# Patient Record
Sex: FEMALE | Race: WHITE | Employment: UNEMPLOYED | URBAN - METROPOLITAN AREA
[De-identification: names, ages, dates, MRNs, and addresses within clinical notes are randomized per-mention and may not be internally consistent; named-entity substitution may affect disease eponyms.]

---

## 2017-04-15 PROBLEM — N30.00 ACUTE CYSTITIS: Status: ACTIVE | Noted: 2017-04-15

## 2017-05-03 PROBLEM — M21.372 BILATERAL FOOT-DROP: Status: ACTIVE | Noted: 2017-05-03

## 2017-05-03 PROBLEM — M21.371 BILATERAL FOOT-DROP: Status: ACTIVE | Noted: 2017-05-03

## 2018-02-28 PROBLEM — A41.9 SEPSIS (HCC): Status: ACTIVE | Noted: 2018-02-28

## 2018-04-04 ENCOUNTER — OFFICE VISIT (OUTPATIENT)
Dept: NEUROLOGY | Age: 63
End: 2018-04-04
Payer: MEDICAID

## 2018-04-04 VITALS
SYSTOLIC BLOOD PRESSURE: 110 MMHG | BODY MASS INDEX: 24.91 KG/M2 | DIASTOLIC BLOOD PRESSURE: 66 MMHG | HEART RATE: 83 BPM | WEIGHT: 155 LBS | HEIGHT: 66 IN | TEMPERATURE: 98.6 F | RESPIRATION RATE: 18 BRPM

## 2018-04-04 DIAGNOSIS — G35 MS (MULTIPLE SCLEROSIS) (HCC): Primary | ICD-10-CM

## 2018-04-04 PROCEDURE — 99215 OFFICE O/P EST HI 40 MIN: CPT | Performed by: PSYCHIATRY & NEUROLOGY

## 2018-04-04 PROCEDURE — 99213 OFFICE O/P EST LOW 20 MIN: CPT | Performed by: PSYCHIATRY & NEUROLOGY

## 2018-04-11 ENCOUNTER — HOSPITAL ENCOUNTER (OUTPATIENT)
Age: 63
Discharge: HOME | End: 2018-04-11
Payer: MEDICAID

## 2018-04-11 DIAGNOSIS — G35 MS (MULTIPLE SCLEROSIS) (HCC): ICD-10-CM

## 2018-04-11 LAB
ALBUMIN SERPL-MCNC: 4 G/DL (ref 3.5–5.2)
ALP BLD-CCNC: 85 U/L (ref 35–104)
ALT SERPL-CCNC: 17 U/L (ref 0–32)
ANION GAP SERPL CALCULATED.3IONS-SCNC: 15 MMOL/L (ref 7–16)
AST SERPL-CCNC: 25 U/L (ref 0–31)
BASOPHILS ABSOLUTE: 0.05 E9/L (ref 0–0.2)
BASOPHILS RELATIVE PERCENT: 0.8 % (ref 0–2)
BILIRUB SERPL-MCNC: 0.3 MG/DL (ref 0–1.2)
BUN BLDV-MCNC: 12 MG/DL (ref 8–23)
CALCIUM SERPL-MCNC: 9.9 MG/DL (ref 8.6–10.2)
CHLORIDE BLD-SCNC: 104 MMOL/L (ref 98–107)
CO2: 21 MMOL/L (ref 22–29)
CREAT SERPL-MCNC: 0.9 MG/DL (ref 0.5–1)
EOSINOPHILS ABSOLUTE: 0.12 E9/L (ref 0.05–0.5)
EOSINOPHILS RELATIVE PERCENT: 2 % (ref 0–6)
GFR AFRICAN AMERICAN: >60
GFR NON-AFRICAN AMERICAN: >60 ML/MIN/1.73
GLUCOSE BLD-MCNC: 82 MG/DL (ref 74–109)
HCT VFR BLD CALC: 41.2 % (ref 34–48)
HEMOGLOBIN: 13 G/DL (ref 11.5–15.5)
IMMATURE GRANULOCYTES #: 0.02 E9/L
IMMATURE GRANULOCYTES %: 0.3 % (ref 0–5)
LYMPHOCYTES ABSOLUTE: 1.2 E9/L (ref 1.5–4)
LYMPHOCYTES RELATIVE PERCENT: 19.9 % (ref 20–42)
MCH RBC QN AUTO: 29.5 PG (ref 26–35)
MCHC RBC AUTO-ENTMCNC: 31.6 % (ref 32–34.5)
MCV RBC AUTO: 93.6 FL (ref 80–99.9)
MONOCYTES ABSOLUTE: 0.53 E9/L (ref 0.1–0.95)
MONOCYTES RELATIVE PERCENT: 8.8 % (ref 2–12)
NEUTROPHILS ABSOLUTE: 4.1 E9/L (ref 1.8–7.3)
NEUTROPHILS RELATIVE PERCENT: 68.2 % (ref 43–80)
PDW BLD-RTO: 13.6 FL (ref 11.5–15)
PLATELET # BLD: 237 E9/L (ref 130–450)
PMV BLD AUTO: 11.1 FL (ref 7–12)
POTASSIUM SERPL-SCNC: 4.2 MMOL/L (ref 3.5–5)
RBC # BLD: 4.4 E12/L (ref 3.5–5.5)
SODIUM BLD-SCNC: 140 MMOL/L (ref 132–146)
TOTAL PROTEIN: 7.6 G/DL (ref 6.4–8.3)
WBC # BLD: 6 E9/L (ref 4.5–11.5)

## 2018-04-11 PROCEDURE — 80053 COMPREHEN METABOLIC PANEL: CPT

## 2018-04-11 PROCEDURE — 80074 ACUTE HEPATITIS PANEL: CPT

## 2018-04-11 PROCEDURE — 36415 COLL VENOUS BLD VENIPUNCTURE: CPT

## 2018-04-11 PROCEDURE — 85025 COMPLETE CBC W/AUTO DIFF WBC: CPT

## 2018-04-12 ENCOUNTER — TELEPHONE (OUTPATIENT)
Dept: PHYSICAL MEDICINE AND REHAB | Age: 63
End: 2018-04-12

## 2018-04-12 DIAGNOSIS — M21.372 BILATERAL FOOT-DROP: ICD-10-CM

## 2018-04-12 DIAGNOSIS — M21.371 BILATERAL FOOT-DROP: ICD-10-CM

## 2018-04-12 DIAGNOSIS — G35 MULTIPLE SCLEROSIS (HCC): Primary | ICD-10-CM

## 2018-04-12 LAB
HAV IGM SER IA-ACNC: NORMAL
HEPATITIS B CORE IGM ANTIBODY: NORMAL
HEPATITIS B SURFACE ANTIGEN INTERPRETATION: NORMAL
HEPATITIS C ANTIBODY INTERPRETATION: NORMAL

## 2018-04-26 RX ORDER — 0.9 % SODIUM CHLORIDE 0.9 %
10 VIAL (ML) INJECTION ONCE
Status: CANCELLED | OUTPATIENT
Start: 2018-04-26 | End: 2018-04-26

## 2018-04-26 RX ORDER — SODIUM CHLORIDE 9 MG/ML
INJECTION, SOLUTION INTRAVENOUS ONCE
Status: CANCELLED | OUTPATIENT
Start: 2018-04-26

## 2018-04-26 RX ORDER — METHYLPREDNISOLONE SODIUM SUCCINATE 125 MG/2ML
125 INJECTION, POWDER, LYOPHILIZED, FOR SOLUTION INTRAMUSCULAR; INTRAVENOUS ONCE
Status: CANCELLED | OUTPATIENT
Start: 2018-04-26 | End: 2018-04-26

## 2018-04-26 RX ORDER — DIPHENHYDRAMINE HYDROCHLORIDE 50 MG/ML
25 INJECTION INTRAMUSCULAR; INTRAVENOUS ONCE
Status: CANCELLED | OUTPATIENT
Start: 2018-04-26

## 2018-04-26 RX ORDER — SODIUM CHLORIDE 0.9 % (FLUSH) 0.9 %
10 SYRINGE (ML) INJECTION PRN
Status: CANCELLED
Start: 2018-04-26

## 2018-04-26 RX ORDER — SODIUM CHLORIDE 9 MG/ML
INJECTION, SOLUTION INTRAVENOUS ONCE
Status: CANCELLED
Start: 2018-04-26 | End: 2018-04-26

## 2018-04-26 RX ORDER — ACETAMINOPHEN 325 MG/1
650 TABLET ORAL ONCE
Status: CANCELLED | OUTPATIENT
Start: 2018-04-26

## 2018-04-26 RX ORDER — DIPHENHYDRAMINE HYDROCHLORIDE 50 MG/ML
50 INJECTION INTRAMUSCULAR; INTRAVENOUS ONCE
Status: CANCELLED | OUTPATIENT
Start: 2018-04-26 | End: 2018-04-26

## 2018-04-26 RX ORDER — METHYLPREDNISOLONE SODIUM SUCCINATE 125 MG/2ML
100 INJECTION, POWDER, LYOPHILIZED, FOR SOLUTION INTRAMUSCULAR; INTRAVENOUS ONCE
Status: CANCELLED | OUTPATIENT
Start: 2018-04-26

## 2018-04-26 RX ORDER — EPINEPHRINE 1 MG/ML
0.3 INJECTION, SOLUTION, CONCENTRATE INTRAVENOUS
Status: CANCELLED | OUTPATIENT
Start: 2018-04-26

## 2018-04-26 RX ORDER — SODIUM CHLORIDE 0.9 % (FLUSH) 0.9 %
10 SYRINGE (ML) INJECTION PRN
Status: CANCELLED | OUTPATIENT
Start: 2018-04-26

## 2018-04-26 RX ORDER — SODIUM CHLORIDE 9 MG/ML
INJECTION, SOLUTION INTRAVENOUS CONTINUOUS
Status: CANCELLED | OUTPATIENT
Start: 2018-04-26

## 2018-04-30 ENCOUNTER — TELEPHONE (OUTPATIENT)
Dept: NEUROLOGY | Age: 63
End: 2018-04-30

## 2018-05-02 ENCOUNTER — HOSPITAL ENCOUNTER (OUTPATIENT)
Dept: INFUSION THERAPY | Age: 63
Setting detail: INFUSION SERIES
End: 2018-05-02
Payer: MEDICAID

## 2018-05-03 ENCOUNTER — HOSPITAL ENCOUNTER (OUTPATIENT)
Dept: INFUSION THERAPY | Age: 63
Setting detail: INFUSION SERIES
Discharge: HOME | End: 2018-05-03
Payer: MEDICAID

## 2018-05-03 VITALS
TEMPERATURE: 97.7 F | RESPIRATION RATE: 16 BRPM | HEIGHT: 66 IN | BODY MASS INDEX: 24.91 KG/M2 | SYSTOLIC BLOOD PRESSURE: 106 MMHG | WEIGHT: 155 LBS | HEART RATE: 66 BPM | DIASTOLIC BLOOD PRESSURE: 57 MMHG

## 2018-05-03 DIAGNOSIS — G35 MULTIPLE SCLEROSIS EXACERBATION (HCC): ICD-10-CM

## 2018-05-03 PROCEDURE — 96366 THER/PROPH/DIAG IV INF ADDON: CPT

## 2018-05-03 PROCEDURE — 6370000000 HC RX 637 (ALT 250 FOR IP): Performed by: PSYCHIATRY & NEUROLOGY

## 2018-05-03 PROCEDURE — 2580000003 HC RX 258: Performed by: PSYCHIATRY & NEUROLOGY

## 2018-05-03 PROCEDURE — 96375 TX/PRO/DX INJ NEW DRUG ADDON: CPT

## 2018-05-03 PROCEDURE — 96374 THER/PROPH/DIAG INJ IV PUSH: CPT

## 2018-05-03 PROCEDURE — 6360000002 HC RX W HCPCS: Performed by: PSYCHIATRY & NEUROLOGY

## 2018-05-03 PROCEDURE — 96365 THER/PROPH/DIAG IV INF INIT: CPT

## 2018-05-03 RX ORDER — METHYLPREDNISOLONE SODIUM SUCCINATE 125 MG/2ML
100 INJECTION, POWDER, LYOPHILIZED, FOR SOLUTION INTRAMUSCULAR; INTRAVENOUS ONCE
Status: CANCELLED | OUTPATIENT
Start: 2018-05-03

## 2018-05-03 RX ORDER — DIPHENHYDRAMINE HYDROCHLORIDE 50 MG/ML
50 INJECTION INTRAMUSCULAR; INTRAVENOUS ONCE
Status: CANCELLED | OUTPATIENT
Start: 2018-05-03 | End: 2018-05-03

## 2018-05-03 RX ORDER — SODIUM CHLORIDE 0.9 % (FLUSH) 0.9 %
10 SYRINGE (ML) INJECTION PRN
Status: DISCONTINUED | OUTPATIENT
Start: 2018-05-03 | End: 2018-05-04 | Stop reason: HOSPADM

## 2018-05-03 RX ORDER — DIPHENHYDRAMINE HYDROCHLORIDE 50 MG/ML
25 INJECTION INTRAMUSCULAR; INTRAVENOUS ONCE
Status: COMPLETED | OUTPATIENT
Start: 2018-05-03 | End: 2018-05-03

## 2018-05-03 RX ORDER — SODIUM CHLORIDE 0.9 % (FLUSH) 0.9 %
10 SYRINGE (ML) INJECTION PRN
Status: CANCELLED | OUTPATIENT
Start: 2018-05-03

## 2018-05-03 RX ORDER — SODIUM CHLORIDE 9 MG/ML
INJECTION, SOLUTION INTRAVENOUS CONTINUOUS
Status: CANCELLED | OUTPATIENT
Start: 2018-05-03

## 2018-05-03 RX ORDER — 0.9 % SODIUM CHLORIDE 0.9 %
10 VIAL (ML) INJECTION ONCE
Status: CANCELLED | OUTPATIENT
Start: 2018-05-03 | End: 2018-05-03

## 2018-05-03 RX ORDER — SODIUM CHLORIDE 0.9 % (FLUSH) 0.9 %
SYRINGE (ML) INJECTION
Status: DISPENSED
Start: 2018-05-03 | End: 2018-05-03

## 2018-05-03 RX ORDER — METHYLPREDNISOLONE SODIUM SUCCINATE 125 MG/2ML
100 INJECTION, POWDER, LYOPHILIZED, FOR SOLUTION INTRAMUSCULAR; INTRAVENOUS ONCE
Status: COMPLETED | OUTPATIENT
Start: 2018-05-03 | End: 2018-05-03

## 2018-05-03 RX ORDER — SODIUM CHLORIDE 9 MG/ML
INJECTION, SOLUTION INTRAVENOUS ONCE
Status: CANCELLED
Start: 2018-05-03 | End: 2018-05-03

## 2018-05-03 RX ORDER — ACETAMINOPHEN 325 MG/1
650 TABLET ORAL ONCE
Status: CANCELLED | OUTPATIENT
Start: 2018-05-03

## 2018-05-03 RX ORDER — DIPHENHYDRAMINE HYDROCHLORIDE 50 MG/ML
25 INJECTION INTRAMUSCULAR; INTRAVENOUS ONCE
Status: CANCELLED | OUTPATIENT
Start: 2018-05-03

## 2018-05-03 RX ORDER — SODIUM CHLORIDE 9 MG/ML
INJECTION, SOLUTION INTRAVENOUS ONCE
Status: CANCELLED | OUTPATIENT
Start: 2018-05-03

## 2018-05-03 RX ORDER — EPINEPHRINE 1 MG/ML
0.3 INJECTION, SOLUTION, CONCENTRATE INTRAVENOUS
Status: CANCELLED | OUTPATIENT
Start: 2018-05-03

## 2018-05-03 RX ORDER — ACETAMINOPHEN 325 MG/1
650 TABLET ORAL ONCE
Status: COMPLETED | OUTPATIENT
Start: 2018-05-03 | End: 2018-05-03

## 2018-05-03 RX ORDER — METHYLPREDNISOLONE SODIUM SUCCINATE 125 MG/2ML
125 INJECTION, POWDER, LYOPHILIZED, FOR SOLUTION INTRAMUSCULAR; INTRAVENOUS ONCE
Status: CANCELLED | OUTPATIENT
Start: 2018-05-03 | End: 2018-05-03

## 2018-05-03 RX ORDER — ACETAMINOPHEN 160 MG/5ML
SOLUTION ORAL
Status: DISPENSED
Start: 2018-05-03 | End: 2018-05-03

## 2018-05-03 RX ORDER — SODIUM CHLORIDE 0.9 % (FLUSH) 0.9 %
10 SYRINGE (ML) INJECTION PRN
Status: CANCELLED
Start: 2018-05-03

## 2018-05-03 RX ADMIN — METHYLPREDNISOLONE SODIUM SUCCINATE 100 MG: 125 INJECTION, POWDER, FOR SOLUTION INTRAMUSCULAR; INTRAVENOUS at 10:00

## 2018-05-03 RX ADMIN — ACETAMINOPHEN 650 MG: 325 TABLET, FILM COATED ORAL at 09:58

## 2018-05-03 RX ADMIN — OCRELIZUMAB 300 MG: 300 INJECTION INTRAVENOUS at 10:38

## 2018-05-03 RX ADMIN — DIPHENHYDRAMINE HYDROCHLORIDE 25 MG: 50 INJECTION INTRAMUSCULAR; INTRAVENOUS at 10:02

## 2018-05-03 ASSESSMENT — PAIN SCALES - GENERAL
PAINLEVEL_OUTOF10: 7
PAINLEVEL_OUTOF10: 7

## 2018-05-03 ASSESSMENT — PAIN DESCRIPTION - PAIN TYPE: TYPE: CHRONIC PAIN

## 2018-05-03 NOTE — PROGRESS NOTES
Discharged to home in stable condition, tolerated infusion well, dc instructions signed and given to pt. iv site dc'd site asymptomatic

## 2018-05-17 ENCOUNTER — HOSPITAL ENCOUNTER (OUTPATIENT)
Dept: INFUSION THERAPY | Age: 63
Setting detail: INFUSION SERIES
Discharge: HOME | End: 2018-05-17
Payer: MEDICAID

## 2018-05-17 VITALS
HEART RATE: 74 BPM | RESPIRATION RATE: 20 BRPM | TEMPERATURE: 98.1 F | OXYGEN SATURATION: 93 % | SYSTOLIC BLOOD PRESSURE: 112 MMHG | DIASTOLIC BLOOD PRESSURE: 64 MMHG

## 2018-05-17 DIAGNOSIS — G35 MULTIPLE SCLEROSIS EXACERBATION (HCC): ICD-10-CM

## 2018-05-17 PROCEDURE — 6360000002 HC RX W HCPCS: Performed by: PSYCHIATRY & NEUROLOGY

## 2018-05-17 PROCEDURE — 96374 THER/PROPH/DIAG INJ IV PUSH: CPT

## 2018-05-17 PROCEDURE — 96375 TX/PRO/DX INJ NEW DRUG ADDON: CPT

## 2018-05-17 PROCEDURE — 96366 THER/PROPH/DIAG IV INF ADDON: CPT

## 2018-05-17 PROCEDURE — 6370000000 HC RX 637 (ALT 250 FOR IP): Performed by: PSYCHIATRY & NEUROLOGY

## 2018-05-17 PROCEDURE — 2580000003 HC RX 258: Performed by: PSYCHIATRY & NEUROLOGY

## 2018-05-17 PROCEDURE — 2580000003 HC RX 258

## 2018-05-17 PROCEDURE — 96365 THER/PROPH/DIAG IV INF INIT: CPT

## 2018-05-17 RX ORDER — SODIUM CHLORIDE 9 MG/ML
INJECTION, SOLUTION INTRAVENOUS CONTINUOUS
Status: CANCELLED | OUTPATIENT
Start: 2018-05-17

## 2018-05-17 RX ORDER — DIPHENHYDRAMINE HYDROCHLORIDE 50 MG/ML
25 INJECTION INTRAMUSCULAR; INTRAVENOUS ONCE
Status: COMPLETED | OUTPATIENT
Start: 2018-05-17 | End: 2018-05-17

## 2018-05-17 RX ORDER — ACETAMINOPHEN 325 MG/1
650 TABLET ORAL ONCE
Status: CANCELLED | OUTPATIENT
Start: 2018-05-17

## 2018-05-17 RX ORDER — SODIUM CHLORIDE 0.9 % (FLUSH) 0.9 %
10 SYRINGE (ML) INJECTION PRN
Status: CANCELLED
Start: 2018-05-17

## 2018-05-17 RX ORDER — SODIUM CHLORIDE 0.9 % (FLUSH) 0.9 %
SYRINGE (ML) INJECTION
Status: COMPLETED
Start: 2018-05-17 | End: 2018-05-17

## 2018-05-17 RX ORDER — SODIUM CHLORIDE 0.9 % (FLUSH) 0.9 %
10 SYRINGE (ML) INJECTION PRN
Status: DISCONTINUED | OUTPATIENT
Start: 2018-05-17 | End: 2018-05-18 | Stop reason: HOSPADM

## 2018-05-17 RX ORDER — METHYLPREDNISOLONE SODIUM SUCCINATE 125 MG/2ML
100 INJECTION, POWDER, LYOPHILIZED, FOR SOLUTION INTRAMUSCULAR; INTRAVENOUS ONCE
Status: CANCELLED | OUTPATIENT
Start: 2018-05-17

## 2018-05-17 RX ORDER — SODIUM CHLORIDE 9 MG/ML
INJECTION, SOLUTION INTRAVENOUS ONCE
Status: DISCONTINUED | OUTPATIENT
Start: 2018-05-17 | End: 2018-05-18 | Stop reason: HOSPADM

## 2018-05-17 RX ORDER — EPINEPHRINE 1 MG/ML
0.3 INJECTION, SOLUTION, CONCENTRATE INTRAVENOUS
Status: CANCELLED | OUTPATIENT
Start: 2018-05-17

## 2018-05-17 RX ORDER — ACETAMINOPHEN 325 MG/1
650 TABLET ORAL ONCE
Status: COMPLETED | OUTPATIENT
Start: 2018-05-17 | End: 2018-05-17

## 2018-05-17 RX ORDER — METHYLPREDNISOLONE SODIUM SUCCINATE 125 MG/2ML
100 INJECTION, POWDER, LYOPHILIZED, FOR SOLUTION INTRAMUSCULAR; INTRAVENOUS ONCE
Status: COMPLETED | OUTPATIENT
Start: 2018-05-17 | End: 2018-05-17

## 2018-05-17 RX ORDER — METHYLPREDNISOLONE SODIUM SUCCINATE 125 MG/2ML
125 INJECTION, POWDER, LYOPHILIZED, FOR SOLUTION INTRAMUSCULAR; INTRAVENOUS ONCE
Status: CANCELLED | OUTPATIENT
Start: 2018-05-17 | End: 2018-05-17

## 2018-05-17 RX ORDER — SODIUM CHLORIDE 0.9 % (FLUSH) 0.9 %
10 SYRINGE (ML) INJECTION PRN
Status: CANCELLED | OUTPATIENT
Start: 2018-05-17

## 2018-05-17 RX ORDER — SODIUM CHLORIDE 9 MG/ML
INJECTION, SOLUTION INTRAVENOUS ONCE
Status: CANCELLED | OUTPATIENT
Start: 2018-05-17

## 2018-05-17 RX ORDER — 0.9 % SODIUM CHLORIDE 0.9 %
10 VIAL (ML) INJECTION ONCE
Status: CANCELLED | OUTPATIENT
Start: 2018-05-17 | End: 2018-05-17

## 2018-05-17 RX ORDER — SODIUM CHLORIDE 9 MG/ML
INJECTION, SOLUTION INTRAVENOUS ONCE
Status: CANCELLED
Start: 2018-05-17 | End: 2018-05-17

## 2018-05-17 RX ORDER — DIPHENHYDRAMINE HYDROCHLORIDE 50 MG/ML
25 INJECTION INTRAMUSCULAR; INTRAVENOUS ONCE
Status: CANCELLED | OUTPATIENT
Start: 2018-05-17

## 2018-05-17 RX ORDER — DIPHENHYDRAMINE HYDROCHLORIDE 50 MG/ML
50 INJECTION INTRAMUSCULAR; INTRAVENOUS ONCE
Status: CANCELLED | OUTPATIENT
Start: 2018-05-17 | End: 2018-05-17

## 2018-05-17 RX ADMIN — METHYLPREDNISOLONE SODIUM SUCCINATE 100 MG: 125 INJECTION, POWDER, FOR SOLUTION INTRAMUSCULAR; INTRAVENOUS at 10:30

## 2018-05-17 RX ADMIN — Medication 10 ML: at 10:16

## 2018-05-17 RX ADMIN — Medication 10 ML: at 10:29

## 2018-05-17 RX ADMIN — ACETAMINOPHEN 650 MG: 325 TABLET, FILM COATED ORAL at 10:13

## 2018-05-17 RX ADMIN — OCRELIZUMAB 300 MG: 300 INJECTION INTRAVENOUS at 11:06

## 2018-05-17 RX ADMIN — DIPHENHYDRAMINE HYDROCHLORIDE 25 MG: 50 INJECTION, SOLUTION INTRAMUSCULAR; INTRAVENOUS at 10:20

## 2018-05-17 ASSESSMENT — PAIN SCALES - GENERAL: PAINLEVEL_OUTOF10: 0

## 2018-06-13 ENCOUNTER — OFFICE VISIT (OUTPATIENT)
Dept: NEUROLOGY | Age: 63
End: 2018-06-13
Payer: MEDICAID

## 2018-06-13 VITALS
HEART RATE: 97 BPM | DIASTOLIC BLOOD PRESSURE: 75 MMHG | SYSTOLIC BLOOD PRESSURE: 119 MMHG | BODY MASS INDEX: 24.91 KG/M2 | TEMPERATURE: 98.1 F | WEIGHT: 155 LBS | RESPIRATION RATE: 18 BRPM | HEIGHT: 66 IN

## 2018-06-13 DIAGNOSIS — G35 MS (MULTIPLE SCLEROSIS) (HCC): Primary | ICD-10-CM

## 2018-06-13 PROCEDURE — 99215 OFFICE O/P EST HI 40 MIN: CPT | Performed by: PSYCHIATRY & NEUROLOGY

## 2018-08-09 ENCOUNTER — HOSPITAL ENCOUNTER (INPATIENT)
Age: 63
LOS: 5 days | Discharge: NURSING, REHAB OR LONG-TERM CARE | DRG: 466 | End: 2018-08-14
Attending: EMERGENCY MEDICINE | Admitting: FAMILY MEDICINE
Payer: MEDICAID

## 2018-08-09 ENCOUNTER — APPOINTMENT (OUTPATIENT)
Dept: GENERAL RADIOLOGY | Age: 63
DRG: 466 | End: 2018-08-09
Payer: MEDICAID

## 2018-08-09 ENCOUNTER — APPOINTMENT (OUTPATIENT)
Dept: CT IMAGING | Age: 63
DRG: 466 | End: 2018-08-09
Payer: MEDICAID

## 2018-08-09 DIAGNOSIS — T83.511A URINARY TRACT INFECTION ASSOCIATED WITH INDWELLING URETHRAL CATHETER, INITIAL ENCOUNTER (HCC): ICD-10-CM

## 2018-08-09 DIAGNOSIS — R41.0 DISORIENTATION: Primary | ICD-10-CM

## 2018-08-09 DIAGNOSIS — N39.0 URINARY TRACT INFECTION ASSOCIATED WITH INDWELLING URETHRAL CATHETER, INITIAL ENCOUNTER (HCC): ICD-10-CM

## 2018-08-09 PROBLEM — R41.82 ALTERED MENTAL STATUS: Status: ACTIVE | Noted: 2018-08-09

## 2018-08-09 LAB
ALBUMIN SERPL-MCNC: 3.8 G/DL (ref 3.5–5.2)
ALP BLD-CCNC: 111 U/L (ref 35–104)
ALT SERPL-CCNC: 13 U/L (ref 0–32)
ANION GAP SERPL CALCULATED.3IONS-SCNC: 16 MMOL/L (ref 7–16)
AST SERPL-CCNC: 35 U/L (ref 0–31)
BACTERIA: ABNORMAL /HPF
BILIRUB SERPL-MCNC: 0.4 MG/DL (ref 0–1.2)
BILIRUBIN URINE: NEGATIVE
BLOOD, URINE: ABNORMAL
BUN BLDV-MCNC: 12 MG/DL (ref 8–23)
CALCIUM SERPL-MCNC: 9.8 MG/DL (ref 8.6–10.2)
CHLORIDE BLD-SCNC: 99 MMOL/L (ref 98–107)
CLARITY: ABNORMAL
CO2: 22 MMOL/L (ref 22–29)
COLOR: YELLOW
CREAT SERPL-MCNC: 0.8 MG/DL (ref 0.5–1)
EPITHELIAL CELLS, UA: ABNORMAL /HPF
GFR AFRICAN AMERICAN: >60
GFR NON-AFRICAN AMERICAN: >60 ML/MIN/1.73
GLUCOSE BLD-MCNC: 136 MG/DL (ref 74–109)
GLUCOSE URINE: NEGATIVE MG/DL
HCT VFR BLD CALC: 42.8 % (ref 34–48)
HEMOGLOBIN: 14.2 G/DL (ref 11.5–15.5)
KETONES, URINE: NEGATIVE MG/DL
LACTIC ACID: 2.9 MMOL/L (ref 0.5–2.2)
LEUKOCYTE ESTERASE, URINE: ABNORMAL
LIPASE: 15 U/L (ref 13–60)
MCH RBC QN AUTO: 28.6 PG (ref 26–35)
MCHC RBC AUTO-ENTMCNC: 33.2 % (ref 32–34.5)
MCV RBC AUTO: 86.3 FL (ref 80–99.9)
NITRITE, URINE: NEGATIVE
PDW BLD-RTO: 13.2 FL (ref 11.5–15)
PH UA: 6 (ref 5–9)
PLATELET # BLD: 506 E9/L (ref 130–450)
PMV BLD AUTO: 10 FL (ref 7–12)
POTASSIUM SERPL-SCNC: 5.4 MMOL/L (ref 3.5–5)
PROTEIN UA: 30 MG/DL
RBC # BLD: 4.96 E12/L (ref 3.5–5.5)
RBC UA: ABNORMAL /HPF (ref 0–2)
SODIUM BLD-SCNC: 137 MMOL/L (ref 132–146)
SPECIFIC GRAVITY UA: 1.02 (ref 1–1.03)
TOTAL PROTEIN: 8.3 G/DL (ref 6.4–8.3)
TSH SERPL DL<=0.05 MIU/L-ACNC: 1.29 UIU/ML (ref 0.27–4.2)
UROBILINOGEN, URINE: 0.2 E.U./DL
WBC # BLD: 15.3 E9/L (ref 4.5–11.5)
WBC UA: ABNORMAL /HPF (ref 0–5)

## 2018-08-09 PROCEDURE — 6370000000 HC RX 637 (ALT 250 FOR IP): Performed by: FAMILY MEDICINE

## 2018-08-09 PROCEDURE — 84443 ASSAY THYROID STIM HORMONE: CPT

## 2018-08-09 PROCEDURE — 71045 X-RAY EXAM CHEST 1 VIEW: CPT

## 2018-08-09 PROCEDURE — 2580000003 HC RX 258: Performed by: EMERGENCY MEDICINE

## 2018-08-09 PROCEDURE — 80053 COMPREHEN METABOLIC PANEL: CPT

## 2018-08-09 PROCEDURE — 6360000002 HC RX W HCPCS: Performed by: EMERGENCY MEDICINE

## 2018-08-09 PROCEDURE — 87088 URINE BACTERIA CULTURE: CPT

## 2018-08-09 PROCEDURE — 36415 COLL VENOUS BLD VENIPUNCTURE: CPT

## 2018-08-09 PROCEDURE — 83690 ASSAY OF LIPASE: CPT

## 2018-08-09 PROCEDURE — 87186 SC STD MICRODIL/AGAR DIL: CPT

## 2018-08-09 PROCEDURE — 2060000000 HC ICU INTERMEDIATE R&B

## 2018-08-09 PROCEDURE — 70450 CT HEAD/BRAIN W/O DYE: CPT

## 2018-08-09 PROCEDURE — 87040 BLOOD CULTURE FOR BACTERIA: CPT

## 2018-08-09 PROCEDURE — 96365 THER/PROPH/DIAG IV INF INIT: CPT

## 2018-08-09 PROCEDURE — 81001 URINALYSIS AUTO W/SCOPE: CPT

## 2018-08-09 PROCEDURE — 83605 ASSAY OF LACTIC ACID: CPT

## 2018-08-09 PROCEDURE — 85027 COMPLETE CBC AUTOMATED: CPT

## 2018-08-09 PROCEDURE — 96366 THER/PROPH/DIAG IV INF ADDON: CPT

## 2018-08-09 PROCEDURE — 99284 EMERGENCY DEPT VISIT MOD MDM: CPT

## 2018-08-09 RX ORDER — SODIUM CHLORIDE 0.9 % (FLUSH) 0.9 %
10 SYRINGE (ML) INJECTION PRN
Status: DISCONTINUED | OUTPATIENT
Start: 2018-08-09 | End: 2018-08-14 | Stop reason: HOSPADM

## 2018-08-09 RX ORDER — LEVOTHYROXINE SODIUM 88 UG/1
88 TABLET ORAL DAILY
Status: DISCONTINUED | OUTPATIENT
Start: 2018-08-10 | End: 2018-08-14 | Stop reason: HOSPADM

## 2018-08-09 RX ORDER — FLUOXETINE HYDROCHLORIDE 20 MG/1
20 CAPSULE ORAL 2 TIMES DAILY
Status: DISCONTINUED | OUTPATIENT
Start: 2018-08-09 | End: 2018-08-14 | Stop reason: HOSPADM

## 2018-08-09 RX ORDER — SODIUM CHLORIDE 0.9 % (FLUSH) 0.9 %
10 SYRINGE (ML) INJECTION EVERY 12 HOURS SCHEDULED
Status: DISCONTINUED | OUTPATIENT
Start: 2018-08-09 | End: 2018-08-14 | Stop reason: HOSPADM

## 2018-08-09 RX ORDER — TRAMADOL HYDROCHLORIDE 50 MG/1
50 TABLET ORAL EVERY 6 HOURS PRN
COMMUNITY
End: 2018-12-12 | Stop reason: ALTCHOICE

## 2018-08-09 RX ORDER — ACETAMINOPHEN 325 MG/1
650 TABLET ORAL EVERY 4 HOURS PRN
Status: DISCONTINUED | OUTPATIENT
Start: 2018-08-09 | End: 2018-08-14 | Stop reason: HOSPADM

## 2018-08-09 RX ORDER — SODIUM CHLORIDE 0.9 % (FLUSH) 0.9 %
10 SYRINGE (ML) INJECTION PRN
Status: DISCONTINUED | OUTPATIENT
Start: 2018-08-09 | End: 2018-08-09 | Stop reason: SDUPTHER

## 2018-08-09 RX ORDER — SODIUM CHLORIDE 0.9 % (FLUSH) 0.9 %
10 SYRINGE (ML) INJECTION EVERY 12 HOURS SCHEDULED
Status: DISCONTINUED | OUTPATIENT
Start: 2018-08-09 | End: 2018-08-09 | Stop reason: SDUPTHER

## 2018-08-09 RX ORDER — ALBUTEROL SULFATE 90 UG/1
2 AEROSOL, METERED RESPIRATORY (INHALATION) EVERY 6 HOURS PRN
Status: DISCONTINUED | OUTPATIENT
Start: 2018-08-09 | End: 2018-08-14 | Stop reason: HOSPADM

## 2018-08-09 RX ORDER — ONDANSETRON 2 MG/ML
4 INJECTION INTRAMUSCULAR; INTRAVENOUS EVERY 6 HOURS PRN
Status: DISCONTINUED | OUTPATIENT
Start: 2018-08-09 | End: 2018-08-14 | Stop reason: HOSPADM

## 2018-08-09 RX ORDER — 0.9 % SODIUM CHLORIDE 0.9 %
1000 INTRAVENOUS SOLUTION INTRAVENOUS ONCE
Status: COMPLETED | OUTPATIENT
Start: 2018-08-09 | End: 2018-08-09

## 2018-08-09 RX ORDER — AMLODIPINE BESYLATE 5 MG/1
5 TABLET ORAL DAILY
Status: DISCONTINUED | OUTPATIENT
Start: 2018-08-10 | End: 2018-08-14 | Stop reason: HOSPADM

## 2018-08-09 RX ORDER — GABAPENTIN 300 MG/1
300 CAPSULE ORAL 3 TIMES DAILY
Status: DISCONTINUED | OUTPATIENT
Start: 2018-08-09 | End: 2018-08-14 | Stop reason: HOSPADM

## 2018-08-09 RX ORDER — CLONIDINE HYDROCHLORIDE 0.2 MG/1
0.2 TABLET ORAL EVERY 6 HOURS PRN
Status: DISCONTINUED | OUTPATIENT
Start: 2018-08-09 | End: 2018-08-14 | Stop reason: HOSPADM

## 2018-08-09 RX ORDER — DIAZEPAM 2 MG/1
2 TABLET ORAL EVERY 12 HOURS PRN
Status: DISCONTINUED | OUTPATIENT
Start: 2018-08-09 | End: 2018-08-14 | Stop reason: HOSPADM

## 2018-08-09 RX ADMIN — GENTAMICIN SULFATE 70 MG: 40 INJECTION, SOLUTION INTRAMUSCULAR; INTRAVENOUS at 17:42

## 2018-08-09 RX ADMIN — FLUOXETINE HYDROCHLORIDE 20 MG: 20 CAPSULE ORAL at 23:06

## 2018-08-09 RX ADMIN — SODIUM CHLORIDE 1000 ML: 9 INJECTION, SOLUTION INTRAVENOUS at 13:00

## 2018-08-09 RX ADMIN — GABAPENTIN 300 MG: 300 CAPSULE ORAL at 23:06

## 2018-08-09 ASSESSMENT — PAIN SCALES - GENERAL
PAINLEVEL_OUTOF10: 0
PAINLEVEL_OUTOF10: 0
PAINLEVEL_OUTOF10: 4

## 2018-08-09 ASSESSMENT — PAIN DESCRIPTION - LOCATION: LOCATION: NECK

## 2018-08-09 ASSESSMENT — PAIN DESCRIPTION - PAIN TYPE: TYPE: ACUTE PAIN

## 2018-08-09 NOTE — ED PROVIDER NOTES
63-year-old female presenting from home with 3 days of confusion, fatigue, questionable altered mental status. She is awake and appears alert but is not oriented at this time. Cannot provide history, cannot answer simple questions. She is not in any distress, moving all extremities. She is in a wheelchair at baseline but does move her legs and is able to pivot according to family. Upon arrival she had 1500 mL of urine that came out with her catheter, she has a chronic indwelling but the initial 750 was in the bag. This is emptied and the bag filled up very quickly with a noted large amount of urine. Patient has no complaints at this time. Review of Systems   Unable to perform ROS: Mental status change       Physical Exam   Constitutional: She appears well-developed and well-nourished. No distress. HENT:   Head: Normocephalic and atraumatic. Eyes: Conjunctivae are normal. Pupils are equal, round, and reactive to light. Neck: Normal range of motion. No thyromegaly present. Cardiovascular: Normal rate and regular rhythm. Murmur heard. Pulmonary/Chest: Effort normal and breath sounds normal. No respiratory distress. Abdominal: Soft. She exhibits no distension. There is no tenderness. There is no rebound and no guarding. Musculoskeletal: She exhibits no edema or tenderness. Chronic problem with her legs, thin, and distal pulses intact, generally weak equally   Neurological: She is alert. No cranial nerve deficit. Coordination normal.   Skin: Skin is warm and dry. No erythema. Psychiatric: She has a normal mood and affect. Procedures    MDM         EKG: Sinus tachycardia, rate 107, normal axis, no ST elevations or depressions, no T-wave abnormalities, rightward axis.         --------------------------------------------- PAST HISTORY ---------------------------------------------  Past Medical History:  has a past medical history of Anxiety; Depression;  Hypothyroidism; [facility] and Unspecified cerebral artery occlusion with cerebral infarction. Past Surgical History:  has a past surgical history that includes Hysterectomy (2001); Dental surgery; and Colon surgery (2001). Social History:  reports that she has never smoked. She has never used smokeless tobacco. She reports that she does not drink alcohol or use drugs. Family History: family history includes Cancer in her mother; Diabetes in her father; Heart Disease in her father. The patients home medications have been reviewed.     Allergies: Pcn [penicillins]    -------------------------------------------------- RESULTS -------------------------------------------------    LABS:  Results for orders placed or performed during the hospital encounter of 08/09/18   CBC   Result Value Ref Range    WBC 15.3 (H) 4.5 - 11.5 E9/L    RBC 4.96 3.50 - 5.50 E12/L    Hemoglobin 14.2 11.5 - 15.5 g/dL    Hematocrit 42.8 34.0 - 48.0 %    MCV 86.3 80.0 - 99.9 fL    MCH 28.6 26.0 - 35.0 pg    MCHC 33.2 32.0 - 34.5 %    RDW 13.2 11.5 - 15.0 fL    Platelets 646 (H) 356 - 450 E9/L    MPV 10.0 7.0 - 12.0 fL   Comprehensive Metabolic Panel   Result Value Ref Range    Sodium 137 132 - 146 mmol/L    Potassium 5.4 (H) 3.5 - 5.0 mmol/L    Chloride 99 98 - 107 mmol/L    CO2 22 22 - 29 mmol/L    Anion Gap 16 7 - 16 mmol/L    Glucose 136 (H) 74 - 109 mg/dL    BUN 12 8 - 23 mg/dL    CREATININE 0.8 0.5 - 1.0 mg/dL    GFR Non-African American >60 >=60 mL/min/1.73    GFR African American >60     Calcium 9.8 8.6 - 10.2 mg/dL    Total Protein 8.3 6.4 - 8.3 g/dL    Alb 3.8 3.5 - 5.2 g/dL    Total Bilirubin 0.4 0.0 - 1.2 mg/dL    Alkaline Phosphatase 111 (H) 35 - 104 U/L    ALT 13 0 - 32 U/L    AST 35 (H) 0 - 31 U/L   Lactic Acid, Plasma   Result Value Ref Range    Lactic Acid 2.9 (H) 0.5 - 2.2 mmol/L   Urinalysis   Result Value Ref Range    Color, UA Yellow Straw/Yellow    Clarity, UA SLCLOUDY Clear    Glucose, Ur Negative Negative mg/dL Bilirubin Urine Negative Negative    Ketones, Urine Negative Negative mg/dL    Specific Gravity, UA 1.020 1.005 - 1.030    Blood, Urine MODERATE (A) Negative    pH, UA 6.0 5.0 - 9.0    Protein, UA 30 (A) Negative mg/dL    Urobilinogen, Urine 0.2 <2.0 E.U./dL    Nitrite, Urine Negative Negative    Leukocyte Esterase, Urine LARGE (A) Negative   Lipase   Result Value Ref Range    Lipase 15 13 - 60 U/L   TSH without Reflex   Result Value Ref Range    TSH 1.290 0.270 - 4.200 uIU/mL   Microscopic Urinalysis   Result Value Ref Range    WBC, UA PACKED 0 - 5 /HPF    RBC, UA 2-5 0 - 2 /HPF    Epi Cells RARE /HPF    Bacteria, UA MANY (A) /HPF       RADIOLOGY:  CT Head WO Contrast   Final Result   1. Age-related atrophic changes without evidence of acute intracranial   pathology. 2. Moderate to severe diffuse periventricular and deep subcortical   low-attenuation similar to previous study, representing underlying   demyelinating process and chronic microvascular ischemic disease. If there is concern for CVA, MRI is more sensitive for the detection   of early stages of acute ischemic infarct. XR CHEST PORTABLE   Final Result      1. No acute pleuroparenchymal disease.                      ------------------------- NURSING NOTES AND VITALS REVIEWED ---------------------------  Date / Time Roomed:  8/9/2018 12:18 PM  ED Bed Assignment:  07/07    The nursing notes within the ED encounter and vital signs as below have been reviewed.      Patient Vitals for the past 24 hrs:   BP Temp Temp src Pulse Resp SpO2 Height Weight   08/09/18 1813 (!) 161/90 - - 109 18 - - -   08/09/18 1533 (!) 149/109 - - 110 16 97 % - -   08/09/18 1331 (!) 159/114 - - 108 16 96 % - -   08/09/18 1226 (!) 157/110 97.7 °F (36.5 °C) Temporal 108 18 96 % 5' 6\" (1.676 m) 155 lb (70.3 kg)       Oxygen Saturation Interpretation: Normal    ------------------------------------------ PROGRESS NOTES

## 2018-08-09 NOTE — ED NOTES
Dr. Slade Rodriguez updated on current vital signs. No new orders. Patient's son at the bedside and awaiting to talk w Dr. Slade Rodriguez.      Donnie Saucedo RN  08/09/18 2542

## 2018-08-10 LAB
ALBUMIN SERPL-MCNC: 3.3 G/DL (ref 3.5–5.2)
ALP BLD-CCNC: 91 U/L (ref 35–104)
ALT SERPL-CCNC: 9 U/L (ref 0–32)
ANION GAP SERPL CALCULATED.3IONS-SCNC: 14 MMOL/L (ref 7–16)
AST SERPL-CCNC: 10 U/L (ref 0–31)
BASOPHILS ABSOLUTE: 0.07 E9/L (ref 0–0.2)
BASOPHILS RELATIVE PERCENT: 0.7 % (ref 0–2)
BILIRUB SERPL-MCNC: 0.5 MG/DL (ref 0–1.2)
BUN BLDV-MCNC: 12 MG/DL (ref 8–23)
CALCIUM SERPL-MCNC: 8.6 MG/DL (ref 8.6–10.2)
CHLORIDE BLD-SCNC: 103 MMOL/L (ref 98–107)
CO2: 24 MMOL/L (ref 22–29)
CREAT SERPL-MCNC: 0.9 MG/DL (ref 0.5–1)
EOSINOPHILS ABSOLUTE: 0.08 E9/L (ref 0.05–0.5)
EOSINOPHILS RELATIVE PERCENT: 0.8 % (ref 0–6)
GFR AFRICAN AMERICAN: >60
GFR NON-AFRICAN AMERICAN: >60 ML/MIN/1.73
GLUCOSE BLD-MCNC: 103 MG/DL (ref 74–109)
HCT VFR BLD CALC: 36.3 % (ref 34–48)
HEMOGLOBIN: 12.3 G/DL (ref 11.5–15.5)
IMMATURE GRANULOCYTES #: 0.02 E9/L
IMMATURE GRANULOCYTES %: 0.2 % (ref 0–5)
LACTIC ACID: 0.8 MMOL/L (ref 0.5–2.2)
LYMPHOCYTES ABSOLUTE: 1.18 E9/L (ref 1.5–4)
LYMPHOCYTES RELATIVE PERCENT: 11.9 % (ref 20–42)
MAGNESIUM: 2.1 MG/DL (ref 1.6–2.6)
MCH RBC QN AUTO: 29.3 PG (ref 26–35)
MCHC RBC AUTO-ENTMCNC: 33.9 % (ref 32–34.5)
MCV RBC AUTO: 86.4 FL (ref 80–99.9)
MONOCYTES ABSOLUTE: 0.84 E9/L (ref 0.1–0.95)
MONOCYTES RELATIVE PERCENT: 8.5 % (ref 2–12)
NEUTROPHILS ABSOLUTE: 7.71 E9/L (ref 1.8–7.3)
NEUTROPHILS RELATIVE PERCENT: 77.9 % (ref 43–80)
PDW BLD-RTO: 13.8 FL (ref 11.5–15)
PLATELET # BLD: 440 E9/L (ref 130–450)
PMV BLD AUTO: 10.1 FL (ref 7–12)
POTASSIUM REFLEX MAGNESIUM: 3 MMOL/L (ref 3.5–5)
RBC # BLD: 4.2 E12/L (ref 3.5–5.5)
SODIUM BLD-SCNC: 141 MMOL/L (ref 132–146)
TOTAL PROTEIN: 6.3 G/DL (ref 6.4–8.3)
WBC # BLD: 9.9 E9/L (ref 4.5–11.5)

## 2018-08-10 PROCEDURE — 2700000000 HC OXYGEN THERAPY PER DAY

## 2018-08-10 PROCEDURE — 36415 COLL VENOUS BLD VENIPUNCTURE: CPT

## 2018-08-10 PROCEDURE — G8987 SELF CARE CURRENT STATUS: HCPCS

## 2018-08-10 PROCEDURE — 97165 OT EVAL LOW COMPLEX 30 MIN: CPT

## 2018-08-10 PROCEDURE — 2060000000 HC ICU INTERMEDIATE R&B

## 2018-08-10 PROCEDURE — 80053 COMPREHEN METABOLIC PANEL: CPT

## 2018-08-10 PROCEDURE — 83605 ASSAY OF LACTIC ACID: CPT

## 2018-08-10 PROCEDURE — 99222 1ST HOSP IP/OBS MODERATE 55: CPT | Performed by: FAMILY MEDICINE

## 2018-08-10 PROCEDURE — 6360000002 HC RX W HCPCS: Performed by: FAMILY MEDICINE

## 2018-08-10 PROCEDURE — 6370000000 HC RX 637 (ALT 250 FOR IP): Performed by: FAMILY MEDICINE

## 2018-08-10 PROCEDURE — 83735 ASSAY OF MAGNESIUM: CPT

## 2018-08-10 PROCEDURE — 2580000003 HC RX 258: Performed by: FAMILY MEDICINE

## 2018-08-10 PROCEDURE — 99253 IP/OBS CNSLTJ NEW/EST LOW 45: CPT | Performed by: PSYCHIATRY & NEUROLOGY

## 2018-08-10 PROCEDURE — G8988 SELF CARE GOAL STATUS: HCPCS

## 2018-08-10 PROCEDURE — 85025 COMPLETE CBC W/AUTO DIFF WBC: CPT

## 2018-08-10 PROCEDURE — 97530 THERAPEUTIC ACTIVITIES: CPT

## 2018-08-10 RX ORDER — POTASSIUM BICARBONATE 25 MEQ/1
25 TABLET, EFFERVESCENT ORAL 2 TIMES DAILY
Status: DISCONTINUED | OUTPATIENT
Start: 2018-08-10 | End: 2018-08-14 | Stop reason: HOSPADM

## 2018-08-10 RX ADMIN — Medication 10 ML: at 20:13

## 2018-08-10 RX ADMIN — ENOXAPARIN SODIUM 40 MG: 100 INJECTION SUBCUTANEOUS at 08:18

## 2018-08-10 RX ADMIN — FLUOXETINE HYDROCHLORIDE 20 MG: 20 CAPSULE ORAL at 20:52

## 2018-08-10 RX ADMIN — AMLODIPINE BESYLATE 5 MG: 5 TABLET ORAL at 08:18

## 2018-08-10 RX ADMIN — Medication 10 ML: at 08:18

## 2018-08-10 RX ADMIN — GABAPENTIN 300 MG: 300 CAPSULE ORAL at 14:24

## 2018-08-10 RX ADMIN — GABAPENTIN 300 MG: 300 CAPSULE ORAL at 20:52

## 2018-08-10 RX ADMIN — POTASSIUM BICARBONATE 25 MEQ: 25 TABLET, EFFERVESCENT ORAL at 11:25

## 2018-08-10 RX ADMIN — POTASSIUM BICARBONATE 25 MEQ: 25 TABLET, EFFERVESCENT ORAL at 20:52

## 2018-08-10 RX ADMIN — CEFTRIAXONE SODIUM 1 G: 1 INJECTION, POWDER, FOR SOLUTION INTRAMUSCULAR; INTRAVENOUS at 20:12

## 2018-08-10 RX ADMIN — LEVOTHYROXINE SODIUM 88 MCG: 88 TABLET ORAL at 06:27

## 2018-08-10 RX ADMIN — Medication 10 ML: at 06:27

## 2018-08-10 RX ADMIN — GABAPENTIN 300 MG: 300 CAPSULE ORAL at 08:18

## 2018-08-10 RX ADMIN — FLUOXETINE HYDROCHLORIDE 20 MG: 20 CAPSULE ORAL at 08:18

## 2018-08-10 ASSESSMENT — PAIN SCALES - GENERAL
PAINLEVEL_OUTOF10: 0

## 2018-08-10 NOTE — H&P
Alejandro Tucker  8/9/2018 12:18 PM     1955      63 y.o. Chief Complaint   Patient presents with    Fatigue     x3 days. BGL per EMS. Speech is clear. Delayed responses when asking questions. History of Present Illness:    Alejandro Tucker is a 63 y.o. with hx of MS presents to ER with decreased mental status. Had similar admission in march with UTI  However that urine did not grow organism. Was given 1 dose of Gent in er. Will ask ID input and neuro input for worsening MS    She is confused does not know why she is here. Denies any complaints. Ct head shows mod to severe diffuse periventricular and deep sucortical low attenuation old. representing underlying   demyelinating process and chronic microvascular ischemic disease     Urine + blood, large LE  Nitrites neg wbc 15.3, glucose 136,  Lactic acid  2.9        Prior to Admission medications    Medication Sig Start Date End Date Taking? Authorizing Provider   traMADol (ULTRAM) 50 MG tablet Take 50 mg by mouth every 6 hours as needed for Pain. .   Yes Historical Provider, MD   levothyroxine (SYNTHROID) 88 MCG tablet Take 88 mcg by mouth Daily   Yes Historical Provider, MD   albuterol sulfate HFA (PROAIR HFA) 108 (90 Base) MCG/ACT inhaler Inhale 2 puffs into the lungs every 6 hours as needed for Wheezing 9/18/17 8/9/18 Yes VERENA Barillas - CNP   amLODIPine (NORVASC) 5 MG tablet Take 1 tablet by mouth daily 5/17/17  Yes Raul Munoz MD   diazepam (VALIUM) 2 MG tablet Take 2 mg by mouth every 12 hours as needed for Anxiety   Yes Historical Provider, MD   gabapentin (NEURONTIN) 300 MG capsule Take 1 capsule by mouth 3 times daily 10/30/15  Yes Christo Velasquez MD   FLUoxetine (PROZAC) 20 MG capsule Take 1 capsule by mouth 2 times daily 10/30/15  Yes Christo Velasquez MD   cloNIDine (CATAPRES) 0.2 MG tablet Take 1 tablet by mouth every 6 hours as needed (EABJYTPR>529 or HJDEXQPBJ>389) 9/64/02   Raul Munoz MD       Current Facility-Administered Medications   Medication Dose Route Frequency Provider Last Rate Last Dose    acetaminophen (TYLENOL) tablet 650 mg  650 mg Oral Q4H PRN Lorena Grimm MD        enoxaparin (LOVENOX) injection 40 mg  40 mg Subcutaneous Daily Lorena Grimm MD        albuterol sulfate  (90 Base) MCG/ACT inhaler 2 puff  2 puff Inhalation Q6H PRN Lorena Grimm MD        amLODIPine (NORVASC) tablet 5 mg  5 mg Oral Daily Lorena Grimm MD        cloNIDine (CATAPRES) tablet 0.2 mg  0.2 mg Oral Q6H PRN Lorena Grimm MD        diazepam (VALIUM) tablet 2 mg  2 mg Oral Q12H PRN Lorena Grimm MD        FLUoxetine (PROZAC) capsule 20 mg  20 mg Oral BID Lorena Grimm MD   20 mg at 08/09/18 2306    gabapentin (NEURONTIN) capsule 300 mg  300 mg Oral TID Lorena Grimm MD   300 mg at 08/09/18 2306    levothyroxine (SYNTHROID) tablet 88 mcg  88 mcg Oral Daily Lorena Grimm MD   88 mcg at 08/10/18 7845    sodium chloride flush 0.9 % injection 10 mL  10 mL Intravenous 2 times per day Lorena Grimm MD   10 mL at 08/10/18 0780    sodium chloride flush 0.9 % injection 10 mL  10 mL Intravenous PRN Lorena Grimm MD        ondansetron [facility]) injection 4 mg  4 mg Intravenous Q6H PRN Lorena Grimm MD         Continuous Infusions: Allergies   Allergen Reactions    Pcn [Penicillins] Anaphylaxis         Past Medical History:   Diagnosis Date    Anxiety     Depression     History of blood transfusion     Hypothyroidism     [facility]     Unspecified cerebral artery occlusion with cerebral infarction 2011    left her incontinent         Past Surgical History:   Procedure Laterality Date    COLON SURGERY  2001    exp lap, lysis of adhesions, release of SBO. SE. Dr. Mayra Londono      all removed    HYST[facility]CTOMY  2001    [facility].  Dr. Dash Maria         Social History:   Social History     Social History    Marital status:      Spouse name: N/A    Number of children: N/A    Years of education: N/A     Social History Main Topics    Smoking status: Never Smoker    Smokeless tobacco: Never Used    Alcohol use No    Drug use: No    Sexual activity: Not Asked     Other Topics Concern    None     Social History Narrative    None       Family History:   Family History   Problem Relation Age of Onset    Cancer Mother     Diabetes Father     Heart Disease Father             REVIEW OF SYSTEMS:                            GENERAL No weight change, no fever or chills,++ change in appetite, no night sweats    SKIN             No itching,no rashes no lesions,.                                  ENT  No hearing loss, no  Earache ,  No tinnitus, no vertigo,  No facial pressure. No rhinorrhea , no sore throat. No hoarseness, no swollen glands  RESP         No cough, no sputum, no wheezing, no hemoptysis , no dyspnea. CARDIO No CP, no palpitations, no syncope,no  murmur,no orthopnea,no  edema, or claudication. GI  No dysphagia,no nausea,no vomiting no diarrhea. No abdominal pain no constipation. No melena.   No dysuria, no frequency, no urgency no hematuria. ENDO   No heat or cold intolerance. No diabetes, no thyroid problems. HEME  No bruising no bleeding problems. [address] SKEL. No joint pain, no joint swelling, no weakness, no tenderness, no cramps, no  back pain, no neck pain   PSHYCH. No depression no anxiety. Not suicidal  NEURO. See hpi ++MS, confusion , follows commands. Weakness.   DATA:      Recent Labs      08/09/18   1301   WBC  15.3*   HGB  14.2   HCT  42.8   MCV  86.3   PLT  506*     Recent Labs      08/09/18   1301   NA  137   K  5.4*   CL  99   CO2  22   BUN  12   CREATININE  0.8   LABGLOM  >60   CALCIUM  9.8     Recent Labs      08/09/18   1301   ALT  13     INR: No results for input(s): abnormal findings:weakness bilat legs, ; follows simple commands speech normal,   Extremities - peripheral pulses normal, no pedal edema, no clubbing or cyanosis  Skin - normal coloration and turgor, no rashes, no suspicious skin lesions noted                         Imaging:      Assessment:  Patient Active Problem List   Diagnosis    [facility] sclerosis ([facility]    Acute cystitis    Bilateral foot-drop    Sepsis ([facility]    Infection due to urethral catheter ([facility]    Altered mental status     1. Change in mental status    2. Possible uti   Rocephin    Consult ID    3. MUltiple sclerosis    4.   Chronic urine catheter    Plan:     Consult ID and Neuro        Ressie Misha  7:24 AM  8/10/2018

## 2018-08-10 NOTE — PROGRESS NOTES
OCCUPATIONAL THERAPY INITIAL EVALUATION      Date:8/10/2018  Patient Name: Anthony Robbins  MRN: [id number]  : 1955  [facility]     Evaluating OT: Stacey Ledesma OTR/L #[id number]      Recommended Adaptive Equipment: TBD     AM PAC ADL RAW SCORE -  1424, G Code CK     Diagnosis: Acute Encephalopathy     Past Medical History: Anxiety, Depression, MS, CVA 2011 since incontinent     Precautions: Fall, Bed alarm, 2 L O2     Home Living: Pt lives son in a 1 story home with ramp to enter; bed/bath on main level  Bathroom setup: shower/tub with extended tub bench   Equipment owned: ww, manual w/c, BSC, hospital bed, TTB    Prior Level of Function: assistance with ADLs assistance with IADLs; using w/c for ambulation, STP to commode, w/c, tub bench   Driving: no  Occupation: retired  HHA each day for 3 hours    Pain Level: pt c/o 0/10 pain this session    Cognition: oriented x 2, self and place; follows 2 step directions. fair Problem solving skills  fair Memory   good Sequencing  Additional Comments: Pt oriented to self and place and situation, demonstrates confusion with year and month    Sensory:   Hearing: WFL  Vision: WFL    Glasses: yes [] no [x] reading []      UE Assessment:  Hand Dominance: Right [x]  Left []     Strength ROM Additional Info:    RUE   3+/5 WFL fair  and fair FMC/dexterity noted during ADL tasks     LUE 3+/5 WFL fair  and fair FMC/dexterity noted during ADL tasks   Sensation: Pt denies n/t this date  Tone: generalized deconitioning  Edema:none noted    Functional Assessment:   Initial Eval Status 8/10/17 Comments   Feeding  Setup    Grooming  Setup seated    Upper Body Dressing Min A     Lower Body Dressing Max A    Bathing Mod A    Toileting  DEP,  Incontinent of bowel     Bed Mobility  Supine to Sit: Mod A  Sit to Supine: Mod A  Rolling: Min A    Functional Transfers Sit to Stand:  Mod A  Stand to sit: Mod A  Quickly fatigues (under 1 minute)   Low bottom scoot to HOB, Min A Increase safety and problem solving to good during ADL's  STG #7   Increase functional activity tolerance to fair for 5 minutes of standing  in ADL tasks      Patient and/or family understands diagnosis, prognosis and plan of care: Yes    [] Malnutrition indicators have been identified and nursing has been notified to ensure a dietitian consult is ordered.      Time in: 3:00  Evaluation + 10 Treatment Minutes     Ofelia Bojorquez OTR/L #[id number]

## 2018-08-10 NOTE — CARE COORDINATION
Spoke with patient at the bedside re: admission diagnosis and treatment plans. Patient is confused with delayed responses. Patient states she lives with her son Leilani Blackwood, [phone]. Explained that therapy will be completing evaluations for transition of care planning. Reviewed chart. Patient has a history of HHC with Cj and SHARON at [facility]. Await therapy evals and input from neurology. Will follow for transition of care planning.      Angelita Kraus.

## 2018-08-10 NOTE — CONSULTS
Department of Neurological Sciences  Section of General Neurology - Adult  Consult Note          Mehnaz Durant is 63 y.o. female, right handed. Neurology was consulted for confusion, fatigue    The patient is a poor historian. Past Medical History:     Past Medical History:   Diagnosis Date    Anxiety     Depression     History of blood transfusion     Hypothyroidism     [facility]     Unspecified cerebral artery occlusion with cerebral infarction 2011    left her incontinent       Past Surgical History:     Past Surgical History:   Procedure Laterality Date    COLON SURGERY  2001    exp lap, lysis of adhesions, release of SBO. SE. Dr. Mayra Londono      all removed    HYSTERECTOMY  2001    [facility]. Dr. Dash Maria       Allergies:     Pcn [penicillins]    Medications:     Prior to Admission medications    Medication Sig Start Date End Date Taking? Authorizing Provider   traMADol (ULTRAM) 50 MG tablet Take 50 mg by mouth every 6 hours as needed for Pain. .   Yes Historical Provider, MD   levothyroxine (SYNTHROID) 88 MCG tablet Take 88 mcg by mouth Daily   Yes Historical Provider, MD   albuterol sulfate HFA (PROAIR HFA) 108 (90 Base) MCG/ACT inhaler Inhale 2 puffs into the lungs every 6 hours as needed for Wheezing 9/18/17 8/9/18 Yes Isa Ramírez APRN - CNP   amLODIPine (NORVASC) 5 MG tablet Take 1 tablet by mouth daily 5/17/17  Yes Juliana Garcia MD   diazepam (VALIUM) 2 MG tablet Take 2 mg by mouth every 12 hours as needed for Anxiety   Yes Historical Provider, MD   gabapentin (NEURONTIN) 300 MG capsule Take 1 capsule by mouth 3 times daily 10/30/15  Yes Diana Saba MD   FLUoxetine (PROZAC) 20 MG capsule Take 1 capsule by mouth 2 times daily 10/30/15  Yes Diana Saba MD   cloNIDine (CATAPRES) 0.2 MG tablet Take 1 tablet by mouth every 6 hours as needed (FXTCPHHR>587 or WKYUKVYIC>004) 0/72/91   Juliana Garcia MD       Social History:     Denies ETOH, tobacco, or illicit reflexes    Laboratory/Radiology:     CBC with Differential:    Lab Results   Component Value Date    WBC 9.9 08/10/2018    RBC 4.20 08/10/2018    HGB 12.3 08/10/2018    HCT 36.3 08/10/2018     08/10/2018    MCV 86.4 08/10/2018    MCH 29.3 08/10/2018    MCHC 33.9 08/10/2018    RDW 13.8 08/10/2018    LYMPHOPCT 11.9 08/10/2018    MONOPCT 8.5 08/10/2018    BASOPCT 0.7 08/10/2018    MONOSABS 0.84 08/10/2018    LYMPHSABS 1.18 08/10/2018    EOSABS 0.08 08/10/2018    BASOSABS 0.07 08/10/2018     CMP:    Lab Results   Component Value Date     08/10/2018    K 3.0 08/10/2018     08/10/2018    CO2 24 08/10/2018    BUN 12 08/10/2018    CREATININE 0.9 08/10/2018    GFRAA >60 08/10/2018    LABGLOM >60 08/10/2018    GLUCOSE 103 08/10/2018    PROT 6.3 08/10/2018    LABALBU 3.3 08/10/2018    CALCIUM 8.6 08/10/2018    BILITOT 0.5 08/10/2018    ALKPHOS 91 08/10/2018    AST 10 08/10/2018    ALT 9 08/10/2018       CT head:  1. Age-related atrophic changes without evidence of acute intracranial   pathology. 2. Moderate to severe diffuse periventricular and deep subcortical   low-attenuation similar to previous study, representing underlying   demyelinating process and chronic microvascular ischemic disease. MRI brain: n/a    CTAs head/neck: n/a    Echo w bubble: n/a    I labs and imaging studies were reviewed with attending. Assessment and Plan   1. Acute Encephalopathy-- 73-year-old female with known history of chronic progressive multiple sclerosis who presented with confusion in the setting of UTI with indwelling catheter. Urine culture was positive E. Coli- sensitivities pending. ID consulted. Confusion likely secondary to infectious process, less likely acute MS flare- chronic progressive course. Patient on Ocrevus-- which has no increased risk for PML.   No further imaging at this time  Continue antibiotics per primary team.       Amilcar Dolan, PGY - 2  Emergency Medicine  9:43 AM  8/10/2018      Attending Supervising Physician's Attestation Statement  I performed a history and physical examination on the patient and discussed the management with the resident physician. I reviewed and agree with the findings and plan as documented in his note except for below. Acute encephalopathy has happened in the setting of UTI which is being treated right now. No sign of acute progression of multiple sclerosis. She should have regular follow-up with her neurologist.    Neurology will sign off. Please call us with questions/additional, persistent or new concerns.     Electronically signed by Tracy Rojas MD on 8/10/18 at 8:01 PM

## 2018-08-11 LAB
ALBUMIN SERPL-MCNC: 3 G/DL (ref 3.5–5.2)
ALP BLD-CCNC: 80 U/L (ref 35–104)
ALT SERPL-CCNC: 8 U/L (ref 0–32)
ANION GAP SERPL CALCULATED.3IONS-SCNC: 16 MMOL/L (ref 7–16)
AST SERPL-CCNC: 12 U/L (ref 0–31)
BASOPHILS ABSOLUTE: 0.04 E9/L (ref 0–0.2)
BASOPHILS RELATIVE PERCENT: 0.6 % (ref 0–2)
BILIRUB SERPL-MCNC: 0.2 MG/DL (ref 0–1.2)
BUN BLDV-MCNC: 14 MG/DL (ref 8–23)
CALCIUM SERPL-MCNC: 8.5 MG/DL (ref 8.6–10.2)
CHLORIDE BLD-SCNC: 99 MMOL/L (ref 98–107)
CO2: 26 MMOL/L (ref 22–29)
CREAT SERPL-MCNC: 0.9 MG/DL (ref 0.5–1)
EOSINOPHILS ABSOLUTE: 0.14 E9/L (ref 0.05–0.5)
EOSINOPHILS RELATIVE PERCENT: 2 % (ref 0–6)
GFR AFRICAN AMERICAN: >60
GFR NON-AFRICAN AMERICAN: >60 ML/MIN/1.73
GLUCOSE BLD-MCNC: 94 MG/DL (ref 74–109)
HCT VFR BLD CALC: 33.3 % (ref 34–48)
HEMOGLOBIN: 11.3 G/DL (ref 11.5–15.5)
IMMATURE GRANULOCYTES #: 0.03 E9/L
IMMATURE GRANULOCYTES %: 0.4 % (ref 0–5)
LYMPHOCYTES ABSOLUTE: 1.01 E9/L (ref 1.5–4)
LYMPHOCYTES RELATIVE PERCENT: 14.6 % (ref 20–42)
MCH RBC QN AUTO: 30 PG (ref 26–35)
MCHC RBC AUTO-ENTMCNC: 33.9 % (ref 32–34.5)
MCV RBC AUTO: 88.3 FL (ref 80–99.9)
MONOCYTES ABSOLUTE: 0.62 E9/L (ref 0.1–0.95)
MONOCYTES RELATIVE PERCENT: 9 % (ref 2–12)
NEUTROPHILS ABSOLUTE: 5.06 E9/L (ref 1.8–7.3)
NEUTROPHILS RELATIVE PERCENT: 73.4 % (ref 43–80)
PDW BLD-RTO: 13.4 FL (ref 11.5–15)
PLATELET # BLD: 363 E9/L (ref 130–450)
PMV BLD AUTO: 10.1 FL (ref 7–12)
POTASSIUM SERPL-SCNC: 3.2 MMOL/L (ref 3.5–5)
RBC # BLD: 3.77 E12/L (ref 3.5–5.5)
SODIUM BLD-SCNC: 141 MMOL/L (ref 132–146)
TOTAL PROTEIN: 6 G/DL (ref 6.4–8.3)
WBC # BLD: 6.9 E9/L (ref 4.5–11.5)

## 2018-08-11 PROCEDURE — 2580000003 HC RX 258: Performed by: FAMILY MEDICINE

## 2018-08-11 PROCEDURE — 80053 COMPREHEN METABOLIC PANEL: CPT

## 2018-08-11 PROCEDURE — 6370000000 HC RX 637 (ALT 250 FOR IP): Performed by: FAMILY MEDICINE

## 2018-08-11 PROCEDURE — 85025 COMPLETE CBC W/AUTO DIFF WBC: CPT

## 2018-08-11 PROCEDURE — 6360000002 HC RX W HCPCS: Performed by: INTERNAL MEDICINE

## 2018-08-11 PROCEDURE — 36415 COLL VENOUS BLD VENIPUNCTURE: CPT

## 2018-08-11 PROCEDURE — 2580000003 HC RX 258: Performed by: INTERNAL MEDICINE

## 2018-08-11 PROCEDURE — 99232 SBSQ HOSP IP/OBS MODERATE 35: CPT | Performed by: FAMILY MEDICINE

## 2018-08-11 PROCEDURE — 6360000002 HC RX W HCPCS: Performed by: FAMILY MEDICINE

## 2018-08-11 PROCEDURE — 2060000000 HC ICU INTERMEDIATE R&B

## 2018-08-11 PROCEDURE — 2700000000 HC OXYGEN THERAPY PER DAY

## 2018-08-11 RX ADMIN — Medication 10 ML: at 21:47

## 2018-08-11 RX ADMIN — ENOXAPARIN SODIUM 40 MG: 100 INJECTION SUBCUTANEOUS at 09:46

## 2018-08-11 RX ADMIN — Medication 10 ML: at 09:46

## 2018-08-11 RX ADMIN — GABAPENTIN 300 MG: 300 CAPSULE ORAL at 14:00

## 2018-08-11 RX ADMIN — POTASSIUM BICARBONATE 25 MEQ: 25 TABLET, EFFERVESCENT ORAL at 21:47

## 2018-08-11 RX ADMIN — GABAPENTIN 300 MG: 300 CAPSULE ORAL at 21:47

## 2018-08-11 RX ADMIN — DIAZEPAM 2 MG: 2 TABLET ORAL at 21:49

## 2018-08-11 RX ADMIN — AMLODIPINE BESYLATE 5 MG: 5 TABLET ORAL at 09:45

## 2018-08-11 RX ADMIN — SODIUM CHLORIDE 1000 MG: 900 INJECTION INTRAVENOUS at 16:28

## 2018-08-11 RX ADMIN — FLUOXETINE HYDROCHLORIDE 20 MG: 20 CAPSULE ORAL at 09:46

## 2018-08-11 RX ADMIN — POTASSIUM BICARBONATE 25 MEQ: 25 TABLET, EFFERVESCENT ORAL at 09:45

## 2018-08-11 RX ADMIN — LEVOTHYROXINE SODIUM 88 MCG: 88 TABLET ORAL at 06:58

## 2018-08-11 RX ADMIN — FLUOXETINE HYDROCHLORIDE 20 MG: 20 CAPSULE ORAL at 21:47

## 2018-08-11 RX ADMIN — GABAPENTIN 300 MG: 300 CAPSULE ORAL at 09:45

## 2018-08-11 ASSESSMENT — PAIN SCALES - GENERAL: PAINLEVEL_OUTOF10: 0

## 2018-08-11 NOTE — PROGRESS NOTES
Admit Date: 8/9/2018       Subjective:    K+ low yesterday 3.0  Supplemented  . Labs pending. MS patient on Ocrevus infusions. Patient previously on Tecfidera    Urine + e coli on rocephin emperic + ESBL will ask ID input.        Objective:    Scheduled Meds:  Current Facility-Administered Medications   Medication Dose Route Frequency Provider Last Rate Last Dose    potassium bicarbonate (K-LYTE) disintegrating tablet 25 mEq  25 mEq Oral BID Rosenda Pritchard MD   25 mEq at 08/10/18 2052    cefTRIAXone (ROCEPHIN) 1 g in sterile water 10 mL IV syringe  1 g Intravenous Q24H Rosenda Pritchard MD   1 g at 08/10/18 2012    acetaminophen (TYLENOL) tablet 650 mg  650 mg Oral Q4H PRN Rosenda Pritchard MD        enoxaparin (LOVENOX) injection 40 mg  40 mg Subcutaneous Daily Rosenda Pritchard MD   40 mg at 08/10/18 0818    albuterol sulfate  (90 Base) MCG/ACT inhaler 2 puff  2 puff Inhalation Q6H PRN Rosneda Pritchard MD        amLODIPine (NORVASC) tablet 5 mg  5 mg Oral Daily Rosenda Pritchard MD   5 mg at 08/10/18 0818    cloNIDine (CATAPRES) tablet 0.2 mg  0.2 mg Oral Q6H PRN Rosenda Pritchard MD        diazepam (VALIUM) tablet 2 mg  2 mg Oral Q12H PRN Rosenda Pritchard MD        FLUoxetine (PROZAC) capsule 20 mg  20 mg Oral BID Rosenda Pritchard MD   20 mg at 08/10/18 2052    gabapentin (NEURONTIN) capsule 300 mg  300 mg Oral TID Rosenda Pritchard MD   300 mg at 08/10/18 2052    levothyroxine (SYNTHROID) tablet 88 mcg  88 mcg Oral Daily Rosenda Pritchard MD   88 mcg at 08/11/18 0658    sodium chloride flush 0.9 % injection 10 mL  10 mL Intravenous 2 times per day Rosenda Pritchard MD   10 mL at 08/10/18 2013    sodium chloride flush 0.9 % injection 10 mL  10 mL Intravenous PRN Rosenda Pritchard MD        ondansetron [facility]) injection 4 mg  4 mg Intravenous Q6H PRN Rosenda Pritchard MD           Continuous Infusions  :    PRN Meds:  acetaminophen, albuterol sulfate HFA, cloNIDine, diazepam, sodium 10/14/2015     Lab Results   Component Value Date    TROPONINI <0.01 02/28/2018    TROPONINI 0.02 04/15/2017    TROPONINI <0.01 10/14/2015        TSH:    Lab Results   Component Value Date    TSH 1.290 08/09/2018             U/A:   Lab Results   Component Value Date    COLORU Yellow 08/09/2018    PHUR 6.0 08/09/2018    WBCUA PACKED 08/09/2018    RBCUA 2-5 08/09/2018    BACTERIA MANY 08/09/2018    CLARITYU SLCLOUDY 08/09/2018    SPECGRAV 1.020 08/09/2018    LEUKOCYTESUR LARGE 08/09/2018    UROBILINOGEN 0.2 08/09/2018    BILIRUBINUR Negative 08/09/2018    BLOODU MODERATE 08/09/2018    GLUCOSEU Negative 08/09/2018          Iron studies:No results found for: FERRITIN  Bone disease:  Lab Results   Component Value Date    MG 2.1 08/10/2018     Nutrition:No results found for: ALB           Assessment:  Patient Active Problem List   Diagnosis Code    Multiple sclerosis ([facility] G35    Acute cystitis N30.00    Bilateral foot-drop M21.371, M21.372    Sepsis ([address] A41.9    Infection due to urethral catheter ([facility] T83.511A, N39.0    Altered mental status R41.82     1. Change in mental status   Acute encephalopathy     2. Possible uti  EBSL              Rocephin                       Consult ID     3.  MUltiple sclerosis - chronic progressive     4. Chronic urine catheter    5.  uti?              Plan:    Consult ID  Rachel daily    Vinicius Chi M.D.    8/11/2018

## 2018-08-12 PROBLEM — E87.6 HYPOKALEMIA: Status: ACTIVE | Noted: 2018-08-12

## 2018-08-12 PROBLEM — G93.40 ACUTE ENCEPHALOPATHY: Status: ACTIVE | Noted: 2018-08-12

## 2018-08-12 PROBLEM — E87.20 LACTIC ACIDOSIS: Status: ACTIVE | Noted: 2018-08-12

## 2018-08-12 PROBLEM — B96.29 URINARY TRACT INFECTION DUE TO EXTENDED-SPECTRUM BETA LACTAMASE (ESBL) PRODUCING ESCHERICHIA COLI: Status: ACTIVE | Noted: 2018-08-12

## 2018-08-12 PROBLEM — T83.511A URINARY TRACT INFECTION ASSOCIATED WITH INDWELLING URETHRAL CATHETER (HCC): Status: ACTIVE | Noted: 2018-08-12

## 2018-08-12 PROBLEM — N39.0 URINARY TRACT INFECTION ASSOCIATED WITH INDWELLING URETHRAL CATHETER (HCC): Status: ACTIVE | Noted: 2018-08-12

## 2018-08-12 PROBLEM — N39.0 URINARY TRACT INFECTION DUE TO EXTENDED-SPECTRUM BETA LACTAMASE (ESBL) PRODUCING ESCHERICHIA COLI: Status: ACTIVE | Noted: 2018-08-12

## 2018-08-12 PROBLEM — Z16.12 URINARY TRACT INFECTION DUE TO EXTENDED-SPECTRUM BETA LACTAMASE (ESBL) PRODUCING ESCHERICHIA COLI: Status: ACTIVE | Noted: 2018-08-12

## 2018-08-12 LAB
ANION GAP SERPL CALCULATED.3IONS-SCNC: 9 MMOL/L (ref 7–16)
BUN BLDV-MCNC: 10 MG/DL (ref 8–23)
CALCIUM SERPL-MCNC: 8.9 MG/DL (ref 8.6–10.2)
CHLORIDE BLD-SCNC: 103 MMOL/L (ref 98–107)
CO2: 28 MMOL/L (ref 22–29)
CREAT SERPL-MCNC: 0.8 MG/DL (ref 0.5–1)
GFR AFRICAN AMERICAN: >60
GFR NON-AFRICAN AMERICAN: >60 ML/MIN/1.73
GLUCOSE BLD-MCNC: 120 MG/DL (ref 74–109)
ORGANISM: ABNORMAL
POTASSIUM SERPL-SCNC: 4 MMOL/L (ref 3.5–5)
SODIUM BLD-SCNC: 140 MMOL/L (ref 132–146)
URINE CULTURE, ROUTINE: ABNORMAL
URINE CULTURE, ROUTINE: ABNORMAL

## 2018-08-12 PROCEDURE — 6360000002 HC RX W HCPCS: Performed by: INTERNAL MEDICINE

## 2018-08-12 PROCEDURE — 6360000002 HC RX W HCPCS: Performed by: FAMILY MEDICINE

## 2018-08-12 PROCEDURE — 99231 SBSQ HOSP IP/OBS SF/LOW 25: CPT | Performed by: FAMILY MEDICINE

## 2018-08-12 PROCEDURE — 36415 COLL VENOUS BLD VENIPUNCTURE: CPT

## 2018-08-12 PROCEDURE — 2580000003 HC RX 258: Performed by: INTERNAL MEDICINE

## 2018-08-12 PROCEDURE — 6370000000 HC RX 637 (ALT 250 FOR IP): Performed by: FAMILY MEDICINE

## 2018-08-12 PROCEDURE — 2060000000 HC ICU INTERMEDIATE R&B

## 2018-08-12 PROCEDURE — 2580000003 HC RX 258: Performed by: FAMILY MEDICINE

## 2018-08-12 PROCEDURE — 80048 BASIC METABOLIC PNL TOTAL CA: CPT

## 2018-08-12 RX ORDER — SENNA PLUS 8.6 MG/1
1 TABLET ORAL 2 TIMES DAILY
Status: DISCONTINUED | OUTPATIENT
Start: 2018-08-12 | End: 2018-08-12

## 2018-08-12 RX ADMIN — GABAPENTIN 300 MG: 300 CAPSULE ORAL at 13:21

## 2018-08-12 RX ADMIN — SODIUM CHLORIDE 1000 MG: 900 INJECTION INTRAVENOUS at 15:38

## 2018-08-12 RX ADMIN — POTASSIUM BICARBONATE 25 MEQ: 25 TABLET, EFFERVESCENT ORAL at 08:53

## 2018-08-12 RX ADMIN — FLUOXETINE HYDROCHLORIDE 20 MG: 20 CAPSULE ORAL at 08:53

## 2018-08-12 RX ADMIN — ENOXAPARIN SODIUM 40 MG: 100 INJECTION SUBCUTANEOUS at 08:53

## 2018-08-12 RX ADMIN — FLUOXETINE HYDROCHLORIDE 20 MG: 20 CAPSULE ORAL at 20:48

## 2018-08-12 RX ADMIN — GABAPENTIN 300 MG: 300 CAPSULE ORAL at 08:53

## 2018-08-12 RX ADMIN — Medication 10 ML: at 20:48

## 2018-08-12 RX ADMIN — POTASSIUM BICARBONATE 25 MEQ: 25 TABLET, EFFERVESCENT ORAL at 20:48

## 2018-08-12 RX ADMIN — LEVOTHYROXINE SODIUM 88 MCG: 88 TABLET ORAL at 06:30

## 2018-08-12 RX ADMIN — Medication 10 ML: at 08:53

## 2018-08-12 RX ADMIN — GABAPENTIN 300 MG: 300 CAPSULE ORAL at 20:48

## 2018-08-12 RX ADMIN — AMLODIPINE BESYLATE 5 MG: 5 TABLET ORAL at 08:53

## 2018-08-12 RX ADMIN — ACETAMINOPHEN 650 MG: 325 TABLET, FILM COATED ORAL at 00:54

## 2018-08-12 ASSESSMENT — PAIN DESCRIPTION - DESCRIPTORS
DESCRIPTORS: ACHING;HEADACHE
DESCRIPTORS: ACHING;DISCOMFORT

## 2018-08-12 ASSESSMENT — PAIN SCALES - GENERAL
PAINLEVEL_OUTOF10: 0
PAINLEVEL_OUTOF10: 8

## 2018-08-12 ASSESSMENT — PAIN DESCRIPTION - ORIENTATION
ORIENTATION: ANTERIOR
ORIENTATION: ANTERIOR

## 2018-08-12 ASSESSMENT — PAIN DESCRIPTION - PROGRESSION
CLINICAL_PROGRESSION: GRADUALLY WORSENING
CLINICAL_PROGRESSION: RAPIDLY IMPROVING

## 2018-08-12 ASSESSMENT — PAIN DESCRIPTION - FREQUENCY
FREQUENCY: INTERMITTENT
FREQUENCY: INTERMITTENT

## 2018-08-12 ASSESSMENT — PAIN DESCRIPTION - PAIN TYPE
TYPE: ACUTE PAIN
TYPE: ACUTE PAIN

## 2018-08-12 ASSESSMENT — PAIN DESCRIPTION - ONSET: ONSET: GRADUAL

## 2018-08-12 ASSESSMENT — PAIN DESCRIPTION - LOCATION
LOCATION: HEAD
LOCATION: HEAD

## 2018-08-12 NOTE — PLAN OF CARE
Problem: Sensory:  Goal: General experience of comfort will improve  General experience of comfort will improve  Outcome: Met This Shift      Problem: Urinary Elimination:  Goal: Signs and symptoms of infection will decrease  Signs and symptoms of infection will decrease  Outcome: Met This Shift

## 2018-08-13 PROCEDURE — 99231 SBSQ HOSP IP/OBS SF/LOW 25: CPT | Performed by: FAMILY MEDICINE

## 2018-08-13 PROCEDURE — 6360000002 HC RX W HCPCS: Performed by: INTERNAL MEDICINE

## 2018-08-13 PROCEDURE — 2060000000 HC ICU INTERMEDIATE R&B

## 2018-08-13 PROCEDURE — 2580000003 HC RX 258: Performed by: FAMILY MEDICINE

## 2018-08-13 PROCEDURE — 2580000003 HC RX 258: Performed by: INTERNAL MEDICINE

## 2018-08-13 PROCEDURE — 97530 THERAPEUTIC ACTIVITIES: CPT

## 2018-08-13 PROCEDURE — 6360000002 HC RX W HCPCS: Performed by: FAMILY MEDICINE

## 2018-08-13 PROCEDURE — 2700000000 HC OXYGEN THERAPY PER DAY

## 2018-08-13 PROCEDURE — 6370000000 HC RX 637 (ALT 250 FOR IP): Performed by: FAMILY MEDICINE

## 2018-08-13 PROCEDURE — 97535 SELF CARE MNGMENT TRAINING: CPT

## 2018-08-13 RX ORDER — GRANULES FOR ORAL 3 G/1
3 POWDER ORAL ONCE
Qty: 1 EACH | Refills: 0 | Status: SHIPPED | OUTPATIENT
Start: 2018-08-13 | End: 2018-08-14

## 2018-08-13 RX ADMIN — FLUOXETINE HYDROCHLORIDE 20 MG: 20 CAPSULE ORAL at 21:23

## 2018-08-13 RX ADMIN — GABAPENTIN 300 MG: 300 CAPSULE ORAL at 21:23

## 2018-08-13 RX ADMIN — GABAPENTIN 300 MG: 300 CAPSULE ORAL at 13:32

## 2018-08-13 RX ADMIN — LEVOTHYROXINE SODIUM 88 MCG: 88 TABLET ORAL at 06:28

## 2018-08-13 RX ADMIN — GABAPENTIN 300 MG: 300 CAPSULE ORAL at 09:22

## 2018-08-13 RX ADMIN — SODIUM CHLORIDE 1000 MG: 900 INJECTION INTRAVENOUS at 15:41

## 2018-08-13 RX ADMIN — FLUOXETINE HYDROCHLORIDE 20 MG: 20 CAPSULE ORAL at 09:22

## 2018-08-13 RX ADMIN — POTASSIUM BICARBONATE 25 MEQ: 25 TABLET, EFFERVESCENT ORAL at 09:22

## 2018-08-13 RX ADMIN — Medication 10 ML: at 09:21

## 2018-08-13 RX ADMIN — ACETAMINOPHEN 650 MG: 325 TABLET, FILM COATED ORAL at 21:28

## 2018-08-13 RX ADMIN — ENOXAPARIN SODIUM 40 MG: 100 INJECTION SUBCUTANEOUS at 09:22

## 2018-08-13 RX ADMIN — Medication 10 ML: at 21:22

## 2018-08-13 RX ADMIN — POTASSIUM BICARBONATE 25 MEQ: 25 TABLET, EFFERVESCENT ORAL at 21:23

## 2018-08-13 RX ADMIN — AMLODIPINE BESYLATE 5 MG: 5 TABLET ORAL at 09:22

## 2018-08-13 ASSESSMENT — PAIN SCALES - GENERAL
PAINLEVEL_OUTOF10: 0
PAINLEVEL_OUTOF10: 7
PAINLEVEL_OUTOF10: 0

## 2018-08-13 ASSESSMENT — PAIN DESCRIPTION - PAIN TYPE: TYPE: ACUTE PAIN

## 2018-08-13 ASSESSMENT — PAIN DESCRIPTION - DESCRIPTORS: DESCRIPTORS: HEADACHE;ACHING;DULL

## 2018-08-13 ASSESSMENT — PAIN DESCRIPTION - LOCATION: LOCATION: HEAD

## 2018-08-13 NOTE — PLAN OF CARE
Problem: Falls - Risk of:  Goal: Absence of physical injury  Absence of physical injury   Outcome: Met This Shift      Problem: Risk for Impaired Skin Integrity  Goal: Tissue integrity - skin and mucous membranes  Structural intactness and normal physiological function of skin and  mucous membranes.    Outcome: Met This Shift      Problem: Mental Status - Impaired:  Goal: Mental status will improve  Mental status will improve   Outcome: Met This Shift

## 2018-08-13 NOTE — PROGRESS NOTES
CC- AMS     Subjective: The patient is awake and alert, confused about time   Tolerating medications. Reports no side effects. Afebrile. 10 ROS otherwise negative unless otherwise specified above. Objective:    Vitals:    08/13/18 0745   BP: 132/68   Pulse: 98   Resp: 18   Temp: 98.7 °F (37.1 °C)   SpO2: 95%       General Appearance:    Awake, alert , no acute distress.    HEENT:    Normocephalic,PERRL,neck supple, no JVD, mucosa moist, no thrush   Lungs:     Clear to auscultation bilaterally, no wheeze , crackles   Heart:    Regular rate and rhythm, no murmur   Abdomen:     Soft, non-tender, not distended  bowel sounds present,   Extremities:   No edema,no open wound,no erythema, non  tender   Skin:   no rashes or lesions   CBC+dif:  Reviewed and trend followed     Radiology:  Noted     Microbiology:  Pending     Assessment:  · Possible complicated cAUTI With ESBL E. Coli- neurogenic bladder and indwelling Catheter present on admission  · Lactic acidosis  · Multiple sclerosis  · Multiple antibiotic allergies     Plan:    ·  IV Invanz 1 g every 24 day 2- day 3  ·  can switch to fosfomycin 1 dose for discharge  · Monitor labs  · Will follow with you         Electronically signed by Shun Murrieta MD on 8/13/2018 at 11:54 AM

## 2018-08-13 NOTE — PROGRESS NOTES
required assist to adjust socks and for balance/safety while standing to compete clothing management. Bathing Mod A Per Eval  Will continue to assess    Toileting  DEP,  Incontinent of bowel  Dependent  Noted pt incontinent of bowel upon standing. Pt assisted to complete hygiene and linen change to prevent skin break down. Bed Mobility  Supine to Sit: Mod A  Sit to Supine: Mod A  Rolling: Min A Supine<>Sit: Min A  Min cues for sequencing/safety required with fair follow through. Functional Transfers Sit to Stand: Mod A  Stand to sit: Mod A  Quickly fatigues (under 1 minute)   Low bottom scoot to HOB, Min A  Sit<>Stand: Mod/Min A  Min cues for hand placement/safety provided with fair follow through. Functional Mobility n/t  NT  Pt deferred this date d/t fatigue. Will continue to assess. Sit balance: SBA/S seated at EOB. x10 minutes. Stand balance: Mod/Min A with w/w  Endurance/Activity tolerance: Fair-    Comments: Upon arrival pt in supine with HOB elevated. Pt educated on transfer/mobility safety, increasing activity, B UE AROM exercises, adaptive techniques for ADL tasks and posture. Pt verbalized/demonstrated fair understanding, recommend continued education. Pt completed B UE AROM exercises x10 reps chest press and shoulder flex/ext, pt instructed to continue exercises 2-3x's a day, pt agreeable. Recommended pt sit up in chair during day pt agreeable to idea however deferred sitting up in chair during treatment d/t fatigue. At end of session pt returned to supine with HOB elevated all lines and tubes intact and call light within reach. · Pt has made fair progress towards set goals.    · Continue with current plan of care    Time in: [facility]  Time out:1500  Total Tx Time: [address] HUA/L [id number]

## 2018-08-14 VITALS
RESPIRATION RATE: 18 BRPM | WEIGHT: 171.06 LBS | DIASTOLIC BLOOD PRESSURE: 70 MMHG | BODY MASS INDEX: 33.58 KG/M2 | HEART RATE: 96 BPM | OXYGEN SATURATION: 92 % | TEMPERATURE: 98.1 F | HEIGHT: 60 IN | SYSTOLIC BLOOD PRESSURE: 122 MMHG

## 2018-08-14 LAB
BLOOD CULTURE, ROUTINE: NORMAL
CULTURE, BLOOD 2: NORMAL

## 2018-08-14 PROCEDURE — 6360000002 HC RX W HCPCS: Performed by: FAMILY MEDICINE

## 2018-08-14 PROCEDURE — 6370000000 HC RX 637 (ALT 250 FOR IP): Performed by: INTERNAL MEDICINE

## 2018-08-14 PROCEDURE — 2580000003 HC RX 258: Performed by: FAMILY MEDICINE

## 2018-08-14 PROCEDURE — 99238 HOSP IP/OBS DSCHRG MGMT 30/<: CPT | Performed by: FAMILY MEDICINE

## 2018-08-14 PROCEDURE — 6370000000 HC RX 637 (ALT 250 FOR IP): Performed by: FAMILY MEDICINE

## 2018-08-14 RX ORDER — GRANULES FOR ORAL 3 G/1
3 POWDER ORAL ONCE
Status: COMPLETED | OUTPATIENT
Start: 2018-08-14 | End: 2018-08-14

## 2018-08-14 RX ORDER — POTASSIUM BICARBONATE 25 MEQ/1
25 TABLET, EFFERVESCENT ORAL 2 TIMES DAILY
Qty: 60 TABLET | Refills: 3 | Status: ON HOLD
Start: 2018-08-14 | End: 2019-12-16 | Stop reason: SDUPTHER

## 2018-08-14 RX ORDER — GRANULES FOR ORAL 3 G/1
3 POWDER ORAL ONCE
Qty: 1 EACH | Refills: 0
Start: 2018-08-14 | End: 2018-08-14

## 2018-08-14 RX ADMIN — ENOXAPARIN SODIUM 40 MG: 100 INJECTION SUBCUTANEOUS at 10:15

## 2018-08-14 RX ADMIN — FLUOXETINE HYDROCHLORIDE 20 MG: 20 CAPSULE ORAL at 10:17

## 2018-08-14 RX ADMIN — AMLODIPINE BESYLATE 5 MG: 5 TABLET ORAL at 10:16

## 2018-08-14 RX ADMIN — GABAPENTIN 300 MG: 300 CAPSULE ORAL at 17:00

## 2018-08-14 RX ADMIN — LEVOTHYROXINE SODIUM 88 MCG: 88 TABLET ORAL at 06:24

## 2018-08-14 RX ADMIN — GABAPENTIN 300 MG: 300 CAPSULE ORAL at 10:16

## 2018-08-14 RX ADMIN — POTASSIUM BICARBONATE 25 MEQ: 25 TABLET, EFFERVESCENT ORAL at 10:16

## 2018-08-14 RX ADMIN — DIAZEPAM 2 MG: 2 TABLET ORAL at 10:18

## 2018-08-14 RX ADMIN — FOSFOMYCIN TROMETHAMINE 1 PACKET: 3 POWDER ORAL at 17:00

## 2018-08-14 RX ADMIN — Medication 10 ML: at 10:18

## 2018-08-14 ASSESSMENT — PAIN SCALES - GENERAL
PAINLEVEL_OUTOF10: 0
PAINLEVEL_OUTOF10: 0

## 2018-08-14 NOTE — PROGRESS NOTES
Admit Date: 8/9/2018       Subjective:    Snoozing, awakens easily. Lunch outside door. going to [facility] on discharge. Answers questions but gets stuck on answers and can't complete the thought. No pain    Urine + e coli on rocephin emperic + ESBL will   on invanzt. swiwthched to fospomycin       Objective:    Scheduled Meds:  Current Facility-Administered Medications   Medication Dose Route Frequency Provider Last Rate Last Dose    ertapenem (INVANZ) 1 g IVPB minibag  1 g Intravenous Q24H Shun Murrieta MD   Stopped at 08/13/18 1611    potassium bicarbonate (K-LYTE) disintegrating tablet 25 mEq  25 mEq Oral BID Shari Garcia MD   25 mEq at 08/13/18 2123    acetaminophen (TYLENOL) tablet 650 mg  650 mg Oral Q4H PRN Shari Garcia MD   650 mg at 08/13/18 2128    enoxaparin (LOVENOX) injection 40 mg  40 mg Subcutaneous Daily Shari Garcia MD   40 mg at 08/13/18 0922    albuterol sulfate  (90 Base) MCG/ACT inhaler 2 puff  2 puff Inhalation Q6H PRN Shari Garcia MD        amLODIPine (NORVASC) tablet 5 mg  5 mg Oral Daily Shari Garcia MD   5 mg at 08/13/18 0649    cloNIDine (CATAPRES) tablet 0.2 mg  0.2 mg Oral Q6H PRN Shari Garcia MD        diazepam (VALIUM) tablet 2 mg  2 mg Oral Q12H PRN Shari Garcia MD   2 mg at 08/11/18 2149    FLUoxetine (PROZAC) capsule 20 mg  20 mg Oral BID Shari Garcia MD   20 mg at 08/13/18 2123    gabapentin (NEURONTIN) capsule 300 mg  300 mg Oral TID Shari Garcia MD   300 mg at 08/13/18 2123    levothyroxine (SYNTHROID) tablet 88 mcg  88 mcg Oral Daily Shari Garcia MD   88 mcg at 08/14/18 1912    sodium chloride flush 0.9 % injection 10 mL  10 mL Intravenous 2 times per day Shari Garcai MD   10 mL at 08/13/18 2122    sodium chloride flush 0.9 % injection 10 mL  10 mL Intravenous PRN Shari Garcia MD        ondansetron [facility]) injection 4 mg  4 mg Intravenous Q6H PRN Shari Garcia MD           Continuous Infusions  :    PRN Meds:  acetaminophen, albuterol sulfate HFA, cloNIDine, diazepam, sodium chloride flush, ondansetron      Wt Readings from Last 3 Encounters:   08/09/18 171 lb 1 oz (77.6 kg)   06/13/18 155 lb (70.3 kg)   05/03/18 155 lb (70.3 kg)     I/O last 3 completed shifts: In: 420 [P.O.:420]  Out: 2250 [Urine:2250]    Intake/Output Summary (Last 24 hours) at 08/14/18 0759  Last data filed at 08/14/18 0415   Gross per 24 hour   Intake              420 ml   Output             2250 ml   Net            -1830 ml       Vitals:    08/14/18 0415   BP:    Pulse: 86   Resp:    Temp:    SpO2: 93%       Physical Exam:    Skin:    Warm and dry. No rash or bruises  HEENT:   PERRLA, EOMI  Neck:    No JVD, No thyromegaly, No carotid bruit  Cardiac:   RRR, No gallop or murmur  Lungs:   CTA,  No wheezes or rales,Normal percussion  Abdomen:  Normal bowel sounds, abd soft, non-tender, no rebound or guarding  Extremities:   No clubbing, edema or cyanosis  Neurological:   A & O x 3, Moves all extremities, normal DTR              CBC  Recent Labs      08/11/18   0949   WBC  6.9   HGB  11.3*   HCT  33.3*   MCV  88.3   PLT  363     BMP  Recent Labs      08/11/18   0949  08/12/18   1550   NA  141  140   K  3.2*  4.0   CL  99  103   CO2  26  28   BUN  14  10   CREATININE  0.9  0.8   LABGLOM  >60  >60   CALCIUM  8.5*  8.9     LFT's  Recent Labs      08/11/18   0949   ALT  8     INR: No results for input(s): INR in the last 72 hours. No results found for: CRP  Lab Results   Component Value Date    SEDRATE 49 (H) 05/12/2017     No results for input(s): POCGLU in the last 72 hours. Lipids: No results for input(s): CHOL, HDL in the last 72 hours.     Invalid input(s): LDLCALCU  ABGs: No results found for: PHART, PO2ART, SSE9UAT  SpO2 Readings from Last 1 Encounters:   08/14/18 93%       Last 3 Troponin:    Lab Results   Component Value Date    TROPONINI <0.01 02/28/2018    TROPONINI 0.02 04/15/2017    TROPONINI <0.01 10/14/2015 Lab Results   Component Value Date    TROPONINI <0.01 02/28/2018    TROPONINI 0.02 04/15/2017    TROPONINI <0.01 10/14/2015        TSH:    Lab Results   Component Value Date    TSH 1.290 08/09/2018             U/A:     Lab Results   Component Value Date    COLORU Yellow 08/09/2018    PHUR 6.0 08/09/2018    WBCUA PACKED 08/09/2018    RBCUA 2-5 08/09/2018    BACTERIA MANY 08/09/2018    CLARITYU SLCLOUDY 08/09/2018    SPECGRAV 1.020 08/09/2018    LEUKOCYTESUR LARGE 08/09/2018    UROBILINOGEN 0.2 08/09/2018    BILIRUBINUR Negative 08/09/2018    BLOODU MODERATE 08/09/2018    GLUCOSEU Negative 08/09/2018          Iron studies:No results found for: FERRITIN  Bone disease:  Lab Results   Component Value Date    MG 2.1 08/10/2018     Nutrition:No results found for: ALB           Assessment:  Patient Active Problem List   Diagnosis Code    Multiple sclerosis ([facility] [address] G35    Acute cystitis N30.00    Bilateral foot-drop M21.371, M21.372    Sepsis ([facility] [address] A41.9    Infection due to urethral catheter ([facility] [address] T83.511A, N39.0    Altered mental status R41.82    Urinary tract infection associated with indwelling urethral catheter ([facility] [address] T83.511A, N39.0    Urinary tract infection due to extended-spectrum beta lactamase (ESBL) producing Escherichia coli N39.0, B96.29, Z16.12    Acute encephalopathy G93.40    Lactic acidosis E87.2    Hypokalemia E87.6     1. Change in mental status   Acute encephalopathy   improving     2.   uti  EBSL              Rocephin  d/c   ON invanz                          3.  MUltiple sclerosis - chronic progressive     4. Chronic urine catheter    5.   Lactic acidosis             Plan:      Fosfomycin   Discharge to home vs Sharonda Fraire M.D.    8/14/2018

## 2018-08-14 NOTE — PROGRESS NOTES
Infusions  :    PRN Meds:  acetaminophen, albuterol sulfate HFA, cloNIDine, diazepam, sodium chloride flush, ondansetron      Wt Readings from Last 3 Encounters:   08/09/18 171 lb 1 oz (77.6 kg)   06/13/18 155 lb (70.3 kg)   05/03/18 155 lb (70.3 kg)     I/O last 3 completed shifts: In: 420 [P.O.:420]  Out: 2250 [Urine:2250]    Intake/Output Summary (Last 24 hours) at 08/14/18 0757  Last data filed at 08/14/18 0415   Gross per 24 hour   Intake              420 ml   Output             2250 ml   Net            -1830 ml       Vitals:    08/14/18 0415   BP:    Pulse: 86   Resp:    Temp:    SpO2: 93%       Physical Exam:    Skin:    Warm and dry. No rash or bruises  HEENT:   PERRLA, EOMI  Neck:    No JVD, No thyromegaly, No carotid bruit  Cardiac:   RRR, No gallop or murmur  Lungs:   CTA,  No wheezes or rales,Normal percussion  Abdomen:  Normal bowel sounds, abd soft, non-tender, no rebound or guarding  Extremities:   No clubbing, edema or cyanosis  Neurological:   A & O x 3, Moves all extremities, normal DTR              CBC  Recent Labs      08/11/18   0949   WBC  6.9   HGB  11.3*   HCT  33.3*   MCV  88.3   PLT  363     BMP  Recent Labs      08/11/18   0949  08/12/18   1550   NA  141  140   K  3.2*  4.0   CL  99  103   CO2  26  28   BUN  14  10   CREATININE  0.9  0.8   LABGLOM  >60  >60   CALCIUM  8.5*  8.9     LFT's  Recent Labs      08/11/18   0949   ALT  8     INR: No results for input(s): INR in the last 72 hours. No results found for: CRP  Lab Results   Component Value Date    SEDRATE 49 (H) 05/12/2017     No results for input(s): POCGLU in the last 72 hours. Lipids: No results for input(s): CHOL, HDL in the last 72 hours.     Invalid input(s): LDLCALCU  ABGs: No results found for: PHART, PO2ART, SYB2NPO  SpO2 Readings from Last 1 Encounters:   08/14/18 93%       Last 3 Troponin:    Lab Results   Component Value Date    TROPONINI <0.01 02/28/2018    TROPONINI 0.02 04/15/2017    TROPONINI <0.01 10/14/2015 Lab Results   Component Value Date    TROPONINI <0.01 02/28/2018    TROPONINI 0.02 04/15/2017    TROPONINI <0.01 10/14/2015        TSH:    Lab Results   Component Value Date    TSH 1.290 08/09/2018             U/A:     Lab Results   Component Value Date    COLORU Yellow 08/09/2018    PHUR 6.0 08/09/2018    WBCUA PACKED 08/09/2018    RBCUA 2-5 08/09/2018    BACTERIA MANY 08/09/2018    CLARITYU SLCLOUDY 08/09/2018    SPECGRAV 1.020 08/09/2018    LEUKOCYTESUR LARGE 08/09/2018    UROBILINOGEN 0.2 08/09/2018    BILIRUBINUR Negative 08/09/2018    BLOODU MODERATE 08/09/2018    GLUCOSEU Negative 08/09/2018          Iron studies:No results found for: FERRITIN  Bone disease:  Lab Results   Component Value Date    MG 2.1 08/10/2018     Nutrition:No results found for: ALB           Assessment:  Patient Active Problem List   Diagnosis Code    Multiple sclerosis ([facility] [address] G35    Acute cystitis N30.00    Bilateral foot-drop M21.371, M21.372    Sepsis ([facility] [address] A41.9    Infection due to urethral catheter ([facility] [address] T83.511A, N39.0    Altered mental status R41.82    Urinary tract infection associated with indwelling urethral catheter ([facility] [address] T83.511A, N39.0    Urinary tract infection due to extended-spectrum beta lactamase (ESBL) producing Escherichia coli N39.0, B96.29, Z16.12    Acute encephalopathy G93.40    Lactic acidosis E87.2    Hypokalemia E87.6     1. Change in mental status   Acute encephalopathy   improving     2.   uti  EBSL              Rocephin  d/c   ON invanz                          3.  MUltiple sclerosis - chronic progressive     4. Chronic urine catheter    5.   Lactic acidosis             Plan:      Cont invanz per ID  Discharge to home vs Craig Knapp M.D.    8/14/2018

## 2018-08-14 NOTE — CARE COORDINATION
8/14/2018 SOCIAL WORK TRANSITION OF CARE/DISCHARGE PLANNING  Sw notified that patient is accepted at [facility]. sw faxed over [employer], and paper N17 to [facility] [phone]. Sofia called and spoke with Mitul Weaver to notify of fax sent [phone]. sofia will follow and assist prn.   Electronically signed by DOTTIE Tucker on 8/14/2018 at 2:47 PM

## 2018-08-14 NOTE — CARE COORDINATION
8/14/2018 social work transition of care/discharge planning  Sw set up transport with [facility] via [employer] for [address] and family was notified.   Electronically signed by DOTTIE Hendrix on 8/14/2018 at 3:47 PM

## 2018-08-14 NOTE — CARE COORDINATION
8/14/2018 social work transition of care [address] [email or web address] notified by Nurse that pt's mom called in with concern about pt going home alone and being weakened,would like patience at discharge. Patient reportedly has intermitten confusion. Referral made to [facility]. No pre cert needed if accepted,will need Pasar. Sw will follow and assist prn. Electronically signed by DOTTIE Lara on 8/14/2018 at 11:26 AM   Correction:LOC,paper N17, and HENS-needed (not pasar).   Electronically signed by DOTTIE Lara on 8/14/2018 at 1:24 PM

## 2018-08-17 LAB
EKG ATRIAL RATE: 107 BPM
EKG P AXIS: 82 DEGREES
EKG P-R INTERVAL: 188 MS
EKG Q-T INTERVAL: 350 MS
EKG QRS DURATION: 88 MS
EKG QTC CALCULATION (BAZETT): 467 MS
EKG R AXIS: 96 DEGREES
EKG T AXIS: 74 DEGREES
EKG VENTRICULAR RATE: 107 BPM

## 2018-08-20 ENCOUNTER — TELEPHONE (OUTPATIENT)
Dept: FAMILY MEDICINE CLINIC | Age: 63
End: 2018-08-20

## 2018-08-20 NOTE — TELEPHONE ENCOUNTER
Malka Palacio called from [place] [address] Dept re [facility], Dr Taveras's pt. The d/c summary needs to be completed . [facility]' ph# [phone].

## 2018-10-09 RX ORDER — SODIUM CHLORIDE 9 MG/ML
INJECTION, SOLUTION INTRAVENOUS CONTINUOUS
Status: CANCELLED | OUTPATIENT
Start: 2018-10-09

## 2018-10-09 RX ORDER — EPINEPHRINE 1 MG/ML
0.3 INJECTION, SOLUTION, CONCENTRATE INTRAVENOUS
Status: CANCELLED | OUTPATIENT
Start: 2018-10-09

## 2018-10-09 RX ORDER — ACETAMINOPHEN 325 MG/1
650 TABLET ORAL ONCE
Status: CANCELLED | OUTPATIENT
Start: 2018-10-09

## 2018-10-09 RX ORDER — DIPHENHYDRAMINE HYDROCHLORIDE 50 MG/ML
25 INJECTION INTRAMUSCULAR; INTRAVENOUS ONCE
Status: CANCELLED | OUTPATIENT
Start: 2018-10-09

## 2018-10-09 RX ORDER — METHYLPREDNISOLONE SODIUM SUCCINATE 125 MG/2ML
100 INJECTION, POWDER, LYOPHILIZED, FOR SOLUTION INTRAMUSCULAR; INTRAVENOUS ONCE
Status: CANCELLED | OUTPATIENT
Start: 2018-10-09

## 2018-10-09 RX ORDER — METHYLPREDNISOLONE SODIUM SUCCINATE 125 MG/2ML
125 INJECTION, POWDER, LYOPHILIZED, FOR SOLUTION INTRAMUSCULAR; INTRAVENOUS ONCE
Status: CANCELLED | OUTPATIENT
Start: 2018-10-09 | End: 2018-10-09

## 2018-10-09 RX ORDER — SODIUM CHLORIDE 9 MG/ML
INJECTION, SOLUTION INTRAVENOUS ONCE
Status: CANCELLED
Start: 2018-10-09 | End: 2018-10-09

## 2018-10-09 RX ORDER — 0.9 % SODIUM CHLORIDE 0.9 %
10 VIAL (ML) INJECTION ONCE
Status: CANCELLED | OUTPATIENT
Start: 2018-10-09 | End: 2018-10-09

## 2018-10-09 RX ORDER — SODIUM CHLORIDE 0.9 % (FLUSH) 0.9 %
10 SYRINGE (ML) INJECTION PRN
Status: CANCELLED
Start: 2018-10-09

## 2018-10-09 RX ORDER — DIPHENHYDRAMINE HYDROCHLORIDE 50 MG/ML
50 INJECTION INTRAMUSCULAR; INTRAVENOUS ONCE
Status: CANCELLED | OUTPATIENT
Start: 2018-10-09 | End: 2018-10-09

## 2018-11-01 ENCOUNTER — HOSPITAL ENCOUNTER (OUTPATIENT)
Dept: INFUSION THERAPY | Age: 63
Setting detail: INFUSION SERIES
Discharge: HOME | End: 2018-11-01
Payer: MEDICAID

## 2018-11-01 ENCOUNTER — HOSPITAL ENCOUNTER (OUTPATIENT)
Dept: INFUSION THERAPY | Age: 63
Setting detail: INFUSION SERIES
End: 2018-11-01
Payer: MEDICAID

## 2018-11-01 VITALS
TEMPERATURE: 98.3 F | HEART RATE: 90 BPM | RESPIRATION RATE: 16 BRPM | DIASTOLIC BLOOD PRESSURE: 70 MMHG | SYSTOLIC BLOOD PRESSURE: 114 MMHG

## 2018-11-01 DIAGNOSIS — G35 MULTIPLE SCLEROSIS (HCC): ICD-10-CM

## 2018-11-01 PROCEDURE — 96375 TX/PRO/DX INJ NEW DRUG ADDON: CPT

## 2018-11-01 PROCEDURE — 96367 TX/PROPH/DG ADDL SEQ IV INF: CPT

## 2018-11-01 PROCEDURE — 96374 THER/PROPH/DIAG INJ IV PUSH: CPT

## 2018-11-01 PROCEDURE — 6370000000 HC RX 637 (ALT 250 FOR IP): Performed by: PSYCHIATRY & NEUROLOGY

## 2018-11-01 PROCEDURE — 6360000002 HC RX W HCPCS: Performed by: PSYCHIATRY & NEUROLOGY

## 2018-11-01 PROCEDURE — 96366 THER/PROPH/DIAG IV INF ADDON: CPT

## 2018-11-01 PROCEDURE — 2580000003 HC RX 258: Performed by: PSYCHIATRY & NEUROLOGY

## 2018-11-01 RX ORDER — ACETAMINOPHEN 325 MG/1
650 TABLET ORAL EVERY 4 HOURS PRN
Status: ON HOLD | COMMUNITY
End: 2019-12-16 | Stop reason: SDUPTHER

## 2018-11-01 RX ORDER — ZOLPIDEM TARTRATE 5 MG/1
5 TABLET ORAL NIGHTLY PRN
COMMUNITY
End: 2019-07-05 | Stop reason: ALTCHOICE

## 2018-11-01 RX ORDER — DIPHENHYDRAMINE HYDROCHLORIDE 50 MG/ML
50 INJECTION INTRAMUSCULAR; INTRAVENOUS ONCE
Status: CANCELLED | OUTPATIENT
Start: 2018-11-01 | End: 2018-11-01

## 2018-11-01 RX ORDER — SODIUM CHLORIDE 9 MG/ML
INJECTION, SOLUTION INTRAVENOUS ONCE
Status: CANCELLED | OUTPATIENT
Start: 2018-11-01

## 2018-11-01 RX ORDER — 0.9 % SODIUM CHLORIDE 0.9 %
10 VIAL (ML) INJECTION ONCE
Status: CANCELLED | OUTPATIENT
Start: 2018-11-01 | End: 2018-11-01

## 2018-11-01 RX ORDER — ACETAMINOPHEN 325 MG/1
650 TABLET ORAL ONCE
Status: COMPLETED | OUTPATIENT
Start: 2018-11-01 | End: 2018-11-01

## 2018-11-01 RX ORDER — SODIUM CHLORIDE 9 MG/ML
INJECTION, SOLUTION INTRAVENOUS ONCE
Status: CANCELLED
Start: 2018-11-01 | End: 2018-11-01

## 2018-11-01 RX ORDER — SODIUM CHLORIDE 0.9 % (FLUSH) 0.9 %
SYRINGE (ML) INJECTION
Status: DISPENSED
Start: 2018-11-01 | End: 2018-11-01

## 2018-11-01 RX ORDER — METHYLPREDNISOLONE SODIUM SUCCINATE 125 MG/2ML
100 INJECTION, POWDER, LYOPHILIZED, FOR SOLUTION INTRAMUSCULAR; INTRAVENOUS ONCE
Status: COMPLETED | OUTPATIENT
Start: 2018-11-01 | End: 2018-11-01

## 2018-11-01 RX ORDER — METHYLPREDNISOLONE SODIUM SUCCINATE 125 MG/2ML
100 INJECTION, POWDER, LYOPHILIZED, FOR SOLUTION INTRAMUSCULAR; INTRAVENOUS ONCE
Status: CANCELLED | OUTPATIENT
Start: 2018-11-01

## 2018-11-01 RX ORDER — SODIUM CHLORIDE 9 MG/ML
INJECTION, SOLUTION INTRAVENOUS ONCE
Status: COMPLETED | OUTPATIENT
Start: 2018-11-01 | End: 2018-11-01

## 2018-11-01 RX ORDER — SODIUM CHLORIDE 9 MG/ML
INJECTION, SOLUTION INTRAVENOUS CONTINUOUS
Status: CANCELLED | OUTPATIENT
Start: 2018-11-01

## 2018-11-01 RX ORDER — DIPHENHYDRAMINE HYDROCHLORIDE 50 MG/ML
25 INJECTION INTRAMUSCULAR; INTRAVENOUS ONCE
Status: COMPLETED | OUTPATIENT
Start: 2018-11-01 | End: 2018-11-01

## 2018-11-01 RX ORDER — EPINEPHRINE 1 MG/ML
0.3 INJECTION, SOLUTION, CONCENTRATE INTRAVENOUS
Status: CANCELLED | OUTPATIENT
Start: 2018-11-01

## 2018-11-01 RX ORDER — SODIUM CHLORIDE 0.9 % (FLUSH) 0.9 %
10 SYRINGE (ML) INJECTION PRN
Status: DISCONTINUED | OUTPATIENT
Start: 2018-11-01 | End: 2018-11-02 | Stop reason: HOSPADM

## 2018-11-01 RX ORDER — DIPHENHYDRAMINE HYDROCHLORIDE 50 MG/ML
25 INJECTION INTRAMUSCULAR; INTRAVENOUS ONCE
Status: CANCELLED | OUTPATIENT
Start: 2018-11-01

## 2018-11-01 RX ORDER — SODIUM CHLORIDE 0.9 % (FLUSH) 0.9 %
10 SYRINGE (ML) INJECTION PRN
Status: CANCELLED
Start: 2018-11-01

## 2018-11-01 RX ORDER — METHYLPREDNISOLONE SODIUM SUCCINATE 125 MG/2ML
125 INJECTION, POWDER, LYOPHILIZED, FOR SOLUTION INTRAMUSCULAR; INTRAVENOUS ONCE
Status: CANCELLED | OUTPATIENT
Start: 2018-11-01 | End: 2018-11-01

## 2018-11-01 RX ORDER — ACETAMINOPHEN 325 MG/1
650 TABLET ORAL ONCE
Status: CANCELLED | OUTPATIENT
Start: 2018-11-01

## 2018-11-01 RX ORDER — PYRIDOXINE HCL (VITAMIN B6) 100 MG
500 TABLET ORAL DAILY
Status: ON HOLD | COMMUNITY
End: 2019-12-16 | Stop reason: SDUPTHER

## 2018-11-01 RX ADMIN — METHYLPREDNISOLONE SODIUM SUCCINATE 100 MG: 125 INJECTION, POWDER, FOR SOLUTION INTRAMUSCULAR; INTRAVENOUS at 11:25

## 2018-11-01 RX ADMIN — SODIUM CHLORIDE: 9 INJECTION, SOLUTION INTRAVENOUS at 11:24

## 2018-11-01 RX ADMIN — OCRELIZUMAB 600 MG: 300 INJECTION INTRAVENOUS at 12:27

## 2018-11-01 RX ADMIN — ACETAMINOPHEN 650 MG: 325 TABLET, FILM COATED ORAL at 11:22

## 2018-11-01 RX ADMIN — DIPHENHYDRAMINE HYDROCHLORIDE 25 MG: 50 INJECTION INTRAMUSCULAR; INTRAVENOUS at 11:33

## 2018-11-01 NOTE — PROGRESS NOTES
Patient admitted to infusion services today for Ocrevus infusion for MS. Line placed and pre medications given. Patient has leg bag. Call light in reach. Patient had no adverse effects from her last treatment. Nursing facility called and medications and administration times reviewed.

## 2018-11-01 NOTE — PROGRESS NOTES
Patient tolerating infusion well. IV site asymptomatic. Position changed in bed and food and beverages offered and taken. Leg bag emptied for 200 cc of clear urine. Call light in reach.

## 2018-11-01 NOTE — PROGRESS NOTES
Infusion completed and patient tolerated very well. Vital signs stable. Urinary leg bag again emptied for 200 cc clear urine.  Patient assisted up in chair and waiting for [facility] which was called at [phone]

## 2018-12-12 ENCOUNTER — OFFICE VISIT (OUTPATIENT)
Dept: NEUROLOGY | Age: 63
End: 2018-12-12
Payer: MEDICAID

## 2018-12-12 VITALS
HEART RATE: 96 BPM | BODY MASS INDEX: 27.8 KG/M2 | RESPIRATION RATE: 18 BRPM | SYSTOLIC BLOOD PRESSURE: 142 MMHG | DIASTOLIC BLOOD PRESSURE: 70 MMHG | WEIGHT: 173 LBS | HEIGHT: 66 IN

## 2018-12-12 DIAGNOSIS — G35 MULTIPLE SCLEROSIS (HCC): Chronic | ICD-10-CM

## 2018-12-12 PROBLEM — E87.6 HYPOKALEMIA: Status: RESOLVED | Noted: 2018-08-12 | Resolved: 2018-12-12

## 2018-12-12 PROBLEM — N39.0 URINARY TRACT INFECTION ASSOCIATED WITH INDWELLING URETHRAL CATHETER (HCC): Status: RESOLVED | Noted: 2018-08-12 | Resolved: 2018-12-12

## 2018-12-12 PROBLEM — T83.511A URINARY TRACT INFECTION ASSOCIATED WITH INDWELLING URETHRAL CATHETER (HCC): Status: RESOLVED | Noted: 2018-08-12 | Resolved: 2018-12-12

## 2018-12-12 PROBLEM — A41.9 SEPSIS (HCC): Status: RESOLVED | Noted: 2018-02-28 | Resolved: 2018-12-12

## 2018-12-12 PROBLEM — R41.82 ALTERED MENTAL STATUS: Status: RESOLVED | Noted: 2018-08-09 | Resolved: 2018-12-12

## 2018-12-12 PROBLEM — E87.20 LACTIC ACIDOSIS: Status: RESOLVED | Noted: 2018-08-12 | Resolved: 2018-12-12

## 2018-12-12 PROCEDURE — 99213 OFFICE O/P EST LOW 20 MIN: CPT | Performed by: PSYCHIATRY & NEUROLOGY

## 2018-12-12 PROCEDURE — 99214 OFFICE O/P EST MOD 30 MIN: CPT | Performed by: PSYCHIATRY & NEUROLOGY

## 2018-12-12 NOTE — PROGRESS NOTES
from chronic progressive multiple sclerosis with no significant changes since her last visit. She remains on Ocrevus---tolerating the first 3 infusions well. Her EDSS remains 7.0    She is stable medically as well. Plan:     She will continue with [facility]    She will call at any time if problems arise    I spent 28 minutes with the patient with over 50 % spent in counseling and disease management. All pt issues were addressed and all questions were answered.     Salas Hopping  1:18 PM  12/12/2018

## 2019-04-12 RX ORDER — DIPHENHYDRAMINE HYDROCHLORIDE 50 MG/ML
25 INJECTION INTRAMUSCULAR; INTRAVENOUS ONCE
Status: CANCELLED | OUTPATIENT
Start: 2019-04-12

## 2019-04-12 RX ORDER — ACETAMINOPHEN 325 MG/1
650 TABLET ORAL ONCE
Status: CANCELLED | OUTPATIENT
Start: 2019-04-12

## 2019-04-12 RX ORDER — SODIUM CHLORIDE 9 MG/ML
INJECTION, SOLUTION INTRAVENOUS ONCE
Status: CANCELLED
Start: 2019-04-12

## 2019-04-12 RX ORDER — METHYLPREDNISOLONE SODIUM SUCCINATE 125 MG/2ML
100 INJECTION, POWDER, LYOPHILIZED, FOR SOLUTION INTRAMUSCULAR; INTRAVENOUS ONCE
Status: CANCELLED | OUTPATIENT
Start: 2019-04-12

## 2019-04-12 RX ORDER — SODIUM CHLORIDE 0.9 % (FLUSH) 0.9 %
10 SYRINGE (ML) INJECTION PRN
Status: CANCELLED
Start: 2019-04-12

## 2019-04-16 ENCOUNTER — TELEPHONE (OUTPATIENT)
Dept: NEUROLOGY | Age: 64
End: 2019-04-16

## 2019-04-16 NOTE — TELEPHONE ENCOUNTER
LM for pt to call office to see when her next Alexia Hunt is due. Also spoke with [facility] center to see why patient is not scheduled .

## 2019-04-17 NOTE — TELEPHONE ENCOUNTER
Infusion due in May. Patient is not scheduled.  Will need new orders if patient wants to continue with infusions

## 2019-05-06 ENCOUNTER — APPOINTMENT (OUTPATIENT)
Dept: CT IMAGING | Age: 64
DRG: 190 | End: 2019-05-06
Payer: MEDICAID

## 2019-05-06 ENCOUNTER — HOSPITAL ENCOUNTER (INPATIENT)
Age: 64
LOS: 3 days | Discharge: HOME HEALTH | DRG: 190 | End: 2019-05-09
Attending: EMERGENCY MEDICINE | Admitting: FAMILY MEDICINE
Payer: MEDICAID

## 2019-05-06 ENCOUNTER — APPOINTMENT (OUTPATIENT)
Dept: GENERAL RADIOLOGY | Age: 64
DRG: 190 | End: 2019-05-06
Payer: MEDICAID

## 2019-05-06 DIAGNOSIS — I50.9 NEW ONSET OF CONGESTIVE HEART FAILURE (HCC): ICD-10-CM

## 2019-05-06 DIAGNOSIS — J96.01 ACUTE RESPIRATORY FAILURE WITH HYPOXIA (HCC): Primary | ICD-10-CM

## 2019-05-06 PROBLEM — I50.43 CHF (CONGESTIVE HEART FAILURE), NYHA CLASS I, ACUTE ON CHRONIC, COMBINED (HCC): Status: ACTIVE | Noted: 2019-05-06

## 2019-05-06 LAB
ANION GAP SERPL CALCULATED.3IONS-SCNC: 10 MMOL/L (ref 7–16)
APTT: 32.2 SEC (ref 24.5–35.1)
APTT: 32.4 SEC (ref 24.5–35.1)
BASOPHILS ABSOLUTE: 0.04 E9/L (ref 0–0.2)
BASOPHILS RELATIVE PERCENT: 0.5 % (ref 0–2)
BUN BLDV-MCNC: 11 MG/DL (ref 8–23)
CALCIUM SERPL-MCNC: 9.4 MG/DL (ref 8.6–10.2)
CHLORIDE BLD-SCNC: 101 MMOL/L (ref 98–107)
CK MB: 7.5 NG/ML (ref 0–4.3)
CO2: 29 MMOL/L (ref 22–29)
CREAT SERPL-MCNC: 0.8 MG/DL (ref 0.5–1)
D DIMER: <200 NG/ML DDU
EOSINOPHILS ABSOLUTE: 0.12 E9/L (ref 0.05–0.5)
EOSINOPHILS RELATIVE PERCENT: 1.5 % (ref 0–6)
GFR AFRICAN AMERICAN: >60
GFR AFRICAN AMERICAN: >60
GFR NON-AFRICAN AMERICAN: >60 ML/MIN/1.73
GFR NON-AFRICAN AMERICAN: >60 ML/MIN/1.73
GLUCOSE BLD-MCNC: 108 MG/DL (ref 74–99)
GLUCOSE BLD-MCNC: 110 MG/DL (ref 74–99)
HCT VFR BLD CALC: 40.1 % (ref 34–48)
HCT VFR BLD CALC: 42.3 % (ref 34–48)
HEMOGLOBIN: 12.9 G/DL (ref 11.5–15.5)
HEMOGLOBIN: 13.6 G/DL (ref 11.5–15.5)
IMMATURE GRANULOCYTES #: 0.02 E9/L
IMMATURE GRANULOCYTES %: 0.3 % (ref 0–5)
LV EF: 60 %
LVEF MODALITY: NORMAL
LYMPHOCYTES ABSOLUTE: 0.84 E9/L (ref 1.5–4)
LYMPHOCYTES RELATIVE PERCENT: 10.5 % (ref 20–42)
MCH RBC QN AUTO: 29.2 PG (ref 26–35)
MCH RBC QN AUTO: 29.3 PG (ref 26–35)
MCHC RBC AUTO-ENTMCNC: 32.2 % (ref 32–34.5)
MCHC RBC AUTO-ENTMCNC: 32.2 % (ref 32–34.5)
MCV RBC AUTO: 90.8 FL (ref 80–99.9)
MCV RBC AUTO: 90.9 FL (ref 80–99.9)
MONOCYTES ABSOLUTE: 0.63 E9/L (ref 0.1–0.95)
MONOCYTES RELATIVE PERCENT: 7.9 % (ref 2–12)
NEUTROPHILS ABSOLUTE: 6.33 E9/L (ref 1.8–7.3)
NEUTROPHILS RELATIVE PERCENT: 79.3 % (ref 43–80)
PDW BLD-RTO: 13.2 FL (ref 11.5–15)
PDW BLD-RTO: 13.2 FL (ref 11.5–15)
PERFORMED ON: ABNORMAL
PLATELET # BLD: 412 E9/L (ref 130–450)
PLATELET # BLD: 420 E9/L (ref 130–450)
PMV BLD AUTO: 10.1 FL (ref 7–12)
PMV BLD AUTO: 10.5 FL (ref 7–12)
POC CHLORIDE: 105 MMOL/L (ref 100–108)
POC CREATININE: 0.9 MG/DL (ref 0.5–1)
POC POTASSIUM: 4.2 MMOL/L (ref 3.5–5)
POC SODIUM: 142 MMOL/L (ref 132–146)
POTASSIUM REFLEX MAGNESIUM: 4 MMOL/L (ref 3.5–5)
PRO-BNP: 1504 PG/ML (ref 0–125)
RBC # BLD: 4.41 E12/L (ref 3.5–5.5)
RBC # BLD: 4.66 E12/L (ref 3.5–5.5)
SODIUM BLD-SCNC: 140 MMOL/L (ref 132–146)
TROPONIN: 0.02 NG/ML (ref 0–0.03)
TROPONIN: 0.04 NG/ML (ref 0–0.03)
TROPONIN: 0.05 NG/ML (ref 0–0.03)
WBC # BLD: 8 E9/L (ref 4.5–11.5)
WBC # BLD: 8.1 E9/L (ref 4.5–11.5)

## 2019-05-06 PROCEDURE — 84295 ASSAY OF SERUM SODIUM: CPT

## 2019-05-06 PROCEDURE — 6360000002 HC RX W HCPCS: Performed by: NURSE PRACTITIONER

## 2019-05-06 PROCEDURE — 36415 COLL VENOUS BLD VENIPUNCTURE: CPT

## 2019-05-06 PROCEDURE — 85378 FIBRIN DEGRADE SEMIQUANT: CPT

## 2019-05-06 PROCEDURE — 85027 COMPLETE CBC AUTOMATED: CPT

## 2019-05-06 PROCEDURE — 84132 ASSAY OF SERUM POTASSIUM: CPT

## 2019-05-06 PROCEDURE — 93005 ELECTROCARDIOGRAM TRACING: CPT | Performed by: EMERGENCY MEDICINE

## 2019-05-06 PROCEDURE — 99285 EMERGENCY DEPT VISIT HI MDM: CPT

## 2019-05-06 PROCEDURE — 93005 ELECTROCARDIOGRAM TRACING: CPT | Performed by: NURSE PRACTITIONER

## 2019-05-06 PROCEDURE — 85730 THROMBOPLASTIN TIME PARTIAL: CPT

## 2019-05-06 PROCEDURE — 82553 CREATINE MB FRACTION: CPT

## 2019-05-06 PROCEDURE — 93306 TTE W/DOPPLER COMPLETE: CPT

## 2019-05-06 PROCEDURE — 96374 THER/PROPH/DIAG INJ IV PUSH: CPT

## 2019-05-06 PROCEDURE — 85025 COMPLETE CBC W/AUTO DIFF WBC: CPT

## 2019-05-06 PROCEDURE — 83880 ASSAY OF NATRIURETIC PEPTIDE: CPT

## 2019-05-06 PROCEDURE — 82435 ASSAY OF BLOOD CHLORIDE: CPT

## 2019-05-06 PROCEDURE — 2500000003 HC RX 250 WO HCPCS: Performed by: NURSE PRACTITIONER

## 2019-05-06 PROCEDURE — 99254 IP/OBS CNSLTJ NEW/EST MOD 60: CPT | Performed by: INTERNAL MEDICINE

## 2019-05-06 PROCEDURE — 82565 ASSAY OF CREATININE: CPT

## 2019-05-06 PROCEDURE — 80048 BASIC METABOLIC PNL TOTAL CA: CPT

## 2019-05-06 PROCEDURE — 2700000000 HC OXYGEN THERAPY PER DAY

## 2019-05-06 PROCEDURE — 6370000000 HC RX 637 (ALT 250 FOR IP): Performed by: FAMILY MEDICINE

## 2019-05-06 PROCEDURE — 71275 CT ANGIOGRAPHY CHEST: CPT

## 2019-05-06 PROCEDURE — 6360000004 HC RX CONTRAST MEDICATION: Performed by: INTERNAL MEDICINE

## 2019-05-06 PROCEDURE — 2580000003 HC RX 258: Performed by: RADIOLOGY

## 2019-05-06 PROCEDURE — APPSS45 APP SPLIT SHARED TIME 31-45 MINUTES: Performed by: NURSE PRACTITIONER

## 2019-05-06 PROCEDURE — 84484 ASSAY OF TROPONIN QUANT: CPT

## 2019-05-06 PROCEDURE — 82947 ASSAY GLUCOSE BLOOD QUANT: CPT

## 2019-05-06 PROCEDURE — 71046 X-RAY EXAM CHEST 2 VIEWS: CPT

## 2019-05-06 PROCEDURE — 2580000003 HC RX 258: Performed by: FAMILY MEDICINE

## 2019-05-06 PROCEDURE — 2060000000 HC ICU INTERMEDIATE R&B

## 2019-05-06 PROCEDURE — 6360000004 HC RX CONTRAST MEDICATION: Performed by: RADIOLOGY

## 2019-05-06 PROCEDURE — 6360000002 HC RX W HCPCS: Performed by: EMERGENCY MEDICINE

## 2019-05-06 RX ORDER — FLUTICASONE PROPIONATE 50 MCG
1 SPRAY, SUSPENSION (ML) NASAL DAILY
Status: DISCONTINUED | OUTPATIENT
Start: 2019-05-06 | End: 2019-05-09 | Stop reason: HOSPADM

## 2019-05-06 RX ORDER — NITROGLYCERIN 20 MG/100ML
5 INJECTION INTRAVENOUS CONTINUOUS
Status: DISCONTINUED | OUTPATIENT
Start: 2019-05-06 | End: 2019-05-09 | Stop reason: HOSPADM

## 2019-05-06 RX ORDER — HEPARIN SODIUM 1000 [USP'U]/ML
60 INJECTION, SOLUTION INTRAVENOUS; SUBCUTANEOUS ONCE
Status: DISCONTINUED | OUTPATIENT
Start: 2019-05-06 | End: 2019-05-06

## 2019-05-06 RX ORDER — LEVOTHYROXINE SODIUM 88 UG/1
88 TABLET ORAL DAILY
Status: DISCONTINUED | OUTPATIENT
Start: 2019-05-06 | End: 2019-05-09 | Stop reason: HOSPADM

## 2019-05-06 RX ORDER — FLUTICASONE PROPIONATE 50 MCG
1 SPRAY, SUSPENSION (ML) NASAL DAILY
COMMUNITY
End: 2020-12-14

## 2019-05-06 RX ORDER — FLUOXETINE HYDROCHLORIDE 20 MG/1
20 CAPSULE ORAL 2 TIMES DAILY
Status: DISCONTINUED | OUTPATIENT
Start: 2019-05-06 | End: 2019-05-09 | Stop reason: HOSPADM

## 2019-05-06 RX ORDER — CLONIDINE HYDROCHLORIDE 0.2 MG/1
0.2 TABLET ORAL EVERY 6 HOURS PRN
Status: DISCONTINUED | OUTPATIENT
Start: 2019-05-06 | End: 2019-05-09 | Stop reason: HOSPADM

## 2019-05-06 RX ORDER — DIAZEPAM 2 MG/1
2 TABLET ORAL EVERY 12 HOURS PRN
Status: DISCONTINUED | OUTPATIENT
Start: 2019-05-06 | End: 2019-05-09 | Stop reason: HOSPADM

## 2019-05-06 RX ORDER — ACETAMINOPHEN 325 MG/1
650 TABLET ORAL EVERY 4 HOURS PRN
Status: DISCONTINUED | OUTPATIENT
Start: 2019-05-06 | End: 2019-05-09 | Stop reason: HOSPADM

## 2019-05-06 RX ORDER — SODIUM CHLORIDE 0.9 % (FLUSH) 0.9 %
10 SYRINGE (ML) INJECTION PRN
Status: DISCONTINUED | OUTPATIENT
Start: 2019-05-06 | End: 2019-05-07 | Stop reason: SDUPTHER

## 2019-05-06 RX ORDER — ASPIRIN 81 MG/1
81 TABLET, CHEWABLE ORAL DAILY
Status: DISCONTINUED | OUTPATIENT
Start: 2019-05-06 | End: 2019-05-09 | Stop reason: HOSPADM

## 2019-05-06 RX ORDER — SODIUM CHLORIDE 0.9 % (FLUSH) 0.9 %
10 SYRINGE (ML) INJECTION EVERY 12 HOURS SCHEDULED
Status: DISCONTINUED | OUTPATIENT
Start: 2019-05-06 | End: 2019-05-09 | Stop reason: HOSPADM

## 2019-05-06 RX ORDER — GABAPENTIN 300 MG/1
300 CAPSULE ORAL 3 TIMES DAILY
Status: DISCONTINUED | OUTPATIENT
Start: 2019-05-06 | End: 2019-05-09 | Stop reason: HOSPADM

## 2019-05-06 RX ORDER — PYRIDOXINE HCL (VITAMIN B6) 100 MG
500 TABLET ORAL DAILY
Status: DISCONTINUED | OUTPATIENT
Start: 2019-05-06 | End: 2019-05-06 | Stop reason: CLARIF

## 2019-05-06 RX ORDER — ATORVASTATIN CALCIUM 40 MG/1
40 TABLET, FILM COATED ORAL NIGHTLY
Status: DISCONTINUED | OUTPATIENT
Start: 2019-05-06 | End: 2019-05-09

## 2019-05-06 RX ORDER — HEPARIN SODIUM 1000 [USP'U]/ML
60 INJECTION, SOLUTION INTRAVENOUS; SUBCUTANEOUS PRN
Status: DISCONTINUED | OUTPATIENT
Start: 2019-05-06 | End: 2019-05-08

## 2019-05-06 RX ORDER — FUROSEMIDE 10 MG/ML
40 INJECTION INTRAMUSCULAR; INTRAVENOUS ONCE
Status: COMPLETED | OUTPATIENT
Start: 2019-05-06 | End: 2019-05-06

## 2019-05-06 RX ORDER — HEPARIN SODIUM 1000 [USP'U]/ML
30 INJECTION, SOLUTION INTRAVENOUS; SUBCUTANEOUS PRN
Status: DISCONTINUED | OUTPATIENT
Start: 2019-05-06 | End: 2019-05-08

## 2019-05-06 RX ORDER — SODIUM CHLORIDE 0.9 % (FLUSH) 0.9 %
10 SYRINGE (ML) INJECTION PRN
Status: DISCONTINUED | OUTPATIENT
Start: 2019-05-06 | End: 2019-05-06 | Stop reason: SDUPTHER

## 2019-05-06 RX ORDER — ACETAMINOPHEN 325 MG/1
650 TABLET ORAL EVERY 6 HOURS PRN
Status: DISCONTINUED | OUTPATIENT
Start: 2019-05-06 | End: 2019-05-06 | Stop reason: SDUPTHER

## 2019-05-06 RX ORDER — CHOLECALCIFEROL (VITAMIN D3) 50 MCG
5000 TABLET ORAL DAILY
Status: DISCONTINUED | OUTPATIENT
Start: 2019-05-06 | End: 2019-05-09 | Stop reason: HOSPADM

## 2019-05-06 RX ORDER — AMLODIPINE BESYLATE 5 MG/1
5 TABLET ORAL DAILY
Status: DISCONTINUED | OUTPATIENT
Start: 2019-05-06 | End: 2019-05-08

## 2019-05-06 RX ORDER — ZOLPIDEM TARTRATE 5 MG/1
5 TABLET ORAL NIGHTLY PRN
Status: DISCONTINUED | OUTPATIENT
Start: 2019-05-06 | End: 2019-05-09 | Stop reason: HOSPADM

## 2019-05-06 RX ORDER — CETIRIZINE HYDROCHLORIDE 10 MG/1
10 TABLET ORAL DAILY
Status: DISCONTINUED | OUTPATIENT
Start: 2019-05-06 | End: 2019-05-09 | Stop reason: HOSPADM

## 2019-05-06 RX ORDER — LORATADINE 10 MG/1
10 TABLET ORAL DAILY
Status: ON HOLD | COMMUNITY
End: 2019-12-16 | Stop reason: HOSPADM

## 2019-05-06 RX ORDER — HEPARIN SODIUM 10000 [USP'U]/100ML
12 INJECTION, SOLUTION INTRAVENOUS CONTINUOUS
Status: DISCONTINUED | OUTPATIENT
Start: 2019-05-06 | End: 2019-05-08

## 2019-05-06 RX ORDER — ONDANSETRON 2 MG/ML
4 INJECTION INTRAMUSCULAR; INTRAVENOUS EVERY 6 HOURS PRN
Status: DISCONTINUED | OUTPATIENT
Start: 2019-05-06 | End: 2019-05-09 | Stop reason: HOSPADM

## 2019-05-06 RX ADMIN — ASPIRIN 81 MG 81 MG: 81 TABLET ORAL at 10:42

## 2019-05-06 RX ADMIN — NITROGLYCERIN 5 MCG/MIN: 20 INJECTION INTRAVENOUS at 16:00

## 2019-05-06 RX ADMIN — Medication 10 ML: at 06:05

## 2019-05-06 RX ADMIN — AMLODIPINE BESYLATE 5 MG: 5 TABLET ORAL at 10:42

## 2019-05-06 RX ADMIN — CETIRIZINE HYDROCHLORIDE 10 MG: 10 TABLET, FILM COATED ORAL at 10:42

## 2019-05-06 RX ADMIN — FLUTICASONE PROPIONATE 1 SPRAY: 50 SPRAY, METERED NASAL at 12:18

## 2019-05-06 RX ADMIN — PERFLUTREN 1.65 MG: 6.52 INJECTION, SUSPENSION INTRAVENOUS at 16:32

## 2019-05-06 RX ADMIN — Medication 10 ML: at 21:51

## 2019-05-06 RX ADMIN — FLUOXETINE HYDROCHLORIDE 20 MG: 20 CAPSULE ORAL at 10:41

## 2019-05-06 RX ADMIN — IOPAMIDOL 75 ML: 755 INJECTION, SOLUTION INTRAVENOUS at 06:05

## 2019-05-06 RX ADMIN — APIXABAN 5 MG: 5 TABLET, FILM COATED ORAL at 10:40

## 2019-05-06 RX ADMIN — HEPARIN SODIUM AND DEXTROSE 12 UNITS/KG/HR: 10000; 5 INJECTION INTRAVENOUS at 22:58

## 2019-05-06 RX ADMIN — LEVOTHYROXINE SODIUM 88 MCG: 175 TABLET ORAL at 10:42

## 2019-05-06 RX ADMIN — Medication 10 ML: at 10:42

## 2019-05-06 RX ADMIN — FUROSEMIDE 40 MG: 10 INJECTION, SOLUTION INTRAMUSCULAR; INTRAVENOUS at 06:46

## 2019-05-06 RX ADMIN — POTASSIUM BICARBONATE 20 MEQ: 782 TABLET, EFFERVESCENT ORAL at 10:39

## 2019-05-06 RX ADMIN — GABAPENTIN 300 MG: 300 CAPSULE ORAL at 14:58

## 2019-05-06 RX ADMIN — GABAPENTIN 300 MG: 300 CAPSULE ORAL at 10:40

## 2019-05-06 RX ADMIN — POTASSIUM BICARBONATE 20 MEQ: 782 TABLET, EFFERVESCENT ORAL at 21:50

## 2019-05-06 RX ADMIN — Medication 10 ML: at 10:43

## 2019-05-06 RX ADMIN — ACETAMINOPHEN 650 MG: 325 TABLET, FILM COATED ORAL at 15:24

## 2019-05-06 RX ADMIN — ATORVASTATIN CALCIUM 40 MG: 40 TABLET, FILM COATED ORAL at 21:51

## 2019-05-06 RX ADMIN — Medication 5000 UNITS: at 10:40

## 2019-05-06 RX ADMIN — FLUOXETINE HYDROCHLORIDE 20 MG: 20 CAPSULE ORAL at 21:51

## 2019-05-06 RX ADMIN — GABAPENTIN 300 MG: 300 CAPSULE ORAL at 21:50

## 2019-05-06 ASSESSMENT — PAIN DESCRIPTION - DESCRIPTORS
DESCRIPTORS_2: ACHING
DESCRIPTORS: THROBBING

## 2019-05-06 ASSESSMENT — PAIN DESCRIPTION - LOCATION
LOCATION_2: ARM
LOCATION: BACK;SHOULDER;ARM
LOCATION: CHEST
LOCATION: CHEST

## 2019-05-06 ASSESSMENT — PAIN DESCRIPTION - PAIN TYPE
TYPE: ACUTE PAIN
TYPE_2: ACUTE PAIN
TYPE: ACUTE PAIN

## 2019-05-06 ASSESSMENT — PAIN SCALES - GENERAL
PAINLEVEL_OUTOF10: 6
PAINLEVEL_OUTOF10: 8
PAINLEVEL_OUTOF10: 7
PAINLEVEL_OUTOF10: 5
PAINLEVEL_OUTOF10: 8
PAINLEVEL_OUTOF10: 3

## 2019-05-06 ASSESSMENT — PAIN DESCRIPTION - ORIENTATION
ORIENTATION: LEFT
ORIENTATION_2: RIGHT
ORIENTATION: LEFT;RIGHT

## 2019-05-06 ASSESSMENT — PAIN DESCRIPTION - ONSET: ONSET: ON-GOING

## 2019-05-06 ASSESSMENT — PAIN DESCRIPTION - INTENSITY: RATING_2: 7

## 2019-05-06 ASSESSMENT — PAIN DESCRIPTION - FREQUENCY: FREQUENCY: CONTINUOUS

## 2019-05-06 ASSESSMENT — PAIN DESCRIPTION - DURATION: DURATION_2: CONTINUOUS

## 2019-05-06 NOTE — CONSULTS
Inpatient Cardiology Consultation      Reason for Consult:  Chest pain and shortness of breath    Consulting Physician: Dr. Suzette Estrada    Requesting Physician:  Dr. Josefina Finney    Date of Consultation: 2019    HISTORY OF PRESENT ILLNESS:     This 82-year-old female denies any cardiac history. She does have multiple sclerosis and is in and out of facilities due to inability to care for herself. She currently resides at [facility]. She was treated for a DVT in the left lower extremity in February of this year and has been on Eiquis. Around 11:30 PM last night she felt a tightness in her chest she was getting ready to go to bed. It was a #9 on a scale of 1-10 and felt like a pulling sensation that radiated around her sternum. She was panting but denies being short of breath. She had no diaphoresis. She did not call the nurses tried to get comfortable and go to sleep. Finally the pain persisted until 3:30 AM and nurses were summoned who called 911. In the emergency room and EKG showed sinus rhythm with left axis deviation and old inferior and anterolateral infarct . These changes were new as compared to an EKG done in 2018. 1st troponin was negative but the 2nd troponin is 0.08. She continues to have chest discomfort. Chest x-ray showed pulmonary edema and a pro-BnP was 1504. PAST MEDICAL HISTORY  1. Remote tobacco abuse  2. CVA in   3. Father with premature coronary artery disease in his 61s  2. DVT left lower extremity 2019 and on anticoagulation. DVT was unprovoked  5. Multiple sclerosis  6. Neurogenic bladder and history of sepsis and chronic indwelling Elizondo catheter  7. Hypothyroidism  8. Anemia and history of blood transfusion  9. Depression  10. Anxiety hysterectomy  11.  Exploratory laparotomy for lysis of adhesions and release of small bowel reduction in    12.  complete dental extraction      FAMILY HISTORY-mother  in her [de-identified] of coronary artery disease which did not start until 6 months prior to death, father  2 months ago and had heart failure and had a CABG in his 62s    SOCIAL HISTORY-smoking many years ago denies alcohol or street drugs and lives with her son or in a facility    Medications Prior to admit:  Prior to Admission medications    Medication Sig Start Date End Date Taking?  Authorizing Provider   Cholecalciferol (VITAMIN D3) 5000 units TABS Take 1 tablet by mouth daily   Yes Historical Provider, MD   loratadine (CLARITIN) 10 MG tablet Take 10 mg by mouth daily   Yes Historical Provider, MD   apixaban (ELIQUIS) 5 MG TABS tablet Take 5 mg by mouth 2 times daily   Yes Historical Provider, MD   fluticasone (FLONASE) 50 MCG/ACT nasal spray 1 spray by Each Nare route daily   Yes Historical Provider, MD   aspirin 81 MG tablet Take 81 mg by mouth daily   Yes Historical Provider, MD   Psyllium (METAMUCIL) 28.3 % POWD Take 1 each by mouth daily   Yes Historical Provider, MD   Cranberry 500 MG CAPS Take 500 mg by mouth daily   Yes Historical Provider, MD   acetaminophen (TYLENOL) 325 MG tablet Take 650 mg by mouth every 4 hours as needed for Pain   Yes Historical Provider, MD   magnesium hydroxide (MILK OF MAGNESIA) 400 MG/5ML suspension Take 30 mLs by mouth daily as needed for Constipation 18  Yes Rupa Guardado MD   potassium bicarbonate (K-LYTE) 25 MEQ disintegrating tablet Take 1 tablet by mouth 2 times daily 18  Yes Rupa Guardado MD   levothyroxine (SYNTHROID) 88 MCG tablet Take 88 mcg by mouth Daily   Yes Historical Provider, MD   albuterol sulfate HFA (PROAIR HFA) 108 (90 Base) MCG/ACT inhaler Inhale 2 puffs into the lungs every 6 hours as needed for Wheezing 17 Yes Cloviswilfred Jamison, APRN - CNP   amLODIPine (NORVASC) 5 MG tablet Take 1 tablet by mouth daily 17  Yes Umer Ritchie MD   diazepam (VALIUM) 2 MG tablet Take 2 mg by mouth every 12 hours as needed for Anxiety   Yes Historical Provider, MD   gabapentin (NEURONTIN) 300 MG capsule Take 1 capsule by mouth 3 times daily 10/30/15  Yes Delio Lilly MD   FLUoxetine (PROZAC) 20 MG capsule Take 1 capsule by mouth 2 times daily 10/30/15  Yes Delio Lilly MD   zolpidem (AMBIEN) 5 MG tablet Take 5 mg by mouth nightly as needed for Sleep. Mariel Schmidt     Historical Provider, MD   ocrelizumab (OCREVUS) 300 MG/10ML SOLN injection Infuse 20 ml ( total 600mg) every 6 months 10/8/18   Magnolia Parker MD   cloNIDine (CATAPRES) 0.2 MG tablet Take 1 tablet by mouth every 6 hours as needed ([id number] or [id number]>501) 6/05/35   Denisse Fowler MD       Current Medications:    Current Facility-Administered Medications: sodium chloride flush 0.9 % injection 10 mL, 10 mL, Intravenous, PRN  sodium chloride flush 0.9 % injection 10 mL, 10 mL, Intravenous, 2 times per day  acetaminophen (TYLENOL) tablet 650 mg, 650 mg, Oral, Q4H PRN  cloNIDine (CATAPRES) tablet 0.2 mg, 0.2 mg, Oral, Q6H PRN  zolpidem (AMBIEN) tablet 5 mg, 5 mg, Oral, Nightly PRN  amLODIPine (NORVASC) tablet 5 mg, 5 mg, Oral, Daily  apixaban (ELIQUIS) tablet 5 mg, 5 mg, Oral, BID  aspirin chewable tablet 81 mg, 81 mg, Oral, Daily  vitamin D tablet 5,000 Units, 5,000 Units, Oral, Daily  diazepam (VALIUM) tablet 2 mg, 2 mg, Oral, Q12H PRN  FLUoxetine (PROZAC) capsule 20 mg, 20 mg, Oral, BID  fluticasone (FLONASE) 50 MCG/ACT nasal spray 1 spray, 1 spray, Each Nare, Daily  gabapentin (NEURONTIN) capsule 300 mg, 300 mg, Oral, TID  levothyroxine (SYNTHROID) tablet 88 mcg, 88 mcg, Oral, Daily  cetirizine (ZYRTEC) tablet 10 mg, 10 mg, Oral, Daily  magnesium hydroxide (MILK OF MAGNESIA) 400 MG/5ML suspension 30 mL, 30 mL, Oral, Daily PRN  potassium bicarb-citric acid (EFFER-K) effervescent tablet 20 mEq, 20 mEq, Oral, BID  psyllium (KONSYL) 28.3 % packet 1 packet, 1 packet, Oral, Daily  sodium chloride flush 0.9 % injection 10 mL, 10 mL, Intravenous, 2 times per day  sodium chloride flush 0.9 % injection 10 mL, 10 mL, Intravenous, PRN  ondansetron (ZOFRAN) injection 4 mg, 4 mg, Intravenous, Q6H PRN  atorvastatin (LIPITOR) tablet 40 mg, 40 mg, Oral, Nightly    Allergies:  Pcn [penicillins]      REVIEW OF SYSTEMS:     · Constitutional: Denies fatigue, fevers, chills or night sweats  · Eyes: Denies visual changes or drainage  · ENT: Denies headaches or hearing loss. No mouth sores or sore throat. No epistaxis   · Cardiovascular: Denies chest pain, pressure or palpitations. No lower extremity swelling. · Respiratory: Denies BRIGGS, cough, orthopnea or PND. No hemoptysis   · Gastrointestinal: Denies hematemesis or anorexia. No hematochezia or melena    · Genitourinary: chronic indwelling Elizondo catheter   · Musculoskeletal: can ambulate with a walker and admits to leg weakness   · Integumentary: Denies rash, hives or pruritis   · Neurological: Denies dizziness, headaches or seizures. No numbness or tingling  · Psychiatric: an history xiety or depression. · Endocrine: Denies temperature intolerance. No recent weight change. .  · Hematologic/Lymphatic: Denies abnormal bruising or bleeding. No swollen lymph nodes    PHYSICAL EXAM:   /84   Pulse 89   Temp 97.3 °F (36.3 °C) (Temporal)   Resp 18   Ht 5' 6\" (1.676 m)   Wt 165 lb (74.8 kg)   SpO2 95%   BMI 26.63 kg/m²   CONST:  Well developed, well nourished who appears of stated age. Awake, alert and cooperative. No apparent distress. HEENT:   Head- Normocephalic, atraumatic   Eyes- Conjunctivae pink, anicteric  Throat- Oral mucosa pink and moist  Neck-  No stridor, trachea midline, no jugular venous distention. No carotid bruit. CHEST: Chest symmetrical and non-tender to palpation. No accessory muscle use or intercostal retractions  RESPIRATORY: Lung sounds -clear  CARDIOVASCULAR:     Heart Inspection- shows no noted pulsations  Heart Palpation- no heaves or thrills; PMI is non-displaced   Heart Ausculation- Regular rate and rhythm, no murmur. No s3, s4 or rub   PV: No lower extremity edema. No varicosities. Pedal pulses palpable, no clubbing or cyanosis   ABDOMEN: Soft, non-tender to light palpation. Bowel sounds present. No palpable masses no organomegaly; no abdominal bruit  MS: Good muscle strength and tone. No atrophy or abnormal movements. :    Elizondo catheter with clear yellow urine IN: Warm and dry no statis dermatitis or ulcers   NEURO / PSYCH: Oriented to person, place and time. Speech clear and appropriate. Follows all commands. Pleasant affect     DATA:    ECG / Tele strips: NSR  Diagnostic:      Intake/Output Summary (Last 24 hours) at 5/6/2019 1438  Last data filed at 5/6/2019 1356  Gross per 24 hour   Intake 180 ml   Output 1775 ml   Net -1595 ml       Labs:   CBC:   Recent Labs     05/06/19  0450   WBC 8.0   HGB 13.6   HCT 42.3        BMP:   Recent Labs     05/06/19  0450 05/06/19  0459     --    K 4.0  --    CO2 29  --    BUN 11  --    CREATININE 0.8 0.9   LABGLOM >60 >60   CALCIUM 9.4  --      Mag: No results for input(s): MG in the last 72 hours. Phos: No results for input(s): PHOS in the last 72 hours. TSH: No results for input(s): TSH in the last 72 hours. HgA1c: No results found for: LABA1C  No results found for: EAG  proBNP:   Recent Labs     05/06/19  0450   PROBNP 1,504*     PT/INR: No results for input(s): PROTIME, INR in the last 72 hours. APTT:No results for input(s): APTT in the last 72 hours. CARDIAC ENZYMES:  Recent Labs     05/06/19  0450 05/06/19  1030 05/06/19  1258   TROPONINI 0.02 0.05* 0.04*     FASTING LIPID PANEL:No results found for: CHOL, HDL, LDLDIRECT, LDLCALC, TRIG  LIVER PROFILE:No results for input(s): AST, ALT, LABALBU in the last 72 hours.     Electronically signed by VERENA Cummings CNP on 5/6/2019 at 2:38 PM     ASSESSMENT AND PLAN BY DR. Jerrica Tom

## 2019-05-06 NOTE — ED NOTES
Bed: 16  Expected date:   Expected time:   Means of arrival:   Comments:  Hold EMS     Areli Norton RN  05/06/19 2042

## 2019-05-06 NOTE — PROGRESS NOTES
[address]- Herbal Suspension    To avoid potential drug interactions with herbal and nutritional supplements, it is the policy of [address] to suspend all orders for these products at the time of admission.     Per hospital policy the following herbal/nutritional supplements were suspended during the hospital stay:    Cranberry Caps 500 mg      Thank you,    Cira Jackson, PharmD 5/6/2019 10:04 AM

## 2019-05-06 NOTE — ED PROVIDER NOTES
Department of Emergency Medicine   ED  Provider Note  Admit Date/RoomTime: 5/6/2019  4:22 AM  ED Room: 7402/7402-A          History of Present Illness:  5/6/19, Time: 4:26 AM         Edenilson Pena is a 66 y.o. female presenting to the ED for chest pain, beginning last night at 11:30 PM.  The complaint has been persistent, moderate in severity, and worsened by nothing. She states the pain radiated down both arms. She denies shortness of breath but EMS states that her O2 saturation was 86% on room air on arrival.    Review of Systems:   Pertinent positives and negatives are stated within HPI, all other systems reviewed and are negative.        --------------------------------------------- PAST HISTORY ---------------------------------------------  Past Medical History:  has a past medical history of Anxiety, Cystitis, Depression, History of blood transfusion, Hypertension, Hypothyroidism, MS (multiple sclerosis) ([facility] [address] Neurogenic bladder, Sepsis ([facility] [address] and Unspecified cerebral artery occlusion with cerebral infarction. Past Surgical History:  has a past surgical history that includes Hysterectomy (2001); Dental surgery; and Colon surgery (2001). Social History:  reports that she has never smoked. She has never used smokeless tobacco. She reports that she does not drink alcohol or use drugs. Family History: family history includes Cancer in her mother; Diabetes in her father; Heart Disease in her father. The patients home medications have been reviewed.     Allergies: Pcn [penicillins]        ---------------------------------------------------PHYSICAL EXAM--------------------------------------    Constitutional/General: Alert and oriented x3  Head: Normocephalic and atraumatic  Eyes: PERRL, EOMI, conjunctiva normal, sclera non icteric  Mouth: Oropharynx clear, handling secretions, no trismus, no asymmetry of the posterior oropharynx or uvular edema  Neck: Supple, full ROM, no stridor, no meningeal signs  Respiratory: Lungs clear to auscultation bilaterally, no wheezes, rales, or rhonchi. Not in respiratory distress  Cardiovascular:  Regular rate. Regular rhythm. No murmurs, no aortic murmurs, no gallops, or rubs. 2+ distal pulses. Equal extremity pulses. Chest: No chest wall tenderness  GI:  Abdomen Soft, Non tender, Non distended. +BS. No rebound, guarding, or rigidity. No pulsatile masses. Musculoskeletal: Moves all extremities x 4. Warm and well perfused, no clubbing, cyanosis, or edema. Capillary refill <3 seconds  Integument: skin warm and dry. No rashes. Neurologic: GCS 15, no focal deficits, symmetric strength 5/5 in the upper and lower extremities bilaterally  Psychiatric: Normal Affect          -------------------------------------------------- RESULTS -------------------------------------------------  I have personally reviewed all laboratory and imaging results for this patient. Results are listed below.      LABS:  Results for orders placed or performed during the hospital encounter of 05/06/19   CBC auto differential   Result Value Ref Range    WBC 8.0 4.5 - 11.5 E9/L    RBC 4.66 3.50 - 5.50 E12/L    Hemoglobin 13.6 11.5 - 15.5 g/dL    Hematocrit 42.3 34.0 - 48.0 %    MCV 90.8 80.0 - 99.9 fL    MCH 29.2 26.0 - 35.0 pg    MCHC 32.2 32.0 - 34.5 %    RDW 13.2 11.5 - 15.0 fL    Platelets 722 085 - 377 E9/L    MPV 10.5 7.0 - 12.0 fL    Neutrophils % 79.3 43.0 - 80.0 %    Immature Granulocytes % 0.3 0.0 - 5.0 %    Lymphocytes % 10.5 (L) 20.0 - 42.0 %    Monocytes % 7.9 2.0 - 12.0 %    Eosinophils % 1.5 0.0 - 6.0 %    Basophils % 0.5 0.0 - 2.0 %    Neutrophils # 6.33 1.80 - 7.30 E9/L    Immature Granulocytes # 0.02 E9/L    Lymphocytes # 0.84 (L) 1.50 - 4.00 E9/L    Monocytes # 0.63 0.10 - 0.95 E9/L    Eosinophils # 0.12 0.05 - 0.50 E9/L    Basophils # 0.04 0.00 - 0.20 W6/U   Basic Metabolic Panel w/ Reflex to MG   Result Value Ref Range    Sodium 140 132 - 146 mmol/L    Potassium reflex Magnesium 4.0 3.5 - 5.0 mmol/L    Chloride 101 98 - 107 mmol/L    CO2 29 22 - 29 mmol/L    Anion Gap 10 7 - 16 mmol/L    Glucose 110 (H) 74 - 99 mg/dL    BUN 11 8 - 23 mg/dL    CREATININE 0.8 0.5 - 1.0 mg/dL    GFR Non-African American >60 >=60 mL/min/1.73    GFR African American >60     Calcium 9.4 8.6 - 10.2 mg/dL   Troponin   Result Value Ref Range    Troponin 0.02 0.00 - 0.03 ng/mL   D-Dimer, Quantitative   Result Value Ref Range    D-Dimer, Quant <200 ng/mL DDU   Brain Natriuretic Peptide   Result Value Ref Range    Pro-BNP 1,504 (H) 0 - 125 pg/mL   Troponin   Result Value Ref Range    Troponin 0.05 (H) 0.00 - 0.03 ng/mL   Troponin   Result Value Ref Range    Troponin 0.04 (H) 0.00 - 0.03 ng/mL   CK MB   Result Value Ref Range    CK-MB 7.5 (H) 0.0 - 4.3 ng/mL   CBC   Result Value Ref Range    WBC 8.1 4.5 - 11.5 E9/L    RBC 4.41 3.50 - 5.50 E12/L    Hemoglobin 12.9 11.5 - 15.5 g/dL    Hematocrit 40.1 34.0 - 48.0 %    MCV 90.9 80.0 - 99.9 fL    MCH 29.3 26.0 - 35.0 pg    MCHC 32.2 32.0 - 34.5 %    RDW 13.2 11.5 - 15.0 fL    Platelets 412 561 - 154 E9/L    MPV 10.1 7.0 - 12.0 fL   APTT   Result Value Ref Range    aPTT 32.2 24.5 - 35.1 sec   APTT   Result Value Ref Range    aPTT 32.4 24.5 - 35.1 sec   APTT   Result Value Ref Range    aPTT 56.0 (H) 24.5 - 35.1 sec   Troponin   Result Value Ref Range    Troponin 0.03 0.00 - 0.03 ng/mL   Lipid Panel   Result Value Ref Range    Cholesterol, Total 226 (H) 0 - 199 mg/dL    Triglycerides 124 0 - 149 mg/dL    HDL 47 >40 mg/dL    LDL Calculated 154 (H) 0 - 99 mg/dL    VLDL Cholesterol Calculated 25 mg/dL   Platelet count   Result Value Ref Range    Platelets 513 596 - 010 E9/L   APTT   Result Value Ref Range    aPTT 53.3 (H) 24.5 - 35.1 sec   POCT Venous   Result Value Ref Range    POC Sodium 142 132 - 146 mmol/L    POC Potassium 4.2 3.5 - 5.0 mmol/L    POC Chloride 105 100 - 108 mmol/L    POC Glucose 108 (H) 74 - 99 mg/dl    POC Creatinine 0.9 0.5 - 1.0 mg/dL    GFR Non- American >60 >=60 mL/min/1.73    GFR  >60     Performed on SEE BELOW    EKG 12 Lead   Result Value Ref Range    Ventricular Rate 88 BPM    Atrial Rate 88 BPM    P-R Interval 188 ms    QRS Duration 78 ms    Q-T Interval 400 ms    QTc Calculation (Bazett) 484 ms    P Axis 30 degrees    R Axis -40 degrees    T Axis 93 degrees   EKG 12 Lead   Result Value Ref Range    Ventricular Rate 90 BPM    Atrial Rate 90 BPM    P-R Interval 186 ms    QRS Duration 78 ms    Q-T Interval 414 ms    QTc Calculation (Bazett) 506 ms    P Axis 19 degrees    R Axis -43 degrees    T Axis 90 degrees   EKG 12 Lead   Result Value Ref Range    Ventricular Rate 84 BPM    Atrial Rate 84 BPM    P-R Interval 222 ms    QRS Duration 86 ms    Q-T Interval 434 ms    QTc Calculation (Bazett) 512 ms    P Axis 47 degrees    R Axis -28 degrees    T Axis 87 degrees       RADIOLOGY:  Interpreted by Radiologist unless otherwise specified  NM MYOCARDIAL SPECT REST EXERCISE OR RX   Final Result   1. No reversible perfusion defect. Findings compatible affixed   anteroseptal perfusion defect   2. Ejection fraction is 40 %. 3. Wall motion appears to be hypokinetic         CTA PULMONARY W CONTRAST   Final Result   1. There no findings to suggest pulmonary embolic disease. 2. Bilateral patchy groundglass opacities with pulmonary vascular, findings    suggesting pulmonary edema. 3. Benign hepatic hemangioma measuring 2.6 cm. This report has been electronically signed by Adina Brownlee MD.      XR CHEST STANDARD (2 VW)   Final Result   No interval change               MRI CARDIAC W [address]    (Results Pending)         ------------------------- NURSING NOTES AND VITALS REVIEWED ---------------------------   The nursing notes within the ED encounter and vital signs as below have been reviewed by myself.   /65   Pulse 71   Temp 98.4 °F (36.9 °C) (Temporal)   Resp 16   Ht 5' 6\" (1.676 m)   Wt 184 lb 6.4 oz (83.6 kg)   SpO2 93% BMI 29.76 kg/m²   Oxygen Saturation Interpretation: Abnormal and Improved after treatment    The patients available past medical records and past encounters were reviewed.         ------------------------------ ED COURSE/MEDICAL DECISION MAKING----------------------  Medications   sodium chloride flush 0.9 % injection 10 mL (10 mLs Intravenous Not Given 5/8/19 0817)   acetaminophen (TYLENOL) tablet 650 mg (650 mg Oral Given 5/8/19 1745)   cloNIDine (CATAPRES) tablet 0.2 mg (has no administration in time range)   zolpidem (AMBIEN) tablet 5 mg (has no administration in time range)   aspirin chewable tablet 81 mg (81 mg Oral Given 5/8/19 0814)   vitamin D tablet 5,000 Units (5,000 Units Oral Given 5/8/19 0814)   diazepam (VALIUM) tablet 2 mg (has no administration in time range)   FLUoxetine (PROZAC) capsule 20 mg (20 mg Oral Given 5/8/19 0814)   fluticasone (FLONASE) 50 MCG/ACT nasal spray 1 spray (1 spray Each Nare Given 5/8/19 0815)   gabapentin (NEURONTIN) capsule 300 mg (300 mg Oral Given 5/8/19 1318)   levothyroxine (SYNTHROID) tablet 88 mcg (88 mcg Oral Given 5/8/19 0646)   cetirizine (ZYRTEC) tablet 10 mg (10 mg Oral Given 5/8/19 0814)   magnesium hydroxide (MILK OF MAGNESIA) 400 MG/5ML suspension 30 mL (has no administration in time range)   potassium bicarb-citric acid (EFFER-K) effervescent tablet 20 mEq (20 mEq Oral Given 5/8/19 0814)   psyllium (KONSYL) 28.3 % packet 1 packet (1 packet Oral Not Given 5/8/19 0814)   sodium chloride flush 0.9 % injection 10 mL (10 mLs Intravenous Not Given 5/8/19 0817)   ondansetron (ZOFRAN) injection 4 mg (has no administration in time range)   atorvastatin (LIPITOR) tablet 40 mg (40 mg Oral Given 5/7/19 2017)   nitroGLYCERIN 50 mg in dextrose 5% 250 mL infusion (0 mcg/min Intravenous Stopped 5/6/19 2023)   sodium chloride flush 0.9 % injection 10 mL (has no administration in time range)   apixaban (ELIQUIS) tablet 5 mg (5 mg Oral Given 5/8/19 1013)   metoprolol succinate (TOPROL XL) extended release tablet 25 mg (25 mg Oral Given 5/8/19 1013)   lisinopril (PRINIVIL;ZESTRIL) tablet 2.5 mg (2.5 mg Oral Given 5/8/19 1013)   furosemide (LASIX) injection 40 mg (40 mg Intravenous Given 5/6/19 0646)   iopamidol (ISOVUE-370) 76 % injection 75 mL (75 mLs Intravenous Given 5/6/19 0605)   perflutren lipid microspheres (DEFINITY) injection 1.65 mg (1.65 mg Intravenous Given 5/6/19 1632)   regadenoson (LEXISCAN) injection 0.4 mg (0.4 mg Intravenous Given 5/7/19 1238)   technetium sestamibi (CARDIOLITE) injection 10 millicurie (10 millicuries Intravenous Given 5/7/19 1036)   technetium sestamibi (CARDIOLITE) injection 30 millicurie (30 millicuries Intravenous Given 5/7/19 1346)   gadobenate dimeglumine (MULTIHANCE) injection 34 mL (20 mLs Intravenous Given 5/8/19 1712)              Medical Decision Making:    Patient presents to the ED for chest pain/shortness of breath. Differential diagnoses included but not limited to MI/PTX/PE. Workup in the ED revealed new-onset CHF with hypoxia. Patient was given lasix, oxygen for their symptoms with moderate improvement. Patient requires continued workup and management of their symptoms and will be admitted to the hospital for further evaluation and treatment. Re-Evaluations:             ED Course as of May 08 1917   Mon May 06, 2019   0428 EKG Interpretation    Interpreted by emergency department physician    Rhythm: normal sinus   Rate: normal  Axis: left  Ectopy: none  Conduction: normal  ST Segments: no acute change  T Waves: no acute change  Q Waves: none    Clinical Impression: no acute changes    Lamount Chi      [AD]      ED Course User Index  [AD] Odilia Chi DO           Counseling: The emergency provider has spoken with the patient and discussed todays results, in addition to providing specific details for the plan of care and counseling regarding the diagnosis and prognosis.   Questions are answered at this time and they are agreeable with the plan.       --------------------------------- IMPRESSION AND DISPOSITION ---------------------------------    IMPRESSION  1. Acute respiratory failure with hypoxia ([facility] [address]    2. New onset of congestive heart failure ([facility] [address]        DISPOSITION  Disposition: Admit to telemetry  Patient condition is fair        NOTE: This report was transcribed using voice recognition software. Every effort was made to ensure accuracy; however, inadvertent computerized transcription errors may be present       Odilia Chi DO  Resident  05/06/19 3281  ATTENDING PROVIDER ATTESTATION:     I have personally performed and/or participated in the history, exam, medical decision making, and procedures and agree with all pertinent clinical information. I have also reviewed and agree with the past medical, family and social history unless otherwise noted. I have discussed this patient in detail with the resident, and provided the instruction and education regarding chest pain. My findings/Plan: patient presents because of chest pain and reports  Pain radiates down both arms. Patient noted to have hypoxia at facility. Patient reporting no abdominal pain or vomiting or  Fever or chills. Patient awake alert oriented heart and lung exam is normal abdomen soft and nontender patient moves upper extremities. patient does have history of MS. Labs and EKG noted. Patient resting comfortably. We spoke to on-call  Physician patient will be admitted.        Mike Mora MD  05/08/19 2668       Mike Mora MD  05/08/19 0398

## 2019-05-07 ENCOUNTER — APPOINTMENT (OUTPATIENT)
Dept: NUCLEAR MEDICINE | Age: 64
DRG: 190 | End: 2019-05-07
Payer: MEDICAID

## 2019-05-07 LAB
APTT: 56 SEC (ref 24.5–35.1)
CHOLESTEROL, TOTAL: 226 MG/DL (ref 0–199)
EKG ATRIAL RATE: 84 BPM
EKG ATRIAL RATE: 88 BPM
EKG ATRIAL RATE: 90 BPM
EKG P AXIS: 19 DEGREES
EKG P AXIS: 30 DEGREES
EKG P AXIS: 47 DEGREES
EKG P-R INTERVAL: 186 MS
EKG P-R INTERVAL: 188 MS
EKG P-R INTERVAL: 222 MS
EKG Q-T INTERVAL: 400 MS
EKG Q-T INTERVAL: 414 MS
EKG Q-T INTERVAL: 434 MS
EKG QRS DURATION: 78 MS
EKG QRS DURATION: 78 MS
EKG QRS DURATION: 86 MS
EKG QTC CALCULATION (BAZETT): 484 MS
EKG QTC CALCULATION (BAZETT): 506 MS
EKG QTC CALCULATION (BAZETT): 512 MS
EKG R AXIS: -28 DEGREES
EKG R AXIS: -40 DEGREES
EKG R AXIS: -43 DEGREES
EKG T AXIS: 87 DEGREES
EKG T AXIS: 90 DEGREES
EKG T AXIS: 93 DEGREES
EKG VENTRICULAR RATE: 84 BPM
EKG VENTRICULAR RATE: 88 BPM
EKG VENTRICULAR RATE: 90 BPM
HDLC SERPL-MCNC: 47 MG/DL
LDL CHOLESTEROL CALCULATED: 154 MG/DL (ref 0–99)
LV EF: 40 %
LVEF MODALITY: NORMAL
TRIGL SERPL-MCNC: 124 MG/DL (ref 0–149)
TROPONIN: 0.03 NG/ML (ref 0–0.03)
VLDLC SERPL CALC-MCNC: 25 MG/DL

## 2019-05-07 PROCEDURE — 78452 HT MUSCLE IMAGE SPECT MULT: CPT

## 2019-05-07 PROCEDURE — 2700000000 HC OXYGEN THERAPY PER DAY

## 2019-05-07 PROCEDURE — 6360000002 HC RX W HCPCS: Performed by: INTERNAL MEDICINE

## 2019-05-07 PROCEDURE — 93010 ELECTROCARDIOGRAM REPORT: CPT | Performed by: INTERNAL MEDICINE

## 2019-05-07 PROCEDURE — 93005 ELECTROCARDIOGRAM TRACING: CPT | Performed by: INTERNAL MEDICINE

## 2019-05-07 PROCEDURE — 3430000000 HC RX DIAGNOSTIC RADIOPHARMACEUTICAL: Performed by: RADIOLOGY

## 2019-05-07 PROCEDURE — A9500 TC99M SESTAMIBI: HCPCS | Performed by: RADIOLOGY

## 2019-05-07 PROCEDURE — 84484 ASSAY OF TROPONIN QUANT: CPT

## 2019-05-07 PROCEDURE — 99232 SBSQ HOSP IP/OBS MODERATE 35: CPT | Performed by: INTERNAL MEDICINE

## 2019-05-07 PROCEDURE — 36415 COLL VENOUS BLD VENIPUNCTURE: CPT

## 2019-05-07 PROCEDURE — 2060000000 HC ICU INTERMEDIATE R&B

## 2019-05-07 PROCEDURE — 6360000002 HC RX W HCPCS: Performed by: NURSE PRACTITIONER

## 2019-05-07 PROCEDURE — 80061 LIPID PANEL: CPT

## 2019-05-07 PROCEDURE — 6370000000 HC RX 637 (ALT 250 FOR IP): Performed by: FAMILY MEDICINE

## 2019-05-07 PROCEDURE — 85730 THROMBOPLASTIN TIME PARTIAL: CPT

## 2019-05-07 PROCEDURE — 93017 CV STRESS TEST TRACING ONLY: CPT

## 2019-05-07 RX ORDER — SODIUM CHLORIDE 0.9 % (FLUSH) 0.9 %
10 SYRINGE (ML) INJECTION PRN
Status: DISCONTINUED | OUTPATIENT
Start: 2019-05-07 | End: 2019-05-09 | Stop reason: HOSPADM

## 2019-05-07 RX ADMIN — Medication 10 MILLICURIE: at 10:36

## 2019-05-07 RX ADMIN — FLUOXETINE HYDROCHLORIDE 20 MG: 20 CAPSULE ORAL at 20:17

## 2019-05-07 RX ADMIN — FLUTICASONE PROPIONATE 1 SPRAY: 50 SPRAY, METERED NASAL at 08:28

## 2019-05-07 RX ADMIN — Medication 5000 UNITS: at 08:27

## 2019-05-07 RX ADMIN — ATORVASTATIN CALCIUM 40 MG: 40 TABLET, FILM COATED ORAL at 20:17

## 2019-05-07 RX ADMIN — ASPIRIN 81 MG 81 MG: 81 TABLET ORAL at 08:28

## 2019-05-07 RX ADMIN — LEVOTHYROXINE SODIUM 88 MCG: 175 TABLET ORAL at 06:38

## 2019-05-07 RX ADMIN — CETIRIZINE HYDROCHLORIDE 10 MG: 10 TABLET, FILM COATED ORAL at 08:27

## 2019-05-07 RX ADMIN — FLUOXETINE HYDROCHLORIDE 20 MG: 20 CAPSULE ORAL at 08:28

## 2019-05-07 RX ADMIN — GABAPENTIN 300 MG: 300 CAPSULE ORAL at 20:16

## 2019-05-07 RX ADMIN — GABAPENTIN 300 MG: 300 CAPSULE ORAL at 08:28

## 2019-05-07 RX ADMIN — REGADENOSON 0.4 MG: 0.08 INJECTION, SOLUTION INTRAVENOUS at 12:38

## 2019-05-07 RX ADMIN — HEPARIN SODIUM AND DEXTROSE 12 UNITS/KG/HR: 10000; 5 INJECTION INTRAVENOUS at 23:52

## 2019-05-07 RX ADMIN — Medication 30 MILLICURIE: at 13:46

## 2019-05-07 RX ADMIN — POTASSIUM BICARBONATE 20 MEQ: 782 TABLET, EFFERVESCENT ORAL at 20:16

## 2019-05-07 ASSESSMENT — PAIN DESCRIPTION - LOCATION: LOCATION: BACK

## 2019-05-07 ASSESSMENT — PAIN SCALES - GENERAL
PAINLEVEL_OUTOF10: 0
PAINLEVEL_OUTOF10: 3

## 2019-05-07 ASSESSMENT — PAIN DESCRIPTION - PAIN TYPE: TYPE: CHRONIC PAIN

## 2019-05-07 NOTE — H&P
[address]                  [address] [facility]  [facility]                              HISTORY AND PHYSICAL    PATIENT NAME: Vivienne Pittman                    :        1955  MED REC NO:   [id number]                            ROOM:       [id number]  ACCOUNT NO:   [de-identified]                           ADMIT DATE: 2019  PROVIDER:     Gloria Reyna MD    CHIEF COMPLAINT:  Chest pain and shortness of breath. HISTORY OF PRESENT ILLNESS:  This is a 82-year-old with a longstanding  history of multiple sclerosis, no previous history of heart disease who  presents to the emergency room with chest pain and shortness of breath. The patient was then recently treated with anticoagulation due to a left  leg DVT in February. The patient's CTA of the chest was negative for  PE.  EKG did show abnormalities which were not there in August.  At  present, the patient is pain free recent. CURRENT MEDICATIONS:  See med rec in the chart. PAST MEDICAL HISTORY:  Once again, primarily for MS. She has had  urosepsis in the past, she has had no previous cardiac issues. SOCIAL HISTORY:  Does not smoke, does not drink. At present, she is  residing in the nursing home. FAMILY MEDICAL HISTORY:  Contributory to the fact that her mother just  recently developed heart disease and has passed away, I believe, at age  2453 [facility]. ALLERGIES:  None known. REVIEW OF SYSTEMS:  SKIN:  Negative. LYMPHATICS:  Negative. CENTRAL NERVOUS SYSTEM:  Positive for MS.  CIRCULATORY:  See HPI. DIGESTIVE:  Denies nausea, vomiting, diarrhea, or melena at this time. GENITOURINARY:  Denies dysuria, frequency, or hematuria. She does have  chronic urinary retention due to her MS.    PHYSICAL EXAMINATION:  GENERAL:  Presently, she is lying in bed in no acute distress. VITAL SIGNS:  Temperature 97.3, pulse 89, respirations 18, pressure  120/84, O2 sats 95% on O2 per nasal cannula.   SKIN:

## 2019-05-07 NOTE — PROGRESS NOTES
INPATIENT CARDIOLOGY FOLLOW-UP    Name: Donnie Quintanilla    Age: 66 y.o. Date of Admission: 5/6/2019  4:22 AM    Date of Service: 5/7/2019    Chief Complaint: Follow-up for chest pain and dyspnea      Interim History:  No new overnight cardiac complaints. Currently with no complaints of CP, SOB, palpitations, dizziness, or lightheadedness. No further episodes of chest pain. SR on telemetry. Review of Systems:   Cardiac: As per HPI  General: No fever, chills  Pulmonary: As per HPI  HEENT: No visual disturbances, difficult swallowing  GI: No nausea, vomiting  Endocrine: No thyroid disease or DM  Musculoskeletal: ABBASI x 4, no focal motor deficits  Skin: Intact, no rashes  Neuro/Psych: No headache or seizures    Problem List:  Patient Active Problem List   Diagnosis    Multiple sclerosis ([facility] [address]    Acute cystitis    Bilateral foot-drop    Infection due to urethral catheter ([facility] [facility]    Urinary tract infection due to extended-spectrum beta lactamase (ESBL) producing Escherichia coli    Acute encephalopathy    Heart failure ([facility] [address]    CHF (congestive heart failure), NYHA class I, acute on chronic, combined ([address]    Acute respiratory failure with hypoxia (HCC)       Allergies:   Allergies   Allergen Reactions    Pcn [Penicillins] Anaphylaxis       Current Medications:  Current Facility-Administered Medications   Medication Dose Route Frequency Provider Last Rate Last Dose    sodium chloride flush 0.9 % injection 10 mL  10 mL Intravenous PRN Nancy Xiong DO        sodium chloride flush 0.9 % injection 10 mL  10 mL Intravenous 2 times per day Tomás Moss MD   10 mL at 05/06/19 1043    acetaminophen (TYLENOL) tablet 650 mg  650 mg Oral Q4H PRN Tomás Moss MD   650 mg at 05/06/19 1524    cloNIDine (CATAPRES) tablet 0.2 mg  0.2 mg Oral Q6H PRN Tomás Moss MD        zolpidem [facility]) tablet 5 mg  5 mg Oral Nightly PRN Tomás Moss MD        amLODIPine (NORVASC) tablet 5 mg  5 mg Oral Daily Tomás Moss MD   5 mg at 05/06/19 1042    aspirin chewable tablet 81 mg  81 mg Oral Daily Emily Quintero MD   81 mg at 05/06/19 1042    vitamin D tablet 5,000 Units  5,000 Units Oral Daily Emily Quintero MD   5,000 Units at 05/06/19 1040    diazepam (VALIUM) tablet 2 mg  2 mg Oral Q12H PRN Emily Quintero MD        FLUoxetine (PROZAC) capsule 20 mg  20 mg Oral BID Emily Quintero MD   20 mg at 05/06/19 2151    fluticasone (FLONASE) 50 MCG/ACT nasal spray 1 spray  1 spray Each Nare Daily Emily Quintero MD   1 spray at 05/06/19 1218    gabapentin (NEURONTIN) capsule 300 mg  300 mg Oral TID Emily Quintero MD   300 mg at 05/06/19 2150    levothyroxine (SYNTHROID) tablet 88 mcg  88 mcg Oral Daily Emily Quintero MD   88 mcg at 05/07/19 2407    cetirizine (ZYRTEC) tablet 10 mg  10 mg Oral Daily Emily Quintero MD   10 mg at 05/06/19 1042    magnesium hydroxide (MILK OF MAGNESIA) 400 MG/5ML suspension 30 mL  30 mL Oral Daily PRN Emily Quintero MD        potassium bicarb-citric acid (EFFER-K) effervescent tablet 20 mEq  20 mEq Oral BID Emily Quintero MD   20 mEq at 05/06/19 2150    psyllium (KONSYL) 28.3 % packet 1 packet  1 packet Oral Daily Emily Quintero MD        sodium chloride flush 0.9 % injection 10 mL  10 mL Intravenous 2 times per day Emily Quintero MD   10 mL at 05/06/19 2151    sodium chloride flush 0.9 % injection 10 mL  10 mL Intravenous PRN Emily Quintero MD        ondansetron [facility]) injection 4 mg  4 mg Intravenous Q6H PRN Emily Quintero MD        atorvastatin (LIPITOR) tablet 40 mg  40 mg Oral Nightly Emily Quintero MD   40 mg at 05/06/19 2151    heparin (porcine) injection 4,490 Units  60 Units/kg Intravenous PRN Dalila Pantelis, APRN - CNP        heparin (porcine) injection 2,240 Units  30 Units/kg Intravenous PRN Dalila Pantelis, APRN - CNP        heparin 25,000 units in dextrose 5% 250 mL infusion  12 Units/kg/hr Intravenous Continuous Dalila Pantelis, APRN - CNP 9 mL/hr at 05/06/19 2258 12 Units/kg/hr at 05/06/19 2258    nitroGLYCERIN 50 mg in dextrose 5% 250 mL infusion  5 mcg/min Intravenous Continuous Dalila Darvins, APRN - CNP   Stopped at 05/06/19 2023      heparin (porcine) 12 Units/kg/hr (05/06/19 2258)    nitroGLYCERIN Stopped (05/06/19 2023)       Physical Exam:  /63   Pulse 90   Temp 97.6 °F (36.4 °C) (Temporal)   Resp 16   Ht 5' 6\" (1.676 m)   Wt 179 lb 3.2 oz (81.3 kg)   SpO2 95%   BMI 28.92 kg/m²   Wt Readings from Last 3 Encounters:   05/07/19 179 lb 3.2 oz (81.3 kg)   12/12/18 173 lb (78.5 kg)   08/09/18 171 lb 1 oz (77.6 kg)     Appearance: Awake, alert, no acute respiratory distress  Skin: Intact, no rash  Head: Normocephalic, atraumatic  Eyes: EOMI, no conjunctival erythema  ENMT: No pharyngeal erythema, MMM, no rhinorrhea  Neck: Supple, no elevated JVP, no carotid bruits  Lungs: Clear to auscultation bilaterally. No wheezes, rales, or rhonchi. Cardiac: Regular rate and rhythm, +S1S2, no murmurs apparent  Abdomen: Soft, nontender, +bowel sounds  Extremities: Moves all extremities x 4, no lower extremity edema  Neurologic: No focal motor deficits apparent, normal mood and affect  Peripheral Pulses: Intact posterior tibial pulses bilaterally    Intake/Output:    Intake/Output Summary (Last 24 hours) at 5/7/2019 0749  Last data filed at 5/7/2019 0639  Gross per 24 hour   Intake 729.15 ml   Output 3250 ml   Net -2520.85 ml     No intake/output data recorded. Laboratory Tests:  Recent Labs     05/06/19  0450 05/06/19  0459     --    K 4.0  --      --    CO2 29  --    BUN 11  --    CREATININE 0.8 0.9   GLUCOSE 110*  --    CALCIUM 9.4  --      Lab Results   Component Value Date    MG 2.1 08/10/2018     No results for input(s): ALKPHOS, ALT, AST, PROT, BILITOT, BILIDIR, LABALBU in the last 72 hours.   Recent Labs     05/06/19  0450 05/06/19  1654   WBC 8.0 8.1   RBC 4.66 4.41   HGB 13.6 12.9   HCT 42.3 40.1   MCV 90.8 90.9   MCH 29.2 29.3   MCHC 32.2 32.2   RDW 13.2 13.2    420   MPV 10.5 10.1     Lab Results Component Value Date    CKMB 7.5 (H) 05/06/2019    TROPONINI 0.04 (H) 05/06/2019    TROPONINI 0.05 (H) 05/06/2019    TROPONINI 0.02 05/06/2019     Lab Results   Component Value Date    INR 1.1 10/14/2015    PROTIME 11.9 10/14/2015     Lab Results   Component Value Date    TSH 1.290 08/09/2018     No results found for: LABA1C  No results found for: EAG  No results found for: CHOL  No results found for: TRIG  No results found for: HDL  No results found for: LDLCALC, LDLCHOLESTEROL  No results found for: LABVLDL, VLDL  No results found for: [place]    Cardiac Tests  Telemetry findings reviewed: SR with heart rate in the 70s      Chest X-ray: NAD      Echocardiogram 5/6/2019:    Technically very very difficult examination. Definity echo contrast was   used to delineate endocardial borders.   Normal left ventricle size and systolic function.   Ejection fraction is visually estimated at 60%.   No regional wall motion abnormalities seen except for hypokinesis of the   apex.   Normal left ventricle wall thickness.   Unable to assess diastolic function.   Valves are not well visualized.   No previous echo for comparison. Stress test:          Cardiac catheterization:     Labs are pending for this morning. Vitals: stable with well controlled BPs    ASSESSMENT:  .    Plan:   Assessment:  · Atypical chest pain with borderline elevated troponin to rule out ischemia, chest pain resolved  · History of for a multiple sclerosis  · History of CVA  · Hypertension - well controlled. · HLD continue Atorvastatin,.     Plan:   · Keep her NPO and will schedule for a stress test with Lexiscan this morning to rule out ishemia  · Continue  heparin for now and will change it back to Eliquis if stress test comes back normal.   · Trend troponin, trop for this morning is pending. · Will obtain lipid panel for this morning. Lynda Tomlinson MD., [facility].   [facility]) Cardiology

## 2019-05-07 NOTE — CARE COORDINATION
5/7/2019 social work:discharge planning   Patient is from [facility] and can go back with no precert. Spoke with patient and verified plan is back to [facility] at [facility].

## 2019-05-07 NOTE — PROGRESS NOTES
Wendy ALBARADO made aware of patient's blood pressure and stopping nitro drip.      Dr Sade Nj made aware of Nitro drip being stopped due to blood pressure

## 2019-05-07 NOTE — PROGRESS NOTES
Escherichia coli    Acute encephalopathy    Heart failure (HCC)    CHF (congestive heart failure), NYHA class I, acute on chronic, combined ([address]    Acute respiratory failure with hypoxia ([facility] [address]       Plan:  Stress test        Josefina Ceja  1:06 PM  5/7/2019

## 2019-05-08 ENCOUNTER — APPOINTMENT (OUTPATIENT)
Dept: MRI IMAGING | Age: 64
DRG: 190 | End: 2019-05-08
Payer: MEDICAID

## 2019-05-08 PROBLEM — I21.09: Status: ACTIVE | Noted: 2019-05-08

## 2019-05-08 LAB
APTT: 53.3 SEC (ref 24.5–35.1)
PLATELET # BLD: 343 E9/L (ref 130–450)

## 2019-05-08 PROCEDURE — 36415 COLL VENOUS BLD VENIPUNCTURE: CPT

## 2019-05-08 PROCEDURE — 2580000003 HC RX 258: Performed by: FAMILY MEDICINE

## 2019-05-08 PROCEDURE — 85730 THROMBOPLASTIN TIME PARTIAL: CPT

## 2019-05-08 PROCEDURE — 6370000000 HC RX 637 (ALT 250 FOR IP): Performed by: INTERNAL MEDICINE

## 2019-05-08 PROCEDURE — 6370000000 HC RX 637 (ALT 250 FOR IP): Performed by: FAMILY MEDICINE

## 2019-05-08 PROCEDURE — 2060000000 HC ICU INTERMEDIATE R&B

## 2019-05-08 PROCEDURE — 6360000004 HC RX CONTRAST MEDICATION: Performed by: RADIOLOGY

## 2019-05-08 PROCEDURE — 99232 SBSQ HOSP IP/OBS MODERATE 35: CPT | Performed by: INTERNAL MEDICINE

## 2019-05-08 PROCEDURE — 75561 CARDIAC MRI FOR MORPH W/DYE: CPT

## 2019-05-08 PROCEDURE — A9577 INJ MULTIHANCE: HCPCS | Performed by: RADIOLOGY

## 2019-05-08 PROCEDURE — 85049 AUTOMATED PLATELET COUNT: CPT

## 2019-05-08 RX ORDER — LISINOPRIL 5 MG/1
2.5 TABLET ORAL DAILY
Status: DISCONTINUED | OUTPATIENT
Start: 2019-05-08 | End: 2019-05-09

## 2019-05-08 RX ORDER — METOPROLOL SUCCINATE 25 MG/1
25 TABLET, EXTENDED RELEASE ORAL DAILY
Status: DISCONTINUED | OUTPATIENT
Start: 2019-05-08 | End: 2019-05-09 | Stop reason: HOSPADM

## 2019-05-08 RX ADMIN — GABAPENTIN 300 MG: 300 CAPSULE ORAL at 13:18

## 2019-05-08 RX ADMIN — Medication 10 ML: at 20:55

## 2019-05-08 RX ADMIN — ASPIRIN 81 MG 81 MG: 81 TABLET ORAL at 08:14

## 2019-05-08 RX ADMIN — FLUTICASONE PROPIONATE 1 SPRAY: 50 SPRAY, METERED NASAL at 08:15

## 2019-05-08 RX ADMIN — METOPROLOL SUCCINATE 25 MG: 25 TABLET, FILM COATED, EXTENDED RELEASE ORAL at 10:13

## 2019-05-08 RX ADMIN — POTASSIUM BICARBONATE 20 MEQ: 782 TABLET, EFFERVESCENT ORAL at 08:14

## 2019-05-08 RX ADMIN — GABAPENTIN 300 MG: 300 CAPSULE ORAL at 08:13

## 2019-05-08 RX ADMIN — GADOBENATE DIMEGLUMINE 20 ML: 529 INJECTION, SOLUTION INTRAVENOUS at 17:12

## 2019-05-08 RX ADMIN — LISINOPRIL 2.5 MG: 5 TABLET ORAL at 10:13

## 2019-05-08 RX ADMIN — Medication 5000 UNITS: at 08:14

## 2019-05-08 RX ADMIN — FLUOXETINE HYDROCHLORIDE 20 MG: 20 CAPSULE ORAL at 08:14

## 2019-05-08 RX ADMIN — APIXABAN 5 MG: 5 TABLET, FILM COATED ORAL at 20:55

## 2019-05-08 RX ADMIN — ATORVASTATIN CALCIUM 40 MG: 40 TABLET, FILM COATED ORAL at 20:55

## 2019-05-08 RX ADMIN — CETIRIZINE HYDROCHLORIDE 10 MG: 10 TABLET, FILM COATED ORAL at 08:14

## 2019-05-08 RX ADMIN — FLUOXETINE HYDROCHLORIDE 20 MG: 20 CAPSULE ORAL at 20:54

## 2019-05-08 RX ADMIN — ACETAMINOPHEN 650 MG: 325 TABLET, FILM COATED ORAL at 13:05

## 2019-05-08 RX ADMIN — AMLODIPINE BESYLATE 5 MG: 5 TABLET ORAL at 08:14

## 2019-05-08 RX ADMIN — GABAPENTIN 300 MG: 300 CAPSULE ORAL at 20:55

## 2019-05-08 RX ADMIN — LEVOTHYROXINE SODIUM 88 MCG: 175 TABLET ORAL at 06:46

## 2019-05-08 RX ADMIN — ACETAMINOPHEN 650 MG: 325 TABLET, FILM COATED ORAL at 17:45

## 2019-05-08 RX ADMIN — POTASSIUM BICARBONATE 20 MEQ: 782 TABLET, EFFERVESCENT ORAL at 20:55

## 2019-05-08 RX ADMIN — ACETAMINOPHEN 650 MG: 325 TABLET, FILM COATED ORAL at 08:13

## 2019-05-08 RX ADMIN — DIAZEPAM 2 MG: 2 TABLET ORAL at 21:07

## 2019-05-08 RX ADMIN — APIXABAN 5 MG: 5 TABLET, FILM COATED ORAL at 10:13

## 2019-05-08 ASSESSMENT — PAIN - FUNCTIONAL ASSESSMENT
PAIN_FUNCTIONAL_ASSESSMENT: ACTIVITIES ARE NOT PREVENTED
PAIN_FUNCTIONAL_ASSESSMENT: PREVENTS OR INTERFERES SOME ACTIVE ACTIVITIES AND ADLS

## 2019-05-08 ASSESSMENT — PAIN DESCRIPTION - PAIN TYPE
TYPE: CHRONIC PAIN
TYPE: CHRONIC PAIN

## 2019-05-08 ASSESSMENT — PAIN DESCRIPTION - ONSET
ONSET: ON-GOING
ONSET: ON-GOING

## 2019-05-08 ASSESSMENT — PAIN DESCRIPTION - PROGRESSION
CLINICAL_PROGRESSION: NOT CHANGED
CLINICAL_PROGRESSION: GRADUALLY WORSENING

## 2019-05-08 ASSESSMENT — PAIN DESCRIPTION - ORIENTATION: ORIENTATION: RIGHT;LEFT

## 2019-05-08 ASSESSMENT — PAIN DESCRIPTION - FREQUENCY
FREQUENCY: CONTINUOUS
FREQUENCY: CONTINUOUS

## 2019-05-08 ASSESSMENT — PAIN SCALES - GENERAL
PAINLEVEL_OUTOF10: 7
PAINLEVEL_OUTOF10: 6
PAINLEVEL_OUTOF10: 6
PAINLEVEL_OUTOF10: 4
PAINLEVEL_OUTOF10: 3

## 2019-05-08 ASSESSMENT — PAIN DESCRIPTION - LOCATION
LOCATION: GENERALIZED
LOCATION: GENERALIZED

## 2019-05-08 ASSESSMENT — PAIN DESCRIPTION - DESCRIPTORS
DESCRIPTORS: ACHING;CONSTANT;DISCOMFORT
DESCRIPTORS: ACHING

## 2019-05-08 NOTE — PROGRESS NOTES
INPATIENT CARDIOLOGY FOLLOW-UP    Name: Marly Iniguez    Age: 66 y.o. Date of Admission: 5/6/2019  4:22 AM    Date of Service: 5/8/2019    Chief Complaint: Follow-up for chest pain and dyspnea      Interim History:  No new overnight cardiac complaints. Currently with no complaints of CP, SOB, palpitations, dizziness, or lightheadedness. No further episodes of chest pain. Had stress test yesterday and showed no reversible ischemia and showed fixed defect. SR on telemetry. Review of Systems:   Cardiac: As per HPI  General: No fever, chills  Pulmonary: As per HPI  HEENT: No visual disturbances, difficult swallowing  GI: No nausea, vomiting  Endocrine: No thyroid disease or DM  Musculoskeletal: ABBASI x 4, no focal motor deficits  Skin: Intact, no rashes  Neuro/Psych: No headache or seizures    Problem List:  Patient Active Problem List   Diagnosis    Multiple sclerosis ([facility] [address]    Acute cystitis    Bilateral foot-drop    Infection due to urethral catheter ([facility] [address] [facility]    Urinary tract infection due to extended-spectrum beta lactamase (ESBL) producing Escherichia coli    Acute encephalopathy    Heart failure ([address]    CHF (congestive heart failure), NYHA class I, acute on chronic, combined ([address]    Acute respiratory failure with hypoxia ([address]    Myocardial infarction, anteroseptal (HCC)       Allergies:   Allergies   Allergen Reactions    Pcn [Penicillins] Anaphylaxis       Current Medications:  Current Facility-Administered Medications   Medication Dose Route Frequency Provider Last Rate Last Dose    sodium chloride flush 0.9 % injection 10 mL  10 mL Intravenous PRN Santos Schmitt DO        sodium chloride flush 0.9 % injection 10 mL  10 mL Intravenous 2 times per day Mahendra Srivastava MD   10 mL at 05/06/19 1043    acetaminophen (TYLENOL) tablet 650 mg  650 mg Oral Q4H PRN Mahendra Srivastava MD   650 mg at 05/08/19 0813    cloNIDine (CATAPRES) tablet 0.2 mg  0.2 mg Oral Q6H PRN Mahendra Srivastava MD        zolpidem (AMBIEN) tablet 5 mg  5 mg Oral Nightly PRN Yolette Tompkins MD        amLODIPine [facility]) tablet 5 mg  5 mg Oral Daily Yolette Tompkins MD   5 mg at 05/08/19 3384    aspirin chewable tablet 81 mg  81 mg Oral Daily Yolette Tompkins MD   81 mg at 05/08/19 3904    vitamin D tablet 5,000 Units  5,000 Units Oral Daily Yolette Tompkins MD   5,000 Units at 05/08/19 5542    diazepam (VALIUM) tablet 2 mg  2 mg Oral Q12H PRN Yolette Tompkins MD        FLUoxetine (PROZAC) capsule 20 mg  20 mg Oral BID Yolette Tompkins MD   20 mg at 05/08/19 0814    fluticasone (FLONASE) 50 MCG/ACT nasal spray 1 spray  1 spray Each Nare Daily Yolette Tompkins MD   1 spray at 05/08/19 0815    gabapentin (NEURONTIN) capsule 300 mg  300 mg Oral TID Yolette Tompkins MD   300 mg at 05/08/19 0813    levothyroxine (SYNTHROID) tablet 88 mcg  88 mcg Oral Daily Yolette Tompkins MD   88 mcg at 05/08/19 3658    cetirizine (ZYRTEC) tablet 10 mg  10 mg Oral Daily Yolette Tompkins MD   10 mg at 05/08/19 0814    magnesium hydroxide (MILK OF MAGNESIA) 400 MG/5ML suspension 30 mL  30 mL Oral Daily PRN Yolette Tompkins MD        potassium bicarb-citric acid (EFFER-K) effervescent tablet 20 mEq  20 mEq Oral BID Yolette Tompkins MD   20 mEq at 05/08/19 0814    psyllium (KONSYL) 28.3 % packet 1 packet  1 packet Oral Daily Yolette Tompkins MD        sodium chloride flush 0.9 % injection 10 mL  10 mL Intravenous 2 times per day Yolette Tompkins MD   10 mL at 05/06/19 2151    ondansetron (ZOFRAN) injection 4 mg  4 mg Intravenous Q6H PRN Yolette Tompkins MD        atorvastatin (LIPITOR) tablet 40 mg  40 mg Oral Nightly Yolette Tompkins MD   40 mg at 05/07/19 2017    heparin (porcine) injection 4,490 Units  60 Units/kg Intravenous PRN Dalila Pantelis, APRN - CNP        heparin (porcine) injection 2,240 Units  30 Units/kg Intravenous PRN Dalila Pantelis, APRN - CNP        nitroGLYCERIN 50 mg in dextrose 5% 250 mL infusion  5 mcg/min Intravenous Continuous Dalila VERENA Connelly CNP   Stopped at 05/06/19 2023      nitroGLYCERIN Stopped (05/06/19 2023)       Physical Exam:  /75   Pulse 83   Temp 97.3 °F (36.3 °C) (Temporal)   Resp 18   Ht 5' 6\" (1.676 m)   Wt 184 lb 6.4 oz (83.6 kg)   SpO2 97%   BMI 29.76 kg/m²   Wt Readings from Last 3 Encounters:   05/08/19 184 lb 6.4 oz (83.6 kg)   12/12/18 173 lb (78.5 kg)   08/09/18 171 lb 1 oz (77.6 kg)     Appearance: Awake, alert, no acute respiratory distress  Skin: Intact, no rash  Head: Normocephalic, atraumatic  Eyes: EOMI, no conjunctival erythema  ENMT: No pharyngeal erythema, MMM, no rhinorrhea  Neck: Supple, no elevated JVP, no carotid bruits  Lungs: Clear to auscultation bilaterally. No wheezes, rales, or rhonchi. Cardiac: Regular rate and rhythm, +S1S2, no murmurs apparent  Abdomen: Soft, nontender, +bowel sounds  Extremities: Moves all extremities x 4, no lower extremity edema  Neurologic: No focal motor deficits apparent, normal mood and affect  Peripheral Pulses: Intact posterior tibial pulses bilaterally    Intake/Output:    Intake/Output Summary (Last 24 hours) at 5/8/2019 0852  Last data filed at 5/8/2019 0818  Gross per 24 hour   Intake 600 ml   Output 2250 ml   Net -1650 ml     I/O this shift:  In: 240 [P.O.:240]  Out: -     Laboratory Tests:  Recent Labs     05/06/19  0450 05/06/19  0459     --    K 4.0  --      --    CO2 29  --    BUN 11  --    CREATININE 0.8 0.9   GLUCOSE 110*  --    CALCIUM 9.4  --      Lab Results   Component Value Date    MG 2.1 08/10/2018     No results for input(s): ALKPHOS, ALT, AST, PROT, BILITOT, BILIDIR, LABALBU in the last 72 hours.   Recent Labs     05/06/19  0450 05/06/19  1654 05/08/19  0740   WBC 8.0 8.1  --    RBC 4.66 4.41  --    HGB 13.6 12.9  --    HCT 42.3 40.1  --    MCV 90.8 90.9  --    MCH 29.2 29.3  --    MCHC 32.2 32.2  --    RDW 13.2 13.2  --     420 343   MPV 10.5 10.1  --      Lab Results   Component Value Date    CKMB 7.5 (H) 05/06/2019    TROPONINI 0.03 05/07/2019    TROPONINI 0.04 (H) 05/06/2019 TROPONINI 0.05 (H) 05/06/2019     Lab Results   Component Value Date    INR 1.1 10/14/2015    PROTIME 11.9 10/14/2015     Lab Results   Component Value Date    TSH 1.290 08/09/2018     No results found for: LABA1C  No results found for: EAG  Lab Results   Component Value Date    CHOL 226 (H) 05/07/2019     Lab Results   Component Value Date    TRIG 124 05/07/2019     Lab Results   Component Value Date    HDL 47 05/07/2019     Lab Results   Component Value Date    LDLCALC 154 (H) 05/07/2019     Lab Results   Component Value Date    LABVLDL 25 05/07/2019     No results found for: [facility]    Cardiac Tests  Telemetry findings reviewed: SR with heart rate in the 70s      Chest X-ray: NAD      Echocardiogram 5/6/2019:    Technically very very difficult examination. Definity echo contrast was   used to delineate endocardial borders.   Normal left ventricle size and systolic function.   Ejection fraction is visually estimated at 60%.   No regional wall motion abnormalities seen except for hypokinesis of the   apex.   Normal left ventricle wall thickness.   Unable to assess diastolic function.   Valves are not well visualized.   No previous echo for comparison. Stress test- :    1. No reversible perfusion defect. Findings compatible affixed   anteroseptal perfusion defect   2. Ejection fraction is 40 %. 3. Wall motion appears to be hypokinetic   Assessment: Anteroseptal infarct  Hypokinesis      Cardiac catheterization:     Labs are pending for this morning. Vitals: stable with well controlled Bps, well controlled     ASSESSMENT:  · Atypical chest pain with borderline elevated troponin to rule out ischemia, chest pain resolved, stress test anteroseptal fixed defect with low LV function  · CAD, s/p MI without ischemia based on stress test.   · History of for a multiple sclerosis  · History of CVA  · Hypertension - well controlled. · HLD continue Atorvastatin,.   · Left lower extremity DVT diagnosed in 2/2019     Plan:   · Will stop heparin and treat with medications and restart ELiquis  5 mg po BID for DVT  · Will schedule for a cardiac MRI at assess her LV function and Echo was sub optimal study and also we need to any viability because of chest pain   · Continue statin, will change amlodipine to ACEI and will add low dose beta blocker  · Lipids are not well controlled and will continue atorvastatin  · D/w Dr. Gera Nichols, I prefer to get cardiac MRI before she leaves other wise she can go today or tomorrow. Pantera Carmona MD., [facility].   [facility]) Cardiology

## 2019-05-08 NOTE — PROGRESS NOTES
Patient lying in bed in no acute distress. Denies chest pain or shortness of breath. Vital signs are stable  Chest is clear  Heart is regular rate and rhythm without murmur gallop or rub  TECHNIQUE:   11.8 mCi of Tc-99m MIBI was injected intravenously at rest and cardiac   SPECT images were performed. In addition 35 mCi of Tc-99m MIBI was   injected intravenously at maximum stress by using Lexiscan stress. Stress SPECT images and gated study were performed.        FINDINGS:   Perfusion images demonstrate no reversible perfusion defect. There is   a moderate-sized fixed defect which is in the anterior and septal wall   involving the apex. Wall motion is hypokinetic in the anterior septum and at the level of   the apex   The end diastolic volume is 872 ml. The end systolic volume is 64 ml. The estimated ejection fraction is 40 %.         Impression   1. No reversible perfusion defect. Findings compatible affixed   anteroseptal perfusion defect   2. Ejection fraction is 40 %.    3. Wall motion appears to be hypokinetic   Assessment: Anteroseptal infarct  Hypokinesis  Plan: Await further recommendations from cardiology

## 2019-05-09 VITALS
DIASTOLIC BLOOD PRESSURE: 70 MMHG | OXYGEN SATURATION: 95 % | HEIGHT: 66 IN | WEIGHT: 183.31 LBS | RESPIRATION RATE: 16 BRPM | BODY MASS INDEX: 29.46 KG/M2 | SYSTOLIC BLOOD PRESSURE: 119 MMHG | TEMPERATURE: 96.9 F | HEART RATE: 70 BPM

## 2019-05-09 PROBLEM — I50.43 CHF (CONGESTIVE HEART FAILURE), NYHA CLASS I, ACUTE ON CHRONIC, COMBINED (HCC): Status: RESOLVED | Noted: 2019-05-06 | Resolved: 2019-05-09

## 2019-05-09 LAB
LV EF: 47 %
LVEF MODALITY: NORMAL

## 2019-05-09 PROCEDURE — 6370000000 HC RX 637 (ALT 250 FOR IP): Performed by: INTERNAL MEDICINE

## 2019-05-09 PROCEDURE — 6370000000 HC RX 637 (ALT 250 FOR IP): Performed by: FAMILY MEDICINE

## 2019-05-09 PROCEDURE — 2580000003 HC RX 258: Performed by: FAMILY MEDICINE

## 2019-05-09 PROCEDURE — 99232 SBSQ HOSP IP/OBS MODERATE 35: CPT | Performed by: INTERNAL MEDICINE

## 2019-05-09 RX ORDER — LISINOPRIL 2.5 MG/1
2.5 TABLET ORAL DAILY
Qty: 30 TABLET | Refills: 3 | DISCHARGE
Start: 2019-05-09 | End: 2019-05-09 | Stop reason: HOSPADM

## 2019-05-09 RX ORDER — FUROSEMIDE 20 MG/1
20 TABLET ORAL DAILY
Qty: 60 TABLET | Refills: 3 | Status: ON HOLD | DISCHARGE
Start: 2019-05-09 | End: 2019-12-16 | Stop reason: SDUPTHER

## 2019-05-09 RX ORDER — METOPROLOL SUCCINATE 25 MG/1
25 TABLET, EXTENDED RELEASE ORAL DAILY
Qty: 30 TABLET | Refills: 3 | Status: ON HOLD | DISCHARGE
Start: 2019-05-09 | End: 2019-06-29 | Stop reason: HOSPADM

## 2019-05-09 RX ORDER — FUROSEMIDE 20 MG/1
20 TABLET ORAL DAILY
Status: DISCONTINUED | OUTPATIENT
Start: 2019-05-09 | End: 2019-05-09 | Stop reason: HOSPADM

## 2019-05-09 RX ORDER — LISINOPRIL 5 MG/1
5 TABLET ORAL DAILY
Status: DISCONTINUED | OUTPATIENT
Start: 2019-05-10 | End: 2019-05-09 | Stop reason: HOSPADM

## 2019-05-09 RX ORDER — LISINOPRIL 5 MG/1
5 TABLET ORAL DAILY
Qty: 30 TABLET | Refills: 3 | DISCHARGE
Start: 2019-05-10 | End: 2019-06-25 | Stop reason: ALTCHOICE

## 2019-05-09 RX ORDER — ATORVASTATIN CALCIUM 80 MG/1
80 TABLET, FILM COATED ORAL NIGHTLY
Qty: 30 TABLET | Refills: 3 | Status: ON HOLD | DISCHARGE
Start: 2019-05-09 | End: 2019-12-16 | Stop reason: SDUPTHER

## 2019-05-09 RX ORDER — POTASSIUM CHLORIDE 750 MG/1
10 TABLET, EXTENDED RELEASE ORAL DAILY
Status: DISCONTINUED | OUTPATIENT
Start: 2019-05-09 | End: 2019-05-09 | Stop reason: HOSPADM

## 2019-05-09 RX ORDER — ATORVASTATIN CALCIUM 40 MG/1
80 TABLET, FILM COATED ORAL NIGHTLY
Status: DISCONTINUED | OUTPATIENT
Start: 2019-05-09 | End: 2019-05-09 | Stop reason: HOSPADM

## 2019-05-09 RX ADMIN — LEVOTHYROXINE SODIUM 88 MCG: 175 TABLET ORAL at 07:08

## 2019-05-09 RX ADMIN — FLUTICASONE PROPIONATE 1 SPRAY: 50 SPRAY, METERED NASAL at 09:18

## 2019-05-09 RX ADMIN — METOPROLOL SUCCINATE 25 MG: 25 TABLET, FILM COATED, EXTENDED RELEASE ORAL at 09:18

## 2019-05-09 RX ADMIN — ACETAMINOPHEN 650 MG: 325 TABLET, FILM COATED ORAL at 07:56

## 2019-05-09 RX ADMIN — FUROSEMIDE 20 MG: 20 TABLET ORAL at 13:30

## 2019-05-09 RX ADMIN — GABAPENTIN 300 MG: 300 CAPSULE ORAL at 09:18

## 2019-05-09 RX ADMIN — CETIRIZINE HYDROCHLORIDE 10 MG: 10 TABLET, FILM COATED ORAL at 09:19

## 2019-05-09 RX ADMIN — GABAPENTIN 300 MG: 300 CAPSULE ORAL at 13:30

## 2019-05-09 RX ADMIN — LISINOPRIL 2.5 MG: 5 TABLET ORAL at 09:19

## 2019-05-09 RX ADMIN — Medication 5000 UNITS: at 09:19

## 2019-05-09 RX ADMIN — APIXABAN 5 MG: 5 TABLET, FILM COATED ORAL at 09:18

## 2019-05-09 RX ADMIN — Medication 10 ML: at 09:19

## 2019-05-09 RX ADMIN — ASPIRIN 81 MG 81 MG: 81 TABLET ORAL at 09:18

## 2019-05-09 RX ADMIN — FLUOXETINE HYDROCHLORIDE 20 MG: 20 CAPSULE ORAL at 09:19

## 2019-05-09 RX ADMIN — POTASSIUM BICARBONATE 20 MEQ: 782 TABLET, EFFERVESCENT ORAL at 09:18

## 2019-05-09 ASSESSMENT — PAIN DESCRIPTION - DIRECTION: RADIATING_TOWARDS: EVERYWHERE

## 2019-05-09 ASSESSMENT — PAIN DESCRIPTION - DESCRIPTORS: DESCRIPTORS: ACHING;SORE

## 2019-05-09 ASSESSMENT — PAIN - FUNCTIONAL ASSESSMENT: PAIN_FUNCTIONAL_ASSESSMENT: PREVENTS OR INTERFERES SOME ACTIVE ACTIVITIES AND ADLS

## 2019-05-09 ASSESSMENT — PAIN DESCRIPTION - PAIN TYPE: TYPE: CHRONIC PAIN

## 2019-05-09 ASSESSMENT — PAIN SCALES - GENERAL
PAINLEVEL_OUTOF10: 0
PAINLEVEL_OUTOF10: 6
PAINLEVEL_OUTOF10: 2

## 2019-05-09 ASSESSMENT — PAIN DESCRIPTION - PROGRESSION: CLINICAL_PROGRESSION: NOT CHANGED

## 2019-05-09 ASSESSMENT — PAIN DESCRIPTION - LOCATION: LOCATION: LEG

## 2019-05-09 ASSESSMENT — PAIN DESCRIPTION - FREQUENCY: FREQUENCY: CONTINUOUS

## 2019-05-09 ASSESSMENT — PAIN DESCRIPTION - ONSET: ONSET: ON-GOING

## 2019-05-09 NOTE — DISCHARGE INSTR - COC
Continuity of Care Form    Patient Name: Mariella Easley   :  1955  MRN:  [id number]    Admit date:  2019  Discharge date:  2019    Code Status Order: Full Code   Advance Directives:   [facility] Documentation     Date/Time Healthcare Directive Type of Healthcare Directive Copy in [facility] [address] Agent's Name Healthcare Agent's Phone Number    [phone]  No, patient does not have an advance directive for healthcare treatment -- -- -- -- --          Admitting Physician:  Phuong Salguero MD  PCP: Phuong Salguero MD    [facility]111[facility]Q  Discharging Unit Phone Number: [phone]    Emergency Contact:   Extended Emergency Contact Information  Primary Emergency Contact: Patricia Padilla  Address: [address] Phone: [phone]  Relation: Child  Secondary Emergency Contact: Magnolia Kelly  Address: [address] Phone: [phone]  Relation: Parent    Past Surgical History:  Past Surgical History:   Procedure Laterality Date    COLON SURGERY      exp lap, lysis of adhesions, release of SBO. [facility]. Dr. Maritza Sutherland      all removed    HYSTERECTOMY      [facility]. Dr. Shirley Speaker       Immunization History: There is no immunization history on file for this patient.     Active Problems:  Patient Active Problem List   Diagnosis Code    Multiple sclerosis ([facility] [address] G35    Acute cystitis N30.00    Bilateral foot-drop M21.371, M21.372    Infection due to urethral catheter ([facility] [address] T83.511A, N39.0    Urinary tract infection due to extended-spectrum beta lactamase (ESBL) producing Escherichia coli N39.0, B96.29, Z16.12    Acute encephalopathy G93.40    Heart failure (HCC) I50.9    Myocardial infarction, anteroseptal (HCC) I21.09       Isolation/Infection: Isolation          No Isolation        Patient Infection Status     Infection Encounter Level? Onset Date Added Added By Resolved Resolved By Review Date    ESBL (Extended Spectrum Beta Lactamase) No  08/13/18 Tori Zamudio, RN       E Coli Urine 8/9/18          Nurse Assessment:  Last Vital Signs: /70   Pulse 70   Temp 96.9 °F (36.1 °C) (Temporal)   Resp 16   Ht 5' 6\" (1.676 m)   Wt 183 lb 5 oz (83.2 kg)   SpO2 95%   BMI 29.59 kg/m²     Last documented pain score (0-10 scale): Pain Level: 6  Last Weight:   Wt Readings from Last 1 Encounters:   05/09/19 183 lb 5 oz (83.2 kg)     Mental Status:  oriented, alert and able to concentrate and follow conversation    IV Access:  - None    Nursing Mobility/ADLs:  Walking   Assisted  Transfer  Assisted  Bathing  Assisted  Dressing  Assisted  Toileting  Assisted  Feeding  [facility] Delivery   whole    Wound Care Documentation and Therapy:        Elimination:  Continence:   · Bowel: Yes  · Bladder: pacheco cath   Urinary Catheter: Insertion Date: unknown    Colostomy/Ileostomy/Ileal Conduit: No       Date of Last BM: ***    Intake/Output Summary (Last 24 hours) at 5/9/2019 0827  Last data filed at 5/9/2019 0645  Gross per 24 hour   Intake 600 ml   Output 2150 ml   Net -1550 ml     I/O last 3 completed shifts: In: 840 [P.O.:840]  Out: 2150 [Urine:2150]    Safety Concerns: At Risk for Falls    Impairments/Disabilities:      weakness in BLE, allen foot drop     Nutrition Therapy:  Current Nutrition Therapy:   - Oral Diet:  General    Routes of Feeding: Oral  Liquids: No Restrictions  Daily Fluid Restriction: no  Last Modified Barium Swallow with Video (Video Swallowing Test): not done    Treatments at the Time of Hospital Discharge:   Respiratory Treatments: none  Oxygen Therapy:  is not on home oxygen therapy.   Ventilator:    - No ventilator support    Rehab Therapies: none  Weight Bearing Status/Restrictions: No weight bearing restirctions  Other Medical Equipment (for information only, NOT a DME order):  bedside commode  Other Treatments: none      Patient's personal belongings (please select all that are sent with patient):  Glasses, Dentures upper and lower    RN SIGNATURE:  Electronically signed by Radha Love RN on 5/9/19 at 11:11 AM    CASE MANAGEMENT/SOCIAL WORK SECTION    Inpatient Status Date: ***    Readmission Risk Assessment Score:  Readmission Risk              Risk of Unplanned Readmission:        12           Discharging to Facility/ Agency   · Name:   · Address:  · Phone:  · Fax:    Dialysis Facility (if applicable)   · Name:  · Address:  · Dialysis Schedule:  · Phone:  · Fax:    / signature: {Esignature:[id number]}    PHYSICIAN SECTION    Prognosis: Good    Condition at Discharge: Stable    Rehab Potential (if transferring to Rehab): Fair    Recommended Labs or Other Treatments After Discharge:     Physician Certification: I certify the above information and transfer of Gilmer Lizarraga  is necessary for the continuing treatment of the diagnosis listed and that she requires [facility] for less 30 days.      Update Admission H&P: Changes in H&P as follows - MI    PHYSICIAN SIGNATURE:  Electronically signed by Josefina Ceja MD on 5/9/19 at 8:28 AM

## 2019-05-09 NOTE — CARE COORDINATION
5/9/2019  Patient to discharge back to [facility] today at 12 pm via [facility] ambulette.  patient aware and in agreement

## 2019-05-09 NOTE — PLAN OF CARE
Problem: Pain:  Goal: Pain level will decrease  Description  Pain level will decrease  5/9/2019 1307 by Micah Rogers RN  Outcome: Met This Shift     Problem: Falls - Risk of:  Goal: Will remain free from falls  Description  Will remain free from falls  5/9/2019 1307 by Micah Rogers RN  Outcome: Met This Shift     Problem: Falls - Risk of:  Goal: Absence of physical injury  Description  Absence of physical injury  5/9/2019 1307 by Micah Rogers RN  Outcome: Met This Shift     Problem: Safety:  Goal: Free from accidental physical injury  Description  Free from accidental physical injury  5/9/2019 1307 by Micah Rogers RN  Outcome: Met This Shift     Problem: Safety:  Goal: Free from intentional harm  Description  Free from intentional harm  5/9/2019 1307 by Micah Rogers RN  Outcome: Met This Shift

## 2019-05-09 NOTE — PLAN OF CARE
Problem: Pain:  Description  Pain management should include both nonpharmacologic and pharmacologic interventions. Goal: Pain level will decrease  Description  Pain level will decrease  Outcome: Met This Shift  Goal: Control of acute pain  Description  Control of acute pain  Outcome: Met This Shift  Goal: Control of chronic pain  Description  Control of chronic pain  Outcome: Met This Shift  Goal: Patient's pain/discomfort is manageable  Description  Patient's pain/discomfort is manageable  Outcome: Met This Shift     Problem: Risk for Impaired Skin Integrity  Goal: Tissue integrity - skin and mucous membranes  Description  Structural intactness and normal physiological function of skin and  mucous membranes.   Outcome: Met This Shift     Problem: Falls - Risk of:  Goal: Will remain free from falls  Description  Will remain free from falls  5/9/2019 0304 by Corry Mclain RN  Outcome: Met This Shift  5/8/2019 1451 by Leighton Young RN  Outcome: Met This Shift  Goal: Absence of physical injury  Description  Absence of physical injury  5/9/2019 0304 by Corry Mclain RN  Outcome: Met This Shift  5/8/2019 1451 by Leighton Young RN  Outcome: Met This Shift     Problem: Safety:  Goal: Free from accidental physical injury  Description  Free from accidental physical injury  5/9/2019 0304 by Corry Mclain RN  Outcome: Met This Shift  5/8/2019 1451 by Leighton Young RN  Outcome: Met This Shift  Goal: Free from intentional harm  Description  Free from intentional harm  5/9/2019 0304 by Corry Mclain RN  Outcome: Met This Shift  5/8/2019 1451 by Leighton Young RN  Outcome: Met This Shift

## 2019-05-09 NOTE — PROGRESS NOTES
INPATIENT CARDIOLOGY FOLLOW-UP    Name: Magnolia Lanier    Age: 66 y.o. Date of Admission: 5/6/2019  4:22 AM    Date of Service: 5/9/2019    Chief Complaint: Follow-up for chest pain and dyspnea      Interim History:  No new overnight cardiac complaints. Currently with no complaints of CP, SOB, palpitations, dizziness, or lightheadedness. No further episodes of chest pain. Had stress test yesterday and showed no reversible ischemia and showed fixed defect. SR on telemetry. Review of Systems:   Cardiac: As per HPI  General: No fever, chills  Pulmonary: As per HPI  HEENT: No visual disturbances, difficult swallowing  GI: No nausea, vomiting  Endocrine: No thyroid disease or DM  Musculoskeletal: ABBASI x 4, no focal motor deficits  Skin: Intact, no rashes  Neuro/Psych: No headache or seizures    Problem List:  Patient Active Problem List   Diagnosis    Multiple sclerosis ([facility] [address]    Acute cystitis    Bilateral foot-drop    Infection due to urethral catheter ([facility] [address]    Urinary tract infection due to extended-spectrum beta lactamase (ESBL) producing Escherichia coli    Acute encephalopathy    Heart failure ([facility] [address]    Myocardial infarction, anteroseptal (HCC)       Allergies:   Allergies   Allergen Reactions    Pcn [Penicillins] Anaphylaxis       Current Medications:  Current Facility-Administered Medications   Medication Dose Route Frequency Provider Last Rate Last Dose    atorvastatin (LIPITOR) tablet 80 mg  80 mg Oral Nightly Rian Roberts MD        [START ON 5/10/2019] lisinopril (PRINIVIL;ZESTRIL) tablet 5 mg  5 mg Oral Daily Rian Roberts MD        apixaban Osuna Alexanders) tablet 5 mg  5 mg Oral BID Rian Roberts MD   5 mg at 05/09/19 0918    metoprolol succinate (TOPROL XL) extended release tablet 25 mg  25 mg Oral Daily Rian Roberts MD   25 mg at 05/09/19 0918    sodium chloride flush 0.9 % injection 10 mL  10 mL Intravenous PRN Santos Schmitt DO        sodium chloride flush 0.9 % injection 10 mL  10 mL Intravenous 2 times per day Freddy Carrillo MD   10 mL at 05/06/19 1043    acetaminophen (TYLENOL) tablet 650 mg  650 mg Oral Q4H PRN Freddy Carrillo MD   650 mg at 05/09/19 0756    cloNIDine (CATAPRES) tablet 0.2 mg  0.2 mg Oral Q6H PRN Freddy Carrillo MD        zolpidem [facility]) tablet 5 mg  5 mg Oral Nightly PRN Freddy Carrillo MD        aspirin chewable tablet 81 mg  81 mg Oral Daily Freddy Carrillo MD   81 mg at 05/09/19 4610    vitamin D tablet 5,000 Units  5,000 Units Oral Daily Freddy Carrillo MD   5,000 Units at 05/09/19 0919    diazepam (VALIUM) tablet 2 mg  2 mg Oral Q12H PRN Freddy Carrillo MD   2 mg at 05/08/19 2107    FLUoxetine (PROZAC) capsule 20 mg  20 mg Oral BID rFeddy Carrillo MD   20 mg at 05/09/19 0919    fluticasone (FLONASE) 50 MCG/ACT nasal spray 1 spray  1 spray Each Nare Daily Freddy Carrillo MD   1 spray at 05/09/19 2170    gabapentin (NEURONTIN) capsule 300 mg  300 mg Oral TID Freddy Carrillo MD   300 mg at 05/09/19 5716    levothyroxine (SYNTHROID) tablet 88 mcg  88 mcg Oral Daily Freddy Carrillo MD   88 mcg at 05/09/19 0708    cetirizine (ZYRTEC) tablet 10 mg  10 mg Oral Daily Freddy Carrillo MD   10 mg at 05/09/19 0919    magnesium hydroxide (MILK OF MAGNESIA) 400 MG/5ML suspension 30 mL  30 mL Oral Daily PRN Freddy Carrillo MD        potassium bicarb-citric acid (EFFER-K) effervescent tablet 20 mEq  20 mEq Oral BID Freddy Carrillo MD   20 mEq at 05/09/19 0918    psyllium (KONSYL) 28.3 % packet 1 packet  1 packet Oral Daily Freddy aCrrillo MD        sodium chloride flush 0.9 % injection 10 mL  10 mL Intravenous 2 times per day Freddy Carrillo MD   10 mL at 05/09/19 0919    ondansetron (ZOFRAN) injection 4 mg  4 mg Intravenous Q6H PRN Freddy Carrillo MD        nitroGLYCERIN 50 mg in dextrose 5% 250 mL infusion  5 mcg/min Intravenous Continuous VERENA Acosta - CNP   Stopped at 05/06/19 2023      nitroGLYCERIN Stopped (05/06/19 2023)       Physical Exam:  /70   Pulse 70   Temp 96.9 °F (36.1 °C) (Temporal) Resp 16   Ht 5' 6\" (1.676 m)   Wt 183 lb 5 oz (83.2 kg)   SpO2 95%   BMI 29.59 kg/m²   Wt Readings from Last 3 Encounters:   05/09/19 183 lb 5 oz (83.2 kg)   12/12/18 173 lb (78.5 kg)   08/09/18 171 lb 1 oz (77.6 kg)     Appearance: Awake, alert, no acute respiratory distress  Skin: Intact, no rash  Head: Normocephalic, atraumatic  Eyes: EOMI, no conjunctival erythema  ENMT: No pharyngeal erythema, MMM, no rhinorrhea  Neck: Supple, no elevated JVP, no carotid bruits  Lungs: Clear to auscultation bilaterally. No wheezes, rales, or rhonchi. Cardiac: Regular rate and rhythm, +S1S2, no murmurs apparent  Abdomen: Soft, nontender, +bowel sounds  Extremities: Moves all extremities x 4, no lower extremity edema  Neurologic: No focal motor deficits apparent, normal mood and affect  Peripheral Pulses: Intact posterior tibial pulses bilaterally    Intake/Output:    Intake/Output Summary (Last 24 hours) at 5/9/2019 1235  Last data filed at 5/9/2019 0949  Gross per 24 hour   Intake 600 ml   Output 2150 ml   Net -1550 ml     I/O this shift:  In: 240 [P.O.:240]  Out: -     Laboratory Tests:  No results for input(s): NA, K, CL, CO2, BUN, CREATININE, GLUCOSE, CALCIUM in the last 72 hours. Lab Results   Component Value Date    MG 2.1 08/10/2018     No results for input(s): ALKPHOS, ALT, AST, PROT, BILITOT, BILIDIR, LABALBU in the last 72 hours.   Recent Labs     05/06/19  1654 05/08/19  0740   WBC 8.1  --    RBC 4.41  --    HGB 12.9  --    HCT 40.1  --    MCV 90.9  --    MCH 29.3  --    MCHC 32.2  --    RDW 13.2  --     343   MPV 10.1  --      Lab Results   Component Value Date    CKMB 7.5 (H) 05/06/2019    TROPONINI 0.03 05/07/2019    TROPONINI 0.04 (H) 05/06/2019    TROPONINI 0.05 (H) 05/06/2019     Lab Results   Component Value Date    INR 1.1 10/14/2015    PROTIME 11.9 10/14/2015     Lab Results   Component Value Date    TSH 1.290 08/09/2018     No results found for: LABA1C  No results found for: EAG  Lab Results Component Value Date    CHOL 226 (H) 05/07/2019     Lab Results   Component Value Date    TRIG 124 05/07/2019     Lab Results   Component Value Date    HDL 47 05/07/2019     Lab Results   Component Value Date    LDLCALC 154 (H) 05/07/2019     Lab Results   Component Value Date    LABVLDL 25 05/07/2019     No results found for: [facility]    Cardiac Tests  Telemetry findings reviewed: SR with heart rate in the 70s      Chest X-ray: NAD      Echocardiogram 5/6/2019:    Technically very very difficult examination. Definity echo contrast was   used to delineate endocardial borders.   Normal left ventricle size and systolic function.   Ejection fraction is visually estimated at 60%.   No regional wall motion abnormalities seen except for hypokinesis of the   apex.   Normal left ventricle wall thickness.   Unable to assess diastolic function.   Valves are not well visualized.   No previous echo for comparison. Stress test- :    1. No reversible perfusion defect. Findings compatible affixed   anteroseptal perfusion defect   2. Ejection fraction is 40 %. 3. Wall motion appears to be hypokinetic   Assessment: Anteroseptal infarct  Hypokinesis    Cardiac MRI: images reviewed: There is evidence for edema and delayed enhancement in the anterior   septum compatible with an infarct these findings correlate with the   findings on the nuclear stress study   There is evidence for small region of hypoenhancement along the   inferior septum towards the apex compatible with ischemia   Wall motion along the septum is hypokinetic   Mild mitral insufficiency             Cardiac catheterization:     Labs are pending for this morning.     Vitals: stable with well controlled Bps, well controlled     ASSESSMENT:  · Atypical chest pain with borderline elevated troponin to rule out ischemia, chest pain resolved, stress test anteroseptal fixed defect with low LV function, Cardiac MRI showed thinned out anteroseptal wall involving the distal segment and evidence of edema on T2 weighted images and positive LGE suggestive of recent MI, currently she does not have any chest and and has been chest pain for the past 48 hours. · CAD, s/p MI without ischemia based on stress test.   · History of for a multiple sclerosis  · History of CVA  · Hypertension - well controlled. · HLD continue Atorvastatin,. · Left lower extremity DVT diagnosed in 2/2019     Plan:   · Continue ELiquis  5 mg po BID for DVT  · No reversible ischemia and will treat her medically. · She appears slightly moist and will add lasix 20 mg po daily  · Increase lisinopril 5 mg po daily and continue metoprolol and will increase atorvastatin to 80 mg po daily. ·  Continue rest of the medications  · She stable for discharge and ok for discharge     Luz Allen MD., [facility].   [facility]) Cardiology

## 2019-05-15 ENCOUNTER — TELEPHONE (OUTPATIENT)
Dept: CARDIOLOGY CLINIC | Age: 64
End: 2019-05-15

## 2019-06-12 ENCOUNTER — OFFICE VISIT (OUTPATIENT)
Dept: NEUROLOGY | Age: 64
End: 2019-06-12
Payer: MEDICAID

## 2019-06-12 VITALS
HEIGHT: 67 IN | HEART RATE: 61 BPM | SYSTOLIC BLOOD PRESSURE: 92 MMHG | RESPIRATION RATE: 17 BRPM | OXYGEN SATURATION: 95 % | BODY MASS INDEX: 26.68 KG/M2 | WEIGHT: 170 LBS | DIASTOLIC BLOOD PRESSURE: 60 MMHG

## 2019-06-12 DIAGNOSIS — G35 MULTIPLE SCLEROSIS (HCC): Chronic | ICD-10-CM

## 2019-06-12 PROCEDURE — 99212 OFFICE O/P EST SF 10 MIN: CPT | Performed by: PSYCHIATRY & NEUROLOGY

## 2019-06-12 PROCEDURE — 99215 OFFICE O/P EST HI 40 MIN: CPT | Performed by: PSYCHIATRY & NEUROLOGY

## 2019-06-12 NOTE — PROGRESS NOTES
Donnie Quintanilla is a 66 y.o. right handed woman who has been followed for multiple sclerosis    She was diagnosed with multiple sclerosis 22 years ago-- she was previously maintained on Tecfidera, but that drug was discontinued due to her constant diarrhea. She had received her third dose of Ocrevus over 6 months ago. She tolerated the infusions well. Both her parents  over the past year,; therefore, she did not receive her last dose of Ocrevus. Fortunately, she again denied any increasing weakness, loss of vision, speech abnormalities or cognitive issues. On occasion, she still noted difficulty swallowing. She was now at home living with her son -she still used her Rollator at all times. She has not fallen. She noted pains in her neck and lower back but no radiating discomforts. She was eating and sleeping well. She again denied any medical issues. Review of systems was otherwise unremarkable.      Objective:     BP 92/60 (Site: Right Upper Arm, Position: Sitting, Cuff Size: Medium Adult)   Pulse 61   Resp 17   Ht 5' 7\" (1.702 m)   Wt 170 lb (77.1 kg)   SpO2 95%   BMI 26.63 kg/m²      Afebrile     General appearance: alert, appears stated age and cooperative--in no distress; she remained in wheelchair--she was in a fair spirits  Neck: no adenopathy, no carotid bruit, no JVD, supple, symmetrical, trachea midline and thyroid not enlarged, symmetric, no tenderness/mass/nodules  Lungs: clear to auscultation bilaterally  Heart: regular rate and rhythm, S1, S2 normal, no murmur, click, rub or gallop  Extremities: normal, atraumatic, no cyanosis or edema; bilateral pes planus deformities  Pulses: 1+ and symmetric  Skin: color, texture, turgor normal; no rashes or lesions     Mental Status: alert, oriented, thought content appropriate--- intact memories in all spheres  Speech: no dysarthria  Language: laconic, without aphasias    Cranial Nerves:  I: smell NA   II: visual acuity  NA   II: well    Hepatitis screening was negative    Assessment: This individual suffers from chronic progressive multiple sclerosis with no significant changes since her last visit. She has missed at least one dose of Ocrevus--- hopefully, she will return soon to   that medication. Her EDSS is 7.5. She remains stable medically. Plan:     [facility], [facility] as possible    Routine blood work and MRIs were scheduled    She will call at any time if problems arise    I spent 40 minutes with the patient with over 50 % spent in counseling and disease management. All pt issues were addressed and all questions were answered.     Anai Marsh  2:19 PM  6/12/2019

## 2019-06-13 ENCOUNTER — TELEPHONE (OUTPATIENT)
Dept: NEUROLOGY | Age: 64
End: 2019-06-13

## 2019-06-13 NOTE — TELEPHONE ENCOUNTER
Patient is scheduled for MRI of cervical spine & MRI of brain both without contrast with radiology on 7/1/19 @ 12:00pm & 1:00pm @ [address]. Patient needs to arrive at 11:30am. Patient's transportation needs to park in [place] [address] in [facility] and enter hospital under canopy A then report to radiology registration. Patient also needs lab work done. Ordered by Dr Za Kay on 6/12/19.  at [facility] did not answer for MA to inform them of the testing appointments so MA left message with brief information asking someone to please call MA back at [phone] for all details.     Electronically signed by Martín Davis on 6/13/19 at 10:06 AM

## 2019-06-14 RX ORDER — DIPHENHYDRAMINE HYDROCHLORIDE 50 MG/ML
25 INJECTION INTRAMUSCULAR; INTRAVENOUS ONCE
Status: CANCELLED
Start: 2019-06-14

## 2019-06-14 RX ORDER — ACETAMINOPHEN 325 MG/1
650 TABLET ORAL ONCE
Status: CANCELLED
Start: 2019-06-14

## 2019-06-14 RX ORDER — EPINEPHRINE 1 MG/ML
0.3 INJECTION, SOLUTION, CONCENTRATE INTRAVENOUS
Status: CANCELLED | OUTPATIENT
Start: 2019-06-14

## 2019-06-14 RX ORDER — DIPHENHYDRAMINE HYDROCHLORIDE 50 MG/ML
50 INJECTION INTRAMUSCULAR; INTRAVENOUS ONCE
Status: CANCELLED | OUTPATIENT
Start: 2019-06-14

## 2019-06-14 RX ORDER — METHYLPREDNISOLONE SODIUM SUCCINATE 125 MG/2ML
100 INJECTION, POWDER, LYOPHILIZED, FOR SOLUTION INTRAMUSCULAR; INTRAVENOUS ONCE
Status: CANCELLED
Start: 2019-06-14

## 2019-06-14 RX ORDER — SODIUM CHLORIDE 0.9 % (FLUSH) 0.9 %
10 SYRINGE (ML) INJECTION PRN
Status: CANCELLED
Start: 2019-06-14

## 2019-06-14 RX ORDER — SODIUM CHLORIDE 9 MG/ML
INJECTION, SOLUTION INTRAVENOUS CONTINUOUS
Status: CANCELLED
Start: 2019-06-14

## 2019-06-25 ENCOUNTER — APPOINTMENT (OUTPATIENT)
Dept: GENERAL RADIOLOGY | Age: 64
DRG: 174 | End: 2019-06-25
Payer: MEDICAID

## 2019-06-25 ENCOUNTER — HOSPITAL ENCOUNTER (OUTPATIENT)
Dept: INFUSION THERAPY | Age: 64
Setting detail: INFUSION SERIES
Discharge: HOME | DRG: 174 | End: 2019-06-25
Payer: MEDICAID

## 2019-06-25 ENCOUNTER — HOSPITAL ENCOUNTER (INPATIENT)
Age: 64
LOS: 2 days | Discharge: NURSING, REHAB OR LONG-TERM CARE | DRG: 174 | End: 2019-06-29
Attending: EMERGENCY MEDICINE | Admitting: FAMILY MEDICINE
Payer: MEDICAID

## 2019-06-25 VITALS
HEART RATE: 83 BPM | OXYGEN SATURATION: 93 % | SYSTOLIC BLOOD PRESSURE: 104 MMHG | DIASTOLIC BLOOD PRESSURE: 70 MMHG | TEMPERATURE: 96.4 F | RESPIRATION RATE: 18 BRPM

## 2019-06-25 DIAGNOSIS — G35 MULTIPLE SCLEROSIS (HCC): Primary | ICD-10-CM

## 2019-06-25 DIAGNOSIS — R07.9 CHEST PAIN, UNSPECIFIED TYPE: Primary | ICD-10-CM

## 2019-06-25 LAB
ALBUMIN SERPL-MCNC: 3.8 G/DL (ref 3.5–5.2)
ALP BLD-CCNC: 121 U/L (ref 35–104)
ALT SERPL-CCNC: 22 U/L (ref 0–32)
ANION GAP SERPL CALCULATED.3IONS-SCNC: 15 MMOL/L (ref 7–16)
APTT: 31.1 SEC (ref 24.5–35.1)
AST SERPL-CCNC: 20 U/L (ref 0–31)
BASOPHILS ABSOLUTE: 0.06 E9/L (ref 0–0.2)
BASOPHILS RELATIVE PERCENT: 0.9 % (ref 0–2)
BILIRUB SERPL-MCNC: 0.3 MG/DL (ref 0–1.2)
BUN BLDV-MCNC: 14 MG/DL (ref 8–23)
CALCIUM SERPL-MCNC: 9.2 MG/DL (ref 8.6–10.2)
CHLORIDE BLD-SCNC: 103 MMOL/L (ref 98–107)
CO2: 23 MMOL/L (ref 22–29)
CREAT SERPL-MCNC: 0.8 MG/DL (ref 0.5–1)
D DIMER: <200 NG/ML DDU
EOSINOPHILS ABSOLUTE: 0 E9/L (ref 0.05–0.5)
EOSINOPHILS RELATIVE PERCENT: 0.1 % (ref 0–6)
GFR AFRICAN AMERICAN: >60
GFR NON-AFRICAN AMERICAN: >60 ML/MIN/1.73
GLUCOSE BLD-MCNC: 222 MG/DL (ref 74–99)
HCT VFR BLD CALC: 39.9 % (ref 34–48)
HEMOGLOBIN: 12.9 G/DL (ref 11.5–15.5)
INR BLD: 1.3
LIPASE: 15 U/L (ref 13–60)
LYMPHOCYTES ABSOLUTE: 0.62 E9/L (ref 1.5–4)
LYMPHOCYTES RELATIVE PERCENT: 8.7 % (ref 20–42)
MCH RBC QN AUTO: 29.7 PG (ref 26–35)
MCHC RBC AUTO-ENTMCNC: 32.3 % (ref 32–34.5)
MCV RBC AUTO: 91.9 FL (ref 80–99.9)
MONOCYTES ABSOLUTE: 0 E9/L (ref 0.1–0.95)
MONOCYTES RELATIVE PERCENT: 0.4 % (ref 2–12)
NEUTROPHILS ABSOLUTE: 6.21 E9/L (ref 1.8–7.3)
NEUTROPHILS RELATIVE PERCENT: 90.4 % (ref 43–80)
OVALOCYTES: ABNORMAL
PDW BLD-RTO: 13.4 FL (ref 11.5–15)
PLATELET # BLD: 342 E9/L (ref 130–450)
PMV BLD AUTO: 10 FL (ref 7–12)
POIKILOCYTES: ABNORMAL
POLYCHROMASIA: ABNORMAL
POTASSIUM REFLEX MAGNESIUM: 3.8 MMOL/L (ref 3.5–5)
PRO-BNP: 611 PG/ML (ref 0–125)
PROTHROMBIN TIME: 15.1 SEC (ref 9.3–12.4)
RBC # BLD: 4.34 E12/L (ref 3.5–5.5)
SODIUM BLD-SCNC: 141 MMOL/L (ref 132–146)
TOTAL PROTEIN: 7 G/DL (ref 6.4–8.3)
TROPONIN: <0.01 NG/ML (ref 0–0.03)
WBC # BLD: 6.9 E9/L (ref 4.5–11.5)

## 2019-06-25 PROCEDURE — 2580000003 HC RX 258

## 2019-06-25 PROCEDURE — 96365 THER/PROPH/DIAG IV INF INIT: CPT

## 2019-06-25 PROCEDURE — 71045 X-RAY EXAM CHEST 1 VIEW: CPT

## 2019-06-25 PROCEDURE — 83690 ASSAY OF LIPASE: CPT

## 2019-06-25 PROCEDURE — 85730 THROMBOPLASTIN TIME PARTIAL: CPT

## 2019-06-25 PROCEDURE — 6370000000 HC RX 637 (ALT 250 FOR IP): Performed by: FAMILY MEDICINE

## 2019-06-25 PROCEDURE — G0378 HOSPITAL OBSERVATION PER HR: HCPCS

## 2019-06-25 PROCEDURE — 85378 FIBRIN DEGRADE SEMIQUANT: CPT

## 2019-06-25 PROCEDURE — 84484 ASSAY OF TROPONIN QUANT: CPT

## 2019-06-25 PROCEDURE — 83880 ASSAY OF NATRIURETIC PEPTIDE: CPT

## 2019-06-25 PROCEDURE — 99285 EMERGENCY DEPT VISIT HI MDM: CPT

## 2019-06-25 PROCEDURE — 2580000003 HC RX 258: Performed by: FAMILY MEDICINE

## 2019-06-25 PROCEDURE — 36415 COLL VENOUS BLD VENIPUNCTURE: CPT

## 2019-06-25 PROCEDURE — 96374 THER/PROPH/DIAG INJ IV PUSH: CPT

## 2019-06-25 PROCEDURE — 6370000000 HC RX 637 (ALT 250 FOR IP): Performed by: PSYCHIATRY & NEUROLOGY

## 2019-06-25 PROCEDURE — 85025 COMPLETE CBC W/AUTO DIFF WBC: CPT

## 2019-06-25 PROCEDURE — 2580000003 HC RX 258: Performed by: PSYCHIATRY & NEUROLOGY

## 2019-06-25 PROCEDURE — 93005 ELECTROCARDIOGRAM TRACING: CPT | Performed by: EMERGENCY MEDICINE

## 2019-06-25 PROCEDURE — 80053 COMPREHEN METABOLIC PANEL: CPT

## 2019-06-25 PROCEDURE — 96375 TX/PRO/DX INJ NEW DRUG ADDON: CPT

## 2019-06-25 PROCEDURE — 85610 PROTHROMBIN TIME: CPT

## 2019-06-25 PROCEDURE — 96366 THER/PROPH/DIAG IV INF ADDON: CPT

## 2019-06-25 PROCEDURE — 6360000002 HC RX W HCPCS: Performed by: PSYCHIATRY & NEUROLOGY

## 2019-06-25 RX ORDER — DIPHENHYDRAMINE HYDROCHLORIDE 50 MG/ML
25 INJECTION INTRAMUSCULAR; INTRAVENOUS ONCE
Status: COMPLETED | OUTPATIENT
Start: 2019-06-25 | End: 2019-06-25

## 2019-06-25 RX ORDER — ACETAMINOPHEN 325 MG/1
650 TABLET ORAL ONCE
Status: COMPLETED | OUTPATIENT
Start: 2019-06-25 | End: 2019-06-25

## 2019-06-25 RX ORDER — DIPHENHYDRAMINE HYDROCHLORIDE 50 MG/ML
25 INJECTION INTRAMUSCULAR; INTRAVENOUS ONCE
Status: CANCELLED | OUTPATIENT
Start: 2019-12-10

## 2019-06-25 RX ORDER — DIPHENHYDRAMINE HYDROCHLORIDE 50 MG/ML
50 INJECTION INTRAMUSCULAR; INTRAVENOUS ONCE
Status: CANCELLED | OUTPATIENT
Start: 2019-06-25

## 2019-06-25 RX ORDER — DIPHENHYDRAMINE HYDROCHLORIDE 50 MG/ML
50 INJECTION INTRAMUSCULAR; INTRAVENOUS ONCE
Status: CANCELLED | OUTPATIENT
Start: 2019-12-10

## 2019-06-25 RX ORDER — ATORVASTATIN CALCIUM 40 MG/1
80 TABLET, FILM COATED ORAL NIGHTLY
Status: DISCONTINUED | OUTPATIENT
Start: 2019-06-25 | End: 2019-06-29 | Stop reason: HOSPADM

## 2019-06-25 RX ORDER — SODIUM CHLORIDE 0.9 % (FLUSH) 0.9 %
5 SYRINGE (ML) INJECTION PRN
Status: DISCONTINUED | OUTPATIENT
Start: 2019-06-25 | End: 2019-06-26 | Stop reason: HOSPADM

## 2019-06-25 RX ORDER — SODIUM CHLORIDE 0.9 % (FLUSH) 0.9 %
10 SYRINGE (ML) INJECTION PRN
Status: CANCELLED | OUTPATIENT
Start: 2019-12-10

## 2019-06-25 RX ORDER — HEPARIN SODIUM (PORCINE) LOCK FLUSH IV SOLN 100 UNIT/ML 100 UNIT/ML
500 SOLUTION INTRAVENOUS PRN
Status: CANCELLED | OUTPATIENT
Start: 2019-12-10

## 2019-06-25 RX ORDER — EPINEPHRINE 1 MG/ML
0.3 INJECTION, SOLUTION, CONCENTRATE INTRAVENOUS PRN
Status: CANCELLED | OUTPATIENT
Start: 2019-06-25

## 2019-06-25 RX ORDER — METOPROLOL SUCCINATE 25 MG/1
25 TABLET, EXTENDED RELEASE ORAL DAILY
Status: DISCONTINUED | OUTPATIENT
Start: 2019-06-25 | End: 2019-06-29

## 2019-06-25 RX ORDER — DIPHENHYDRAMINE HYDROCHLORIDE 50 MG/ML
50 INJECTION INTRAMUSCULAR; INTRAVENOUS ONCE
Status: COMPLETED | OUTPATIENT
Start: 2019-06-25 | End: 2019-06-25

## 2019-06-25 RX ORDER — GABAPENTIN 300 MG/1
300 CAPSULE ORAL 3 TIMES DAILY
Status: DISCONTINUED | OUTPATIENT
Start: 2019-06-25 | End: 2019-06-29 | Stop reason: HOSPADM

## 2019-06-25 RX ORDER — SODIUM CHLORIDE 9 MG/ML
INJECTION, SOLUTION INTRAVENOUS CONTINUOUS
Status: CANCELLED
Start: 2019-12-10

## 2019-06-25 RX ORDER — SODIUM CHLORIDE 0.9 % (FLUSH) 0.9 %
5 SYRINGE (ML) INJECTION PRN
Status: CANCELLED | OUTPATIENT
Start: 2019-12-10

## 2019-06-25 RX ORDER — METHYLPREDNISOLONE SODIUM SUCCINATE 125 MG/2ML
100 INJECTION, POWDER, LYOPHILIZED, FOR SOLUTION INTRAMUSCULAR; INTRAVENOUS ONCE
Status: COMPLETED | OUTPATIENT
Start: 2019-06-25 | End: 2019-06-25

## 2019-06-25 RX ORDER — AMLODIPINE BESYLATE 5 MG/1
5 TABLET ORAL DAILY
Status: DISCONTINUED | OUTPATIENT
Start: 2019-06-25 | End: 2019-06-29

## 2019-06-25 RX ORDER — FLUTICASONE PROPIONATE 50 MCG
1 SPRAY, SUSPENSION (ML) NASAL DAILY
Status: DISCONTINUED | OUTPATIENT
Start: 2019-06-25 | End: 2019-06-29 | Stop reason: HOSPADM

## 2019-06-25 RX ORDER — CETIRIZINE HYDROCHLORIDE 10 MG/1
10 TABLET ORAL DAILY
Status: DISCONTINUED | OUTPATIENT
Start: 2019-06-25 | End: 2019-06-29 | Stop reason: HOSPADM

## 2019-06-25 RX ORDER — EPINEPHRINE 1 MG/ML
0.3 INJECTION, SOLUTION, CONCENTRATE INTRAVENOUS PRN
Status: CANCELLED | OUTPATIENT
Start: 2019-12-10

## 2019-06-25 RX ORDER — METHYLPREDNISOLONE SODIUM SUCCINATE 125 MG/2ML
100 INJECTION, POWDER, LYOPHILIZED, FOR SOLUTION INTRAMUSCULAR; INTRAVENOUS ONCE
Status: CANCELLED | OUTPATIENT
Start: 2019-12-10

## 2019-06-25 RX ORDER — SODIUM CHLORIDE 0.9 % (FLUSH) 0.9 %
10 SYRINGE (ML) INJECTION PRN
Status: DISCONTINUED | OUTPATIENT
Start: 2019-06-25 | End: 2019-06-26 | Stop reason: HOSPADM

## 2019-06-25 RX ORDER — ZOLPIDEM TARTRATE 5 MG/1
5 TABLET ORAL NIGHTLY PRN
Status: DISCONTINUED | OUTPATIENT
Start: 2019-06-25 | End: 2019-06-29 | Stop reason: HOSPADM

## 2019-06-25 RX ORDER — HEPARIN SODIUM (PORCINE) LOCK FLUSH IV SOLN 100 UNIT/ML 100 UNIT/ML
500 SOLUTION INTRAVENOUS PRN
Status: DISCONTINUED | OUTPATIENT
Start: 2019-06-25 | End: 2019-06-26 | Stop reason: HOSPADM

## 2019-06-25 RX ORDER — ONDANSETRON 2 MG/ML
4 INJECTION INTRAMUSCULAR; INTRAVENOUS EVERY 6 HOURS PRN
Status: DISCONTINUED | OUTPATIENT
Start: 2019-06-25 | End: 2019-06-29 | Stop reason: HOSPADM

## 2019-06-25 RX ORDER — ACETAMINOPHEN 325 MG/1
650 TABLET ORAL ONCE
Status: CANCELLED | OUTPATIENT
Start: 2019-12-10

## 2019-06-25 RX ORDER — ACETAMINOPHEN 325 MG/1
650 TABLET ORAL ONCE
Status: CANCELLED
Start: 2019-12-10

## 2019-06-25 RX ORDER — SODIUM CHLORIDE 0.9 % (FLUSH) 0.9 %
10 SYRINGE (ML) INJECTION EVERY 12 HOURS SCHEDULED
Status: DISCONTINUED | OUTPATIENT
Start: 2019-06-25 | End: 2019-06-29 | Stop reason: HOSPADM

## 2019-06-25 RX ORDER — ALBUTEROL SULFATE 90 UG/1
2 AEROSOL, METERED RESPIRATORY (INHALATION) EVERY 6 HOURS PRN
Status: DISCONTINUED | OUTPATIENT
Start: 2019-06-25 | End: 2019-06-29 | Stop reason: HOSPADM

## 2019-06-25 RX ORDER — ACETAMINOPHEN 325 MG/1
650 TABLET ORAL ONCE
Status: CANCELLED
Start: 2019-06-25

## 2019-06-25 RX ORDER — LISINOPRIL 2.5 MG/1
2.5 TABLET ORAL DAILY
Status: ON HOLD | COMMUNITY
End: 2019-06-29 | Stop reason: HOSPADM

## 2019-06-25 RX ORDER — SODIUM CHLORIDE 0.9 % (FLUSH) 0.9 %
10 SYRINGE (ML) INJECTION PRN
Status: DISCONTINUED | OUTPATIENT
Start: 2019-06-25 | End: 2019-06-29 | Stop reason: HOSPADM

## 2019-06-25 RX ORDER — SODIUM CHLORIDE 9 MG/ML
INJECTION, SOLUTION INTRAVENOUS CONTINUOUS
Status: DISCONTINUED | OUTPATIENT
Start: 2019-06-25 | End: 2019-06-26 | Stop reason: HOSPADM

## 2019-06-25 RX ORDER — FLUOXETINE HYDROCHLORIDE 20 MG/1
20 CAPSULE ORAL 2 TIMES DAILY
Status: DISCONTINUED | OUTPATIENT
Start: 2019-06-25 | End: 2019-06-29 | Stop reason: HOSPADM

## 2019-06-25 RX ORDER — ASPIRIN 81 MG/1
81 TABLET ORAL DAILY
Status: DISCONTINUED | OUTPATIENT
Start: 2019-06-25 | End: 2019-06-29 | Stop reason: HOSPADM

## 2019-06-25 RX ORDER — ACETAMINOPHEN 325 MG/1
650 TABLET ORAL ONCE
Status: DISCONTINUED | OUTPATIENT
Start: 2019-06-25 | End: 2019-06-26 | Stop reason: HOSPADM

## 2019-06-25 RX ORDER — PYRIDOXINE HCL (VITAMIN B6) 100 MG
500 TABLET ORAL DAILY
Status: DISCONTINUED | OUTPATIENT
Start: 2019-06-25 | End: 2019-06-25 | Stop reason: CLARIF

## 2019-06-25 RX ORDER — LISINOPRIL 5 MG/1
2.5 TABLET ORAL DAILY
Status: DISCONTINUED | OUTPATIENT
Start: 2019-06-25 | End: 2019-06-29

## 2019-06-25 RX ORDER — ACETAMINOPHEN 325 MG/1
650 TABLET ORAL EVERY 4 HOURS PRN
Status: DISCONTINUED | OUTPATIENT
Start: 2019-06-25 | End: 2019-06-29 | Stop reason: HOSPADM

## 2019-06-25 RX ORDER — SODIUM CHLORIDE 0.9 % (FLUSH) 0.9 %
SYRINGE (ML) INJECTION
Status: COMPLETED
Start: 2019-06-25 | End: 2019-06-25

## 2019-06-25 RX ORDER — CLONIDINE HYDROCHLORIDE 0.2 MG/1
0.2 TABLET ORAL EVERY 6 HOURS PRN
Status: DISCONTINUED | OUTPATIENT
Start: 2019-06-25 | End: 2019-06-29 | Stop reason: HOSPADM

## 2019-06-25 RX ORDER — FUROSEMIDE 20 MG/1
20 TABLET ORAL DAILY
Status: DISCONTINUED | OUTPATIENT
Start: 2019-06-25 | End: 2019-06-27

## 2019-06-25 RX ORDER — LEVOTHYROXINE SODIUM 88 UG/1
88 TABLET ORAL DAILY
Status: DISCONTINUED | OUTPATIENT
Start: 2019-06-26 | End: 2019-06-29 | Stop reason: HOSPADM

## 2019-06-25 RX ORDER — DIAZEPAM 2 MG/1
2 TABLET ORAL EVERY 12 HOURS PRN
Status: DISCONTINUED | OUTPATIENT
Start: 2019-06-25 | End: 2019-06-29 | Stop reason: HOSPADM

## 2019-06-25 RX ORDER — EPINEPHRINE 1 MG/ML
0.3 INJECTION, SOLUTION, CONCENTRATE INTRAVENOUS PRN
Status: DISCONTINUED | OUTPATIENT
Start: 2019-06-25 | End: 2019-06-26 | Stop reason: HOSPADM

## 2019-06-25 RX ADMIN — ATORVASTATIN CALCIUM 80 MG: 40 TABLET, FILM COATED ORAL at 22:00

## 2019-06-25 RX ADMIN — DIPHENHYDRAMINE HYDROCHLORIDE 50 MG: 50 INJECTION, SOLUTION INTRAMUSCULAR; INTRAVENOUS at 14:14

## 2019-06-25 RX ADMIN — DIPHENHYDRAMINE HYDROCHLORIDE 25 MG: 50 INJECTION, SOLUTION INTRAMUSCULAR; INTRAVENOUS at 09:15

## 2019-06-25 RX ADMIN — POTASSIUM BICARBONATE 20 MEQ: 782 TABLET, EFFERVESCENT ORAL at 22:00

## 2019-06-25 RX ADMIN — HYDROCORTISONE SODIUM SUCCINATE 100 MG: 100 INJECTION, POWDER, FOR SOLUTION INTRAMUSCULAR; INTRAVENOUS at 14:18

## 2019-06-25 RX ADMIN — Medication 10 ML: at 22:00

## 2019-06-25 RX ADMIN — FLUOXETINE HYDROCHLORIDE 20 MG: 20 CAPSULE ORAL at 22:00

## 2019-06-25 RX ADMIN — ACETAMINOPHEN 650 MG: 325 TABLET, FILM COATED ORAL at 09:02

## 2019-06-25 RX ADMIN — METHYLPREDNISOLONE SODIUM SUCCINATE 100 MG: 125 INJECTION, POWDER, FOR SOLUTION INTRAMUSCULAR; INTRAVENOUS at 09:12

## 2019-06-25 RX ADMIN — GABAPENTIN 300 MG: 300 CAPSULE ORAL at 22:00

## 2019-06-25 RX ADMIN — Medication 10 ML: at 09:16

## 2019-06-25 RX ADMIN — Medication 10 ML: at 09:23

## 2019-06-25 RX ADMIN — APIXABAN 5 MG: 5 TABLET, FILM COATED ORAL at 22:00

## 2019-06-25 RX ADMIN — OCRELIZUMAB 600 MG: 300 INJECTION INTRAVENOUS at 09:49

## 2019-06-25 RX ADMIN — Medication 10 ML: at 14:18

## 2019-06-25 RX ADMIN — SODIUM CHLORIDE: 9 INJECTION, SOLUTION INTRAVENOUS at 09:12

## 2019-06-25 RX ADMIN — ZOLPIDEM TARTRATE 5 MG: 5 TABLET ORAL at 22:11

## 2019-06-25 ASSESSMENT — PAIN SCALES - GENERAL
PAINLEVEL_OUTOF10: 8
PAINLEVEL_OUTOF10: 8
PAINLEVEL_OUTOF10: 7

## 2019-06-25 ASSESSMENT — PAIN DESCRIPTION - PAIN TYPE
TYPE: CHRONIC PAIN
TYPE: CHRONIC PAIN

## 2019-06-25 ASSESSMENT — PAIN DESCRIPTION - LOCATION: LOCATION: GENERALIZED

## 2019-06-25 ASSESSMENT — ENCOUNTER SYMPTOMS
NAUSEA: 0
BACK PAIN: 0
TROUBLE SWALLOWING: 0
SHORTNESS OF BREATH: 1
ABDOMINAL PAIN: 0
VOMITING: 0

## 2019-06-25 NOTE — ED NOTES
Bed: 11  Expected date:   Expected time:   Means of arrival:   Comments:     Christina Sosa RN  06/25/19 7624

## 2019-06-25 NOTE — PROGRESS NOTES
Pt states she she feels much better, cp 6 or 7 on a scale of 1-10, not as much chest  Pressure , VS better pulse 93%    bp 104 70 hr 83 , dr chong want pt to go to ER will  Get pt ready to take. Loyda Mendosa

## 2019-06-25 NOTE — PROGRESS NOTES
Pt c/o cp and heaviness, ocrevus was completed and ns flush was infusing, vs 140 97 hr91 , pulse ox 88%, oc 2l  applied, dr. Roberto Talavera office called, spoke to [facility] and relayed information, rescue meds were  given.

## 2019-06-26 LAB
EKG ATRIAL RATE: 84 BPM
EKG ATRIAL RATE: 88 BPM
EKG P AXIS: 43 DEGREES
EKG P AXIS: 52 DEGREES
EKG P-R INTERVAL: 204 MS
EKG P-R INTERVAL: 220 MS
EKG Q-T INTERVAL: 404 MS
EKG Q-T INTERVAL: 436 MS
EKG QRS DURATION: 88 MS
EKG QRS DURATION: 90 MS
EKG QTC CALCULATION (BAZETT): 488 MS
EKG QTC CALCULATION (BAZETT): 515 MS
EKG R AXIS: -20 DEGREES
EKG R AXIS: -45 DEGREES
EKG T AXIS: 69 DEGREES
EKG T AXIS: 86 DEGREES
EKG VENTRICULAR RATE: 84 BPM
EKG VENTRICULAR RATE: 88 BPM
TROPONIN: 0.3 NG/ML (ref 0–0.03)
TROPONIN: 0.41 NG/ML (ref 0–0.03)

## 2019-06-26 PROCEDURE — 93010 ELECTROCARDIOGRAM REPORT: CPT | Performed by: INTERNAL MEDICINE

## 2019-06-26 PROCEDURE — 36415 COLL VENOUS BLD VENIPUNCTURE: CPT

## 2019-06-26 PROCEDURE — 2580000003 HC RX 258: Performed by: FAMILY MEDICINE

## 2019-06-26 PROCEDURE — 84484 ASSAY OF TROPONIN QUANT: CPT

## 2019-06-26 PROCEDURE — 6370000000 HC RX 637 (ALT 250 FOR IP): Performed by: INTERNAL MEDICINE

## 2019-06-26 PROCEDURE — G0378 HOSPITAL OBSERVATION PER HR: HCPCS

## 2019-06-26 PROCEDURE — 6370000000 HC RX 637 (ALT 250 FOR IP): Performed by: FAMILY MEDICINE

## 2019-06-26 PROCEDURE — 93005 ELECTROCARDIOGRAM TRACING: CPT | Performed by: FAMILY MEDICINE

## 2019-06-26 PROCEDURE — 99244 OFF/OP CNSLTJ NEW/EST MOD 40: CPT | Performed by: INTERNAL MEDICINE

## 2019-06-26 RX ADMIN — ATORVASTATIN CALCIUM 80 MG: 40 TABLET, FILM COATED ORAL at 22:39

## 2019-06-26 RX ADMIN — FLUTICASONE PROPIONATE 1 SPRAY: 50 SPRAY, METERED NASAL at 08:59

## 2019-06-26 RX ADMIN — CETIRIZINE HYDROCHLORIDE 10 MG: 10 TABLET, FILM COATED ORAL at 08:54

## 2019-06-26 RX ADMIN — APIXABAN 5 MG: 5 TABLET, FILM COATED ORAL at 08:53

## 2019-06-26 RX ADMIN — FLUOXETINE HYDROCHLORIDE 20 MG: 20 CAPSULE ORAL at 08:53

## 2019-06-26 RX ADMIN — POTASSIUM BICARBONATE 20 MEQ: 782 TABLET, EFFERVESCENT ORAL at 22:39

## 2019-06-26 RX ADMIN — ASPIRIN 81 MG: 81 TABLET ORAL at 08:55

## 2019-06-26 RX ADMIN — POTASSIUM BICARBONATE 20 MEQ: 782 TABLET, EFFERVESCENT ORAL at 08:53

## 2019-06-26 RX ADMIN — FLUOXETINE HYDROCHLORIDE 20 MG: 20 CAPSULE ORAL at 22:39

## 2019-06-26 RX ADMIN — GABAPENTIN 300 MG: 300 CAPSULE ORAL at 08:53

## 2019-06-26 RX ADMIN — AMLODIPINE BESYLATE 5 MG: 5 TABLET ORAL at 08:55

## 2019-06-26 RX ADMIN — LEVOTHYROXINE SODIUM 88 MCG: 88 TABLET ORAL at 06:51

## 2019-06-26 RX ADMIN — Medication 10 ML: at 08:57

## 2019-06-26 RX ADMIN — GABAPENTIN 300 MG: 300 CAPSULE ORAL at 22:40

## 2019-06-26 RX ADMIN — METOPROLOL SUCCINATE 25 MG: 25 TABLET, FILM COATED, EXTENDED RELEASE ORAL at 08:54

## 2019-06-26 RX ADMIN — GABAPENTIN 300 MG: 300 CAPSULE ORAL at 14:34

## 2019-06-26 RX ADMIN — LISINOPRIL 2.5 MG: 5 TABLET ORAL at 08:56

## 2019-06-26 RX ADMIN — NITROGLYCERIN 1 INCH: 20 OINTMENT TOPICAL at 18:26

## 2019-06-26 RX ADMIN — NITROGLYCERIN 1 INCH: 20 OINTMENT TOPICAL at 12:14

## 2019-06-26 RX ADMIN — Medication 10 ML: at 22:40

## 2019-06-26 RX ADMIN — FUROSEMIDE 20 MG: 20 TABLET ORAL at 08:55

## 2019-06-26 ASSESSMENT — PAIN SCALES - GENERAL
PAINLEVEL_OUTOF10: 0

## 2019-06-26 NOTE — CONSULTS
Consults   dictatted  Deondre Kwong for dischage if second trope neg
14, temp 98, and room air saturation 92%. HEENT: no JVD, no carotid bruits, normocephqlic  LUNGS: clear of rales or wheeze. No ACC muscle usage  HEART: regular rate and rhythm, normal S1,S2. No murmur  ABDOMEN: soft, non tender, no masses or organomegaly  MUSCULOSKELETAL: no deformities, no edema, normal strenght  NEURO/PSYCH: alert and oreiented, appropriate  SKIN: no rashes or edema    LABORATORY DATA:  Troponin negative x1. Potassium 3.8. Normal GFR. Hemoglobin 12.9. Normal LFTs. D-dimer less than 200. Chest x-ray, no  overt failure to my review. EKG, normal sinus rhythm, septal MI, no  change from prior tracing. IMPRESSION AND PLAN:  The patient presents with rest chest discomfort  after an infusion of medicine for multiple sclerosis. She is symptom  free at this time. Her ECG shows no acute changes and her initial  cardiac enzymes negative. If the second troponin is negative, she can  be discharged without further testing, given her recent benign  noninvasive evaluation just a month ago.         Jaime Doan MD    D: 06/26/2019 8:02:44       T: 06/26/2019 9:14:23     [email or web address]  Job#: [id number]     Doc#: [id number]    CC:

## 2019-06-26 NOTE — DISCHARGE INSTR - COC
***    Patient's personal belongings (please select all that are sent with patient):  {[facility] DME Belongings:[id number]}    RN SIGNATURE:  SRomain RN    CASE MANAGEMENT/SOCIAL WORK SECTION    Inpatient Status Date: ***    Readmission Risk Assessment Score:  Readmission Risk              Risk of Unplanned Readmission:        14           Discharging to Facility/ Agency   · Name: Mjao Suárez  · Address:  · Phone:  · Fax:    Dialysis Facility (if applicable)   · Name:  · Address:  · Dialysis Schedule:  · Phone:  · Fax:    / signature: Electronically signed by DOTTIE Resendiz on 6/26/19 at 10:37 AM    PHYSICIAN SECTION    Prognosis: Good    Condition at Discharge: Stable    Rehab Potential (if transferring to Rehab): Fair    Recommended Labs or Other Treatments After Discharge: cbc cmp    Physician Certification: I certify the above information and transfer of Josefina Velazquez  is necessary for the continuing treatment of the diagnosis listed and that she requires Intermediate Nursing Care for greater 30 days.      Update Admission H&P: Changes in H&P as follows - NSTEMI    PHYSICIAN SIGNATURE:  Electronically signed by Darra Duverney, MD on 6/29/19 at 12:33 PM

## 2019-06-26 NOTE — PROGRESS NOTES
Second troponin came back at 0.41. Dr. Cecilia Salinas on call for cardiology and notified via perfect serve.

## 2019-06-27 PROBLEM — I21.4 NSTEMI (NON-ST ELEVATED MYOCARDIAL INFARCTION) (HCC): Status: ACTIVE | Noted: 2019-06-27

## 2019-06-27 PROCEDURE — 2580000003 HC RX 258: Performed by: FAMILY MEDICINE

## 2019-06-27 PROCEDURE — 2060000000 HC ICU INTERMEDIATE R&B

## 2019-06-27 PROCEDURE — 6370000000 HC RX 637 (ALT 250 FOR IP): Performed by: INTERNAL MEDICINE

## 2019-06-27 PROCEDURE — 6370000000 HC RX 637 (ALT 250 FOR IP): Performed by: FAMILY MEDICINE

## 2019-06-27 PROCEDURE — 99233 SBSQ HOSP IP/OBS HIGH 50: CPT | Performed by: INTERNAL MEDICINE

## 2019-06-27 RX ORDER — SODIUM CHLORIDE 9 MG/ML
INJECTION, SOLUTION INTRAVENOUS CONTINUOUS
Status: DISCONTINUED | OUTPATIENT
Start: 2019-06-28 | End: 2019-06-28

## 2019-06-27 RX ADMIN — GABAPENTIN 300 MG: 300 CAPSULE ORAL at 10:35

## 2019-06-27 RX ADMIN — GABAPENTIN 300 MG: 300 CAPSULE ORAL at 20:22

## 2019-06-27 RX ADMIN — NITROGLYCERIN 1 INCH: 20 OINTMENT TOPICAL at 06:59

## 2019-06-27 RX ADMIN — POTASSIUM BICARBONATE 20 MEQ: 782 TABLET, EFFERVESCENT ORAL at 20:22

## 2019-06-27 RX ADMIN — NITROGLYCERIN 1 INCH: 20 OINTMENT TOPICAL at 18:18

## 2019-06-27 RX ADMIN — Medication 10 ML: at 20:22

## 2019-06-27 RX ADMIN — Medication 10 ML: at 10:29

## 2019-06-27 RX ADMIN — AMLODIPINE BESYLATE 5 MG: 5 TABLET ORAL at 10:28

## 2019-06-27 RX ADMIN — FLUTICASONE PROPIONATE 1 SPRAY: 50 SPRAY, METERED NASAL at 10:27

## 2019-06-27 RX ADMIN — ZOLPIDEM TARTRATE 5 MG: 5 TABLET ORAL at 20:38

## 2019-06-27 RX ADMIN — NITROGLYCERIN 1 INCH: 20 OINTMENT TOPICAL at 01:30

## 2019-06-27 RX ADMIN — NITROGLYCERIN 1 INCH: 20 OINTMENT TOPICAL at 14:29

## 2019-06-27 RX ADMIN — MAGNESIUM HYDROXIDE 30 ML: 400 SUSPENSION ORAL at 10:28

## 2019-06-27 RX ADMIN — ATORVASTATIN CALCIUM 80 MG: 40 TABLET, FILM COATED ORAL at 20:22

## 2019-06-27 RX ADMIN — ZOLPIDEM TARTRATE 5 MG: 5 TABLET ORAL at 02:49

## 2019-06-27 RX ADMIN — ASPIRIN 81 MG: 81 TABLET ORAL at 10:28

## 2019-06-27 RX ADMIN — PSYLLIUM HUSK 1 PACKET: 3.4 GRANULE ORAL at 10:27

## 2019-06-27 RX ADMIN — ACETAMINOPHEN 650 MG: 325 TABLET, FILM COATED ORAL at 19:33

## 2019-06-27 RX ADMIN — FLUOXETINE HYDROCHLORIDE 20 MG: 20 CAPSULE ORAL at 10:28

## 2019-06-27 RX ADMIN — LISINOPRIL 2.5 MG: 5 TABLET ORAL at 10:28

## 2019-06-27 RX ADMIN — METOPROLOL SUCCINATE 25 MG: 25 TABLET, FILM COATED, EXTENDED RELEASE ORAL at 10:27

## 2019-06-27 RX ADMIN — GABAPENTIN 300 MG: 300 CAPSULE ORAL at 14:29

## 2019-06-27 RX ADMIN — POTASSIUM BICARBONATE 20 MEQ: 782 TABLET, EFFERVESCENT ORAL at 10:27

## 2019-06-27 RX ADMIN — CETIRIZINE HYDROCHLORIDE 10 MG: 10 TABLET, FILM COATED ORAL at 10:28

## 2019-06-27 RX ADMIN — FLUOXETINE HYDROCHLORIDE 20 MG: 20 CAPSULE ORAL at 20:22

## 2019-06-27 RX ADMIN — NITROGLYCERIN 1 INCH: 20 OINTMENT TOPICAL at 23:55

## 2019-06-27 RX ADMIN — LEVOTHYROXINE SODIUM 88 MCG: 88 TABLET ORAL at 06:59

## 2019-06-27 ASSESSMENT — PAIN SCALES - GENERAL
PAINLEVEL_OUTOF10: 8
PAINLEVEL_OUTOF10: 0
PAINLEVEL_OUTOF10: 8
PAINLEVEL_OUTOF10: 0

## 2019-06-27 ASSESSMENT — PAIN DESCRIPTION - LOCATION: LOCATION: BACK

## 2019-06-27 ASSESSMENT — PAIN DESCRIPTION - PAIN TYPE: TYPE: CHRONIC PAIN

## 2019-06-27 NOTE — PROGRESS NOTES
PROGRESS NOTE     CARDIOLOGY    Chief complaint: Seen today for follow up, management & recommendations for nstemi  She denies chest pain or shortness of breath today.     Wt Readings from Last 3 Encounters:   06/25/19 170 lb (77.1 kg)   06/12/19 170 lb (77.1 kg)   05/09/19 183 lb 5 oz (83.2 kg)     Temp Readings from Last 3 Encounters:   06/26/19 98 °F (36.7 °C) (Temporal)   06/25/19 96.4 °F (35.8 °C)   05/09/19 96.9 °F (36.1 °C) (Temporal)     BP Readings from Last 3 Encounters:   06/26/19 104/63   06/25/19 104/70   06/12/19 92/60     Pulse Readings from Last 3 Encounters:   06/26/19 85   06/25/19 83   06/12/19 61         Intake/Output Summary (Last 24 hours) at 6/27/2019 0616  Last data filed at 6/27/2019 0245  Gross per 24 hour   Intake 250 ml   Output 3475 ml   Net -3225 ml       Recent Labs     06/25/19  1515   WBC 6.9   HGB 12.9   HCT 39.9   MCV 91.9        Recent Labs     06/25/19  1515      K 3.8      CO2 23   BUN 14   CREATININE 0.8     Recent Labs     06/25/19  1515   PROTIME 15.1*   INR 1.3     Recent Labs     06/25/19  2153 06/26/19  1020 06/26/19  1610   TROPONINI <0.01 0.30* 0.41*           nitroglycerin (NITRO-BID) 2 % ointment 1 inch 4 times per day   acetaminophen (TYLENOL) tablet 650 mg Q4H PRN   albuterol sulfate  (90 Base) MCG/ACT inhaler 2 puff Q6H PRN   amLODIPine (NORVASC) tablet 5 mg Daily   aspirin EC tablet 81 mg Daily   atorvastatin (LIPITOR) tablet 80 mg Nightly   cloNIDine (CATAPRES) tablet 0.2 mg Q6H PRN   diazepam (VALIUM) tablet 2 mg Q12H PRN   FLUoxetine (PROZAC) capsule 20 mg BID   fluticasone (FLONASE) 50 MCG/ACT nasal spray 1 spray Daily   furosemide (LASIX) tablet 20 mg Daily   gabapentin (NEURONTIN) capsule 300 mg TID   levothyroxine (SYNTHROID) tablet 88 mcg Daily   lisinopril (PRINIVIL;ZESTRIL) tablet 2.5 mg Daily   cetirizine (ZYRTEC) tablet 10 mg Daily   magnesium hydroxide (MILK OF MAGNESIA) 400 MG/5ML suspension 30 mL Daily PRN   metoprolol

## 2019-06-28 LAB
ABO/RH: NORMAL
ANTIBODY SCREEN: NORMAL
EKG ATRIAL RATE: 68 BPM
EKG P AXIS: 32 DEGREES
EKG P-R INTERVAL: 222 MS
EKG Q-T INTERVAL: 480 MS
EKG QRS DURATION: 64 MS
EKG QTC CALCULATION (BAZETT): 510 MS
EKG R AXIS: -20 DEGREES
EKG T AXIS: 105 DEGREES
EKG VENTRICULAR RATE: 68 BPM
POC ACT LR: 272 SECONDS

## 2019-06-28 PROCEDURE — C1725 CATH, TRANSLUMIN NON-LASER: HCPCS

## 2019-06-28 PROCEDURE — C1874 STENT, COATED/COV W/DEL SYS: HCPCS

## 2019-06-28 PROCEDURE — 36415 COLL VENOUS BLD VENIPUNCTURE: CPT

## 2019-06-28 PROCEDURE — 2140000000 HC CCU INTERMEDIATE R&B

## 2019-06-28 PROCEDURE — 85347 COAGULATION TIME ACTIVATED: CPT

## 2019-06-28 PROCEDURE — 86900 BLOOD TYPING SEROLOGIC ABO: CPT

## 2019-06-28 PROCEDURE — 92928 PRQ TCAT PLMT NTRAC ST 1 LES: CPT | Performed by: INTERNAL MEDICINE

## 2019-06-28 PROCEDURE — 4A023N7 MEASUREMENT OF CARDIAC SAMPLING AND PRESSURE, LEFT HEART, PERCUTANEOUS APPROACH: ICD-10-PCS | Performed by: INTERNAL MEDICINE

## 2019-06-28 PROCEDURE — 6360000002 HC RX W HCPCS

## 2019-06-28 PROCEDURE — 02723FZ DILATION OF CORONARY ARTERY, THREE ARTERIES WITH THREE INTRALUMINAL DEVICES, PERCUTANEOUS APPROACH: ICD-10-PCS | Performed by: INTERNAL MEDICINE

## 2019-06-28 PROCEDURE — 6370000000 HC RX 637 (ALT 250 FOR IP): Performed by: INTERNAL MEDICINE

## 2019-06-28 PROCEDURE — 2580000003 HC RX 258: Performed by: INTERNAL MEDICINE

## 2019-06-28 PROCEDURE — B2151ZZ FLUOROSCOPY OF LEFT HEART USING LOW OSMOLAR CONTRAST: ICD-10-PCS | Performed by: INTERNAL MEDICINE

## 2019-06-28 PROCEDURE — 6370000000 HC RX 637 (ALT 250 FOR IP)

## 2019-06-28 PROCEDURE — 2700000000 HC OXYGEN THERAPY PER DAY

## 2019-06-28 PROCEDURE — 93005 ELECTROCARDIOGRAM TRACING: CPT | Performed by: INTERNAL MEDICINE

## 2019-06-28 PROCEDURE — C1769 GUIDE WIRE: HCPCS

## 2019-06-28 PROCEDURE — C1894 INTRO/SHEATH, NON-LASER: HCPCS

## 2019-06-28 PROCEDURE — 2580000003 HC RX 258: Performed by: FAMILY MEDICINE

## 2019-06-28 PROCEDURE — 99233 SBSQ HOSP IP/OBS HIGH 50: CPT | Performed by: INTERNAL MEDICINE

## 2019-06-28 PROCEDURE — 86850 RBC ANTIBODY SCREEN: CPT

## 2019-06-28 PROCEDURE — C1887 CATHETER, GUIDING: HCPCS

## 2019-06-28 PROCEDURE — 86901 BLOOD TYPING SEROLOGIC RH(D): CPT

## 2019-06-28 PROCEDURE — 93458 L HRT ARTERY/VENTRICLE ANGIO: CPT | Performed by: INTERNAL MEDICINE

## 2019-06-28 PROCEDURE — 93010 ELECTROCARDIOGRAM REPORT: CPT | Performed by: INTERNAL MEDICINE

## 2019-06-28 PROCEDURE — 2500000003 HC RX 250 WO HCPCS

## 2019-06-28 PROCEDURE — 6370000000 HC RX 637 (ALT 250 FOR IP): Performed by: FAMILY MEDICINE

## 2019-06-28 PROCEDURE — 2709999900 HC NON-CHARGEABLE SUPPLY

## 2019-06-28 PROCEDURE — B2111ZZ FLUOROSCOPY OF MULTIPLE CORONARY ARTERIES USING LOW OSMOLAR CONTRAST: ICD-10-PCS | Performed by: INTERNAL MEDICINE

## 2019-06-28 RX ADMIN — POTASSIUM BICARBONATE 20 MEQ: 782 TABLET, EFFERVESCENT ORAL at 13:31

## 2019-06-28 RX ADMIN — FLUTICASONE PROPIONATE 1 SPRAY: 50 SPRAY, METERED NASAL at 13:29

## 2019-06-28 RX ADMIN — GABAPENTIN 300 MG: 300 CAPSULE ORAL at 21:15

## 2019-06-28 RX ADMIN — FLUOXETINE HYDROCHLORIDE 20 MG: 20 CAPSULE ORAL at 21:15

## 2019-06-28 RX ADMIN — ASPIRIN 81 MG: 81 TABLET ORAL at 07:08

## 2019-06-28 RX ADMIN — POTASSIUM BICARBONATE 20 MEQ: 782 TABLET, EFFERVESCENT ORAL at 21:15

## 2019-06-28 RX ADMIN — LISINOPRIL 2.5 MG: 5 TABLET ORAL at 13:30

## 2019-06-28 RX ADMIN — NITROGLYCERIN 1 INCH: 20 OINTMENT TOPICAL at 06:17

## 2019-06-28 RX ADMIN — ATORVASTATIN CALCIUM 80 MG: 40 TABLET, FILM COATED ORAL at 21:15

## 2019-06-28 RX ADMIN — METOPROLOL SUCCINATE 25 MG: 25 TABLET, FILM COATED, EXTENDED RELEASE ORAL at 13:30

## 2019-06-28 RX ADMIN — Medication 10 ML: at 01:21

## 2019-06-28 RX ADMIN — DIAZEPAM 2 MG: 2 TABLET ORAL at 21:15

## 2019-06-28 RX ADMIN — TICAGRELOR 90 MG: 90 TABLET ORAL at 21:15

## 2019-06-28 RX ADMIN — CETIRIZINE HYDROCHLORIDE 10 MG: 10 TABLET, FILM COATED ORAL at 15:30

## 2019-06-28 RX ADMIN — GABAPENTIN 300 MG: 300 CAPSULE ORAL at 13:30

## 2019-06-28 RX ADMIN — SODIUM CHLORIDE: 9 INJECTION, SOLUTION INTRAVENOUS at 01:21

## 2019-06-28 RX ADMIN — Medication 10 ML: at 13:31

## 2019-06-28 RX ADMIN — AMLODIPINE BESYLATE 5 MG: 5 TABLET ORAL at 15:30

## 2019-06-28 RX ADMIN — Medication 10 ML: at 21:15

## 2019-06-28 ASSESSMENT — PAIN SCALES - GENERAL
PAINLEVEL_OUTOF10: 0

## 2019-06-28 NOTE — PROCEDURES
type: A. CHINEDU III flow pre PCI. SEE PROCEDURE PORTION ABOVE  Result: 0% residual stenosis and CHINEDU III distal flow.      2. PCI vessel:  CIRCUMFLEX Lesion type B  CHINEDU I flow pre PCI  SEE PROCEDURE PORTION ABOVE  Result: 0% residual stenosis with CHINEDU III distal flow.      3. PCI vessel:  LAD, Lesion type C  CHINEDU III distal flow  SEE PROCEDURE PORTION ABOVE  Result: no change     Drugs: . Anticoagulation was maintained with heparin . brilinta  load given  in cath lab.      Right radial sheath: pulled and TR band applied. There was good distal pulses in the RUE at the end of the procedure.     Complication: None   Blood loss: 10 cc  Contrast used: 128cc        Post op diagnosis:  NSTEMI  PCI TO RCA AND CIRCUMFLEX  ATTEMPTED PCI TO LAD     Plan:  DAPT  Risk profile modification.    See orders        Fanta Islas MD    D: 06/28/2019 8:50:29       T: 06/28/2019 11:09:22     [email or web address]  Job#: [id number]     Doc#: [id number]    CC:

## 2019-06-29 VITALS
DIASTOLIC BLOOD PRESSURE: 56 MMHG | OXYGEN SATURATION: 94 % | BODY MASS INDEX: 29.15 KG/M2 | TEMPERATURE: 98.3 F | SYSTOLIC BLOOD PRESSURE: 99 MMHG | HEART RATE: 76 BPM | WEIGHT: 185.7 LBS | RESPIRATION RATE: 16 BRPM | HEIGHT: 67 IN

## 2019-06-29 PROBLEM — N30.00 ACUTE CYSTITIS: Status: RESOLVED | Noted: 2017-04-15 | Resolved: 2019-06-29

## 2019-06-29 PROBLEM — Z16.12 URINARY TRACT INFECTION DUE TO EXTENDED-SPECTRUM BETA LACTAMASE (ESBL) PRODUCING ESCHERICHIA COLI: Status: RESOLVED | Noted: 2018-08-12 | Resolved: 2019-06-29

## 2019-06-29 PROBLEM — N39.0 URINARY TRACT INFECTION DUE TO EXTENDED-SPECTRUM BETA LACTAMASE (ESBL) PRODUCING ESCHERICHIA COLI: Status: RESOLVED | Noted: 2018-08-12 | Resolved: 2019-06-29

## 2019-06-29 PROBLEM — G93.40 ACUTE ENCEPHALOPATHY: Status: RESOLVED | Noted: 2018-08-12 | Resolved: 2019-06-29

## 2019-06-29 PROBLEM — B96.29 URINARY TRACT INFECTION DUE TO EXTENDED-SPECTRUM BETA LACTAMASE (ESBL) PRODUCING ESCHERICHIA COLI: Status: RESOLVED | Noted: 2018-08-12 | Resolved: 2019-06-29

## 2019-06-29 LAB
ANION GAP SERPL CALCULATED.3IONS-SCNC: 14 MMOL/L (ref 7–16)
BUN BLDV-MCNC: 16 MG/DL (ref 8–23)
CALCIUM SERPL-MCNC: 8.9 MG/DL (ref 8.6–10.2)
CHLORIDE BLD-SCNC: 100 MMOL/L (ref 98–107)
CO2: 24 MMOL/L (ref 22–29)
CREAT SERPL-MCNC: 0.9 MG/DL (ref 0.5–1)
GFR AFRICAN AMERICAN: >60
GFR NON-AFRICAN AMERICAN: >60 ML/MIN/1.73
GLUCOSE BLD-MCNC: 100 MG/DL (ref 74–99)
POTASSIUM SERPL-SCNC: 4.2 MMOL/L (ref 3.5–5)
SODIUM BLD-SCNC: 138 MMOL/L (ref 132–146)

## 2019-06-29 PROCEDURE — 99233 SBSQ HOSP IP/OBS HIGH 50: CPT | Performed by: INTERNAL MEDICINE

## 2019-06-29 PROCEDURE — 2580000003 HC RX 258: Performed by: INTERNAL MEDICINE

## 2019-06-29 PROCEDURE — 6370000000 HC RX 637 (ALT 250 FOR IP): Performed by: INTERNAL MEDICINE

## 2019-06-29 PROCEDURE — 2700000000 HC OXYGEN THERAPY PER DAY

## 2019-06-29 PROCEDURE — 80048 BASIC METABOLIC PNL TOTAL CA: CPT

## 2019-06-29 PROCEDURE — 36415 COLL VENOUS BLD VENIPUNCTURE: CPT

## 2019-06-29 PROCEDURE — 93005 ELECTROCARDIOGRAM TRACING: CPT | Performed by: INTERNAL MEDICINE

## 2019-06-29 RX ORDER — LISINOPRIL 5 MG/1
5 TABLET ORAL DAILY
Qty: 30 TABLET | Refills: 3 | Status: ON HOLD | DISCHARGE
Start: 2019-06-30 | End: 2019-12-16 | Stop reason: SDUPTHER

## 2019-06-29 RX ORDER — METOPROLOL SUCCINATE 50 MG/1
50 TABLET, EXTENDED RELEASE ORAL DAILY
Status: DISCONTINUED | OUTPATIENT
Start: 2019-06-30 | End: 2019-06-29 | Stop reason: HOSPADM

## 2019-06-29 RX ORDER — NITROGLYCERIN 0.4 MG/1
0.4 TABLET SUBLINGUAL EVERY 5 MIN PRN
Status: DISCONTINUED | OUTPATIENT
Start: 2019-06-29 | End: 2019-06-29 | Stop reason: HOSPADM

## 2019-06-29 RX ORDER — METOPROLOL SUCCINATE 50 MG/1
50 TABLET, EXTENDED RELEASE ORAL DAILY
Qty: 30 TABLET | Refills: 3 | Status: ON HOLD | DISCHARGE
Start: 2019-06-30 | End: 2019-12-16 | Stop reason: HOSPADM

## 2019-06-29 RX ORDER — NITROGLYCERIN 0.4 MG/1
TABLET SUBLINGUAL
Qty: 25 TABLET | Refills: 3 | Status: ON HOLD | DISCHARGE
Start: 2019-06-29 | End: 2019-12-16 | Stop reason: SDUPTHER

## 2019-06-29 RX ORDER — LISINOPRIL 5 MG/1
5 TABLET ORAL DAILY
Status: DISCONTINUED | OUTPATIENT
Start: 2019-06-30 | End: 2019-06-29 | Stop reason: HOSPADM

## 2019-06-29 RX ADMIN — GABAPENTIN 300 MG: 300 CAPSULE ORAL at 15:35

## 2019-06-29 RX ADMIN — APIXABAN 5 MG: 5 TABLET, FILM COATED ORAL at 09:38

## 2019-06-29 RX ADMIN — Medication 10 ML: at 09:37

## 2019-06-29 RX ADMIN — ASPIRIN 81 MG: 81 TABLET ORAL at 09:38

## 2019-06-29 RX ADMIN — METOPROLOL SUCCINATE 25 MG: 25 TABLET, FILM COATED, EXTENDED RELEASE ORAL at 09:42

## 2019-06-29 RX ADMIN — FLUOXETINE HYDROCHLORIDE 20 MG: 20 CAPSULE ORAL at 09:38

## 2019-06-29 RX ADMIN — POTASSIUM BICARBONATE 20 MEQ: 782 TABLET, EFFERVESCENT ORAL at 09:31

## 2019-06-29 RX ADMIN — GABAPENTIN 300 MG: 300 CAPSULE ORAL at 09:37

## 2019-06-29 RX ADMIN — FLUTICASONE PROPIONATE 1 SPRAY: 50 SPRAY, METERED NASAL at 09:31

## 2019-06-29 RX ADMIN — LEVOTHYROXINE SODIUM 88 MCG: 88 TABLET ORAL at 06:37

## 2019-06-29 RX ADMIN — CETIRIZINE HYDROCHLORIDE 10 MG: 10 TABLET, FILM COATED ORAL at 09:38

## 2019-06-29 RX ADMIN — TICAGRELOR 90 MG: 90 TABLET ORAL at 09:37

## 2019-06-29 ASSESSMENT — PAIN SCALES - GENERAL: PAINLEVEL_OUTOF10: 0

## 2019-06-29 NOTE — DISCHARGE SUMMARY
Admit Date: 5/6/2019  4:22 AM   Discharge Date: 5/9/2019    Patient Active Problem List   Diagnosis    [facility]    Bilateral foot-drop    Heart failure ([facility]    Myocardial infarction, anteroseptal ([facility]    Chest pain    NSTEMI (non-ST elevated myocardial infarction) ([facility]        Present on Admission:   Heart failure ([facility]   (Resolved) CHF (congestive heart failure), NYHA class I, acute on chronic, combined ([facility]   (Resolved) Acute respiratory failure with hypoxia ([address]   Myocardial infarction, anteroseptal ([facility]        Sweta, [address] Medication Instructions ALVARO:[id number]    Printed on:06/29/19 6822   Medication Information                      acetaminophen (TYLENOL) 325 MG tablet  Take 650 mg by mouth every 4 hours as needed for Pain             albuterol sulfate HFA (PROAIR HFA) 108 (90 Base) MCG/ACT inhaler  Inhale 2 puffs into the lungs every 6 hours as needed for Wheezing             apixaban (ELIQUIS) 5 MG TABS tablet  Take 5 mg by mouth 2 times daily             aspirin 81 MG tablet  Take 81 mg by mouth daily             atorvastatin (LIPITOR) 80 MG tablet  Take 1 tablet by mouth nightly             Cholecalciferol (VITAMIN D3) 5000 units TABS  Take 1 tablet by mouth daily             cloNIDine (CATAPRES) 0.2 MG tablet  Take 1 tablet by mouth every 6 hours as needed ([id number]>870 or [id number]>788)             Cranberry 500 MG CAPS  Take 500 mg by mouth daily             diazepam (VALIUM) 2 MG tablet  Take 2 mg by mouth every 12 hours as needed for Anxiety             FLUoxetine (PROZAC) 20 MG capsule  Take 1 capsule by mouth 2 times daily             fluticasone (FLONASE) 50 MCG/ACT nasal spray  1 spray by Each Nare route daily             furosemide (LASIX) 20 MG tablet  Take 1 tablet by mouth daily             gabapentin (NEURONTIN) 300 MG capsule  Take 1 capsule by mouth 3 times daily             levothyroxine (SYNTHROID) 88 MCG tablet  Take 88 mcg by mouth Daily

## 2019-06-30 LAB
EKG ATRIAL RATE: 70 BPM
EKG P AXIS: 9 DEGREES
EKG P-R INTERVAL: 206 MS
EKG Q-T INTERVAL: 450 MS
EKG QRS DURATION: 66 MS
EKG QTC CALCULATION (BAZETT): 486 MS
EKG R AXIS: -31 DEGREES
EKG T AXIS: 88 DEGREES
EKG VENTRICULAR RATE: 70 BPM

## 2019-06-30 PROCEDURE — 93010 ELECTROCARDIOGRAM REPORT: CPT | Performed by: INTERNAL MEDICINE

## 2019-07-01 ENCOUNTER — HOSPITAL ENCOUNTER (OUTPATIENT)
Dept: MRI IMAGING | Age: 64
Discharge: HOME | End: 2019-07-03
Payer: MEDICAID

## 2019-07-01 ENCOUNTER — TELEPHONE (OUTPATIENT)
Dept: CARDIOLOGY CLINIC | Age: 64
End: 2019-07-01

## 2019-07-01 DIAGNOSIS — G35 MULTIPLE SCLEROSIS (HCC): Chronic | ICD-10-CM

## 2019-07-01 PROCEDURE — 72156 MRI NECK SPINE W/O & W/DYE: CPT

## 2019-07-01 PROCEDURE — 6360000004 HC RX CONTRAST MEDICATION: Performed by: RADIOLOGY

## 2019-07-01 PROCEDURE — A9579 GAD-BASE MR CONTRAST NOS,1ML: HCPCS | Performed by: RADIOLOGY

## 2019-07-01 PROCEDURE — 70553 MRI BRAIN STEM W/O & W/DYE: CPT

## 2019-07-01 RX ADMIN — GADOTERIDOL 17 ML: 279.3 INJECTION, SOLUTION INTRAVENOUS at 13:54

## 2019-07-01 NOTE — TELEPHONE ENCOUNTER
F/U scheduled for 7/2/19 at 10:20 a.m. Appointment information left on voicemail with  at [facility] as they were in a meeting at the time of the call. Advised to call our office if they are unable to keep appointment tomorrow.

## 2019-07-05 ENCOUNTER — OFFICE VISIT (OUTPATIENT)
Dept: CARDIOLOGY CLINIC | Age: 64
End: 2019-07-05
Payer: MEDICAID

## 2019-07-05 VITALS
DIASTOLIC BLOOD PRESSURE: 80 MMHG | RESPIRATION RATE: 16 BRPM | HEIGHT: 66 IN | HEART RATE: 63 BPM | WEIGHT: 180 LBS | BODY MASS INDEX: 28.93 KG/M2 | SYSTOLIC BLOOD PRESSURE: 118 MMHG

## 2019-07-05 DIAGNOSIS — I51.9 HEART PROBLEM: Primary | ICD-10-CM

## 2019-07-05 DIAGNOSIS — I21.4 NON-ST ELEVATION MYOCARDIAL INFARCTION (NSTEMI) (HCC): ICD-10-CM

## 2019-07-05 PROCEDURE — 99214 OFFICE O/P EST MOD 30 MIN: CPT | Performed by: INTERNAL MEDICINE

## 2019-07-05 PROCEDURE — 93000 ELECTROCARDIOGRAM COMPLETE: CPT | Performed by: INTERNAL MEDICINE

## 2019-07-05 NOTE — PROGRESS NOTES
OUTPATIENT CARDIOLOGY FOLLOW-UP    Name: Jose R Slade    Age: 66 y.o. Primary Care Physician: Janny Bansal MD    Date of Service: 7/5/2019    Chief Complaint:   Chief Complaint   Patient presents with    Heart Problem       Interim History:   Mrs. Roselyn Laughlin is a 44-year-old female with history of multiple sclerosis and no prior history of cardiac history, history of DVT involving the left lower extremity diagnosed in February 2019 on Eliquis for anticoagulation, ambulates with a walker, history of CVA diagnosed in February 2011 and history of hypertension who presented to the hospital on 5/6/2019 with midsternal chest pressure and blood work and EKG changes suggestive for non-ST elevation MI. After initial chest pain patient remain asymptomatic. Her stress test and cardiac MRI showed evidence of myocardial with no reversible ischemia. Patient was discharged home in medical management. She was readmitted to the hospital on 6/26/2019 with a chest pain secondary to unstable angina. She did undergo cardiac catheterization which showed multivessel disease and underwent PCI/ELHAM to mid RCA,and  Circumflex. PCI of the  proximal LAD was attempted and was not successful and was unable to pass balloon beyond the obstruction. She does have left to right collateral.  She did not go for bypass due to multiple sclerosis. She was discharged home on 6/26/2019. Since she was discharged from the hospital, she has not had any further hospitalizations or ER visits. She is here for follow-up visit after she was discharged from the hospital.  He is compliant with medications, as well as salt and fluid intake. He does not take any over-the-counter arthritis medications. No new cardiac complaints since last cardiology evaluation. She denies recent chest pain, SOB, palpitations, lightheadedness, dizziness, syncope, PND, or orthopnea. SR on EKG.     Review of Systems:   Cardiac: As per HPI  General: No fever, chills  Pulmonary: As per HPI  HEENT: No visual disturbances, difficult swallowing  GI: No nausea, vomiting  Endocrine: No thyroid disease or DM  Musculoskeletal: ABBASI x 4, no focal motor deficits  Skin: Intact, no rashes  Neuro/Psych: No headache or seizures    Past Medical History:  Past Medical History:   Diagnosis Date    Anxiety     Cystitis     Depression     History of blood transfusion     Hypertension 08/27/2018    Hypothyroidism     [facility]     Neurogenic bladder 08/09/2018    [facility]     Unspecified cerebral artery occlusion with cerebral infarction 2011    left her incontinent       Past Surgical History:  Past Surgical History:   Procedure Laterality Date    COLON SURGERY  2001    exp lap, lysis of adhesions, release of SBO. [facility]. Dr. Lee March 06/28/2019    Dr. Gianluca Emmanuel- Rt Wrist- 3.0/300mm Resolute-RCA, 2.75/22mm-Circumflex,     DENTAL SURGERY      all removed    HYSTERECTOMY  2001    [facility].  Dr. Marcus Hall       Family History:  Family History   Problem Relation Age of Onset    Cancer Mother     Diabetes Father     Heart Disease Father        Social History:  Social History     Socioeconomic History    Marital status:      Spouse name: Not on file    Number of children: Not on file    Years of education: Not on file    Highest education level: Not on file   Occupational History    Not on file   Social Needs    Financial resource strain: Not on file    Food insecurity:     Worry: Not on file     Inability: Not on file    Transportation needs:     Medical: Not on file     Non-medical: Not on file   Tobacco Use    Smoking status: Never Smoker    Smokeless tobacco: Never Used   Substance and Sexual Activity    Alcohol use: No    Drug use: No    Sexual activity: Not on file   Lifestyle    Physical activity:     Days per week: Not on file     Minutes per session: Not on file    Stress: Not on file

## 2019-10-03 ENCOUNTER — TELEPHONE (OUTPATIENT)
Dept: CARDIOLOGY | Age: 64
End: 2019-10-03

## 2019-10-07 ENCOUNTER — HOSPITAL ENCOUNTER (OUTPATIENT)
Dept: CARDIOLOGY | Age: 64
Discharge: HOME | End: 2019-10-07
Payer: MEDICAID

## 2019-10-07 DIAGNOSIS — I21.4 NON-ST ELEVATION MYOCARDIAL INFARCTION (NSTEMI) (HCC): ICD-10-CM

## 2019-10-07 LAB
LV EF: 63 %
LVEF MODALITY: NORMAL

## 2019-10-07 PROCEDURE — 2580000003 HC RX 258: Performed by: INTERNAL MEDICINE

## 2019-10-07 PROCEDURE — 6360000004 HC RX CONTRAST MEDICATION: Performed by: INTERNAL MEDICINE

## 2019-10-07 PROCEDURE — 93306 TTE W/DOPPLER COMPLETE: CPT | Performed by: PSYCHIATRY & NEUROLOGY

## 2019-10-07 RX ORDER — SODIUM CHLORIDE 0.9 % (FLUSH) 0.9 %
10 SYRINGE (ML) INJECTION PRN
Status: DISCONTINUED | OUTPATIENT
Start: 2019-10-07 | End: 2019-10-08 | Stop reason: HOSPADM

## 2019-10-07 RX ADMIN — Medication 10 ML: at 14:12

## 2019-10-07 RX ADMIN — Medication 10 ML: at 14:19

## 2019-10-07 RX ADMIN — PERFLUTREN 1.1 MG: 6.52 INJECTION, SUSPENSION INTRAVENOUS at 14:12

## 2019-10-09 ENCOUNTER — TELEPHONE (OUTPATIENT)
Dept: CARDIOLOGY CLINIC | Age: 64
End: 2019-10-09

## 2019-11-28 ENCOUNTER — HOSPITAL ENCOUNTER (INPATIENT)
Age: 64
LOS: 18 days | Discharge: NURSING, REHAB OR LONG-TERM CARE | DRG: 720 | End: 2019-12-16
Attending: EMERGENCY MEDICINE | Admitting: FAMILY MEDICINE
Payer: MEDICAID

## 2019-11-28 ENCOUNTER — APPOINTMENT (OUTPATIENT)
Dept: GENERAL RADIOLOGY | Age: 64
DRG: 720 | End: 2019-11-28
Payer: MEDICAID

## 2019-11-28 DIAGNOSIS — N39.0 URINARY TRACT INFECTION WITHOUT HEMATURIA, SITE UNSPECIFIED: Primary | ICD-10-CM

## 2019-11-28 DIAGNOSIS — G35 MULTIPLE SCLEROSIS (HCC): Chronic | ICD-10-CM

## 2019-11-28 PROBLEM — A41.9 SEPSIS (HCC): Status: ACTIVE | Noted: 2019-11-28

## 2019-11-28 LAB
ALBUMIN SERPL-MCNC: 3.4 G/DL (ref 3.5–5.2)
ALP BLD-CCNC: 146 U/L (ref 35–104)
ALT SERPL-CCNC: 15 U/L (ref 0–32)
ANION GAP SERPL CALCULATED.3IONS-SCNC: 17 MMOL/L (ref 7–16)
APTT: 32.5 SEC (ref 24.5–35.1)
AST SERPL-CCNC: 14 U/L (ref 0–31)
BACTERIA: ABNORMAL /HPF
BASOPHILS ABSOLUTE: 0.05 E9/L (ref 0–0.2)
BASOPHILS RELATIVE PERCENT: 0.3 % (ref 0–2)
BILIRUB SERPL-MCNC: 0.4 MG/DL (ref 0–1.2)
BILIRUBIN URINE: NEGATIVE
BLOOD, URINE: ABNORMAL
BUN BLDV-MCNC: 11 MG/DL (ref 8–23)
CALCIUM SERPL-MCNC: 8.6 MG/DL (ref 8.6–10.2)
CHLORIDE BLD-SCNC: 100 MMOL/L (ref 98–107)
CHP ED QC CHECK: YES
CLARITY: ABNORMAL
CO2: 19 MMOL/L (ref 22–29)
COLOR: YELLOW
CREAT SERPL-MCNC: 0.9 MG/DL (ref 0.5–1)
EOSINOPHILS ABSOLUTE: 0.22 E9/L (ref 0.05–0.5)
EOSINOPHILS RELATIVE PERCENT: 1.3 % (ref 0–6)
EPITHELIAL CELLS, UA: ABNORMAL /HPF
GFR AFRICAN AMERICAN: >60
GFR NON-AFRICAN AMERICAN: >60 ML/MIN/1.73
GLUCOSE BLD-MCNC: 106 MG/DL (ref 74–99)
GLUCOSE BLD-MCNC: 83 MG/DL
GLUCOSE URINE: NEGATIVE MG/DL
HCT VFR BLD CALC: 38.9 % (ref 34–48)
HEMOGLOBIN: 11.8 G/DL (ref 11.5–15.5)
IMMATURE GRANULOCYTES #: 0.1 E9/L
IMMATURE GRANULOCYTES %: 0.6 % (ref 0–5)
INR BLD: 1.5
KETONES, URINE: NEGATIVE MG/DL
LACTIC ACID: 1.4 MMOL/L (ref 0.5–2.2)
LACTIC ACID: 2.9 MMOL/L (ref 0.5–2.2)
LEUKOCYTE ESTERASE, URINE: ABNORMAL
LYMPHOCYTES ABSOLUTE: 0.61 E9/L (ref 1.5–4)
LYMPHOCYTES RELATIVE PERCENT: 3.7 % (ref 20–42)
MCH RBC QN AUTO: 29.2 PG (ref 26–35)
MCHC RBC AUTO-ENTMCNC: 30.3 % (ref 32–34.5)
MCV RBC AUTO: 96.3 FL (ref 80–99.9)
METER GLUCOSE: 83 MG/DL (ref 74–99)
MONOCYTES ABSOLUTE: 0.77 E9/L (ref 0.1–0.95)
MONOCYTES RELATIVE PERCENT: 4.7 % (ref 2–12)
NEUTROPHILS ABSOLUTE: 14.64 E9/L (ref 1.8–7.3)
NEUTROPHILS RELATIVE PERCENT: 89.4 % (ref 43–80)
NITRITE, URINE: POSITIVE
PDW BLD-RTO: 13.4 FL (ref 11.5–15)
PH UA: 7.5 (ref 5–9)
PLATELET # BLD: 364 E9/L (ref 130–450)
PMV BLD AUTO: 10.1 FL (ref 7–12)
POTASSIUM SERPL-SCNC: 3.8 MMOL/L (ref 3.5–5)
PROTEIN UA: NEGATIVE MG/DL
PROTHROMBIN TIME: 16.6 SEC (ref 9.3–12.4)
RBC # BLD: 4.04 E12/L (ref 3.5–5.5)
RBC UA: ABNORMAL /HPF (ref 0–2)
REASON FOR REJECTION: NORMAL
REJECTED TEST: NORMAL
SODIUM BLD-SCNC: 136 MMOL/L (ref 132–146)
SPECIFIC GRAVITY UA: 1.01 (ref 1–1.03)
TOTAL PROTEIN: 6.5 G/DL (ref 6.4–8.3)
TROPONIN: <0.01 NG/ML (ref 0–0.03)
UROBILINOGEN, URINE: 0.2 E.U./DL
WBC # BLD: 16.4 E9/L (ref 4.5–11.5)
WBC UA: >20 /HPF (ref 0–5)

## 2019-11-28 PROCEDURE — 85730 THROMBOPLASTIN TIME PARTIAL: CPT

## 2019-11-28 PROCEDURE — 85610 PROTHROMBIN TIME: CPT

## 2019-11-28 PROCEDURE — 81001 URINALYSIS AUTO W/SCOPE: CPT

## 2019-11-28 PROCEDURE — 99285 EMERGENCY DEPT VISIT HI MDM: CPT

## 2019-11-28 PROCEDURE — 96374 THER/PROPH/DIAG INJ IV PUSH: CPT

## 2019-11-28 PROCEDURE — 1200000000 HC SEMI PRIVATE

## 2019-11-28 PROCEDURE — 82962 GLUCOSE BLOOD TEST: CPT

## 2019-11-28 PROCEDURE — 80053 COMPREHEN METABOLIC PANEL: CPT

## 2019-11-28 PROCEDURE — 36415 COLL VENOUS BLD VENIPUNCTURE: CPT

## 2019-11-28 PROCEDURE — 94760 N-INVAS EAR/PLS OXIMETRY 1: CPT

## 2019-11-28 PROCEDURE — 6360000002 HC RX W HCPCS: Performed by: EMERGENCY MEDICINE

## 2019-11-28 PROCEDURE — 84484 ASSAY OF TROPONIN QUANT: CPT

## 2019-11-28 PROCEDURE — 87186 SC STD MICRODIL/AGAR DIL: CPT

## 2019-11-28 PROCEDURE — 85025 COMPLETE CBC W/AUTO DIFF WBC: CPT

## 2019-11-28 PROCEDURE — 71045 X-RAY EXAM CHEST 1 VIEW: CPT

## 2019-11-28 PROCEDURE — 93005 ELECTROCARDIOGRAM TRACING: CPT | Performed by: EMERGENCY MEDICINE

## 2019-11-28 PROCEDURE — 2580000003 HC RX 258

## 2019-11-28 PROCEDURE — 2580000003 HC RX 258: Performed by: EMERGENCY MEDICINE

## 2019-11-28 PROCEDURE — 87040 BLOOD CULTURE FOR BACTERIA: CPT

## 2019-11-28 PROCEDURE — 87088 URINE BACTERIA CULTURE: CPT

## 2019-11-28 PROCEDURE — 83605 ASSAY OF LACTIC ACID: CPT

## 2019-11-28 RX ORDER — FUROSEMIDE 20 MG/1
20 TABLET ORAL DAILY
Status: DISCONTINUED | OUTPATIENT
Start: 2019-11-29 | End: 2019-12-01

## 2019-11-28 RX ORDER — METOPROLOL SUCCINATE 50 MG/1
50 TABLET, EXTENDED RELEASE ORAL DAILY
Status: DISCONTINUED | OUTPATIENT
Start: 2019-11-29 | End: 2019-12-01

## 2019-11-28 RX ORDER — CETIRIZINE HYDROCHLORIDE 10 MG/1
10 TABLET ORAL DAILY
Status: DISCONTINUED | OUTPATIENT
Start: 2019-11-29 | End: 2019-12-16 | Stop reason: HOSPADM

## 2019-11-28 RX ORDER — DIAZEPAM 2 MG/1
2 TABLET ORAL EVERY 12 HOURS PRN
Status: DISCONTINUED | OUTPATIENT
Start: 2019-11-28 | End: 2019-11-30

## 2019-11-28 RX ORDER — SODIUM CHLORIDE 0.9 % (FLUSH) 0.9 %
10 SYRINGE (ML) INJECTION PRN
Status: DISCONTINUED | OUTPATIENT
Start: 2019-11-28 | End: 2019-11-30 | Stop reason: SDUPTHER

## 2019-11-28 RX ORDER — CLONIDINE HYDROCHLORIDE 0.2 MG/1
0.2 TABLET ORAL EVERY 6 HOURS PRN
Status: DISCONTINUED | OUTPATIENT
Start: 2019-11-28 | End: 2019-12-06

## 2019-11-28 RX ORDER — LEVOTHYROXINE SODIUM 88 UG/1
88 TABLET ORAL DAILY
Status: DISCONTINUED | OUTPATIENT
Start: 2019-11-29 | End: 2019-12-16 | Stop reason: HOSPADM

## 2019-11-28 RX ORDER — LISINOPRIL 5 MG/1
5 TABLET ORAL DAILY
Status: DISCONTINUED | OUTPATIENT
Start: 2019-11-29 | End: 2019-12-16 | Stop reason: HOSPADM

## 2019-11-28 RX ORDER — FLUOXETINE HYDROCHLORIDE 20 MG/1
20 CAPSULE ORAL 2 TIMES DAILY
Status: DISCONTINUED | OUTPATIENT
Start: 2019-11-28 | End: 2019-12-16 | Stop reason: HOSPADM

## 2019-11-28 RX ORDER — ONDANSETRON 2 MG/ML
4 INJECTION INTRAMUSCULAR; INTRAVENOUS EVERY 6 HOURS PRN
Status: DISCONTINUED | OUTPATIENT
Start: 2019-11-28 | End: 2019-11-30

## 2019-11-28 RX ORDER — GABAPENTIN 300 MG/1
300 CAPSULE ORAL 3 TIMES DAILY
Status: DISCONTINUED | OUTPATIENT
Start: 2019-11-28 | End: 2019-12-12 | Stop reason: SDUPTHER

## 2019-11-28 RX ORDER — SODIUM CHLORIDE 0.9 % (FLUSH) 0.9 %
10 SYRINGE (ML) INJECTION EVERY 12 HOURS SCHEDULED
Status: DISCONTINUED | OUTPATIENT
Start: 2019-11-28 | End: 2019-11-30 | Stop reason: SDUPTHER

## 2019-11-28 RX ORDER — VITAMIN B COMPLEX
5000 TABLET ORAL DAILY
Status: DISCONTINUED | OUTPATIENT
Start: 2019-11-29 | End: 2019-12-16 | Stop reason: HOSPADM

## 2019-11-28 RX ORDER — FLUTICASONE PROPIONATE 50 MCG
1 SPRAY, SUSPENSION (ML) NASAL DAILY
Status: DISCONTINUED | OUTPATIENT
Start: 2019-11-29 | End: 2019-12-16 | Stop reason: HOSPADM

## 2019-11-28 RX ORDER — KETOROLAC TROMETHAMINE 30 MG/ML
15 INJECTION, SOLUTION INTRAMUSCULAR; INTRAVENOUS ONCE
Status: COMPLETED | OUTPATIENT
Start: 2019-11-28 | End: 2019-11-28

## 2019-11-28 RX ORDER — ATORVASTATIN CALCIUM 40 MG/1
80 TABLET, FILM COATED ORAL NIGHTLY
Status: DISCONTINUED | OUTPATIENT
Start: 2019-11-28 | End: 2019-12-16 | Stop reason: HOSPADM

## 2019-11-28 RX ORDER — 0.9 % SODIUM CHLORIDE 0.9 %
1000 INTRAVENOUS SOLUTION INTRAVENOUS ONCE
Status: COMPLETED | OUTPATIENT
Start: 2019-11-28 | End: 2019-11-28

## 2019-11-28 RX ORDER — SODIUM CHLORIDE 9 MG/ML
INJECTION, SOLUTION INTRAVENOUS CONTINUOUS
Status: DISCONTINUED | OUTPATIENT
Start: 2019-11-28 | End: 2019-11-29

## 2019-11-28 RX ORDER — ACETAMINOPHEN 325 MG/1
650 TABLET ORAL EVERY 4 HOURS PRN
Status: DISCONTINUED | OUTPATIENT
Start: 2019-11-28 | End: 2019-11-30

## 2019-11-28 RX ORDER — ASPIRIN 81 MG/1
81 TABLET ORAL DAILY
Status: DISCONTINUED | OUTPATIENT
Start: 2019-11-29 | End: 2019-11-30

## 2019-11-28 RX ADMIN — KETOROLAC TROMETHAMINE 15 MG: 30 INJECTION, SOLUTION INTRAMUSCULAR; INTRAVENOUS at 21:27

## 2019-11-28 RX ADMIN — SODIUM CHLORIDE 1000 ML: 9 INJECTION, SOLUTION INTRAVENOUS at 19:00

## 2019-11-28 RX ADMIN — SODIUM CHLORIDE: 9 INJECTION, SOLUTION INTRAVENOUS at 20:25

## 2019-11-28 ASSESSMENT — PAIN DESCRIPTION - DESCRIPTORS: DESCRIPTORS: ACHING

## 2019-11-28 ASSESSMENT — PAIN SCALES - GENERAL
PAINLEVEL_OUTOF10: 7
PAINLEVEL_OUTOF10: 7
PAINLEVEL_OUTOF10: 8

## 2019-11-28 ASSESSMENT — PAIN DESCRIPTION - LOCATION
LOCATION: ABDOMEN
LOCATION: GENERALIZED

## 2019-11-28 ASSESSMENT — PAIN DESCRIPTION - PAIN TYPE
TYPE: ACUTE PAIN
TYPE: ACUTE PAIN

## 2019-11-28 ASSESSMENT — PAIN DESCRIPTION - ORIENTATION: ORIENTATION: LOWER

## 2019-11-29 ENCOUNTER — ANESTHESIA EVENT (OUTPATIENT)
Dept: INPATIENT UNIT | Age: 64
DRG: 720 | End: 2019-11-29
Payer: MEDICAID

## 2019-11-29 ENCOUNTER — APPOINTMENT (OUTPATIENT)
Dept: GENERAL RADIOLOGY | Age: 64
DRG: 720 | End: 2019-11-29
Payer: MEDICAID

## 2019-11-29 ENCOUNTER — ANESTHESIA (OUTPATIENT)
Dept: INPATIENT UNIT | Age: 64
DRG: 720 | End: 2019-11-29
Payer: MEDICAID

## 2019-11-29 LAB
AADO2: 605 MMHG
ALBUMIN SERPL-MCNC: 2.8 G/DL (ref 3.5–5.2)
ALP BLD-CCNC: 122 U/L (ref 35–104)
ALT SERPL-CCNC: 11 U/L (ref 0–32)
ANION GAP SERPL CALCULATED.3IONS-SCNC: 13 MMOL/L (ref 7–16)
ANION GAP SERPL CALCULATED.3IONS-SCNC: 8 MMOL/L (ref 7–16)
AST SERPL-CCNC: 12 U/L (ref 0–31)
B.E.: -7.6 MMOL/L (ref -3–3)
B.E.: -9.2 MMOL/L (ref -3–3)
BASOPHILS ABSOLUTE: 0.03 E9/L (ref 0–0.2)
BASOPHILS ABSOLUTE: 0.12 E9/L (ref 0–0.2)
BASOPHILS RELATIVE PERCENT: 0.3 % (ref 0–2)
BASOPHILS RELATIVE PERCENT: 0.9 % (ref 0–2)
BILIRUB SERPL-MCNC: 0.5 MG/DL (ref 0–1.2)
BUN BLDV-MCNC: 11 MG/DL (ref 8–23)
BUN BLDV-MCNC: 12 MG/DL (ref 8–23)
BURR CELLS: ABNORMAL
CALCIUM SERPL-MCNC: 7.9 MG/DL (ref 8.6–10.2)
CALCIUM SERPL-MCNC: 8 MG/DL (ref 8.6–10.2)
CHLORIDE BLD-SCNC: 106 MMOL/L (ref 98–107)
CHLORIDE BLD-SCNC: 109 MMOL/L (ref 98–107)
CO2: 22 MMOL/L (ref 22–29)
CO2: 24 MMOL/L (ref 22–29)
COHB: 0.5 % (ref 0–1.5)
COHB: 0.6 % (ref 0–1.5)
COMMENT: ABNORMAL
CREAT SERPL-MCNC: 0.9 MG/DL (ref 0.5–1)
CREAT SERPL-MCNC: 0.9 MG/DL (ref 0.5–1)
CRITICAL: ABNORMAL
CRITICAL: ABNORMAL
D DIMER: 534 NG/ML DDU
DATE ANALYZED: ABNORMAL
DATE ANALYZED: ABNORMAL
DATE OF COLLECTION: ABNORMAL
DATE OF COLLECTION: ABNORMAL
EOSINOPHILS ABSOLUTE: 0.12 E9/L (ref 0.05–0.5)
EOSINOPHILS ABSOLUTE: 0.29 E9/L (ref 0.05–0.5)
EOSINOPHILS RELATIVE PERCENT: 0.9 % (ref 0–6)
EOSINOPHILS RELATIVE PERCENT: 3.1 % (ref 0–6)
FIO2: 100 %
GFR AFRICAN AMERICAN: >60
GFR AFRICAN AMERICAN: >60
GFR NON-AFRICAN AMERICAN: >60 ML/MIN/1.73
GFR NON-AFRICAN AMERICAN: >60 ML/MIN/1.73
GLUCOSE BLD-MCNC: 95 MG/DL (ref 74–99)
GLUCOSE BLD-MCNC: 96 MG/DL (ref 74–99)
HCO3: 16.3 MMOL/L (ref 22–26)
HCO3: 20.5 MMOL/L (ref 22–26)
HCT VFR BLD CALC: 27.2 % (ref 34–48)
HCT VFR BLD CALC: 30.8 % (ref 34–48)
HEMOGLOBIN: 8.2 G/DL (ref 11.5–15.5)
HEMOGLOBIN: 9.6 G/DL (ref 11.5–15.5)
HHB: 14.2 % (ref 0–5)
HHB: 29.1 % (ref 0–5)
HYPOCHROMIA: ABNORMAL
IMMATURE GRANULOCYTES #: 0.03 E9/L
IMMATURE GRANULOCYTES %: 0.3 % (ref 0–5)
LAB: ABNORMAL
LAB: ABNORMAL
LACTIC ACID, SEPSIS: 0.8 MMOL/L (ref 0.5–1.9)
LACTIC ACID, SEPSIS: 0.9 MMOL/L (ref 0.5–1.9)
LACTIC ACID: 0.9 MMOL/L (ref 0.5–2.2)
LYMPHOCYTES ABSOLUTE: 0.27 E9/L (ref 1.5–4)
LYMPHOCYTES ABSOLUTE: 0.59 E9/L (ref 1.5–4)
LYMPHOCYTES RELATIVE PERCENT: 1.7 % (ref 20–42)
LYMPHOCYTES RELATIVE PERCENT: 6.3 % (ref 20–42)
Lab: ABNORMAL
Lab: ABNORMAL
MAGNESIUM: 2 MG/DL (ref 1.6–2.6)
MCH RBC QN AUTO: 29.2 PG (ref 26–35)
MCH RBC QN AUTO: 29.6 PG (ref 26–35)
MCHC RBC AUTO-ENTMCNC: 30.1 % (ref 32–34.5)
MCHC RBC AUTO-ENTMCNC: 31.2 % (ref 32–34.5)
MCV RBC AUTO: 95.1 FL (ref 80–99.9)
MCV RBC AUTO: 96.8 FL (ref 80–99.9)
METER GLUCOSE: 100 MG/DL (ref 74–99)
METER GLUCOSE: 82 MG/DL (ref 74–99)
METHB: 0.4 % (ref 0–1.5)
METHB: 0.4 % (ref 0–1.5)
MODE: ABNORMAL
MODE: AC
MONOCYTES ABSOLUTE: 0.4 E9/L (ref 0.1–0.95)
MONOCYTES ABSOLUTE: 0.51 E9/L (ref 0.1–0.95)
MONOCYTES RELATIVE PERCENT: 2.6 % (ref 2–12)
MONOCYTES RELATIVE PERCENT: 5.4 % (ref 2–12)
NEUTROPHILS ABSOLUTE: 12.69 E9/L (ref 1.8–7.3)
NEUTROPHILS ABSOLUTE: 7.99 E9/L (ref 1.8–7.3)
NEUTROPHILS RELATIVE PERCENT: 84.6 % (ref 43–80)
NEUTROPHILS RELATIVE PERCENT: 93.9 % (ref 43–80)
O2 CONTENT: 14.5 ML/DL
O2 CONTENT: 16.3 ML/DL
O2 SATURATION: 70.6 % (ref 92–98.5)
O2 SATURATION: 85.7 % (ref 92–98.5)
O2HB: 70 % (ref 94–97)
O2HB: 84.8 % (ref 94–97)
OPERATOR ID: 2156
OPERATOR ID: 914
OVALOCYTES: ABNORMAL
PATIENT TEMP: 37 C
PATIENT TEMP: 37 C
PCO2: 28.9 MMHG (ref 35–45)
PCO2: 61 MMHG (ref 35–45)
PDW BLD-RTO: 13.7 FL (ref 11.5–15)
PDW BLD-RTO: 13.9 FL (ref 11.5–15)
PEEP/CPAP: 10 CMH2O
PFO2: 0.54 MMHG/%
PH BLOOD GAS: 7.14 (ref 7.35–7.45)
PH BLOOD GAS: 7.37 (ref 7.35–7.45)
PHOSPHORUS: 2.6 MG/DL (ref 2.5–4.5)
PLATELET # BLD: 301 E9/L (ref 130–450)
PLATELET # BLD: 334 E9/L (ref 130–450)
PMV BLD AUTO: 10.3 FL (ref 7–12)
PMV BLD AUTO: 9.8 FL (ref 7–12)
PO2: 49.6 MMHG (ref 60–100)
PO2: 54.1 MMHG (ref 60–100)
POIKILOCYTES: ABNORMAL
POIKILOCYTES: ABNORMAL
POLYCHROMASIA: ABNORMAL
POLYCHROMASIA: ABNORMAL
POTASSIUM REFLEX MAGNESIUM: 3.9 MMOL/L (ref 3.5–5)
POTASSIUM SERPL-SCNC: 4.1 MMOL/L (ref 3.5–5)
POTASSIUM SERPL-SCNC: 4.2 MMOL/L (ref 3.3–5.1)
POTASSIUM SERPL-SCNC: 4.75 MMOL/L (ref 3.3–5.1)
PRO-BNP: 3877 PG/ML (ref 0–125)
RBC # BLD: 2.81 E12/L (ref 3.5–5.5)
RBC # BLD: 3.24 E12/L (ref 3.5–5.5)
RI(T): 11.18
RR MECHANICAL: 14 B/MIN
SODIUM BLD-SCNC: 141 MMOL/L (ref 132–146)
SODIUM BLD-SCNC: 141 MMOL/L (ref 132–146)
SOURCE, BLOOD GAS: ABNORMAL
SOURCE, BLOOD GAS: ABNORMAL
THB: 13.7 G/DL (ref 11.5–16.5)
THB: 14.7 G/DL (ref 11.5–16.5)
TIME ANALYZED: 1808
TIME ANALYZED: 2219
TOTAL PROTEIN: 5.3 G/DL (ref 6.4–8.3)
TROPONIN: <0.01 NG/ML (ref 0–0.03)
VT MECHANICAL: 400 ML
WBC # BLD: 13.5 E9/L (ref 4.5–11.5)
WBC # BLD: 9.4 E9/L (ref 4.5–11.5)

## 2019-11-29 PROCEDURE — 99223 1ST HOSP IP/OBS HIGH 75: CPT | Performed by: FAMILY MEDICINE

## 2019-11-29 PROCEDURE — 71045 X-RAY EXAM CHEST 1 VIEW: CPT

## 2019-11-29 PROCEDURE — 2000000000 HC ICU R&B

## 2019-11-29 PROCEDURE — 2700000000 HC OXYGEN THERAPY PER DAY

## 2019-11-29 PROCEDURE — 36415 COLL VENOUS BLD VENIPUNCTURE: CPT

## 2019-11-29 PROCEDURE — 2500000003 HC RX 250 WO HCPCS: Performed by: INTERNAL MEDICINE

## 2019-11-29 PROCEDURE — 6360000002 HC RX W HCPCS: Performed by: FAMILY MEDICINE

## 2019-11-29 PROCEDURE — 6360000002 HC RX W HCPCS: Performed by: EMERGENCY MEDICINE

## 2019-11-29 PROCEDURE — 80048 BASIC METABOLIC PNL TOTAL CA: CPT

## 2019-11-29 PROCEDURE — 31500 INSERT EMERGENCY AIRWAY: CPT | Performed by: NURSE ANESTHETIST, CERTIFIED REGISTERED

## 2019-11-29 PROCEDURE — 84484 ASSAY OF TROPONIN QUANT: CPT

## 2019-11-29 PROCEDURE — 36620 INSERTION CATHETER ARTERY: CPT

## 2019-11-29 PROCEDURE — 83735 ASSAY OF MAGNESIUM: CPT

## 2019-11-29 PROCEDURE — 80053 COMPREHEN METABOLIC PANEL: CPT

## 2019-11-29 PROCEDURE — 5A1945Z RESPIRATORY VENTILATION, 24-96 CONSECUTIVE HOURS: ICD-10-PCS | Performed by: ANESTHESIOLOGY

## 2019-11-29 PROCEDURE — 6370000000 HC RX 637 (ALT 250 FOR IP): Performed by: INTERNAL MEDICINE

## 2019-11-29 PROCEDURE — 85378 FIBRIN DEGRADE SEMIQUANT: CPT

## 2019-11-29 PROCEDURE — 31500 INSERT EMERGENCY AIRWAY: CPT

## 2019-11-29 PROCEDURE — 6360000002 HC RX W HCPCS

## 2019-11-29 PROCEDURE — 2580000003 HC RX 258: Performed by: INTERNAL MEDICINE

## 2019-11-29 PROCEDURE — 84100 ASSAY OF PHOSPHORUS: CPT

## 2019-11-29 PROCEDURE — 99233 SBSQ HOSP IP/OBS HIGH 50: CPT | Performed by: INTERNAL MEDICINE

## 2019-11-29 PROCEDURE — 2580000003 HC RX 258: Performed by: EMERGENCY MEDICINE

## 2019-11-29 PROCEDURE — 84132 ASSAY OF SERUM POTASSIUM: CPT

## 2019-11-29 PROCEDURE — 82805 BLOOD GASES W/O2 SATURATION: CPT

## 2019-11-29 PROCEDURE — 83880 ASSAY OF NATRIURETIC PEPTIDE: CPT

## 2019-11-29 PROCEDURE — 82962 GLUCOSE BLOOD TEST: CPT

## 2019-11-29 PROCEDURE — 83605 ASSAY OF LACTIC ACID: CPT

## 2019-11-29 PROCEDURE — 2500000003 HC RX 250 WO HCPCS: Performed by: NURSE ANESTHETIST, CERTIFIED REGISTERED

## 2019-11-29 PROCEDURE — 6370000000 HC RX 637 (ALT 250 FOR IP): Performed by: FAMILY MEDICINE

## 2019-11-29 PROCEDURE — 85025 COMPLETE CBC W/AUTO DIFF WBC: CPT

## 2019-11-29 PROCEDURE — 94002 VENT MGMT INPAT INIT DAY: CPT

## 2019-11-29 PROCEDURE — 2580000003 HC RX 258: Performed by: FAMILY MEDICINE

## 2019-11-29 RX ORDER — 0.9 % SODIUM CHLORIDE 0.9 %
1000 INTRAVENOUS SOLUTION INTRAVENOUS ONCE
Status: COMPLETED | OUTPATIENT
Start: 2019-11-29 | End: 2019-11-29

## 2019-11-29 RX ORDER — IPRATROPIUM BROMIDE AND ALBUTEROL SULFATE 2.5; .5 MG/3ML; MG/3ML
1 SOLUTION RESPIRATORY (INHALATION)
Status: DISCONTINUED | OUTPATIENT
Start: 2019-11-29 | End: 2019-11-30

## 2019-11-29 RX ORDER — ALBUMIN (HUMAN) 12.5 G/50ML
25 SOLUTION INTRAVENOUS ONCE
Status: DISCONTINUED | OUTPATIENT
Start: 2019-11-29 | End: 2019-11-29

## 2019-11-29 RX ORDER — LIDOCAINE HYDROCHLORIDE 20 MG/ML
INJECTION, SOLUTION INFILTRATION; PERINEURAL
Status: DISCONTINUED
Start: 2019-11-29 | End: 2019-11-30

## 2019-11-29 RX ORDER — BUMETANIDE 0.25 MG/ML
2 INJECTION, SOLUTION INTRAMUSCULAR; INTRAVENOUS ONCE
Status: COMPLETED | OUTPATIENT
Start: 2019-11-29 | End: 2019-11-29

## 2019-11-29 RX ORDER — PROPOFOL 10 MG/ML
INJECTION, EMULSION INTRAVENOUS
Status: COMPLETED
Start: 2019-11-29 | End: 2019-11-29

## 2019-11-29 RX ORDER — VECURONIUM BROMIDE 1 MG/ML
8 INJECTION, POWDER, LYOPHILIZED, FOR SOLUTION INTRAVENOUS ONCE
Status: COMPLETED | OUTPATIENT
Start: 2019-11-30 | End: 2019-11-29

## 2019-11-29 RX ORDER — ETOMIDATE 2 MG/ML
INJECTION, SOLUTION INTRAVENOUS PRN
Status: DISCONTINUED | OUTPATIENT
Start: 2019-11-29 | End: 2019-12-03 | Stop reason: HOSPADM

## 2019-11-29 RX ORDER — MIDAZOLAM HYDROCHLORIDE 1 MG/ML
INJECTION INTRAMUSCULAR; INTRAVENOUS
Status: COMPLETED
Start: 2019-11-29 | End: 2019-11-29

## 2019-11-29 RX ORDER — PANTOPRAZOLE SODIUM 40 MG/10ML
40 INJECTION, POWDER, LYOPHILIZED, FOR SOLUTION INTRAVENOUS DAILY
Status: DISCONTINUED | OUTPATIENT
Start: 2019-11-30 | End: 2019-12-06

## 2019-11-29 RX ORDER — VECURONIUM BROMIDE 1 MG/ML
INJECTION, POWDER, LYOPHILIZED, FOR SOLUTION INTRAVENOUS
Status: DISPENSED
Start: 2019-11-29 | End: 2019-11-30

## 2019-11-29 RX ORDER — 0.9 % SODIUM CHLORIDE 0.9 %
500 INTRAVENOUS SOLUTION INTRAVENOUS ONCE
Status: DISCONTINUED | OUTPATIENT
Start: 2019-11-29 | End: 2019-11-29

## 2019-11-29 RX ADMIN — GABAPENTIN 300 MG: 300 CAPSULE ORAL at 14:06

## 2019-11-29 RX ADMIN — IPRATROPIUM BROMIDE AND ALBUTEROL SULFATE 1 AMPULE: .5; 3 SOLUTION RESPIRATORY (INHALATION) at 22:51

## 2019-11-29 RX ADMIN — LEVOTHYROXINE SODIUM 88 MCG: 88 TABLET ORAL at 06:34

## 2019-11-29 RX ADMIN — SODIUM CHLORIDE: 9 INJECTION, SOLUTION INTRAVENOUS at 11:50

## 2019-11-29 RX ADMIN — DEXMEDETOMIDINE 0.2 MCG/KG/HR: 100 INJECTION, SOLUTION, CONCENTRATE INTRAVENOUS at 21:06

## 2019-11-29 RX ADMIN — FLUOXETINE HYDROCHLORIDE 20 MG: 20 CAPSULE ORAL at 00:49

## 2019-11-29 RX ADMIN — APIXABAN 5 MG: 5 TABLET, FILM COATED ORAL at 00:58

## 2019-11-29 RX ADMIN — FLUTICASONE PROPIONATE 1 SPRAY: 50 SPRAY, METERED NASAL at 09:22

## 2019-11-29 RX ADMIN — MIDAZOLAM 2 MG: 1 INJECTION INTRAMUSCULAR; INTRAVENOUS at 22:45

## 2019-11-29 RX ADMIN — GABAPENTIN 300 MG: 300 CAPSULE ORAL at 09:23

## 2019-11-29 RX ADMIN — LISINOPRIL 5 MG: 5 TABLET ORAL at 09:23

## 2019-11-29 RX ADMIN — VITAMIN D, TAB 1000IU (100/BT) 5000 UNITS: 25 TAB at 09:23

## 2019-11-29 RX ADMIN — CEFTRIAXONE SODIUM 1 G: 1 INJECTION, POWDER, FOR SOLUTION INTRAMUSCULAR; INTRAVENOUS at 06:23

## 2019-11-29 RX ADMIN — SODIUM CHLORIDE: 9 INJECTION, SOLUTION INTRAVENOUS at 02:04

## 2019-11-29 RX ADMIN — SODIUM CHLORIDE 1000 ML: 9 INJECTION, SOLUTION INTRAVENOUS at 15:55

## 2019-11-29 RX ADMIN — VECURONIUM BROMIDE: 1 INJECTION, POWDER, LYOPHILIZED, FOR SOLUTION INTRAVENOUS at 23:40

## 2019-11-29 RX ADMIN — ASPIRIN 81 MG: 81 TABLET, COATED ORAL at 09:23

## 2019-11-29 RX ADMIN — Medication 5 MCG/MIN: at 21:28

## 2019-11-29 RX ADMIN — MIDAZOLAM 2 MG: 1 INJECTION INTRAMUSCULAR; INTRAVENOUS at 23:25

## 2019-11-29 RX ADMIN — PROPOFOL 1000 MG: 10 INJECTION, EMULSION INTRAVENOUS at 21:28

## 2019-11-29 RX ADMIN — METOPROLOL SUCCINATE 50 MG: 50 TABLET, EXTENDED RELEASE ORAL at 09:23

## 2019-11-29 RX ADMIN — ACETAMINOPHEN 650 MG: 325 TABLET, FILM COATED ORAL at 09:23

## 2019-11-29 RX ADMIN — ACETAMINOPHEN 650 MG: 325 TABLET, FILM COATED ORAL at 01:03

## 2019-11-29 RX ADMIN — ATORVASTATIN CALCIUM 80 MG: 40 TABLET, FILM COATED ORAL at 00:58

## 2019-11-29 RX ADMIN — BUMETANIDE 2 MG: 0.25 INJECTION INTRAMUSCULAR; INTRAVENOUS at 18:30

## 2019-11-29 RX ADMIN — Medication 25 MCG/HR: at 19:32

## 2019-11-29 RX ADMIN — POTASSIUM BICARBONATE 20 MEQ: 782 TABLET, EFFERVESCENT ORAL at 09:23

## 2019-11-29 RX ADMIN — GABAPENTIN 300 MG: 300 CAPSULE ORAL at 00:58

## 2019-11-29 RX ADMIN — CLONIDINE HYDROCHLORIDE 0.2 MG: 0.2 TABLET ORAL at 09:23

## 2019-11-29 RX ADMIN — SODIUM CHLORIDE 1000 ML: 9 INJECTION, SOLUTION INTRAVENOUS at 16:51

## 2019-11-29 RX ADMIN — FUROSEMIDE 20 MG: 20 TABLET ORAL at 09:23

## 2019-11-29 RX ADMIN — POTASSIUM BICARBONATE 20 MEQ: 782 TABLET, EFFERVESCENT ORAL at 00:57

## 2019-11-29 RX ADMIN — CETIRIZINE HYDROCHLORIDE 10 MG: 10 TABLET, FILM COATED ORAL at 09:23

## 2019-11-29 RX ADMIN — MEROPENEM 1 G: 1 INJECTION, POWDER, FOR SOLUTION INTRAVENOUS at 00:43

## 2019-11-29 RX ADMIN — SODIUM CHLORIDE, PRESERVATIVE FREE 10 ML: 5 INJECTION INTRAVENOUS at 09:24

## 2019-11-29 RX ADMIN — APIXABAN 5 MG: 5 TABLET, FILM COATED ORAL at 09:24

## 2019-11-29 RX ADMIN — ETOMIDATE 14 MG: 2 INJECTION, SOLUTION INTRAVENOUS at 18:14

## 2019-11-29 RX ADMIN — FLUOXETINE HYDROCHLORIDE 20 MG: 20 CAPSULE ORAL at 09:24

## 2019-11-29 ASSESSMENT — PULMONARY FUNCTION TESTS
PIF_VALUE: 37
PIF_VALUE: 30
PIF_VALUE: 39
PIF_VALUE: 30
PIF_VALUE: 29
PIF_VALUE: 30
PIF_VALUE: 33
PIF_VALUE: 28
PIF_VALUE: 26
PIF_VALUE: 35
PIF_VALUE: 27
PIF_VALUE: 35
PIF_VALUE: 29
PIF_VALUE: 30
PIF_VALUE: 26
PIF_VALUE: 30
PIF_VALUE: 35
PIF_VALUE: 27

## 2019-11-29 ASSESSMENT — PAIN DESCRIPTION - ONSET: ONSET: ON-GOING

## 2019-11-29 ASSESSMENT — PAIN SCALES - GENERAL
PAINLEVEL_OUTOF10: 7
PAINLEVEL_OUTOF10: 8
PAINLEVEL_OUTOF10: 0
PAINLEVEL_OUTOF10: 0
PAINLEVEL_OUTOF10: 8

## 2019-11-29 ASSESSMENT — PAIN DESCRIPTION - PAIN TYPE
TYPE: ACUTE PAIN

## 2019-11-29 ASSESSMENT — PAIN DESCRIPTION - PROGRESSION: CLINICAL_PROGRESSION: NOT CHANGED

## 2019-11-29 ASSESSMENT — PAIN DESCRIPTION - DESCRIPTORS
DESCRIPTORS: STABBING
DESCRIPTORS: DISCOMFORT

## 2019-11-29 ASSESSMENT — PAIN DESCRIPTION - LOCATION
LOCATION: KNEE
LOCATION: ABDOMEN
LOCATION: ABDOMEN

## 2019-11-29 ASSESSMENT — PAIN DESCRIPTION - FREQUENCY: FREQUENCY: INTERMITTENT

## 2019-11-29 ASSESSMENT — PAIN DESCRIPTION - ORIENTATION
ORIENTATION: LEFT
ORIENTATION: RIGHT;LEFT

## 2019-11-30 ENCOUNTER — APPOINTMENT (OUTPATIENT)
Dept: GENERAL RADIOLOGY | Age: 64
DRG: 720 | End: 2019-11-30
Payer: MEDICAID

## 2019-11-30 ENCOUNTER — ANESTHESIA EVENT (OUTPATIENT)
Dept: ICU | Age: 64
DRG: 720 | End: 2019-11-30
Payer: MEDICAID

## 2019-11-30 ENCOUNTER — ANESTHESIA (OUTPATIENT)
Dept: ICU | Age: 64
DRG: 720 | End: 2019-11-30
Payer: MEDICAID

## 2019-11-30 ENCOUNTER — APPOINTMENT (OUTPATIENT)
Dept: ULTRASOUND IMAGING | Age: 64
DRG: 720 | End: 2019-11-30
Payer: MEDICAID

## 2019-11-30 LAB
AADO2: 419.8 MMHG
AADO2: 509.4 MMHG
ADENOVIRUS BY PCR: NOT DETECTED
ALBUMIN SERPL-MCNC: 2.7 G/DL (ref 3.5–5.2)
ALP BLD-CCNC: 160 U/L (ref 35–104)
ALT SERPL-CCNC: 28 U/L (ref 0–32)
ANION GAP SERPL CALCULATED.3IONS-SCNC: 15 MMOL/L (ref 7–16)
ANION GAP SERPL CALCULATED.3IONS-SCNC: 8 MMOL/L (ref 7–16)
APTT: 33 SEC (ref 24.5–35.1)
AST SERPL-CCNC: 32 U/L (ref 0–31)
B.E.: -7.2 MMOL/L (ref -3–3)
B.E.: -7.9 MMOL/L (ref -3–3)
BILIRUB SERPL-MCNC: 0.2 MG/DL (ref 0–1.2)
BORDETELLA PARAPERTUSSIS BY PCR: NOT DETECTED
BORDETELLA PERTUSSIS BY PCR: NOT DETECTED
BUN BLDV-MCNC: 12 MG/DL (ref 8–23)
BUN BLDV-MCNC: 15 MG/DL (ref 8–23)
CALCIUM IONIZED: 1.2 MMOL/L (ref 1.15–1.33)
CALCIUM SERPL-MCNC: 7.8 MG/DL (ref 8.6–10.2)
CALCIUM SERPL-MCNC: 7.9 MG/DL (ref 8.6–10.2)
CHLAMYDOPHILIA PNEUMONIAE BY PCR: NOT DETECTED
CHLORIDE BLD-SCNC: 107 MMOL/L (ref 98–107)
CHLORIDE BLD-SCNC: 113 MMOL/L (ref 98–107)
CHLORIDE URINE RANDOM: 75 MMOL/L
CO2: 18 MMOL/L (ref 22–29)
CO2: 24 MMOL/L (ref 22–29)
COHB: 0.4 % (ref 0–1.5)
COHB: 0.6 % (ref 0–1.5)
CORONAVIRUS 229E BY PCR: NOT DETECTED
CORONAVIRUS HKU1 BY PCR: NOT DETECTED
CORONAVIRUS NL63 BY PCR: NOT DETECTED
CORONAVIRUS OC43 BY PCR: NOT DETECTED
CREAT SERPL-MCNC: 1 MG/DL (ref 0.5–1)
CREAT SERPL-MCNC: 1.2 MG/DL (ref 0.5–1)
CREATININE URINE: 92 MG/DL (ref 29–226)
CRITICAL: ABNORMAL
CRITICAL: ABNORMAL
DATE ANALYZED: ABNORMAL
DATE ANALYZED: ABNORMAL
DATE OF COLLECTION: ABNORMAL
DATE OF COLLECTION: ABNORMAL
EKG ATRIAL RATE: 88 BPM
EKG P AXIS: 40 DEGREES
EKG P-R INTERVAL: 200 MS
EKG Q-T INTERVAL: 378 MS
EKG QRS DURATION: 76 MS
EKG QTC CALCULATION (BAZETT): 457 MS
EKG R AXIS: -28 DEGREES
EKG T AXIS: 58 DEGREES
EKG VENTRICULAR RATE: 88 BPM
FIO2: 100 %
FIO2: 90 %
GFR AFRICAN AMERICAN: 55
GFR AFRICAN AMERICAN: >60
GFR NON-AFRICAN AMERICAN: 45 ML/MIN/1.73
GFR NON-AFRICAN AMERICAN: 56 ML/MIN/1.73
GLUCOSE BLD-MCNC: 140 MG/DL (ref 74–99)
GLUCOSE BLD-MCNC: 247 MG/DL (ref 74–99)
HCO3: 19.7 MMOL/L (ref 22–26)
HCO3: 20 MMOL/L (ref 22–26)
HCT VFR BLD CALC: 33 % (ref 34–48)
HCT VFR BLD CALC: 34.1 % (ref 34–48)
HCT VFR BLD CALC: 38 % (ref 34–48)
HEMOGLOBIN: 10.1 G/DL (ref 11.5–15.5)
HEMOGLOBIN: 10.4 G/DL (ref 11.5–15.5)
HEMOGLOBIN: 11.6 G/DL (ref 11.5–15.5)
HHB: 1.3 % (ref 0–5)
HHB: 2.3 % (ref 0–5)
HUMAN METAPNEUMOVIRUS BY PCR: NOT DETECTED
HUMAN RHINOVIRUS/ENTEROVIRUS BY PCR: NOT DETECTED
INFLUENZA A BY PCR: NOT DETECTED
INFLUENZA B BY PCR: NOT DETECTED
INR BLD: 1.8
LAB: ABNORMAL
LAB: ABNORMAL
LACTATE DEHYDROGENASE: 264 U/L (ref 135–214)
Lab: ABNORMAL
Lab: ABNORMAL
MAGNESIUM: 1.7 MG/DL (ref 1.6–2.6)
METER GLUCOSE: 165 MG/DL (ref 74–99)
METER GLUCOSE: 198 MG/DL (ref 74–99)
METER GLUCOSE: 206 MG/DL (ref 74–99)
METHB: 0.5 % (ref 0–1.5)
METHB: 0.6 % (ref 0–1.5)
MODE: AC
MODE: AC
MYCOPLASMA PNEUMONIAE BY PCR: NOT DETECTED
O2 CONTENT: 17.6 ML/DL
O2 CONTENT: 17.7 ML/DL
O2 SATURATION: 97.7 % (ref 92–98.5)
O2 SATURATION: 98.7 % (ref 92–98.5)
O2HB: 96.5 % (ref 94–97)
O2HB: 97.8 % (ref 94–97)
OPERATOR ID: ABNORMAL
OPERATOR ID: ABNORMAL
PARAINFLUENZA VIRUS 1 BY PCR: NOT DETECTED
PARAINFLUENZA VIRUS 2 BY PCR: NOT DETECTED
PARAINFLUENZA VIRUS 3 BY PCR: NOT DETECTED
PARAINFLUENZA VIRUS 4 BY PCR: NOT DETECTED
PATIENT TEMP: 37 C
PATIENT TEMP: 37 C
PCO2: 44.7 MMHG (ref 35–45)
PCO2: 50.5 MMHG (ref 35–45)
PEEP/CPAP: 15 CMH2O
PEEP/CPAP: 15 CMH2O
PFO2: 1.28 MMHG/%
PFO2: 1.71 MMHG/%
PH BLOOD GAS: 7.22 (ref 7.35–7.45)
PH BLOOD GAS: 7.26 (ref 7.35–7.45)
PHOSPHORUS: 2.6 MG/DL (ref 2.5–4.5)
PO2: 128.1 MMHG (ref 60–100)
PO2: 153.6 MMHG (ref 60–100)
POTASSIUM SERPL-SCNC: 4.2 MMOL/L (ref 3.5–5)
POTASSIUM SERPL-SCNC: 4.2 MMOL/L (ref 3.5–5)
POTASSIUM, UR: 66.1 MMOL/L
PROTHROMBIN TIME: 19.8 SEC (ref 9.3–12.4)
RESPIRATORY SYNCYTIAL VIRUS BY PCR: NOT DETECTED
RI(T): 2.73
RI(T): 3.98
RR MECHANICAL: 16 B/MIN
RR MECHANICAL: 18 B/MIN
SODIUM BLD-SCNC: 140 MMOL/L (ref 132–146)
SODIUM BLD-SCNC: 145 MMOL/L (ref 132–146)
SODIUM URINE: 62 MMOL/L
SOURCE, BLOOD GAS: ABNORMAL
SOURCE, BLOOD GAS: ABNORMAL
THB: 12.6 G/DL (ref 11.5–16.5)
THB: 12.9 G/DL (ref 11.5–16.5)
TIME ANALYZED: 152
TIME ANALYZED: 415
TOTAL PROTEIN: 5.5 G/DL (ref 6.4–8.3)
UREA NITROGEN, UR: 503 MG/DL (ref 800–1666)
URINE CULTURE, ROUTINE: NORMAL
VT MECHANICAL: 330 ML
VT MECHANICAL: 330 ML

## 2019-11-30 PROCEDURE — 6370000000 HC RX 637 (ALT 250 FOR IP): Performed by: INTERNAL MEDICINE

## 2019-11-30 PROCEDURE — 83615 LACTATE (LD) (LDH) ENZYME: CPT

## 2019-11-30 PROCEDURE — 6360000002 HC RX W HCPCS: Performed by: INTERNAL MEDICINE

## 2019-11-30 PROCEDURE — 80053 COMPREHEN METABOLIC PANEL: CPT

## 2019-11-30 PROCEDURE — 85730 THROMBOPLASTIN TIME PARTIAL: CPT

## 2019-11-30 PROCEDURE — 84133 ASSAY OF URINE POTASSIUM: CPT

## 2019-11-30 PROCEDURE — 85018 HEMOGLOBIN: CPT

## 2019-11-30 PROCEDURE — 85014 HEMATOCRIT: CPT

## 2019-11-30 PROCEDURE — 82330 ASSAY OF CALCIUM: CPT

## 2019-11-30 PROCEDURE — 84300 ASSAY OF URINE SODIUM: CPT

## 2019-11-30 PROCEDURE — 82805 BLOOD GASES W/O2 SATURATION: CPT

## 2019-11-30 PROCEDURE — 2580000003 HC RX 258: Performed by: INTERNAL MEDICINE

## 2019-11-30 PROCEDURE — 84100 ASSAY OF PHOSPHORUS: CPT

## 2019-11-30 PROCEDURE — 93308 TTE F-UP OR LMTD: CPT

## 2019-11-30 PROCEDURE — 74018 RADEX ABDOMEN 1 VIEW: CPT

## 2019-11-30 PROCEDURE — 71045 X-RAY EXAM CHEST 1 VIEW: CPT

## 2019-11-30 PROCEDURE — 94003 VENT MGMT INPAT SUBQ DAY: CPT

## 2019-11-30 PROCEDURE — 2500000003 HC RX 250 WO HCPCS: Performed by: INTERNAL MEDICINE

## 2019-11-30 PROCEDURE — 94640 AIRWAY INHALATION TREATMENT: CPT

## 2019-11-30 PROCEDURE — 84540 ASSAY OF URINE/UREA-N: CPT

## 2019-11-30 PROCEDURE — 0BH18EZ INSERTION OF ENDOTRACHEAL AIRWAY INTO TRACHEA, VIA NATURAL OR ARTIFICIAL OPENING ENDOSCOPIC: ICD-10-PCS | Performed by: ANESTHESIOLOGY

## 2019-11-30 PROCEDURE — 31500 INSERT EMERGENCY AIRWAY: CPT | Performed by: ANESTHESIOLOGY

## 2019-11-30 PROCEDURE — 2580000003 HC RX 258

## 2019-11-30 PROCEDURE — 6370000000 HC RX 637 (ALT 250 FOR IP): Performed by: FAMILY MEDICINE

## 2019-11-30 PROCEDURE — 99255 IP/OBS CONSLTJ NEW/EST HI 80: CPT | Performed by: INTERNAL MEDICINE

## 2019-11-30 PROCEDURE — 99232 SBSQ HOSP IP/OBS MODERATE 35: CPT | Performed by: FAMILY MEDICINE

## 2019-11-30 PROCEDURE — 87206 SMEAR FLUORESCENT/ACID STAI: CPT

## 2019-11-30 PROCEDURE — C9113 INJ PANTOPRAZOLE SODIUM, VIA: HCPCS | Performed by: INTERNAL MEDICINE

## 2019-11-30 PROCEDURE — 36415 COLL VENOUS BLD VENIPUNCTURE: CPT

## 2019-11-30 PROCEDURE — 87450 HC DIRECT STREP B ANTIGEN: CPT

## 2019-11-30 PROCEDURE — 82570 ASSAY OF URINE CREATININE: CPT

## 2019-11-30 PROCEDURE — 93010 ELECTROCARDIOGRAM REPORT: CPT | Performed by: INTERNAL MEDICINE

## 2019-11-30 PROCEDURE — 85610 PROTHROMBIN TIME: CPT

## 2019-11-30 PROCEDURE — 02HV33Z INSERTION OF INFUSION DEVICE INTO SUPERIOR VENA CAVA, PERCUTANEOUS APPROACH: ICD-10-PCS | Performed by: INTERNAL MEDICINE

## 2019-11-30 PROCEDURE — 80048 BASIC METABOLIC PNL TOTAL CA: CPT

## 2019-11-30 PROCEDURE — 82962 GLUCOSE BLOOD TEST: CPT

## 2019-11-30 PROCEDURE — 76770 US EXAM ABDO BACK WALL COMP: CPT

## 2019-11-30 PROCEDURE — 2000000000 HC ICU R&B

## 2019-11-30 PROCEDURE — 87088 URINE BACTERIA CULTURE: CPT

## 2019-11-30 PROCEDURE — 0HQ4XZZ REPAIR NECK SKIN, EXTERNAL APPROACH: ICD-10-PCS | Performed by: FAMILY MEDICINE

## 2019-11-30 PROCEDURE — 36556 INSERT NON-TUNNEL CV CATH: CPT

## 2019-11-30 PROCEDURE — 6360000004 HC RX CONTRAST MEDICATION: Performed by: INTERNAL MEDICINE

## 2019-11-30 PROCEDURE — 87070 CULTURE OTHR SPECIMN AEROBIC: CPT

## 2019-11-30 PROCEDURE — 82436 ASSAY OF URINE CHLORIDE: CPT

## 2019-11-30 PROCEDURE — 87081 CULTURE SCREEN ONLY: CPT

## 2019-11-30 PROCEDURE — 83735 ASSAY OF MAGNESIUM: CPT

## 2019-11-30 PROCEDURE — 0100U HC RESPIRPTHGN MULT REV TRANS & AMP PRB TECH 21 TRGT: CPT

## 2019-11-30 PROCEDURE — 99233 SBSQ HOSP IP/OBS HIGH 50: CPT | Performed by: INTERNAL MEDICINE

## 2019-11-30 RX ORDER — SODIUM CHLORIDE 0.9 % (FLUSH) 0.9 %
10 SYRINGE (ML) INJECTION EVERY 12 HOURS SCHEDULED
Status: DISCONTINUED | OUTPATIENT
Start: 2019-11-30 | End: 2019-12-16 | Stop reason: HOSPADM

## 2019-11-30 RX ORDER — NICOTINE POLACRILEX 4 MG
15 LOZENGE BUCCAL PRN
Status: DISCONTINUED | OUTPATIENT
Start: 2019-11-30 | End: 2019-12-16 | Stop reason: HOSPADM

## 2019-11-30 RX ORDER — IPRATROPIUM BROMIDE AND ALBUTEROL SULFATE 2.5; .5 MG/3ML; MG/3ML
1 SOLUTION RESPIRATORY (INHALATION) EVERY 4 HOURS PRN
Status: DISCONTINUED | OUTPATIENT
Start: 2019-11-30 | End: 2019-11-30

## 2019-11-30 RX ORDER — MAGNESIUM SULFATE IN WATER 40 MG/ML
2 INJECTION, SOLUTION INTRAVENOUS ONCE
Status: COMPLETED | OUTPATIENT
Start: 2019-11-30 | End: 2019-11-30

## 2019-11-30 RX ORDER — DEXTROSE MONOHYDRATE 50 MG/ML
100 INJECTION, SOLUTION INTRAVENOUS PRN
Status: DISCONTINUED | OUTPATIENT
Start: 2019-11-30 | End: 2019-12-16 | Stop reason: HOSPADM

## 2019-11-30 RX ORDER — PROPOFOL 10 MG/ML
10 INJECTION, EMULSION INTRAVENOUS
Status: DISCONTINUED | OUTPATIENT
Start: 2019-11-30 | End: 2019-12-01

## 2019-11-30 RX ORDER — DEXTROSE MONOHYDRATE 25 G/50ML
12.5 INJECTION, SOLUTION INTRAVENOUS PRN
Status: DISCONTINUED | OUTPATIENT
Start: 2019-11-30 | End: 2019-12-16 | Stop reason: HOSPADM

## 2019-11-30 RX ORDER — ACETAMINOPHEN 325 MG/1
650 TABLET ORAL EVERY 6 HOURS PRN
Status: DISCONTINUED | OUTPATIENT
Start: 2019-11-30 | End: 2019-12-16 | Stop reason: HOSPADM

## 2019-11-30 RX ORDER — MIDAZOLAM HYDROCHLORIDE 1 MG/ML
2 INJECTION INTRAMUSCULAR; INTRAVENOUS
Status: DISCONTINUED | OUTPATIENT
Start: 2019-11-30 | End: 2019-12-03

## 2019-11-30 RX ORDER — MIDAZOLAM HYDROCHLORIDE 1 MG/ML
2 INJECTION INTRAMUSCULAR; INTRAVENOUS
Status: DISCONTINUED | OUTPATIENT
Start: 2019-11-30 | End: 2019-11-30

## 2019-11-30 RX ORDER — ONDANSETRON 2 MG/ML
4 INJECTION INTRAMUSCULAR; INTRAVENOUS EVERY 6 HOURS PRN
Status: DISCONTINUED | OUTPATIENT
Start: 2019-11-30 | End: 2019-12-16 | Stop reason: HOSPADM

## 2019-11-30 RX ORDER — SODIUM CHLORIDE 0.9 % (FLUSH) 0.9 %
10 SYRINGE (ML) INJECTION PRN
Status: DISCONTINUED | OUTPATIENT
Start: 2019-11-30 | End: 2019-12-16 | Stop reason: HOSPADM

## 2019-11-30 RX ORDER — MIDAZOLAM HYDROCHLORIDE 1 MG/ML
2 INJECTION INTRAMUSCULAR; INTRAVENOUS
Status: DISCONTINUED | OUTPATIENT
Start: 2019-11-30 | End: 2019-11-30 | Stop reason: SDUPTHER

## 2019-11-30 RX ORDER — IPRATROPIUM BROMIDE AND ALBUTEROL SULFATE 2.5; .5 MG/3ML; MG/3ML
1 SOLUTION RESPIRATORY (INHALATION) 4 TIMES DAILY
Status: DISCONTINUED | OUTPATIENT
Start: 2019-11-30 | End: 2019-12-09

## 2019-11-30 RX ADMIN — POTASSIUM BICARBONATE 20 MEQ: 782 TABLET, EFFERVESCENT ORAL at 08:25

## 2019-11-30 RX ADMIN — HYDROCORTISONE SODIUM SUCCINATE 50 MG: 100 INJECTION, POWDER, FOR SOLUTION INTRAMUSCULAR; INTRAVENOUS at 06:55

## 2019-11-30 RX ADMIN — IPRATROPIUM BROMIDE AND ALBUTEROL SULFATE 1 AMPULE: 2.5; .5 SOLUTION RESPIRATORY (INHALATION) at 12:20

## 2019-11-30 RX ADMIN — THIAMINE HYDROCHLORIDE 200 MG: 100 INJECTION, SOLUTION INTRAMUSCULAR; INTRAVENOUS at 20:15

## 2019-11-30 RX ADMIN — IPRATROPIUM BROMIDE AND ALBUTEROL SULFATE 1 AMPULE: 2.5; .5 SOLUTION RESPIRATORY (INHALATION) at 08:35

## 2019-11-30 RX ADMIN — TICAGRELOR 90 MG: 90 TABLET ORAL at 22:07

## 2019-11-30 RX ADMIN — IPRATROPIUM BROMIDE AND ALBUTEROL SULFATE 1 AMPULE: 2.5; .5 SOLUTION RESPIRATORY (INHALATION) at 15:59

## 2019-11-30 RX ADMIN — Medication 150 MCG/HR: at 12:45

## 2019-11-30 RX ADMIN — ASCORBIC ACID 1500 MG: 500 INJECTION, SOLUTION INTRAMUSCULAR; INTRAVENOUS; SUBCUTANEOUS at 18:10

## 2019-11-30 RX ADMIN — PROPOFOL 35 MCG/KG/MIN: 10 INJECTION, EMULSION INTRAVENOUS at 08:33

## 2019-11-30 RX ADMIN — APIXABAN 5 MG: 5 TABLET, FILM COATED ORAL at 20:15

## 2019-11-30 RX ADMIN — MEROPENEM 1 G: 1 INJECTION, POWDER, FOR SOLUTION INTRAVENOUS at 17:19

## 2019-11-30 RX ADMIN — POTASSIUM BICARBONATE 20 MEQ: 782 TABLET, EFFERVESCENT ORAL at 20:14

## 2019-11-30 RX ADMIN — HYDROCORTISONE SODIUM SUCCINATE 50 MG: 100 INJECTION, POWDER, FOR SOLUTION INTRAMUSCULAR; INTRAVENOUS at 02:43

## 2019-11-30 RX ADMIN — MAGNESIUM SULFATE HEPTAHYDRATE 2 G: 40 INJECTION, SOLUTION INTRAVENOUS at 10:35

## 2019-11-30 RX ADMIN — HYDROCORTISONE SODIUM SUCCINATE 50 MG: 100 INJECTION, POWDER, FOR SOLUTION INTRAMUSCULAR; INTRAVENOUS at 12:41

## 2019-11-30 RX ADMIN — PANTOPRAZOLE SODIUM 40 MG: 40 INJECTION, POWDER, FOR SOLUTION INTRAVENOUS at 08:23

## 2019-11-30 RX ADMIN — Medication 10 ML: at 08:23

## 2019-11-30 RX ADMIN — IPRATROPIUM BROMIDE AND ALBUTEROL SULFATE 1 AMPULE: 2.5; .5 SOLUTION RESPIRATORY (INHALATION) at 19:59

## 2019-11-30 RX ADMIN — VITAMIN D, TAB 1000IU (100/BT) 5000 UNITS: 25 TAB at 08:23

## 2019-11-30 RX ADMIN — Medication 150 MCG/HR: at 04:05

## 2019-11-30 RX ADMIN — ACETAMINOPHEN 325 MG: 325 TABLET, FILM COATED ORAL at 06:54

## 2019-11-30 RX ADMIN — MEROPENEM 1 G: 1 INJECTION, POWDER, FOR SOLUTION INTRAVENOUS at 02:43

## 2019-11-30 RX ADMIN — Medication 150 MCG/HR: at 22:08

## 2019-11-30 RX ADMIN — INSULIN LISPRO 2 UNITS: 100 INJECTION, SOLUTION INTRAVENOUS; SUBCUTANEOUS at 22:10

## 2019-11-30 RX ADMIN — ATORVASTATIN CALCIUM 80 MG: 40 TABLET, FILM COATED ORAL at 20:14

## 2019-11-30 RX ADMIN — WATER 10 ML: 1 INJECTION INTRAMUSCULAR; INTRAVENOUS; SUBCUTANEOUS at 02:44

## 2019-11-30 RX ADMIN — PROPOFOL 35 MCG/KG/MIN: 10 INJECTION, EMULSION INTRAVENOUS at 01:55

## 2019-11-30 RX ADMIN — HYDROCORTISONE SODIUM SUCCINATE 50 MG: 100 INJECTION, POWDER, FOR SOLUTION INTRAMUSCULAR; INTRAVENOUS at 18:09

## 2019-11-30 RX ADMIN — ASCORBIC ACID 1500 MG: 500 INJECTION, SOLUTION INTRAMUSCULAR; INTRAVENOUS; SUBCUTANEOUS at 12:39

## 2019-11-30 RX ADMIN — ACETAMINOPHEN 650 MG: 325 TABLET, FILM COATED ORAL at 01:54

## 2019-11-30 RX ADMIN — THIAMINE HYDROCHLORIDE 200 MG: 100 INJECTION, SOLUTION INTRAMUSCULAR; INTRAVENOUS at 12:39

## 2019-11-30 RX ADMIN — INSULIN LISPRO 1 UNITS: 100 INJECTION, SOLUTION INTRAVENOUS; SUBCUTANEOUS at 18:19

## 2019-11-30 RX ADMIN — PROPOFOL 20 MCG/KG/MIN: 10 INJECTION, EMULSION INTRAVENOUS at 15:03

## 2019-11-30 RX ADMIN — PERFLUTREN 1.65 MG: 6.52 INJECTION, SUSPENSION INTRAVENOUS at 15:05

## 2019-11-30 RX ADMIN — LEVOTHYROXINE SODIUM 88 MCG: 88 TABLET ORAL at 06:54

## 2019-11-30 RX ADMIN — DEXTROSE MONOHYDRATE 1.5 G: 50 INJECTION, SOLUTION INTRAVENOUS at 11:27

## 2019-11-30 RX ADMIN — MEROPENEM 1 G: 1 INJECTION, POWDER, FOR SOLUTION INTRAVENOUS at 08:24

## 2019-11-30 RX ADMIN — Medication 20 MCG/MIN: at 18:19

## 2019-11-30 RX ADMIN — CETIRIZINE HYDROCHLORIDE 10 MG: 10 TABLET, FILM COATED ORAL at 08:22

## 2019-11-30 ASSESSMENT — PULMONARY FUNCTION TESTS
PIF_VALUE: 19
PIF_VALUE: 31
PIF_VALUE: 19
PIF_VALUE: 25
PIF_VALUE: 30
PIF_VALUE: 22
PIF_VALUE: 25
PIF_VALUE: 25
PIF_VALUE: 21
PIF_VALUE: 26
PIF_VALUE: 26
PIF_VALUE: 24
PIF_VALUE: 20
PIF_VALUE: 19
PIF_VALUE: 19
PIF_VALUE: 25
PIF_VALUE: 24
PIF_VALUE: 24
PIF_VALUE: 25
PIF_VALUE: 22
PIF_VALUE: 23
PIF_VALUE: 19
PIF_VALUE: 24
PIF_VALUE: 21
PIF_VALUE: 22
PIF_VALUE: 24
PIF_VALUE: 22
PIF_VALUE: 19
PIF_VALUE: 23
PIF_VALUE: 24
PIF_VALUE: 24
PIF_VALUE: 20
PIF_VALUE: 19
PIF_VALUE: 22
PIF_VALUE: 21
PIF_VALUE: 24
PIF_VALUE: 26

## 2019-11-30 ASSESSMENT — PAIN SCALES - GENERAL
PAINLEVEL_OUTOF10: 0
PAINLEVEL_OUTOF10: 0

## 2019-12-01 ENCOUNTER — APPOINTMENT (OUTPATIENT)
Dept: GENERAL RADIOLOGY | Age: 64
DRG: 720 | End: 2019-12-01
Payer: MEDICAID

## 2019-12-01 LAB
AADO2: 208.5 MMHG
ANION GAP SERPL CALCULATED.3IONS-SCNC: 14 MMOL/L (ref 7–16)
B.E.: -1.9 MMOL/L (ref -3–3)
BASOPHILS ABSOLUTE: 0 E9/L (ref 0–0.2)
BASOPHILS RELATIVE PERCENT: 0.1 % (ref 0–2)
BUN BLDV-MCNC: 11 MG/DL (ref 8–23)
BURR CELLS: ABNORMAL
CALCIUM SERPL-MCNC: 8.2 MG/DL (ref 8.6–10.2)
CHLORIDE BLD-SCNC: 110 MMOL/L (ref 98–107)
CO2: 20 MMOL/L (ref 22–29)
COHB: 0.3 % (ref 0–1.5)
CREAT SERPL-MCNC: 0.7 MG/DL (ref 0.5–1)
CRITICAL: ABNORMAL
DATE ANALYZED: ABNORMAL
DATE OF COLLECTION: ABNORMAL
EOSINOPHILS ABSOLUTE: 0 E9/L (ref 0.05–0.5)
EOSINOPHILS RELATIVE PERCENT: 0 % (ref 0–6)
FIO2: 60 %
GFR AFRICAN AMERICAN: >60
GFR NON-AFRICAN AMERICAN: >60 ML/MIN/1.73
GLUCOSE BLD-MCNC: 171 MG/DL (ref 74–99)
HCO3: 24.3 MMOL/L (ref 22–26)
HCT VFR BLD CALC: 28.5 % (ref 34–48)
HCT VFR BLD CALC: 32.5 % (ref 34–48)
HEMOGLOBIN: 8.9 G/DL (ref 11.5–15.5)
HEMOGLOBIN: 9.9 G/DL (ref 11.5–15.5)
HHB: 1.3 % (ref 0–5)
L. PNEUMOPHILA SEROGP 1 UR AG: NORMAL
LAB: ABNORMAL
LYMPHOCYTES ABSOLUTE: 0.95 E9/L (ref 1.5–4)
LYMPHOCYTES RELATIVE PERCENT: 4.3 % (ref 20–42)
Lab: ABNORMAL
MAGNESIUM: 2.4 MG/DL (ref 1.6–2.6)
MCH RBC QN AUTO: 29.7 PG (ref 26–35)
MCHC RBC AUTO-ENTMCNC: 30.5 % (ref 32–34.5)
MCV RBC AUTO: 97.6 FL (ref 80–99.9)
METER GLUCOSE: 128 MG/DL (ref 74–99)
METER GLUCOSE: 131 MG/DL (ref 74–99)
METER GLUCOSE: 149 MG/DL (ref 74–99)
METER GLUCOSE: 159 MG/DL (ref 74–99)
METHB: 0.6 % (ref 0–1.5)
MODE: AC
MONOCYTES ABSOLUTE: 0.48 E9/L (ref 0.1–0.95)
MONOCYTES RELATIVE PERCENT: 1.7 % (ref 2–12)
NEUTROPHILS ABSOLUTE: 22.37 E9/L (ref 1.8–7.3)
NEUTROPHILS RELATIVE PERCENT: 94 % (ref 43–80)
O2 CONTENT: 15.7 ML/DL
O2 SATURATION: 98.7 % (ref 92–98.5)
O2HB: 97.8 % (ref 94–97)
OPERATOR ID: 7490
ORGANISM: ABNORMAL
OVALOCYTES: ABNORMAL
PATIENT TEMP: 37 C
PCO2: 48 MMHG (ref 35–45)
PDW BLD-RTO: 13.4 FL (ref 11.5–15)
PEEP/CPAP: 15 CMH2O
PFO2: 2.53 MMHG/%
PH BLOOD GAS: 7.32 (ref 7.35–7.45)
PHOSPHORUS: 2.3 MG/DL (ref 2.5–4.5)
PLATELET # BLD: 446 E9/L (ref 130–450)
PMV BLD AUTO: 10.2 FL (ref 7–12)
PO2: 151.5 MMHG (ref 60–100)
POIKILOCYTES: ABNORMAL
POLYCHROMASIA: ABNORMAL
POTASSIUM SERPL-SCNC: 4.4 MMOL/L (ref 3.5–5)
PRO-BNP: 8324 PG/ML (ref 0–125)
RBC # BLD: 3.33 E12/L (ref 3.5–5.5)
RI(T): 1.38
RR MECHANICAL: 18 B/MIN
SODIUM BLD-SCNC: 144 MMOL/L (ref 132–146)
SOURCE, BLOOD GAS: ABNORMAL
STREP PNEUMONIAE ANTIGEN, URINE: NORMAL
THB: 11.2 G/DL (ref 11.5–16.5)
TIME ANALYZED: 616
TSH SERPL DL<=0.05 MIU/L-ACNC: 1.96 UIU/ML (ref 0.27–4.2)
VT MECHANICAL: 330 ML
WBC # BLD: 23.8 E9/L (ref 4.5–11.5)

## 2019-12-01 PROCEDURE — 6360000002 HC RX W HCPCS: Performed by: INTERNAL MEDICINE

## 2019-12-01 PROCEDURE — 84100 ASSAY OF PHOSPHORUS: CPT

## 2019-12-01 PROCEDURE — 82805 BLOOD GASES W/O2 SATURATION: CPT

## 2019-12-01 PROCEDURE — 94640 AIRWAY INHALATION TREATMENT: CPT

## 2019-12-01 PROCEDURE — 71045 X-RAY EXAM CHEST 1 VIEW: CPT

## 2019-12-01 PROCEDURE — 99233 SBSQ HOSP IP/OBS HIGH 50: CPT | Performed by: INTERNAL MEDICINE

## 2019-12-01 PROCEDURE — 6370000000 HC RX 637 (ALT 250 FOR IP): Performed by: INTERNAL MEDICINE

## 2019-12-01 PROCEDURE — 2580000003 HC RX 258: Performed by: INTERNAL MEDICINE

## 2019-12-01 PROCEDURE — 84443 ASSAY THYROID STIM HORMONE: CPT

## 2019-12-01 PROCEDURE — 2500000003 HC RX 250 WO HCPCS: Performed by: INTERNAL MEDICINE

## 2019-12-01 PROCEDURE — 2000000000 HC ICU R&B

## 2019-12-01 PROCEDURE — 85018 HEMOGLOBIN: CPT

## 2019-12-01 PROCEDURE — C9113 INJ PANTOPRAZOLE SODIUM, VIA: HCPCS | Performed by: INTERNAL MEDICINE

## 2019-12-01 PROCEDURE — 85014 HEMATOCRIT: CPT

## 2019-12-01 PROCEDURE — 36415 COLL VENOUS BLD VENIPUNCTURE: CPT

## 2019-12-01 PROCEDURE — 85025 COMPLETE CBC W/AUTO DIFF WBC: CPT

## 2019-12-01 PROCEDURE — 36592 COLLECT BLOOD FROM PICC: CPT

## 2019-12-01 PROCEDURE — 83880 ASSAY OF NATRIURETIC PEPTIDE: CPT

## 2019-12-01 PROCEDURE — 83735 ASSAY OF MAGNESIUM: CPT

## 2019-12-01 PROCEDURE — 82962 GLUCOSE BLOOD TEST: CPT

## 2019-12-01 PROCEDURE — 99231 SBSQ HOSP IP/OBS SF/LOW 25: CPT | Performed by: FAMILY MEDICINE

## 2019-12-01 PROCEDURE — 6370000000 HC RX 637 (ALT 250 FOR IP): Performed by: FAMILY MEDICINE

## 2019-12-01 PROCEDURE — 94003 VENT MGMT INPAT SUBQ DAY: CPT

## 2019-12-01 PROCEDURE — 80048 BASIC METABOLIC PNL TOTAL CA: CPT

## 2019-12-01 RX ORDER — BUMETANIDE 0.25 MG/ML
2 INJECTION, SOLUTION INTRAMUSCULAR; INTRAVENOUS ONCE
Status: COMPLETED | OUTPATIENT
Start: 2019-12-01 | End: 2019-12-01

## 2019-12-01 RX ADMIN — ASCORBIC ACID 1500 MG: 500 INJECTION, SOLUTION INTRAMUSCULAR; INTRAVENOUS; SUBCUTANEOUS at 01:05

## 2019-12-01 RX ADMIN — MUPIROCIN: 20 OINTMENT TOPICAL at 21:23

## 2019-12-01 RX ADMIN — ASCORBIC ACID 1500 MG: 500 INJECTION, SOLUTION INTRAMUSCULAR; INTRAVENOUS; SUBCUTANEOUS at 13:51

## 2019-12-01 RX ADMIN — PANTOPRAZOLE SODIUM 40 MG: 40 INJECTION, POWDER, FOR SOLUTION INTRAVENOUS at 08:25

## 2019-12-01 RX ADMIN — MEROPENEM 1 G: 1 INJECTION, POWDER, FOR SOLUTION INTRAVENOUS at 01:05

## 2019-12-01 RX ADMIN — IPRATROPIUM BROMIDE AND ALBUTEROL SULFATE 1 AMPULE: 2.5; .5 SOLUTION RESPIRATORY (INHALATION) at 17:29

## 2019-12-01 RX ADMIN — MEROPENEM 1 G: 1 INJECTION, POWDER, FOR SOLUTION INTRAVENOUS at 17:52

## 2019-12-01 RX ADMIN — ASCORBIC ACID 1500 MG: 500 INJECTION, SOLUTION INTRAMUSCULAR; INTRAVENOUS; SUBCUTANEOUS at 17:52

## 2019-12-01 RX ADMIN — HYDROCORTISONE SODIUM SUCCINATE 50 MG: 100 INJECTION, POWDER, FOR SOLUTION INTRAMUSCULAR; INTRAVENOUS at 06:34

## 2019-12-01 RX ADMIN — THIAMINE HYDROCHLORIDE 200 MG: 100 INJECTION, SOLUTION INTRAMUSCULAR; INTRAVENOUS at 13:51

## 2019-12-01 RX ADMIN — LEVOTHYROXINE SODIUM 88 MCG: 88 TABLET ORAL at 06:34

## 2019-12-01 RX ADMIN — THIAMINE HYDROCHLORIDE 200 MG: 100 INJECTION, SOLUTION INTRAMUSCULAR; INTRAVENOUS at 21:33

## 2019-12-01 RX ADMIN — CETIRIZINE HYDROCHLORIDE 10 MG: 10 TABLET, FILM COATED ORAL at 08:26

## 2019-12-01 RX ADMIN — VITAMIN D, TAB 1000IU (100/BT) 5000 UNITS: 25 TAB at 08:25

## 2019-12-01 RX ADMIN — APIXABAN 5 MG: 5 TABLET, FILM COATED ORAL at 08:26

## 2019-12-01 RX ADMIN — BUMETANIDE 2 MG: 0.25 INJECTION INTRAMUSCULAR; INTRAVENOUS at 15:26

## 2019-12-01 RX ADMIN — IPRATROPIUM BROMIDE AND ALBUTEROL SULFATE 1 AMPULE: 2.5; .5 SOLUTION RESPIRATORY (INHALATION) at 12:35

## 2019-12-01 RX ADMIN — IPRATROPIUM BROMIDE AND ALBUTEROL SULFATE 1 AMPULE: 2.5; .5 SOLUTION RESPIRATORY (INHALATION) at 20:07

## 2019-12-01 RX ADMIN — MUPIROCIN: 20 OINTMENT TOPICAL at 17:53

## 2019-12-01 RX ADMIN — POTASSIUM BICARBONATE 20 MEQ: 782 TABLET, EFFERVESCENT ORAL at 08:25

## 2019-12-01 RX ADMIN — TICAGRELOR 90 MG: 90 TABLET ORAL at 21:22

## 2019-12-01 RX ADMIN — HYDROCORTISONE SODIUM SUCCINATE 50 MG: 100 INJECTION, POWDER, FOR SOLUTION INTRAMUSCULAR; INTRAVENOUS at 21:19

## 2019-12-01 RX ADMIN — POTASSIUM BICARBONATE 20 MEQ: 782 TABLET, EFFERVESCENT ORAL at 21:22

## 2019-12-01 RX ADMIN — MEROPENEM 1 G: 1 INJECTION, POWDER, FOR SOLUTION INTRAVENOUS at 13:21

## 2019-12-01 RX ADMIN — IPRATROPIUM BROMIDE AND ALBUTEROL SULFATE 1 AMPULE: 2.5; .5 SOLUTION RESPIRATORY (INHALATION) at 08:02

## 2019-12-01 RX ADMIN — Medication 10 ML: at 08:26

## 2019-12-01 RX ADMIN — Medication 10 ML: at 21:22

## 2019-12-01 RX ADMIN — Medication 13 MCG/MIN: at 10:03

## 2019-12-01 RX ADMIN — APIXABAN 5 MG: 5 TABLET, FILM COATED ORAL at 21:22

## 2019-12-01 RX ADMIN — TICAGRELOR 90 MG: 90 TABLET ORAL at 08:24

## 2019-12-01 RX ADMIN — ATORVASTATIN CALCIUM 80 MG: 40 TABLET, FILM COATED ORAL at 21:22

## 2019-12-01 RX ADMIN — HYDROCORTISONE SODIUM SUCCINATE 50 MG: 100 INJECTION, POWDER, FOR SOLUTION INTRAMUSCULAR; INTRAVENOUS at 01:05

## 2019-12-01 RX ADMIN — METOPROLOL TARTRATE 25 MG: 25 TABLET ORAL at 15:26

## 2019-12-01 RX ADMIN — PROPOFOL 15 MCG/KG/MIN: 10 INJECTION, EMULSION INTRAVENOUS at 01:09

## 2019-12-01 RX ADMIN — ASCORBIC ACID 1500 MG: 500 INJECTION, SOLUTION INTRAMUSCULAR; INTRAVENOUS; SUBCUTANEOUS at 06:34

## 2019-12-01 ASSESSMENT — PULMONARY FUNCTION TESTS
PIF_VALUE: 24
PIF_VALUE: 24
PIF_VALUE: 22
PIF_VALUE: 23
PIF_VALUE: 26
PIF_VALUE: 17
PIF_VALUE: 26
PIF_VALUE: 17
PIF_VALUE: 24
PIF_VALUE: 25
PIF_VALUE: 26
PIF_VALUE: 24
PIF_VALUE: 26
PIF_VALUE: 21
PIF_VALUE: 27
PIF_VALUE: 26
PIF_VALUE: 21
PIF_VALUE: 12
PIF_VALUE: 24
PIF_VALUE: 12
PIF_VALUE: 24
PIF_VALUE: 26
PIF_VALUE: 24
PIF_VALUE: 24

## 2019-12-01 ASSESSMENT — PAIN SCALES - GENERAL
PAINLEVEL_OUTOF10: 0
PAINLEVEL_OUTOF10: 2

## 2019-12-02 ENCOUNTER — APPOINTMENT (OUTPATIENT)
Dept: GENERAL RADIOLOGY | Age: 64
DRG: 720 | End: 2019-12-02
Payer: MEDICAID

## 2019-12-02 LAB
AADO2: 218 MMHG
AADO2: 377.6 MMHG
ALBUMIN SERPL-MCNC: 2.6 G/DL (ref 3.5–5.2)
ALP BLD-CCNC: 103 U/L (ref 35–104)
ALT SERPL-CCNC: 61 U/L (ref 0–32)
ANION GAP SERPL CALCULATED.3IONS-SCNC: 11 MMOL/L (ref 7–16)
AST SERPL-CCNC: 43 U/L (ref 0–31)
B.E.: 2.2 MMOL/L (ref -3–3)
B.E.: 3.9 MMOL/L (ref -3–3)
BASOPHILS ABSOLUTE: 0.01 E9/L (ref 0–0.2)
BASOPHILS RELATIVE PERCENT: 0.1 % (ref 0–2)
BILIRUB SERPL-MCNC: 0.4 MG/DL (ref 0–1.2)
BILIRUBIN DIRECT: <0.2 MG/DL (ref 0–0.3)
BILIRUBIN, INDIRECT: ABNORMAL MG/DL (ref 0–1)
BUN BLDV-MCNC: 16 MG/DL (ref 8–23)
CALCIUM SERPL-MCNC: 8.2 MG/DL (ref 8.6–10.2)
CHLORIDE BLD-SCNC: 111 MMOL/L (ref 98–107)
CO2: 23 MMOL/L (ref 22–29)
COHB: 0.2 % (ref 0–1.5)
COHB: 0.3 % (ref 0–1.5)
CREAT SERPL-MCNC: 0.9 MG/DL (ref 0.5–1)
CRITICAL: ABNORMAL
CRITICAL: ABNORMAL
CULTURE, RESPIRATORY: NORMAL
DATE ANALYZED: ABNORMAL
DATE ANALYZED: ABNORMAL
DATE OF COLLECTION: ABNORMAL
DATE OF COLLECTION: ABNORMAL
EOSINOPHILS ABSOLUTE: 0 E9/L (ref 0.05–0.5)
EOSINOPHILS RELATIVE PERCENT: 0 % (ref 0–6)
FIO2: 50 %
FIO2: 80 %
GFR AFRICAN AMERICAN: >60
GFR NON-AFRICAN AMERICAN: >60 ML/MIN/1.73
GLUCOSE BLD-MCNC: 131 MG/DL (ref 74–99)
HCO3: 23.1 MMOL/L (ref 22–26)
HCO3: 27.1 MMOL/L (ref 22–26)
HCT VFR BLD CALC: 24.6 % (ref 34–48)
HCT VFR BLD CALC: 25.9 % (ref 34–48)
HEMOGLOBIN: 7.9 G/DL (ref 11.5–15.5)
HEMOGLOBIN: 8.3 G/DL (ref 11.5–15.5)
HHB: 1.6 % (ref 0–5)
HHB: 2.2 % (ref 0–5)
IMMATURE GRANULOCYTES #: 0.15 E9/L
IMMATURE GRANULOCYTES %: 1.2 % (ref 0–5)
LAB: ABNORMAL
LAB: ABNORMAL
LYMPHOCYTES ABSOLUTE: 0.65 E9/L (ref 1.5–4)
LYMPHOCYTES RELATIVE PERCENT: 5.3 % (ref 20–42)
Lab: ABNORMAL
Lab: ABNORMAL
MAGNESIUM: 2.1 MG/DL (ref 1.6–2.6)
MCH RBC QN AUTO: 29.8 PG (ref 26–35)
MCHC RBC AUTO-ENTMCNC: 32.1 % (ref 32–34.5)
MCV RBC AUTO: 92.8 FL (ref 80–99.9)
METER GLUCOSE: 106 MG/DL (ref 74–99)
METER GLUCOSE: 116 MG/DL (ref 74–99)
METER GLUCOSE: 135 MG/DL (ref 74–99)
METER GLUCOSE: 138 MG/DL (ref 74–99)
METHB: 0.4 % (ref 0–1.5)
METHB: 0.4 % (ref 0–1.5)
MODE: AC
MODE: AC
MONOCYTES ABSOLUTE: 0.63 E9/L (ref 0.1–0.95)
MONOCYTES RELATIVE PERCENT: 5.1 % (ref 2–12)
NEUTROPHILS ABSOLUTE: 10.9 E9/L (ref 1.8–7.3)
NEUTROPHILS RELATIVE PERCENT: 88.3 % (ref 43–80)
O2 CONTENT: 11.8 ML/DL
O2 CONTENT: 12.5 ML/DL
O2 SATURATION: 97.8 % (ref 92–98.5)
O2 SATURATION: 98.4 % (ref 92–98.5)
O2HB: 97.2 % (ref 94–97)
O2HB: 97.7 % (ref 94–97)
OPERATOR ID: 2658
OPERATOR ID: 7490
PATIENT TEMP: 37 C
PATIENT TEMP: 37 C
PCO2: 23.5 MMHG (ref 35–45)
PCO2: 35.1 MMHG (ref 35–45)
PDW BLD-RTO: 13.9 FL (ref 11.5–15)
PEEP/CPAP: 10 CMH2O
PEEP/CPAP: 8 CMH2O
PFO2: 1.7 MMHG/%
PFO2: 1.99 MMHG/%
PH BLOOD GAS: 7.5 (ref 7.35–7.45)
PH BLOOD GAS: 7.61 (ref 7.35–7.45)
PHOSPHORUS: 1.6 MG/DL (ref 2.5–4.5)
PLATELET # BLD: 329 E9/L (ref 130–450)
PMV BLD AUTO: 10.1 FL (ref 7–12)
PO2: 136 MMHG (ref 60–100)
PO2: 99.6 MMHG (ref 60–100)
POTASSIUM SERPL-SCNC: 3.5 MMOL/L (ref 3.5–5)
RBC # BLD: 2.65 E12/L (ref 3.5–5.5)
RI(T): 2.19
RI(T): 2.78
RR MECHANICAL: 14 B/MIN
RR MECHANICAL: 18 B/MIN
SMEAR, RESPIRATORY: NORMAL
SODIUM BLD-SCNC: 145 MMOL/L (ref 132–146)
SOURCE, BLOOD GAS: ABNORMAL
SOURCE, BLOOD GAS: ABNORMAL
THB: 8.5 G/DL (ref 11.5–16.5)
THB: 8.9 G/DL (ref 11.5–16.5)
TIME ANALYZED: 534
TIME ANALYZED: 901
TOTAL PROTEIN: 4.9 G/DL (ref 6.4–8.3)
VT MECHANICAL: 400 ML
VT MECHANICAL: 400 ML
WBC # BLD: 12.3 E9/L (ref 4.5–11.5)

## 2019-12-02 PROCEDURE — 6360000002 HC RX W HCPCS: Performed by: INTERNAL MEDICINE

## 2019-12-02 PROCEDURE — 6370000000 HC RX 637 (ALT 250 FOR IP): Performed by: INTERNAL MEDICINE

## 2019-12-02 PROCEDURE — 94640 AIRWAY INHALATION TREATMENT: CPT

## 2019-12-02 PROCEDURE — 83735 ASSAY OF MAGNESIUM: CPT

## 2019-12-02 PROCEDURE — 82805 BLOOD GASES W/O2 SATURATION: CPT

## 2019-12-02 PROCEDURE — 85025 COMPLETE CBC W/AUTO DIFF WBC: CPT

## 2019-12-02 PROCEDURE — 2000000000 HC ICU R&B

## 2019-12-02 PROCEDURE — 80076 HEPATIC FUNCTION PANEL: CPT

## 2019-12-02 PROCEDURE — 2580000003 HC RX 258: Performed by: INTERNAL MEDICINE

## 2019-12-02 PROCEDURE — C9113 INJ PANTOPRAZOLE SODIUM, VIA: HCPCS | Performed by: INTERNAL MEDICINE

## 2019-12-02 PROCEDURE — 99233 SBSQ HOSP IP/OBS HIGH 50: CPT | Performed by: INTERNAL MEDICINE

## 2019-12-02 PROCEDURE — 84100 ASSAY OF PHOSPHORUS: CPT

## 2019-12-02 PROCEDURE — 36415 COLL VENOUS BLD VENIPUNCTURE: CPT

## 2019-12-02 PROCEDURE — 2500000003 HC RX 250 WO HCPCS: Performed by: INTERNAL MEDICINE

## 2019-12-02 PROCEDURE — 82962 GLUCOSE BLOOD TEST: CPT

## 2019-12-02 PROCEDURE — 6370000000 HC RX 637 (ALT 250 FOR IP): Performed by: FAMILY MEDICINE

## 2019-12-02 PROCEDURE — 71045 X-RAY EXAM CHEST 1 VIEW: CPT

## 2019-12-02 PROCEDURE — 85014 HEMATOCRIT: CPT

## 2019-12-02 PROCEDURE — 94003 VENT MGMT INPAT SUBQ DAY: CPT

## 2019-12-02 PROCEDURE — 85018 HEMOGLOBIN: CPT

## 2019-12-02 PROCEDURE — 80048 BASIC METABOLIC PNL TOTAL CA: CPT

## 2019-12-02 RX ORDER — CHLORHEXIDINE GLUCONATE 0.12 MG/ML
15 RINSE ORAL 2 TIMES DAILY
Status: DISCONTINUED | OUTPATIENT
Start: 2019-12-02 | End: 2019-12-05

## 2019-12-02 RX ORDER — POTASSIUM CHLORIDE 29.8 MG/ML
20 INJECTION INTRAVENOUS ONCE
Status: COMPLETED | OUTPATIENT
Start: 2019-12-02 | End: 2019-12-02

## 2019-12-02 RX ORDER — BUMETANIDE 0.25 MG/ML
1 INJECTION, SOLUTION INTRAMUSCULAR; INTRAVENOUS ONCE
Status: COMPLETED | OUTPATIENT
Start: 2019-12-02 | End: 2019-12-02

## 2019-12-02 RX ADMIN — TICAGRELOR 90 MG: 90 TABLET ORAL at 20:27

## 2019-12-02 RX ADMIN — POTASSIUM BICARBONATE 20 MEQ: 782 TABLET, EFFERVESCENT ORAL at 20:22

## 2019-12-02 RX ADMIN — IPRATROPIUM BROMIDE AND ALBUTEROL SULFATE 1 AMPULE: 2.5; .5 SOLUTION RESPIRATORY (INHALATION) at 14:02

## 2019-12-02 RX ADMIN — HYDROCORTISONE SODIUM SUCCINATE 50 MG: 100 INJECTION, POWDER, FOR SOLUTION INTRAMUSCULAR; INTRAVENOUS at 06:27

## 2019-12-02 RX ADMIN — APIXABAN 5 MG: 5 TABLET, FILM COATED ORAL at 20:23

## 2019-12-02 RX ADMIN — POTASSIUM BICARBONATE 20 MEQ: 782 TABLET, EFFERVESCENT ORAL at 08:34

## 2019-12-02 RX ADMIN — MUPIROCIN: 20 OINTMENT TOPICAL at 08:32

## 2019-12-02 RX ADMIN — METOPROLOL TARTRATE 25 MG: 25 TABLET ORAL at 20:23

## 2019-12-02 RX ADMIN — SODIUM PHOSPHATE, MONOBASIC, MONOHYDRATE 10 MMOL: 276; 142 INJECTION, SOLUTION INTRAVENOUS at 12:07

## 2019-12-02 RX ADMIN — VITAMIN D, TAB 1000IU (100/BT) 5000 UNITS: 25 TAB at 08:34

## 2019-12-02 RX ADMIN — CHLORHEXIDINE GLUCONATE 0.12% ORAL RINSE 15 ML: 1.2 LIQUID ORAL at 20:23

## 2019-12-02 RX ADMIN — IPRATROPIUM BROMIDE AND ALBUTEROL SULFATE 1 AMPULE: 2.5; .5 SOLUTION RESPIRATORY (INHALATION) at 16:13

## 2019-12-02 RX ADMIN — IPRATROPIUM BROMIDE AND ALBUTEROL SULFATE 1 AMPULE: 2.5; .5 SOLUTION RESPIRATORY (INHALATION) at 20:05

## 2019-12-02 RX ADMIN — MEROPENEM 1 G: 1 INJECTION, POWDER, FOR SOLUTION INTRAVENOUS at 08:32

## 2019-12-02 RX ADMIN — BUMETANIDE 1 MG: 0.25 INJECTION INTRAMUSCULAR; INTRAVENOUS at 19:32

## 2019-12-02 RX ADMIN — ASCORBIC ACID 1500 MG: 500 INJECTION, SOLUTION INTRAMUSCULAR; INTRAVENOUS; SUBCUTANEOUS at 06:41

## 2019-12-02 RX ADMIN — BUMETANIDE 1 MG: 0.25 INJECTION INTRAMUSCULAR; INTRAVENOUS at 10:28

## 2019-12-02 RX ADMIN — LEVOTHYROXINE SODIUM 88 MCG: 88 TABLET ORAL at 06:27

## 2019-12-02 RX ADMIN — IPRATROPIUM BROMIDE AND ALBUTEROL SULFATE 1 AMPULE: 2.5; .5 SOLUTION RESPIRATORY (INHALATION) at 07:29

## 2019-12-02 RX ADMIN — PANTOPRAZOLE SODIUM 40 MG: 40 INJECTION, POWDER, FOR SOLUTION INTRAVENOUS at 08:33

## 2019-12-02 RX ADMIN — Medication 10 ML: at 20:25

## 2019-12-02 RX ADMIN — FLUTICASONE PROPIONATE 1 SPRAY: 50 SPRAY, METERED NASAL at 08:32

## 2019-12-02 RX ADMIN — ATORVASTATIN CALCIUM 80 MG: 40 TABLET, FILM COATED ORAL at 20:22

## 2019-12-02 RX ADMIN — THIAMINE HYDROCHLORIDE 200 MG: 100 INJECTION, SOLUTION INTRAMUSCULAR; INTRAVENOUS at 08:33

## 2019-12-02 RX ADMIN — CETIRIZINE HYDROCHLORIDE 10 MG: 10 TABLET, FILM COATED ORAL at 08:34

## 2019-12-02 RX ADMIN — ACETAMINOPHEN 650 MG: 325 TABLET, FILM COATED ORAL at 00:44

## 2019-12-02 RX ADMIN — MEROPENEM 1 G: 1 INJECTION, POWDER, FOR SOLUTION INTRAVENOUS at 00:44

## 2019-12-02 RX ADMIN — MUPIROCIN: 20 OINTMENT TOPICAL at 20:24

## 2019-12-02 RX ADMIN — ASCORBIC ACID 1500 MG: 500 INJECTION, SOLUTION INTRAMUSCULAR; INTRAVENOUS; SUBCUTANEOUS at 12:07

## 2019-12-02 RX ADMIN — THIAMINE HYDROCHLORIDE 200 MG: 100 INJECTION, SOLUTION INTRAMUSCULAR; INTRAVENOUS at 20:27

## 2019-12-02 RX ADMIN — ASCORBIC ACID 1500 MG: 500 INJECTION, SOLUTION INTRAMUSCULAR; INTRAVENOUS; SUBCUTANEOUS at 23:57

## 2019-12-02 RX ADMIN — APIXABAN 5 MG: 5 TABLET, FILM COATED ORAL at 08:34

## 2019-12-02 RX ADMIN — ASCORBIC ACID 1500 MG: 500 INJECTION, SOLUTION INTRAMUSCULAR; INTRAVENOUS; SUBCUTANEOUS at 00:17

## 2019-12-02 RX ADMIN — TICAGRELOR 90 MG: 90 TABLET ORAL at 08:37

## 2019-12-02 RX ADMIN — ASCORBIC ACID 1500 MG: 500 INJECTION, SOLUTION INTRAMUSCULAR; INTRAVENOUS; SUBCUTANEOUS at 18:00

## 2019-12-02 RX ADMIN — METOPROLOL TARTRATE 25 MG: 25 TABLET ORAL at 08:34

## 2019-12-02 RX ADMIN — Medication 10 ML: at 08:33

## 2019-12-02 RX ADMIN — ERTAPENEM SODIUM 1000 MG: 1 INJECTION, POWDER, LYOPHILIZED, FOR SOLUTION INTRAMUSCULAR; INTRAVENOUS at 14:54

## 2019-12-02 RX ADMIN — CHLORHEXIDINE GLUCONATE 0.12% ORAL RINSE 15 ML: 1.2 LIQUID ORAL at 12:07

## 2019-12-02 RX ADMIN — POTASSIUM CHLORIDE 20 MEQ: 29.8 INJECTION, SOLUTION INTRAVENOUS at 10:29

## 2019-12-02 ASSESSMENT — PULMONARY FUNCTION TESTS
PIF_VALUE: 20
PIF_VALUE: 21
PIF_VALUE: 20
PIF_VALUE: 19
PIF_VALUE: 26
PIF_VALUE: 18
PIF_VALUE: 26
PIF_VALUE: 20
PIF_VALUE: 21
PIF_VALUE: 22
PIF_VALUE: 21
PIF_VALUE: 20
PIF_VALUE: 19
PIF_VALUE: 21
PIF_VALUE: 21
PIF_VALUE: 33
PIF_VALUE: 19
PIF_VALUE: 19
PIF_VALUE: 27
PIF_VALUE: 25
PIF_VALUE: 23
PIF_VALUE: 22
PIF_VALUE: 23

## 2019-12-02 ASSESSMENT — PAIN SCALES - GENERAL
PAINLEVEL_OUTOF10: 0

## 2019-12-03 ENCOUNTER — APPOINTMENT (OUTPATIENT)
Dept: GENERAL RADIOLOGY | Age: 64
DRG: 720 | End: 2019-12-03
Payer: MEDICAID

## 2019-12-03 LAB
AADO2: 162.9 MMHG
AADO2: 241 MMHG
ANION GAP SERPL CALCULATED.3IONS-SCNC: 12 MMOL/L (ref 7–16)
B.E.: 5.3 MMOL/L (ref -3–3)
B.E.: 6 MMOL/L (ref -3–3)
BASOPHILS ABSOLUTE: 0.03 E9/L (ref 0–0.2)
BASOPHILS RELATIVE PERCENT: 0.3 % (ref 0–2)
BLOOD CULTURE, ROUTINE: NORMAL
BUN BLDV-MCNC: 17 MG/DL (ref 8–23)
CALCIUM SERPL-MCNC: 8.3 MG/DL (ref 8.6–10.2)
CHLORIDE BLD-SCNC: 107 MMOL/L (ref 98–107)
CO2: 28 MMOL/L (ref 22–29)
COHB: 0.3 % (ref 0–1.5)
COHB: 0.3 % (ref 0–1.5)
CREAT SERPL-MCNC: 0.8 MG/DL (ref 0.5–1)
CRITICAL: ABNORMAL
CRITICAL: ABNORMAL
CULTURE, BLOOD 2: NORMAL
DATE ANALYZED: ABNORMAL
DATE ANALYZED: ABNORMAL
DATE OF COLLECTION: ABNORMAL
DATE OF COLLECTION: ABNORMAL
EOSINOPHILS ABSOLUTE: 0.07 E9/L (ref 0.05–0.5)
EOSINOPHILS RELATIVE PERCENT: 0.8 % (ref 0–6)
FIO2: 40 %
FIO2: 60 %
GFR AFRICAN AMERICAN: >60
GFR NON-AFRICAN AMERICAN: >60 ML/MIN/1.73
GLUCOSE BLD-MCNC: 105 MG/DL (ref 74–99)
HCO3: 29 MMOL/L (ref 22–26)
HCO3: 29.2 MMOL/L (ref 22–26)
HCT VFR BLD CALC: 24.5 % (ref 34–48)
HCT VFR BLD CALC: 30.6 % (ref 34–48)
HEMOGLOBIN: 7.7 G/DL (ref 11.5–15.5)
HEMOGLOBIN: 9.6 G/DL (ref 11.5–15.5)
HHB: 1.7 % (ref 0–5)
HHB: 7.3 % (ref 0–5)
IMMATURE GRANULOCYTES #: 0.08 E9/L
IMMATURE GRANULOCYTES %: 0.9 % (ref 0–5)
LAB: ABNORMAL
LAB: ABNORMAL
LYMPHOCYTES ABSOLUTE: 0.81 E9/L (ref 1.5–4)
LYMPHOCYTES RELATIVE PERCENT: 8.8 % (ref 20–42)
Lab: ABNORMAL
Lab: ABNORMAL
MAGNESIUM: 2.2 MG/DL (ref 1.6–2.6)
MCH RBC QN AUTO: 29.2 PG (ref 26–35)
MCHC RBC AUTO-ENTMCNC: 31.4 % (ref 32–34.5)
MCV RBC AUTO: 92.8 FL (ref 80–99.9)
METER GLUCOSE: 109 MG/DL (ref 74–99)
METER GLUCOSE: 110 MG/DL (ref 74–99)
METER GLUCOSE: 112 MG/DL (ref 74–99)
METER GLUCOSE: 121 MG/DL (ref 74–99)
METER GLUCOSE: 98 MG/DL (ref 74–99)
METER GLUCOSE: 99 MG/DL (ref 74–99)
METHB: 0.3 % (ref 0–1.5)
METHB: 0.5 % (ref 0–1.5)
MODE: ABNORMAL
MODE: AC
MONOCYTES ABSOLUTE: 0.58 E9/L (ref 0.1–0.95)
MONOCYTES RELATIVE PERCENT: 6.3 % (ref 2–12)
NEUTROPHILS ABSOLUTE: 7.61 E9/L (ref 1.8–7.3)
NEUTROPHILS RELATIVE PERCENT: 82.9 % (ref 43–80)
O2 CONTENT: 12.6 ML/DL
O2 CONTENT: 13.9 ML/DL
O2 SATURATION: 92.7 % (ref 92–98.5)
O2 SATURATION: 98.3 % (ref 92–98.5)
O2HB: 92.1 % (ref 94–97)
O2HB: 97.5 % (ref 94–97)
OPERATOR ID: 1768
OPERATOR ID: 1874
PATIENT TEMP: 37 C
PATIENT TEMP: 37 C
PCO2: 35.2 MMHG (ref 35–45)
PCO2: 39.9 MMHG (ref 35–45)
PDW BLD-RTO: 14.4 FL (ref 11.5–15)
PEEP/CPAP: 5 CMH2O
PEEP/CPAP: 5 CMH2O
PFO2: 1.66 MMHG/%
PFO2: 2.22 MMHG/%
PH BLOOD GAS: 7.48 (ref 7.35–7.45)
PH BLOOD GAS: 7.53 (ref 7.35–7.45)
PHOSPHORUS: 2.3 MG/DL (ref 2.5–4.5)
PHOSPHORUS: 3.9 MG/DL (ref 2.5–4.5)
PLATELET # BLD: 339 E9/L (ref 130–450)
PMV BLD AUTO: 10.2 FL (ref 7–12)
PO2: 133.1 MMHG (ref 60–100)
PO2: 66.4 MMHG (ref 60–100)
POTASSIUM SERPL-SCNC: 2.9 MMOL/L (ref 3.5–5)
POTASSIUM SERPL-SCNC: 4 MMOL/L (ref 3.5–5)
PRO-BNP: 6519 PG/ML (ref 0–125)
PS: 10 CMH20
RBC # BLD: 2.64 E12/L (ref 3.5–5.5)
RI(T): 1.81
RI(T): 2.45
RR MECHANICAL: 14 B/MIN
SODIUM BLD-SCNC: 147 MMOL/L (ref 132–146)
SOURCE, BLOOD GAS: ABNORMAL
SOURCE, BLOOD GAS: ABNORMAL
THB: 10.7 G/DL (ref 11.5–16.5)
THB: 9 G/DL (ref 11.5–16.5)
TIME ANALYZED: 1747
TIME ANALYZED: 425
VT MECHANICAL: 400 ML
WBC # BLD: 9.2 E9/L (ref 4.5–11.5)

## 2019-12-03 PROCEDURE — 36415 COLL VENOUS BLD VENIPUNCTURE: CPT

## 2019-12-03 PROCEDURE — 2580000003 HC RX 258: Performed by: INTERNAL MEDICINE

## 2019-12-03 PROCEDURE — 85014 HEMATOCRIT: CPT

## 2019-12-03 PROCEDURE — 82805 BLOOD GASES W/O2 SATURATION: CPT

## 2019-12-03 PROCEDURE — 2500000003 HC RX 250 WO HCPCS: Performed by: INTERNAL MEDICINE

## 2019-12-03 PROCEDURE — 6370000000 HC RX 637 (ALT 250 FOR IP): Performed by: INTERNAL MEDICINE

## 2019-12-03 PROCEDURE — 6360000002 HC RX W HCPCS: Performed by: INTERNAL MEDICINE

## 2019-12-03 PROCEDURE — 94003 VENT MGMT INPAT SUBQ DAY: CPT

## 2019-12-03 PROCEDURE — 94640 AIRWAY INHALATION TREATMENT: CPT

## 2019-12-03 PROCEDURE — 71045 X-RAY EXAM CHEST 1 VIEW: CPT

## 2019-12-03 PROCEDURE — 82962 GLUCOSE BLOOD TEST: CPT

## 2019-12-03 PROCEDURE — C9113 INJ PANTOPRAZOLE SODIUM, VIA: HCPCS | Performed by: INTERNAL MEDICINE

## 2019-12-03 PROCEDURE — 83735 ASSAY OF MAGNESIUM: CPT

## 2019-12-03 PROCEDURE — 85025 COMPLETE CBC W/AUTO DIFF WBC: CPT

## 2019-12-03 PROCEDURE — 99233 SBSQ HOSP IP/OBS HIGH 50: CPT | Performed by: INTERNAL MEDICINE

## 2019-12-03 PROCEDURE — 80048 BASIC METABOLIC PNL TOTAL CA: CPT

## 2019-12-03 PROCEDURE — 84132 ASSAY OF SERUM POTASSIUM: CPT

## 2019-12-03 PROCEDURE — 83880 ASSAY OF NATRIURETIC PEPTIDE: CPT

## 2019-12-03 PROCEDURE — 84100 ASSAY OF PHOSPHORUS: CPT

## 2019-12-03 PROCEDURE — 2000000000 HC ICU R&B

## 2019-12-03 PROCEDURE — 85018 HEMOGLOBIN: CPT

## 2019-12-03 PROCEDURE — 6370000000 HC RX 637 (ALT 250 FOR IP): Performed by: FAMILY MEDICINE

## 2019-12-03 RX ORDER — POTASSIUM CHLORIDE 29.8 MG/ML
20 INJECTION INTRAVENOUS PRN
Status: DISCONTINUED | OUTPATIENT
Start: 2019-12-03 | End: 2019-12-07

## 2019-12-03 RX ORDER — POTASSIUM CHLORIDE 29.8 MG/ML
20 INJECTION INTRAVENOUS
Status: COMPLETED | OUTPATIENT
Start: 2019-12-03 | End: 2019-12-03

## 2019-12-03 RX ORDER — POTASSIUM CHLORIDE 29.8 MG/ML
20 INJECTION INTRAVENOUS
Status: DISCONTINUED | OUTPATIENT
Start: 2019-12-03 | End: 2019-12-03

## 2019-12-03 RX ORDER — BUMETANIDE 0.25 MG/ML
1 INJECTION, SOLUTION INTRAMUSCULAR; INTRAVENOUS 2 TIMES DAILY
Status: DISCONTINUED | OUTPATIENT
Start: 2019-12-03 | End: 2019-12-06

## 2019-12-03 RX ORDER — MAGNESIUM SULFATE 1 G/100ML
1 INJECTION INTRAVENOUS ONCE
Status: COMPLETED | OUTPATIENT
Start: 2019-12-03 | End: 2019-12-03

## 2019-12-03 RX ORDER — SODIUM CHLORIDE FOR INHALATION 3 %
4 VIAL, NEBULIZER (ML) INHALATION EVERY 4 HOURS
Status: DISCONTINUED | OUTPATIENT
Start: 2019-12-03 | End: 2019-12-08

## 2019-12-03 RX ORDER — POTASSIUM CHLORIDE 29.8 MG/ML
20 INJECTION INTRAVENOUS ONCE
Status: COMPLETED | OUTPATIENT
Start: 2019-12-03 | End: 2019-12-03

## 2019-12-03 RX ADMIN — APIXABAN 5 MG: 5 TABLET, FILM COATED ORAL at 20:53

## 2019-12-03 RX ADMIN — TICAGRELOR 90 MG: 90 TABLET ORAL at 20:55

## 2019-12-03 RX ADMIN — SODIUM CHLORIDE SOLN NEBU 3% 4 ML: 3 NEBU SOLN at 15:59

## 2019-12-03 RX ADMIN — CETIRIZINE HYDROCHLORIDE 10 MG: 10 TABLET, FILM COATED ORAL at 08:46

## 2019-12-03 RX ADMIN — MAGNESIUM SULFATE 1 G: 1 INJECTION INTRAVENOUS at 11:58

## 2019-12-03 RX ADMIN — METOPROLOL TARTRATE 25 MG: 25 TABLET ORAL at 08:46

## 2019-12-03 RX ADMIN — CHLORHEXIDINE GLUCONATE 0.12% ORAL RINSE 15 ML: 1.2 LIQUID ORAL at 08:48

## 2019-12-03 RX ADMIN — MUPIROCIN: 20 OINTMENT TOPICAL at 20:55

## 2019-12-03 RX ADMIN — POTASSIUM CHLORIDE 20 MEQ: 29.8 INJECTION, SOLUTION INTRAVENOUS at 10:37

## 2019-12-03 RX ADMIN — ASCORBIC ACID 1500 MG: 500 INJECTION, SOLUTION INTRAMUSCULAR; INTRAVENOUS; SUBCUTANEOUS at 05:55

## 2019-12-03 RX ADMIN — IPRATROPIUM BROMIDE AND ALBUTEROL SULFATE 1 AMPULE: 2.5; .5 SOLUTION RESPIRATORY (INHALATION) at 10:28

## 2019-12-03 RX ADMIN — IPRATROPIUM BROMIDE AND ALBUTEROL SULFATE 1 AMPULE: 2.5; .5 SOLUTION RESPIRATORY (INHALATION) at 12:35

## 2019-12-03 RX ADMIN — METOPROLOL TARTRATE 25 MG: 25 TABLET ORAL at 20:53

## 2019-12-03 RX ADMIN — VITAMIN D, TAB 1000IU (100/BT) 5000 UNITS: 25 TAB at 08:46

## 2019-12-03 RX ADMIN — FLUTICASONE PROPIONATE 1 SPRAY: 50 SPRAY, METERED NASAL at 08:48

## 2019-12-03 RX ADMIN — APIXABAN 5 MG: 5 TABLET, FILM COATED ORAL at 08:46

## 2019-12-03 RX ADMIN — BUMETANIDE 1 MG: 0.25 INJECTION INTRAMUSCULAR; INTRAVENOUS at 15:50

## 2019-12-03 RX ADMIN — THIAMINE HYDROCHLORIDE 200 MG: 100 INJECTION, SOLUTION INTRAMUSCULAR; INTRAVENOUS at 08:47

## 2019-12-03 RX ADMIN — PANTOPRAZOLE SODIUM 40 MG: 40 INJECTION, POWDER, FOR SOLUTION INTRAVENOUS at 08:47

## 2019-12-03 RX ADMIN — POTASSIUM BICARBONATE 20 MEQ: 782 TABLET, EFFERVESCENT ORAL at 08:46

## 2019-12-03 RX ADMIN — SODIUM CHLORIDE SOLN NEBU 3% 4 ML: 3 NEBU SOLN at 19:54

## 2019-12-03 RX ADMIN — Medication 10 ML: at 20:54

## 2019-12-03 RX ADMIN — ERTAPENEM SODIUM 1000 MG: 1 INJECTION, POWDER, LYOPHILIZED, FOR SOLUTION INTRAMUSCULAR; INTRAVENOUS at 11:56

## 2019-12-03 RX ADMIN — HYDROCORTISONE SODIUM SUCCINATE 50 MG: 100 INJECTION, POWDER, FOR SOLUTION INTRAMUSCULAR; INTRAVENOUS at 08:47

## 2019-12-03 RX ADMIN — IPRATROPIUM BROMIDE AND ALBUTEROL SULFATE 1 AMPULE: 2.5; .5 SOLUTION RESPIRATORY (INHALATION) at 19:54

## 2019-12-03 RX ADMIN — POTASSIUM CHLORIDE 20 MEQ: 29.8 INJECTION, SOLUTION INTRAVENOUS at 07:54

## 2019-12-03 RX ADMIN — IPRATROPIUM BROMIDE AND ALBUTEROL SULFATE 1 AMPULE: 2.5; .5 SOLUTION RESPIRATORY (INHALATION) at 15:59

## 2019-12-03 RX ADMIN — LEVOTHYROXINE SODIUM 88 MCG: 88 TABLET ORAL at 08:46

## 2019-12-03 RX ADMIN — POTASSIUM CHLORIDE 20 MEQ: 29.8 INJECTION, SOLUTION INTRAVENOUS at 09:31

## 2019-12-03 RX ADMIN — ATORVASTATIN CALCIUM 80 MG: 40 TABLET, FILM COATED ORAL at 20:52

## 2019-12-03 RX ADMIN — Medication 10 ML: at 08:48

## 2019-12-03 RX ADMIN — CHLORHEXIDINE GLUCONATE 0.12% ORAL RINSE 15 ML: 1.2 LIQUID ORAL at 20:54

## 2019-12-03 RX ADMIN — TICAGRELOR 90 MG: 90 TABLET ORAL at 08:46

## 2019-12-03 RX ADMIN — POTASSIUM PHOSPHATE, MONOBASIC AND POTASSIUM PHOSPHATE, DIBASIC 15 MMOL: 224; 236 INJECTION, SOLUTION, CONCENTRATE INTRAVENOUS at 09:31

## 2019-12-03 RX ADMIN — POTASSIUM BICARBONATE 20 MEQ: 782 TABLET, EFFERVESCENT ORAL at 20:52

## 2019-12-03 RX ADMIN — MUPIROCIN: 20 OINTMENT TOPICAL at 08:49

## 2019-12-03 ASSESSMENT — PAIN SCALES - GENERAL
PAINLEVEL_OUTOF10: 0

## 2019-12-03 ASSESSMENT — PULMONARY FUNCTION TESTS
PIF_VALUE: 16
PIF_VALUE: 19
PIF_VALUE: 21
PIF_VALUE: 16
PIF_VALUE: 22
PIF_VALUE: 21
PIF_VALUE: 19
PIF_VALUE: 16
PIF_VALUE: 21
PIF_VALUE: 22
PIF_VALUE: 21
PIF_VALUE: 19
PIF_VALUE: 16
PIF_VALUE: 19
PIF_VALUE: 16
PIF_VALUE: 16
PIF_VALUE: 22
PIF_VALUE: 21
PIF_VALUE: 19
PIF_VALUE: 16
PIF_VALUE: 16
PIF_VALUE: 15
PIF_VALUE: 16
PIF_VALUE: 16
PIF_VALUE: 22

## 2019-12-04 ENCOUNTER — APPOINTMENT (OUTPATIENT)
Dept: CT IMAGING | Age: 64
DRG: 720 | End: 2019-12-04
Payer: MEDICAID

## 2019-12-04 ENCOUNTER — APPOINTMENT (OUTPATIENT)
Dept: GENERAL RADIOLOGY | Age: 64
DRG: 720 | End: 2019-12-04
Payer: MEDICAID

## 2019-12-04 LAB
AADO2: 279.7 MMHG
ANION GAP SERPL CALCULATED.3IONS-SCNC: 13 MMOL/L (ref 7–16)
B.E.: 5.9 MMOL/L (ref -3–3)
BASOPHILS ABSOLUTE: 0.02 E9/L (ref 0–0.2)
BASOPHILS RELATIVE PERCENT: 0.2 % (ref 0–2)
BUN BLDV-MCNC: 13 MG/DL (ref 8–23)
CALCIUM SERPL-MCNC: 8.4 MG/DL (ref 8.6–10.2)
CHLORIDE BLD-SCNC: 107 MMOL/L (ref 98–107)
CO2: 26 MMOL/L (ref 22–29)
COHB: 0.3 % (ref 0–1.5)
CREAT SERPL-MCNC: 0.7 MG/DL (ref 0.5–1)
CRITICAL: ABNORMAL
DATE ANALYZED: ABNORMAL
DATE OF COLLECTION: ABNORMAL
EOSINOPHILS ABSOLUTE: 0.11 E9/L (ref 0.05–0.5)
EOSINOPHILS RELATIVE PERCENT: 1.2 % (ref 0–6)
FIO2: 60 %
GFR AFRICAN AMERICAN: >60
GFR NON-AFRICAN AMERICAN: >60 ML/MIN/1.73
GLUCOSE BLD-MCNC: 130 MG/DL (ref 74–99)
HCO3: 29.7 MMOL/L (ref 22–26)
HCT VFR BLD CALC: 27.2 % (ref 34–48)
HEMOGLOBIN: 8.4 G/DL (ref 11.5–15.5)
HHB: 3.7 % (ref 0–5)
IMMATURE GRANULOCYTES #: 0.08 E9/L
IMMATURE GRANULOCYTES %: 0.9 % (ref 0–5)
LAB: ABNORMAL
LYMPHOCYTES ABSOLUTE: 0.86 E9/L (ref 1.5–4)
LYMPHOCYTES RELATIVE PERCENT: 9.2 % (ref 20–42)
Lab: ABNORMAL
MAGNESIUM: 2.4 MG/DL (ref 1.6–2.6)
MCH RBC QN AUTO: 29.1 PG (ref 26–35)
MCHC RBC AUTO-ENTMCNC: 30.9 % (ref 32–34.5)
MCV RBC AUTO: 94.1 FL (ref 80–99.9)
METER GLUCOSE: 104 MG/DL (ref 74–99)
METER GLUCOSE: 109 MG/DL (ref 74–99)
METHB: 0.4 % (ref 0–1.5)
MODE: AC
MONOCYTES ABSOLUTE: 0.62 E9/L (ref 0.1–0.95)
MONOCYTES RELATIVE PERCENT: 6.6 % (ref 2–12)
NEUTROPHILS ABSOLUTE: 7.65 E9/L (ref 1.8–7.3)
NEUTROPHILS RELATIVE PERCENT: 81.9 % (ref 43–80)
O2 CONTENT: 13 ML/DL
O2 SATURATION: 96.3 % (ref 92–98.5)
O2HB: 95.6 % (ref 94–97)
OPERATOR ID: 1893
PATIENT TEMP: 37 C
PCO2: 39.9 MMHG (ref 35–45)
PDW BLD-RTO: 14.4 FL (ref 11.5–15)
PEEP/CPAP: 5 CMH2O
PFO2: 1.49 MMHG/%
PH BLOOD GAS: 7.49 (ref 7.35–7.45)
PHOSPHORUS: 3.3 MG/DL (ref 2.5–4.5)
PLATELET # BLD: 382 E9/L (ref 130–450)
PMV BLD AUTO: 9.7 FL (ref 7–12)
PO2: 89.2 MMHG (ref 60–100)
POTASSIUM SERPL-SCNC: 3.9 MMOL/L (ref 3.5–5)
RBC # BLD: 2.89 E12/L (ref 3.5–5.5)
RI(T): 3.14
RR MECHANICAL: 14 B/MIN
SODIUM BLD-SCNC: 146 MMOL/L (ref 132–146)
SOURCE, BLOOD GAS: ABNORMAL
THB: 9.6 G/DL (ref 11.5–16.5)
TIME ANALYZED: 506
VT MECHANICAL: 400 ML
WBC # BLD: 9.3 E9/L (ref 4.5–11.5)

## 2019-12-04 PROCEDURE — 2580000003 HC RX 258: Performed by: INTERNAL MEDICINE

## 2019-12-04 PROCEDURE — 82805 BLOOD GASES W/O2 SATURATION: CPT

## 2019-12-04 PROCEDURE — 85025 COMPLETE CBC W/AUTO DIFF WBC: CPT

## 2019-12-04 PROCEDURE — 71045 X-RAY EXAM CHEST 1 VIEW: CPT

## 2019-12-04 PROCEDURE — 71250 CT THORAX DX C-: CPT

## 2019-12-04 PROCEDURE — 99233 SBSQ HOSP IP/OBS HIGH 50: CPT | Performed by: INTERNAL MEDICINE

## 2019-12-04 PROCEDURE — 6370000000 HC RX 637 (ALT 250 FOR IP): Performed by: INTERNAL MEDICINE

## 2019-12-04 PROCEDURE — 84100 ASSAY OF PHOSPHORUS: CPT

## 2019-12-04 PROCEDURE — 2000000000 HC ICU R&B

## 2019-12-04 PROCEDURE — 6360000002 HC RX W HCPCS: Performed by: INTERNAL MEDICINE

## 2019-12-04 PROCEDURE — 94003 VENT MGMT INPAT SUBQ DAY: CPT

## 2019-12-04 PROCEDURE — 82962 GLUCOSE BLOOD TEST: CPT

## 2019-12-04 PROCEDURE — 80048 BASIC METABOLIC PNL TOTAL CA: CPT

## 2019-12-04 PROCEDURE — 2500000003 HC RX 250 WO HCPCS: Performed by: INTERNAL MEDICINE

## 2019-12-04 PROCEDURE — 37799 UNLISTED PX VASCULAR SURGERY: CPT

## 2019-12-04 PROCEDURE — 36415 COLL VENOUS BLD VENIPUNCTURE: CPT

## 2019-12-04 PROCEDURE — 94667 MNPJ CHEST WALL 1ST: CPT

## 2019-12-04 PROCEDURE — 6370000000 HC RX 637 (ALT 250 FOR IP): Performed by: FAMILY MEDICINE

## 2019-12-04 PROCEDURE — 94640 AIRWAY INHALATION TREATMENT: CPT

## 2019-12-04 PROCEDURE — 83735 ASSAY OF MAGNESIUM: CPT

## 2019-12-04 PROCEDURE — C9113 INJ PANTOPRAZOLE SODIUM, VIA: HCPCS | Performed by: INTERNAL MEDICINE

## 2019-12-04 RX ORDER — ALBUMIN, HUMAN INJ 5% 5 %
50 SOLUTION INTRAVENOUS ONCE
Status: DISCONTINUED | OUTPATIENT
Start: 2019-12-04 | End: 2019-12-04

## 2019-12-04 RX ADMIN — IPRATROPIUM BROMIDE AND ALBUTEROL SULFATE 1 AMPULE: 2.5; .5 SOLUTION RESPIRATORY (INHALATION) at 13:34

## 2019-12-04 RX ADMIN — APIXABAN 5 MG: 5 TABLET, FILM COATED ORAL at 09:40

## 2019-12-04 RX ADMIN — SODIUM CHLORIDE, PRESERVATIVE FREE 10 ML: 5 INJECTION INTRAVENOUS at 17:04

## 2019-12-04 RX ADMIN — MUPIROCIN: 20 OINTMENT TOPICAL at 20:51

## 2019-12-04 RX ADMIN — TICAGRELOR 90 MG: 90 TABLET ORAL at 20:51

## 2019-12-04 RX ADMIN — SODIUM CHLORIDE SOLN NEBU 3% 4 ML: 3 NEBU SOLN at 13:35

## 2019-12-04 RX ADMIN — ERTAPENEM SODIUM 1000 MG: 1 INJECTION, POWDER, LYOPHILIZED, FOR SOLUTION INTRAMUSCULAR; INTRAVENOUS at 12:14

## 2019-12-04 RX ADMIN — Medication 10 ML: at 09:44

## 2019-12-04 RX ADMIN — SODIUM CHLORIDE SOLN NEBU 3% 4 ML: 3 NEBU SOLN at 20:18

## 2019-12-04 RX ADMIN — SODIUM CHLORIDE SOLN NEBU 3% 4 ML: 3 NEBU SOLN at 16:13

## 2019-12-04 RX ADMIN — Medication 10 ML: at 20:51

## 2019-12-04 RX ADMIN — CHLORHEXIDINE GLUCONATE 0.12% ORAL RINSE 15 ML: 1.2 LIQUID ORAL at 09:38

## 2019-12-04 RX ADMIN — SODIUM CHLORIDE SOLN NEBU 3% 4 ML: 3 NEBU SOLN at 03:19

## 2019-12-04 RX ADMIN — IPRATROPIUM BROMIDE AND ALBUTEROL SULFATE 1 AMPULE: 2.5; .5 SOLUTION RESPIRATORY (INHALATION) at 16:13

## 2019-12-04 RX ADMIN — MUPIROCIN: 20 OINTMENT TOPICAL at 09:43

## 2019-12-04 RX ADMIN — TICAGRELOR 90 MG: 90 TABLET ORAL at 09:44

## 2019-12-04 RX ADMIN — SODIUM CHLORIDE SOLN NEBU 3% 4 ML: 3 NEBU SOLN at 23:37

## 2019-12-04 RX ADMIN — IPRATROPIUM BROMIDE AND ALBUTEROL SULFATE 1 AMPULE: 2.5; .5 SOLUTION RESPIRATORY (INHALATION) at 20:18

## 2019-12-04 RX ADMIN — VITAMIN D, TAB 1000IU (100/BT) 5000 UNITS: 25 TAB at 09:39

## 2019-12-04 RX ADMIN — LEVOTHYROXINE SODIUM 88 MCG: 88 TABLET ORAL at 05:26

## 2019-12-04 RX ADMIN — PANTOPRAZOLE SODIUM 40 MG: 40 INJECTION, POWDER, FOR SOLUTION INTRAVENOUS at 09:39

## 2019-12-04 RX ADMIN — BUMETANIDE 1 MG: 0.25 INJECTION INTRAMUSCULAR; INTRAVENOUS at 09:39

## 2019-12-04 RX ADMIN — POTASSIUM BICARBONATE 20 MEQ: 782 TABLET, EFFERVESCENT ORAL at 20:50

## 2019-12-04 RX ADMIN — BUMETANIDE 1 MG: 0.25 INJECTION INTRAMUSCULAR; INTRAVENOUS at 17:03

## 2019-12-04 RX ADMIN — METOPROLOL TARTRATE 25 MG: 25 TABLET ORAL at 09:40

## 2019-12-04 RX ADMIN — CHLORHEXIDINE GLUCONATE 0.12% ORAL RINSE 15 ML: 1.2 LIQUID ORAL at 20:51

## 2019-12-04 RX ADMIN — SODIUM CHLORIDE SOLN NEBU 3% 4 ML: 3 NEBU SOLN at 08:49

## 2019-12-04 RX ADMIN — IPRATROPIUM BROMIDE AND ALBUTEROL SULFATE 1 AMPULE: 2.5; .5 SOLUTION RESPIRATORY (INHALATION) at 08:49

## 2019-12-04 RX ADMIN — FLUTICASONE PROPIONATE 1 SPRAY: 50 SPRAY, METERED NASAL at 09:43

## 2019-12-04 RX ADMIN — METOPROLOL TARTRATE 25 MG: 25 TABLET ORAL at 20:50

## 2019-12-04 RX ADMIN — POTASSIUM BICARBONATE 20 MEQ: 782 TABLET, EFFERVESCENT ORAL at 09:39

## 2019-12-04 RX ADMIN — CETIRIZINE HYDROCHLORIDE 10 MG: 10 TABLET, FILM COATED ORAL at 09:40

## 2019-12-04 ASSESSMENT — PULMONARY FUNCTION TESTS
PIF_VALUE: 20
PIF_VALUE: 20
PIF_VALUE: 21
PIF_VALUE: 20
PIF_VALUE: 22
PIF_VALUE: 18
PIF_VALUE: 24
PIF_VALUE: 20
PIF_VALUE: 16
PIF_VALUE: 19
PIF_VALUE: 20
PIF_VALUE: 21
PIF_VALUE: 20
PIF_VALUE: 21
PIF_VALUE: 22
PIF_VALUE: 20
PIF_VALUE: 23
PIF_VALUE: 21
PIF_VALUE: 20
PIF_VALUE: 20
PIF_VALUE: 22
PIF_VALUE: 21
PIF_VALUE: 22
PIF_VALUE: 21
PIF_VALUE: 21

## 2019-12-04 ASSESSMENT — PAIN SCALES - GENERAL
PAINLEVEL_OUTOF10: 0
PAINLEVEL_OUTOF10: 0

## 2019-12-05 ENCOUNTER — APPOINTMENT (OUTPATIENT)
Dept: GENERAL RADIOLOGY | Age: 64
DRG: 720 | End: 2019-12-05
Payer: MEDICAID

## 2019-12-05 LAB
AADO2: 230.5 MMHG
ANION GAP SERPL CALCULATED.3IONS-SCNC: 14 MMOL/L (ref 7–16)
B.E.: 4.2 MMOL/L (ref -3–3)
BASOPHILS ABSOLUTE: 0.04 E9/L (ref 0–0.2)
BASOPHILS RELATIVE PERCENT: 0.4 % (ref 0–2)
BUN BLDV-MCNC: 15 MG/DL (ref 8–23)
CALCIUM SERPL-MCNC: 8.3 MG/DL (ref 8.6–10.2)
CHLORIDE BLD-SCNC: 105 MMOL/L (ref 98–107)
CO2: 25 MMOL/L (ref 22–29)
COHB: 0.2 % (ref 0–1.5)
CREAT SERPL-MCNC: 0.8 MG/DL (ref 0.5–1)
CRITICAL: ABNORMAL
DATE ANALYZED: ABNORMAL
DATE OF COLLECTION: ABNORMAL
EOSINOPHILS ABSOLUTE: 0.23 E9/L (ref 0.05–0.5)
EOSINOPHILS RELATIVE PERCENT: 2.5 % (ref 0–6)
FIO2: 50 %
GFR AFRICAN AMERICAN: >60
GFR NON-AFRICAN AMERICAN: >60 ML/MIN/1.73
GLUCOSE BLD-MCNC: 91 MG/DL (ref 74–99)
HCO3: 27.7 MMOL/L (ref 22–26)
HCT VFR BLD CALC: 28.5 % (ref 34–48)
HEMOGLOBIN: 8.8 G/DL (ref 11.5–15.5)
HHB: 6.4 % (ref 0–5)
IMMATURE GRANULOCYTES #: 0.1 E9/L
IMMATURE GRANULOCYTES %: 1.1 % (ref 0–5)
LAB: ABNORMAL
LYMPHOCYTES ABSOLUTE: 0.78 E9/L (ref 1.5–4)
LYMPHOCYTES RELATIVE PERCENT: 8.5 % (ref 20–42)
Lab: ABNORMAL
MAGNESIUM: 2.3 MG/DL (ref 1.6–2.6)
MCH RBC QN AUTO: 29.1 PG (ref 26–35)
MCHC RBC AUTO-ENTMCNC: 30.9 % (ref 32–34.5)
MCV RBC AUTO: 94.4 FL (ref 80–99.9)
METER GLUCOSE: 83 MG/DL (ref 74–99)
METHB: 0.5 % (ref 0–1.5)
MODE: ABNORMAL
MONOCYTES ABSOLUTE: 0.61 E9/L (ref 0.1–0.95)
MONOCYTES RELATIVE PERCENT: 6.7 % (ref 2–12)
NEUTROPHILS ABSOLUTE: 7.39 E9/L (ref 1.8–7.3)
NEUTROPHILS RELATIVE PERCENT: 80.8 % (ref 43–80)
O2 CONTENT: 13 ML/DL
O2 SATURATION: 93.6 % (ref 92–98.5)
O2HB: 92.9 % (ref 94–97)
OPERATOR ID: 7490
PATIENT TEMP: 37 C
PCO2: 36.9 MMHG (ref 35–45)
PDW BLD-RTO: 14.6 FL (ref 11.5–15)
PEEP/CPAP: 5 CMH2O
PFO2: 1.44 MMHG/%
PH BLOOD GAS: 7.49 (ref 7.35–7.45)
PHOSPHORUS: 3.7 MG/DL (ref 2.5–4.5)
PLATELET # BLD: 422 E9/L (ref 130–450)
PMV BLD AUTO: 10.1 FL (ref 7–12)
PO2: 72 MMHG (ref 60–100)
POTASSIUM SERPL-SCNC: 3.7 MMOL/L (ref 3.5–5)
PRO-BNP: 2180 PG/ML (ref 0–125)
PS: 14 CMH20
RBC # BLD: 3.02 E12/L (ref 3.5–5.5)
RI(T): 3.2
SODIUM BLD-SCNC: 144 MMOL/L (ref 132–146)
SOURCE, BLOOD GAS: ABNORMAL
THB: 9.9 G/DL (ref 11.5–16.5)
TIME ANALYZED: 540
URINE CULTURE, ROUTINE: NORMAL
WBC # BLD: 9.2 E9/L (ref 4.5–11.5)

## 2019-12-05 PROCEDURE — 80048 BASIC METABOLIC PNL TOTAL CA: CPT

## 2019-12-05 PROCEDURE — 6360000002 HC RX W HCPCS: Performed by: INTERNAL MEDICINE

## 2019-12-05 PROCEDURE — 94669 MECHANICAL CHEST WALL OSCILL: CPT

## 2019-12-05 PROCEDURE — 94640 AIRWAY INHALATION TREATMENT: CPT

## 2019-12-05 PROCEDURE — 83880 ASSAY OF NATRIURETIC PEPTIDE: CPT

## 2019-12-05 PROCEDURE — 37799 UNLISTED PX VASCULAR SURGERY: CPT

## 2019-12-05 PROCEDURE — 2500000003 HC RX 250 WO HCPCS: Performed by: INTERNAL MEDICINE

## 2019-12-05 PROCEDURE — 83735 ASSAY OF MAGNESIUM: CPT

## 2019-12-05 PROCEDURE — 6370000000 HC RX 637 (ALT 250 FOR IP): Performed by: INTERNAL MEDICINE

## 2019-12-05 PROCEDURE — 94003 VENT MGMT INPAT SUBQ DAY: CPT

## 2019-12-05 PROCEDURE — 99233 SBSQ HOSP IP/OBS HIGH 50: CPT | Performed by: INTERNAL MEDICINE

## 2019-12-05 PROCEDURE — 2580000003 HC RX 258: Performed by: INTERNAL MEDICINE

## 2019-12-05 PROCEDURE — 97163 PT EVAL HIGH COMPLEX 45 MIN: CPT

## 2019-12-05 PROCEDURE — 2000000000 HC ICU R&B

## 2019-12-05 PROCEDURE — 82805 BLOOD GASES W/O2 SATURATION: CPT

## 2019-12-05 PROCEDURE — 97530 THERAPEUTIC ACTIVITIES: CPT

## 2019-12-05 PROCEDURE — 85025 COMPLETE CBC W/AUTO DIFF WBC: CPT

## 2019-12-05 PROCEDURE — C9113 INJ PANTOPRAZOLE SODIUM, VIA: HCPCS | Performed by: INTERNAL MEDICINE

## 2019-12-05 PROCEDURE — 2700000000 HC OXYGEN THERAPY PER DAY

## 2019-12-05 PROCEDURE — 6370000000 HC RX 637 (ALT 250 FOR IP): Performed by: FAMILY MEDICINE

## 2019-12-05 PROCEDURE — 82962 GLUCOSE BLOOD TEST: CPT

## 2019-12-05 PROCEDURE — 84100 ASSAY OF PHOSPHORUS: CPT

## 2019-12-05 PROCEDURE — 71045 X-RAY EXAM CHEST 1 VIEW: CPT

## 2019-12-05 RX ADMIN — LEVOTHYROXINE SODIUM 88 MCG: 88 TABLET ORAL at 07:53

## 2019-12-05 RX ADMIN — SODIUM CHLORIDE SOLN NEBU 3% 4 ML: 3 NEBU SOLN at 08:58

## 2019-12-05 RX ADMIN — DORNASE ALFA 2.5 MG: 1 SOLUTION RESPIRATORY (INHALATION) at 18:13

## 2019-12-05 RX ADMIN — BUMETANIDE 1 MG: 0.25 INJECTION INTRAMUSCULAR; INTRAVENOUS at 17:00

## 2019-12-05 RX ADMIN — TICAGRELOR 90 MG: 90 TABLET ORAL at 07:50

## 2019-12-05 RX ADMIN — SODIUM CHLORIDE SOLN NEBU 3% 4 ML: 3 NEBU SOLN at 04:16

## 2019-12-05 RX ADMIN — CHLORHEXIDINE GLUCONATE 0.12% ORAL RINSE 15 ML: 1.2 LIQUID ORAL at 07:53

## 2019-12-05 RX ADMIN — IPRATROPIUM BROMIDE AND ALBUTEROL SULFATE 1 AMPULE: 2.5; .5 SOLUTION RESPIRATORY (INHALATION) at 21:04

## 2019-12-05 RX ADMIN — METOPROLOL TARTRATE 25 MG: 25 TABLET ORAL at 07:50

## 2019-12-05 RX ADMIN — MUPIROCIN: 20 OINTMENT TOPICAL at 07:51

## 2019-12-05 RX ADMIN — IPRATROPIUM BROMIDE AND ALBUTEROL SULFATE 1 AMPULE: 2.5; .5 SOLUTION RESPIRATORY (INHALATION) at 08:58

## 2019-12-05 RX ADMIN — POTASSIUM BICARBONATE 20 MEQ: 782 TABLET, EFFERVESCENT ORAL at 07:51

## 2019-12-05 RX ADMIN — Medication 10 ML: at 20:45

## 2019-12-05 RX ADMIN — Medication 10 ML: at 07:53

## 2019-12-05 RX ADMIN — IPRATROPIUM BROMIDE AND ALBUTEROL SULFATE 1 AMPULE: 2.5; .5 SOLUTION RESPIRATORY (INHALATION) at 18:14

## 2019-12-05 RX ADMIN — ERTAPENEM SODIUM 1000 MG: 1 INJECTION, POWDER, LYOPHILIZED, FOR SOLUTION INTRAMUSCULAR; INTRAVENOUS at 14:01

## 2019-12-05 RX ADMIN — SODIUM CHLORIDE SOLN NEBU 3% 4 ML: 3 NEBU SOLN at 18:14

## 2019-12-05 RX ADMIN — VITAMIN D, TAB 1000IU (100/BT) 5000 UNITS: 25 TAB at 07:50

## 2019-12-05 RX ADMIN — CETIRIZINE HYDROCHLORIDE 10 MG: 10 TABLET, FILM COATED ORAL at 07:50

## 2019-12-05 RX ADMIN — SODIUM CHLORIDE SOLN NEBU 3% 4 ML: 3 NEBU SOLN at 21:04

## 2019-12-05 RX ADMIN — PANTOPRAZOLE SODIUM 40 MG: 40 INJECTION, POWDER, FOR SOLUTION INTRAVENOUS at 07:53

## 2019-12-05 RX ADMIN — FLUTICASONE PROPIONATE 1 SPRAY: 50 SPRAY, METERED NASAL at 07:51

## 2019-12-05 RX ADMIN — BUMETANIDE 1 MG: 0.25 INJECTION INTRAMUSCULAR; INTRAVENOUS at 07:53

## 2019-12-05 RX ADMIN — MUPIROCIN: 20 OINTMENT TOPICAL at 21:23

## 2019-12-05 ASSESSMENT — PAIN SCALES - GENERAL
PAINLEVEL_OUTOF10: 0

## 2019-12-05 ASSESSMENT — PULMONARY FUNCTION TESTS
PIF_VALUE: 20
PIF_VALUE: 16
PIF_VALUE: 0
PIF_VALUE: 20
PIF_VALUE: 16
PIF_VALUE: 20

## 2019-12-06 ENCOUNTER — APPOINTMENT (OUTPATIENT)
Dept: GENERAL RADIOLOGY | Age: 64
DRG: 720 | End: 2019-12-06
Payer: MEDICAID

## 2019-12-06 ENCOUNTER — APPOINTMENT (OUTPATIENT)
Dept: INTERVENTIONAL RADIOLOGY/VASCULAR | Age: 64
DRG: 720 | End: 2019-12-06
Payer: MEDICAID

## 2019-12-06 LAB
ANION GAP SERPL CALCULATED.3IONS-SCNC: 17 MMOL/L (ref 7–16)
B.E.: 3.3 MMOL/L (ref -3–3)
BASOPHILS ABSOLUTE: 0.04 E9/L (ref 0–0.2)
BASOPHILS RELATIVE PERCENT: 0.3 % (ref 0–2)
BUN BLDV-MCNC: 19 MG/DL (ref 8–23)
CALCIUM SERPL-MCNC: 8.8 MG/DL (ref 8.6–10.2)
CHLORIDE BLD-SCNC: 100 MMOL/L (ref 98–107)
CO2: 24 MMOL/L (ref 22–29)
COHB: 0.7 % (ref 0–1.5)
CREAT SERPL-MCNC: 0.8 MG/DL (ref 0.5–1)
CRITICAL: ABNORMAL
DATE ANALYZED: ABNORMAL
DATE OF COLLECTION: ABNORMAL
EOSINOPHILS ABSOLUTE: 0.23 E9/L (ref 0.05–0.5)
EOSINOPHILS RELATIVE PERCENT: 1.9 % (ref 0–6)
GFR AFRICAN AMERICAN: >60
GFR NON-AFRICAN AMERICAN: >60 ML/MIN/1.73
GLUCOSE BLD-MCNC: 90 MG/DL (ref 74–99)
HCO3: 27.1 MMOL/L (ref 22–26)
HCT VFR BLD CALC: 30.8 % (ref 34–48)
HEMOGLOBIN: 9.5 G/DL (ref 11.5–15.5)
HHB: 6.2 % (ref 0–5)
IMMATURE GRANULOCYTES #: 0.13 E9/L
IMMATURE GRANULOCYTES %: 1.1 % (ref 0–5)
LAB: ABNORMAL
LYMPHOCYTES ABSOLUTE: 0.6 E9/L (ref 1.5–4)
LYMPHOCYTES RELATIVE PERCENT: 5.1 % (ref 20–42)
Lab: ABNORMAL
MAGNESIUM: 2.3 MG/DL (ref 1.6–2.6)
MCH RBC QN AUTO: 29 PG (ref 26–35)
MCHC RBC AUTO-ENTMCNC: 30.8 % (ref 32–34.5)
MCV RBC AUTO: 93.9 FL (ref 80–99.9)
METER GLUCOSE: 81 MG/DL (ref 74–99)
METER GLUCOSE: 84 MG/DL (ref 74–99)
METER GLUCOSE: 89 MG/DL (ref 74–99)
METER GLUCOSE: 93 MG/DL (ref 74–99)
METHB: 0.5 % (ref 0–1.5)
MODE: ABNORMAL
MONOCYTES ABSOLUTE: 0.66 E9/L (ref 0.1–0.95)
MONOCYTES RELATIVE PERCENT: 5.6 % (ref 2–12)
NEUTROPHILS ABSOLUTE: 10.15 E9/L (ref 1.8–7.3)
NEUTROPHILS RELATIVE PERCENT: 86 % (ref 43–80)
O2 CONTENT: 14.4 ML/DL
O2 SATURATION: 93.7 % (ref 92–98.5)
O2HB: 92.6 % (ref 94–97)
OPERATOR ID: ABNORMAL
PATIENT TEMP: 37 C
PCO2: 38.3 MMHG (ref 35–45)
PDW BLD-RTO: 14.3 FL (ref 11.5–15)
PH BLOOD GAS: 7.47 (ref 7.35–7.45)
PHOSPHORUS: 3.5 MG/DL (ref 2.5–4.5)
PLATELET # BLD: 540 E9/L (ref 130–450)
PMV BLD AUTO: 10.1 FL (ref 7–12)
PO2: 72.9 MMHG (ref 60–100)
POTASSIUM SERPL-SCNC: 3.8 MMOL/L (ref 3.5–5)
RBC # BLD: 3.28 E12/L (ref 3.5–5.5)
SODIUM BLD-SCNC: 141 MMOL/L (ref 132–146)
SOURCE, BLOOD GAS: ABNORMAL
THB: 11 G/DL (ref 11.5–16.5)
TIME ANALYZED: 422
WBC # BLD: 11.8 E9/L (ref 4.5–11.5)

## 2019-12-06 PROCEDURE — 92610 EVALUATE SWALLOWING FUNCTION: CPT | Performed by: SPEECH-LANGUAGE PATHOLOGIST

## 2019-12-06 PROCEDURE — 2580000003 HC RX 258: Performed by: INTERNAL MEDICINE

## 2019-12-06 PROCEDURE — C9113 INJ PANTOPRAZOLE SODIUM, VIA: HCPCS | Performed by: INTERNAL MEDICINE

## 2019-12-06 PROCEDURE — 82805 BLOOD GASES W/O2 SATURATION: CPT

## 2019-12-06 PROCEDURE — 2700000000 HC OXYGEN THERAPY PER DAY

## 2019-12-06 PROCEDURE — 76604 US EXAM CHEST: CPT

## 2019-12-06 PROCEDURE — 99232 SBSQ HOSP IP/OBS MODERATE 35: CPT | Performed by: INTERNAL MEDICINE

## 2019-12-06 PROCEDURE — 92526 ORAL FUNCTION THERAPY: CPT | Performed by: SPEECH-LANGUAGE PATHOLOGIST

## 2019-12-06 PROCEDURE — 71045 X-RAY EXAM CHEST 1 VIEW: CPT

## 2019-12-06 PROCEDURE — 36415 COLL VENOUS BLD VENIPUNCTURE: CPT

## 2019-12-06 PROCEDURE — 2500000003 HC RX 250 WO HCPCS: Performed by: INTERNAL MEDICINE

## 2019-12-06 PROCEDURE — 80048 BASIC METABOLIC PNL TOTAL CA: CPT

## 2019-12-06 PROCEDURE — 74018 RADEX ABDOMEN 1 VIEW: CPT

## 2019-12-06 PROCEDURE — 6360000002 HC RX W HCPCS: Performed by: INTERNAL MEDICINE

## 2019-12-06 PROCEDURE — 6370000000 HC RX 637 (ALT 250 FOR IP): Performed by: INTERNAL MEDICINE

## 2019-12-06 PROCEDURE — 94640 AIRWAY INHALATION TREATMENT: CPT

## 2019-12-06 PROCEDURE — 2720000010 HC SURG SUPPLY STERILE

## 2019-12-06 PROCEDURE — 84100 ASSAY OF PHOSPHORUS: CPT

## 2019-12-06 PROCEDURE — 82962 GLUCOSE BLOOD TEST: CPT

## 2019-12-06 PROCEDURE — 83735 ASSAY OF MAGNESIUM: CPT

## 2019-12-06 PROCEDURE — 94668 MNPJ CHEST WALL SBSQ: CPT

## 2019-12-06 PROCEDURE — 85025 COMPLETE CBC W/AUTO DIFF WBC: CPT

## 2019-12-06 PROCEDURE — 2060000000 HC ICU INTERMEDIATE R&B

## 2019-12-06 RX ORDER — SODIUM CHLORIDE 9 MG/ML
10 INJECTION INTRAVENOUS DAILY
Status: DISCONTINUED | OUTPATIENT
Start: 2019-12-06 | End: 2019-12-16 | Stop reason: HOSPADM

## 2019-12-06 RX ORDER — PANTOPRAZOLE SODIUM 40 MG/10ML
40 INJECTION, POWDER, LYOPHILIZED, FOR SOLUTION INTRAVENOUS DAILY
Status: DISCONTINUED | OUTPATIENT
Start: 2019-12-06 | End: 2019-12-16 | Stop reason: HOSPADM

## 2019-12-06 RX ORDER — BUMETANIDE 0.25 MG/ML
1 INJECTION, SOLUTION INTRAMUSCULAR; INTRAVENOUS DAILY
Status: DISCONTINUED | OUTPATIENT
Start: 2019-12-06 | End: 2019-12-10

## 2019-12-06 RX ORDER — PANTOPRAZOLE SODIUM 40 MG/1
40 TABLET, DELAYED RELEASE ORAL
Status: DISCONTINUED | OUTPATIENT
Start: 2019-12-06 | End: 2019-12-06

## 2019-12-06 RX ORDER — BUMETANIDE 1 MG/1
1 TABLET ORAL DAILY
Status: DISCONTINUED | OUTPATIENT
Start: 2019-12-06 | End: 2019-12-06

## 2019-12-06 RX ADMIN — BUMETANIDE 1 MG: 0.25 INJECTION INTRAMUSCULAR; INTRAVENOUS at 08:38

## 2019-12-06 RX ADMIN — SODIUM CHLORIDE SOLN NEBU 3% 4 ML: 3 NEBU SOLN at 03:45

## 2019-12-06 RX ADMIN — SODIUM CHLORIDE SOLN NEBU 3% 4 ML: 3 NEBU SOLN at 08:56

## 2019-12-06 RX ADMIN — DORNASE ALFA 2.5 MG: 1 SOLUTION RESPIRATORY (INHALATION) at 08:56

## 2019-12-06 RX ADMIN — SODIUM CHLORIDE SOLN NEBU 3% 4 ML: 3 NEBU SOLN at 00:58

## 2019-12-06 RX ADMIN — MUPIROCIN: 20 OINTMENT TOPICAL at 08:45

## 2019-12-06 RX ADMIN — IPRATROPIUM BROMIDE AND ALBUTEROL SULFATE 1 AMPULE: 2.5; .5 SOLUTION RESPIRATORY (INHALATION) at 08:56

## 2019-12-06 RX ADMIN — FLUTICASONE PROPIONATE 1 SPRAY: 50 SPRAY, METERED NASAL at 08:44

## 2019-12-06 RX ADMIN — Medication 10 ML: at 08:38

## 2019-12-06 RX ADMIN — SODIUM CHLORIDE SOLN NEBU 3% 4 ML: 3 NEBU SOLN at 11:54

## 2019-12-06 RX ADMIN — PANTOPRAZOLE SODIUM 40 MG: 40 INJECTION, POWDER, FOR SOLUTION INTRAVENOUS at 08:38

## 2019-12-06 RX ADMIN — IPRATROPIUM BROMIDE AND ALBUTEROL SULFATE 1 AMPULE: 2.5; .5 SOLUTION RESPIRATORY (INHALATION) at 11:54

## 2019-12-06 ASSESSMENT — PAIN SCALES - GENERAL
PAINLEVEL_OUTOF10: 0

## 2019-12-07 LAB
ANION GAP SERPL CALCULATED.3IONS-SCNC: 18 MMOL/L (ref 7–16)
BASOPHILS ABSOLUTE: 0.03 E9/L (ref 0–0.2)
BASOPHILS RELATIVE PERCENT: 0.3 % (ref 0–2)
BUN BLDV-MCNC: 18 MG/DL (ref 8–23)
CALCIUM SERPL-MCNC: 9.1 MG/DL (ref 8.6–10.2)
CHLORIDE BLD-SCNC: 106 MMOL/L (ref 98–107)
CO2: 23 MMOL/L (ref 22–29)
CREAT SERPL-MCNC: 0.7 MG/DL (ref 0.5–1)
EOSINOPHILS ABSOLUTE: 0.15 E9/L (ref 0.05–0.5)
EOSINOPHILS RELATIVE PERCENT: 1.4 % (ref 0–6)
GFR AFRICAN AMERICAN: >60
GFR NON-AFRICAN AMERICAN: >60 ML/MIN/1.73
GLUCOSE BLD-MCNC: 115 MG/DL (ref 74–99)
HCT VFR BLD CALC: 32.2 % (ref 34–48)
HEMOGLOBIN: 9.9 G/DL (ref 11.5–15.5)
IMMATURE GRANULOCYTES #: 0.12 E9/L
IMMATURE GRANULOCYTES %: 1.1 % (ref 0–5)
LYMPHOCYTES ABSOLUTE: 0.68 E9/L (ref 1.5–4)
LYMPHOCYTES RELATIVE PERCENT: 6.5 % (ref 20–42)
MAGNESIUM: 2.3 MG/DL (ref 1.6–2.6)
MCH RBC QN AUTO: 28.9 PG (ref 26–35)
MCHC RBC AUTO-ENTMCNC: 30.7 % (ref 32–34.5)
MCV RBC AUTO: 94.2 FL (ref 80–99.9)
METER GLUCOSE: 134 MG/DL (ref 74–99)
METER GLUCOSE: 159 MG/DL (ref 74–99)
METER GLUCOSE: 185 MG/DL (ref 74–99)
METER GLUCOSE: 92 MG/DL (ref 74–99)
MONOCYTES ABSOLUTE: 0.77 E9/L (ref 0.1–0.95)
MONOCYTES RELATIVE PERCENT: 7.3 % (ref 2–12)
NEUTROPHILS ABSOLUTE: 8.79 E9/L (ref 1.8–7.3)
NEUTROPHILS RELATIVE PERCENT: 83.4 % (ref 43–80)
PDW BLD-RTO: 14.3 FL (ref 11.5–15)
PHOSPHORUS: 2.5 MG/DL (ref 2.5–4.5)
PLATELET # BLD: 656 E9/L (ref 130–450)
PMV BLD AUTO: 10.4 FL (ref 7–12)
POTASSIUM SERPL-SCNC: 3.8 MMOL/L (ref 3.5–5)
PRO-BNP: 1430 PG/ML (ref 0–125)
RBC # BLD: 3.42 E12/L (ref 3.5–5.5)
SODIUM BLD-SCNC: 147 MMOL/L (ref 132–146)
WBC # BLD: 10.5 E9/L (ref 4.5–11.5)

## 2019-12-07 PROCEDURE — 2500000003 HC RX 250 WO HCPCS: Performed by: INTERNAL MEDICINE

## 2019-12-07 PROCEDURE — 94640 AIRWAY INHALATION TREATMENT: CPT

## 2019-12-07 PROCEDURE — 6370000000 HC RX 637 (ALT 250 FOR IP): Performed by: INTERNAL MEDICINE

## 2019-12-07 PROCEDURE — 83880 ASSAY OF NATRIURETIC PEPTIDE: CPT

## 2019-12-07 PROCEDURE — 82962 GLUCOSE BLOOD TEST: CPT

## 2019-12-07 PROCEDURE — 6360000002 HC RX W HCPCS: Performed by: INTERNAL MEDICINE

## 2019-12-07 PROCEDURE — 80048 BASIC METABOLIC PNL TOTAL CA: CPT

## 2019-12-07 PROCEDURE — 2580000003 HC RX 258: Performed by: INTERNAL MEDICINE

## 2019-12-07 PROCEDURE — 36415 COLL VENOUS BLD VENIPUNCTURE: CPT

## 2019-12-07 PROCEDURE — 6370000000 HC RX 637 (ALT 250 FOR IP): Performed by: FAMILY MEDICINE

## 2019-12-07 PROCEDURE — 2700000000 HC OXYGEN THERAPY PER DAY

## 2019-12-07 PROCEDURE — 84100 ASSAY OF PHOSPHORUS: CPT

## 2019-12-07 PROCEDURE — 94668 MNPJ CHEST WALL SBSQ: CPT

## 2019-12-07 PROCEDURE — 2060000000 HC ICU INTERMEDIATE R&B

## 2019-12-07 PROCEDURE — C9113 INJ PANTOPRAZOLE SODIUM, VIA: HCPCS | Performed by: INTERNAL MEDICINE

## 2019-12-07 PROCEDURE — 85025 COMPLETE CBC W/AUTO DIFF WBC: CPT

## 2019-12-07 PROCEDURE — 83735 ASSAY OF MAGNESIUM: CPT

## 2019-12-07 RX ADMIN — TICAGRELOR 90 MG: 90 TABLET ORAL at 10:07

## 2019-12-07 RX ADMIN — SODIUM CHLORIDE SOLN NEBU 3% 4 ML: 3 NEBU SOLN at 20:05

## 2019-12-07 RX ADMIN — POTASSIUM BICARBONATE 20 MEQ: 782 TABLET, EFFERVESCENT ORAL at 10:07

## 2019-12-07 RX ADMIN — VITAMIN D, TAB 1000IU (100/BT) 5000 UNITS: 25 TAB at 10:06

## 2019-12-07 RX ADMIN — LEVOTHYROXINE SODIUM 88 MCG: 88 TABLET ORAL at 06:51

## 2019-12-07 RX ADMIN — IPRATROPIUM BROMIDE AND ALBUTEROL SULFATE 1 AMPULE: 2.5; .5 SOLUTION RESPIRATORY (INHALATION) at 20:05

## 2019-12-07 RX ADMIN — IPRATROPIUM BROMIDE AND ALBUTEROL SULFATE 1 AMPULE: 2.5; .5 SOLUTION RESPIRATORY (INHALATION) at 16:36

## 2019-12-07 RX ADMIN — SODIUM CHLORIDE SOLN NEBU 3% 4 ML: 3 NEBU SOLN at 12:20

## 2019-12-07 RX ADMIN — LISINOPRIL 5 MG: 5 TABLET ORAL at 10:06

## 2019-12-07 RX ADMIN — SODIUM CHLORIDE SOLN NEBU 3% 4 ML: 3 NEBU SOLN at 00:16

## 2019-12-07 RX ADMIN — MUPIROCIN: 20 OINTMENT TOPICAL at 10:37

## 2019-12-07 RX ADMIN — DORNASE ALFA 2.5 MG: 1 SOLUTION RESPIRATORY (INHALATION) at 16:35

## 2019-12-07 RX ADMIN — SODIUM CHLORIDE, PRESERVATIVE FREE 10 ML: 5 INJECTION INTRAVENOUS at 10:08

## 2019-12-07 RX ADMIN — PANTOPRAZOLE SODIUM 40 MG: 40 INJECTION, POWDER, FOR SOLUTION INTRAVENOUS at 10:07

## 2019-12-07 RX ADMIN — IPRATROPIUM BROMIDE AND ALBUTEROL SULFATE 1 AMPULE: 2.5; .5 SOLUTION RESPIRATORY (INHALATION) at 12:19

## 2019-12-07 RX ADMIN — POTASSIUM BICARBONATE 20 MEQ: 782 TABLET, EFFERVESCENT ORAL at 00:31

## 2019-12-07 RX ADMIN — Medication 10 ML: at 10:07

## 2019-12-07 RX ADMIN — POTASSIUM BICARBONATE 20 MEQ: 782 TABLET, EFFERVESCENT ORAL at 21:26

## 2019-12-07 RX ADMIN — METOPROLOL TARTRATE 25 MG: 25 TABLET ORAL at 00:32

## 2019-12-07 RX ADMIN — MUPIROCIN: 20 OINTMENT TOPICAL at 21:40

## 2019-12-07 RX ADMIN — IPRATROPIUM BROMIDE AND ALBUTEROL SULFATE 1 AMPULE: 2.5; .5 SOLUTION RESPIRATORY (INHALATION) at 09:23

## 2019-12-07 RX ADMIN — SODIUM CHLORIDE SOLN NEBU 3% 4 ML: 3 NEBU SOLN at 09:23

## 2019-12-07 RX ADMIN — TICAGRELOR 90 MG: 90 TABLET ORAL at 00:31

## 2019-12-07 RX ADMIN — FLUTICASONE PROPIONATE 1 SPRAY: 50 SPRAY, METERED NASAL at 10:38

## 2019-12-07 RX ADMIN — TICAGRELOR 90 MG: 90 TABLET ORAL at 21:26

## 2019-12-07 RX ADMIN — METOPROLOL TARTRATE 25 MG: 25 TABLET ORAL at 10:07

## 2019-12-07 RX ADMIN — CETIRIZINE HYDROCHLORIDE 10 MG: 10 TABLET, FILM COATED ORAL at 10:06

## 2019-12-07 RX ADMIN — SODIUM CHLORIDE SOLN NEBU 3% 4 ML: 3 NEBU SOLN at 03:28

## 2019-12-07 RX ADMIN — BUMETANIDE 1 MG: 0.25 INJECTION INTRAMUSCULAR; INTRAVENOUS at 10:07

## 2019-12-07 RX ADMIN — Medication 10 ML: at 21:28

## 2019-12-07 RX ADMIN — Medication 10 ML: at 00:32

## 2019-12-07 RX ADMIN — SODIUM CHLORIDE SOLN NEBU 3% 4 ML: 3 NEBU SOLN at 16:36

## 2019-12-07 RX ADMIN — METOPROLOL TARTRATE 25 MG: 25 TABLET ORAL at 21:26

## 2019-12-07 ASSESSMENT — PAIN SCALES - GENERAL
PAINLEVEL_OUTOF10: 0

## 2019-12-08 LAB
ANION GAP SERPL CALCULATED.3IONS-SCNC: 15 MMOL/L (ref 7–16)
BASOPHILS ABSOLUTE: 0.05 E9/L (ref 0–0.2)
BASOPHILS RELATIVE PERCENT: 0.5 % (ref 0–2)
BUN BLDV-MCNC: 23 MG/DL (ref 8–23)
CALCIUM SERPL-MCNC: 9.2 MG/DL (ref 8.6–10.2)
CHLORIDE BLD-SCNC: 104 MMOL/L (ref 98–107)
CO2: 23 MMOL/L (ref 22–29)
CREAT SERPL-MCNC: 0.8 MG/DL (ref 0.5–1)
EOSINOPHILS ABSOLUTE: 0.1 E9/L (ref 0.05–0.5)
EOSINOPHILS RELATIVE PERCENT: 1 % (ref 0–6)
GFR AFRICAN AMERICAN: >60
GFR NON-AFRICAN AMERICAN: >60 ML/MIN/1.73
GLUCOSE BLD-MCNC: 143 MG/DL (ref 74–99)
HCT VFR BLD CALC: 34.9 % (ref 34–48)
HEMOGLOBIN: 10.3 G/DL (ref 11.5–15.5)
IMMATURE GRANULOCYTES #: 0.07 E9/L
IMMATURE GRANULOCYTES %: 0.7 % (ref 0–5)
LYMPHOCYTES ABSOLUTE: 1.07 E9/L (ref 1.5–4)
LYMPHOCYTES RELATIVE PERCENT: 11.1 % (ref 20–42)
MAGNESIUM: 2.6 MG/DL (ref 1.6–2.6)
MCH RBC QN AUTO: 29 PG (ref 26–35)
MCHC RBC AUTO-ENTMCNC: 29.5 % (ref 32–34.5)
MCV RBC AUTO: 98.3 FL (ref 80–99.9)
METER GLUCOSE: 145 MG/DL (ref 74–99)
METER GLUCOSE: 146 MG/DL (ref 74–99)
METER GLUCOSE: 150 MG/DL (ref 74–99)
METER GLUCOSE: 151 MG/DL (ref 74–99)
MONOCYTES ABSOLUTE: 0.73 E9/L (ref 0.1–0.95)
MONOCYTES RELATIVE PERCENT: 7.6 % (ref 2–12)
NEUTROPHILS ABSOLUTE: 7.62 E9/L (ref 1.8–7.3)
NEUTROPHILS RELATIVE PERCENT: 79.1 % (ref 43–80)
PDW BLD-RTO: 14.5 FL (ref 11.5–15)
PHOSPHORUS: 3.5 MG/DL (ref 2.5–4.5)
PLATELET # BLD: 661 E9/L (ref 130–450)
PMV BLD AUTO: 10.8 FL (ref 7–12)
POTASSIUM SERPL-SCNC: 4.2 MMOL/L (ref 3.5–5)
RBC # BLD: 3.55 E12/L (ref 3.5–5.5)
SODIUM BLD-SCNC: 142 MMOL/L (ref 132–146)
WBC # BLD: 9.6 E9/L (ref 4.5–11.5)

## 2019-12-08 PROCEDURE — 6370000000 HC RX 637 (ALT 250 FOR IP): Performed by: INTERNAL MEDICINE

## 2019-12-08 PROCEDURE — 94640 AIRWAY INHALATION TREATMENT: CPT

## 2019-12-08 PROCEDURE — 2500000003 HC RX 250 WO HCPCS: Performed by: INTERNAL MEDICINE

## 2019-12-08 PROCEDURE — C9113 INJ PANTOPRAZOLE SODIUM, VIA: HCPCS | Performed by: INTERNAL MEDICINE

## 2019-12-08 PROCEDURE — 2700000000 HC OXYGEN THERAPY PER DAY

## 2019-12-08 PROCEDURE — 36415 COLL VENOUS BLD VENIPUNCTURE: CPT

## 2019-12-08 PROCEDURE — 2580000003 HC RX 258: Performed by: INTERNAL MEDICINE

## 2019-12-08 PROCEDURE — 82962 GLUCOSE BLOOD TEST: CPT

## 2019-12-08 PROCEDURE — 2060000000 HC ICU INTERMEDIATE R&B

## 2019-12-08 PROCEDURE — 6360000002 HC RX W HCPCS: Performed by: INTERNAL MEDICINE

## 2019-12-08 PROCEDURE — 80048 BASIC METABOLIC PNL TOTAL CA: CPT

## 2019-12-08 PROCEDURE — 83735 ASSAY OF MAGNESIUM: CPT

## 2019-12-08 PROCEDURE — 94668 MNPJ CHEST WALL SBSQ: CPT

## 2019-12-08 PROCEDURE — 85025 COMPLETE CBC W/AUTO DIFF WBC: CPT

## 2019-12-08 PROCEDURE — 84100 ASSAY OF PHOSPHORUS: CPT

## 2019-12-08 PROCEDURE — 94669 MECHANICAL CHEST WALL OSCILL: CPT

## 2019-12-08 RX ORDER — SODIUM CHLORIDE FOR INHALATION 3 %
4 VIAL, NEBULIZER (ML) INHALATION 4 TIMES DAILY
Status: DISCONTINUED | OUTPATIENT
Start: 2019-12-09 | End: 2019-12-16 | Stop reason: HOSPADM

## 2019-12-08 RX ADMIN — SODIUM CHLORIDE SOLN NEBU 3% 4 ML: 3 NEBU SOLN at 21:17

## 2019-12-08 RX ADMIN — Medication 10 ML: at 09:50

## 2019-12-08 RX ADMIN — SODIUM CHLORIDE, PRESERVATIVE FREE 10 ML: 5 INJECTION INTRAVENOUS at 09:50

## 2019-12-08 RX ADMIN — SODIUM CHLORIDE SOLN NEBU 3% 4 ML: 3 NEBU SOLN at 17:50

## 2019-12-08 RX ADMIN — SODIUM CHLORIDE SOLN NEBU 3% 4 ML: 3 NEBU SOLN at 13:37

## 2019-12-08 RX ADMIN — POTASSIUM BICARBONATE 20 MEQ: 782 TABLET, EFFERVESCENT ORAL at 22:32

## 2019-12-08 RX ADMIN — METOPROLOL TARTRATE 25 MG: 25 TABLET ORAL at 22:33

## 2019-12-08 RX ADMIN — IPRATROPIUM BROMIDE AND ALBUTEROL SULFATE 1 AMPULE: 2.5; .5 SOLUTION RESPIRATORY (INHALATION) at 08:44

## 2019-12-08 RX ADMIN — FLUTICASONE PROPIONATE 1 SPRAY: 50 SPRAY, METERED NASAL at 09:51

## 2019-12-08 RX ADMIN — BUMETANIDE 1 MG: 0.25 INJECTION INTRAMUSCULAR; INTRAVENOUS at 09:50

## 2019-12-08 RX ADMIN — POTASSIUM BICARBONATE 20 MEQ: 782 TABLET, EFFERVESCENT ORAL at 09:50

## 2019-12-08 RX ADMIN — SODIUM CHLORIDE SOLN NEBU 3% 4 ML: 3 NEBU SOLN at 00:41

## 2019-12-08 RX ADMIN — VITAMIN D, TAB 1000IU (100/BT) 5000 UNITS: 25 TAB at 09:53

## 2019-12-08 RX ADMIN — DORNASE ALFA 2.5 MG: 1 SOLUTION RESPIRATORY (INHALATION) at 08:45

## 2019-12-08 RX ADMIN — SODIUM CHLORIDE SOLN NEBU 3% 4 ML: 3 NEBU SOLN at 04:21

## 2019-12-08 RX ADMIN — ACETAMINOPHEN 650 MG: 325 TABLET, FILM COATED ORAL at 09:55

## 2019-12-08 RX ADMIN — Medication 10 ML: at 22:33

## 2019-12-08 RX ADMIN — PANTOPRAZOLE SODIUM 40 MG: 40 INJECTION, POWDER, FOR SOLUTION INTRAVENOUS at 09:50

## 2019-12-08 RX ADMIN — LEVOTHYROXINE SODIUM 88 MCG: 88 TABLET ORAL at 06:38

## 2019-12-08 RX ADMIN — LISINOPRIL 5 MG: 5 TABLET ORAL at 09:53

## 2019-12-08 RX ADMIN — IPRATROPIUM BROMIDE AND ALBUTEROL SULFATE 1 AMPULE: 2.5; .5 SOLUTION RESPIRATORY (INHALATION) at 21:16

## 2019-12-08 RX ADMIN — METOPROLOL TARTRATE 25 MG: 25 TABLET ORAL at 09:50

## 2019-12-08 RX ADMIN — IPRATROPIUM BROMIDE AND ALBUTEROL SULFATE 1 AMPULE: 2.5; .5 SOLUTION RESPIRATORY (INHALATION) at 17:42

## 2019-12-08 RX ADMIN — CETIRIZINE HYDROCHLORIDE 10 MG: 10 TABLET, FILM COATED ORAL at 09:53

## 2019-12-08 RX ADMIN — SODIUM CHLORIDE SOLN NEBU 3% 4 ML: 3 NEBU SOLN at 08:44

## 2019-12-08 RX ADMIN — IPRATROPIUM BROMIDE AND ALBUTEROL SULFATE 1 AMPULE: 2.5; .5 SOLUTION RESPIRATORY (INHALATION) at 13:27

## 2019-12-08 RX ADMIN — TICAGRELOR 90 MG: 90 TABLET ORAL at 09:50

## 2019-12-08 RX ADMIN — TICAGRELOR 90 MG: 90 TABLET ORAL at 22:32

## 2019-12-08 ASSESSMENT — PAIN SCALES - GENERAL
PAINLEVEL_OUTOF10: 8
PAINLEVEL_OUTOF10: 0

## 2019-12-09 ENCOUNTER — APPOINTMENT (OUTPATIENT)
Dept: GENERAL RADIOLOGY | Age: 64
DRG: 720 | End: 2019-12-09
Payer: MEDICAID

## 2019-12-09 LAB
ANION GAP SERPL CALCULATED.3IONS-SCNC: 15 MMOL/L (ref 7–16)
BASOPHILS ABSOLUTE: 0.04 E9/L (ref 0–0.2)
BASOPHILS RELATIVE PERCENT: 0.3 % (ref 0–2)
BUN BLDV-MCNC: 21 MG/DL (ref 8–23)
CALCIUM SERPL-MCNC: 8.8 MG/DL (ref 8.6–10.2)
CHLORIDE BLD-SCNC: 106 MMOL/L (ref 98–107)
CO2: 24 MMOL/L (ref 22–29)
CREAT SERPL-MCNC: 0.8 MG/DL (ref 0.5–1)
EOSINOPHILS ABSOLUTE: 0.12 E9/L (ref 0.05–0.5)
EOSINOPHILS RELATIVE PERCENT: 1 % (ref 0–6)
GFR AFRICAN AMERICAN: >60
GFR NON-AFRICAN AMERICAN: >60 ML/MIN/1.73
GLUCOSE BLD-MCNC: 145 MG/DL (ref 74–99)
HCT VFR BLD CALC: 33 % (ref 34–48)
HEMOGLOBIN: 9.8 G/DL (ref 11.5–15.5)
IMMATURE GRANULOCYTES #: 0.11 E9/L
IMMATURE GRANULOCYTES %: 0.9 % (ref 0–5)
LYMPHOCYTES ABSOLUTE: 0.97 E9/L (ref 1.5–4)
LYMPHOCYTES RELATIVE PERCENT: 8.1 % (ref 20–42)
MAGNESIUM: 2.4 MG/DL (ref 1.6–2.6)
MCH RBC QN AUTO: 28.6 PG (ref 26–35)
MCHC RBC AUTO-ENTMCNC: 29.7 % (ref 32–34.5)
MCV RBC AUTO: 96.2 FL (ref 80–99.9)
METER GLUCOSE: 132 MG/DL (ref 74–99)
METER GLUCOSE: 151 MG/DL (ref 74–99)
METER GLUCOSE: 155 MG/DL (ref 74–99)
METER GLUCOSE: 162 MG/DL (ref 74–99)
MONOCYTES ABSOLUTE: 0.87 E9/L (ref 0.1–0.95)
MONOCYTES RELATIVE PERCENT: 7.2 % (ref 2–12)
NEUTROPHILS ABSOLUTE: 9.9 E9/L (ref 1.8–7.3)
NEUTROPHILS RELATIVE PERCENT: 82.5 % (ref 43–80)
PDW BLD-RTO: 14.8 FL (ref 11.5–15)
PHOSPHORUS: 2.9 MG/DL (ref 2.5–4.5)
PLATELET # BLD: 712 E9/L (ref 130–450)
PMV BLD AUTO: 10.1 FL (ref 7–12)
POTASSIUM SERPL-SCNC: 3.6 MMOL/L (ref 3.5–5)
RBC # BLD: 3.43 E12/L (ref 3.5–5.5)
SODIUM BLD-SCNC: 145 MMOL/L (ref 132–146)
WBC # BLD: 12 E9/L (ref 4.5–11.5)

## 2019-12-09 PROCEDURE — 80048 BASIC METABOLIC PNL TOTAL CA: CPT

## 2019-12-09 PROCEDURE — 2500000003 HC RX 250 WO HCPCS: Performed by: INTERNAL MEDICINE

## 2019-12-09 PROCEDURE — 6360000002 HC RX W HCPCS: Performed by: INTERNAL MEDICINE

## 2019-12-09 PROCEDURE — 2060000000 HC ICU INTERMEDIATE R&B

## 2019-12-09 PROCEDURE — 83735 ASSAY OF MAGNESIUM: CPT

## 2019-12-09 PROCEDURE — 36415 COLL VENOUS BLD VENIPUNCTURE: CPT

## 2019-12-09 PROCEDURE — 2700000000 HC OXYGEN THERAPY PER DAY

## 2019-12-09 PROCEDURE — 2580000003 HC RX 258: Performed by: INTERNAL MEDICINE

## 2019-12-09 PROCEDURE — C9113 INJ PANTOPRAZOLE SODIUM, VIA: HCPCS | Performed by: INTERNAL MEDICINE

## 2019-12-09 PROCEDURE — 71045 X-RAY EXAM CHEST 1 VIEW: CPT

## 2019-12-09 PROCEDURE — 82962 GLUCOSE BLOOD TEST: CPT

## 2019-12-09 PROCEDURE — 84100 ASSAY OF PHOSPHORUS: CPT

## 2019-12-09 PROCEDURE — 6370000000 HC RX 637 (ALT 250 FOR IP): Performed by: INTERNAL MEDICINE

## 2019-12-09 PROCEDURE — 6360000002 HC RX W HCPCS: Performed by: NURSE PRACTITIONER

## 2019-12-09 PROCEDURE — 92526 ORAL FUNCTION THERAPY: CPT

## 2019-12-09 PROCEDURE — 94640 AIRWAY INHALATION TREATMENT: CPT

## 2019-12-09 PROCEDURE — 94668 MNPJ CHEST WALL SBSQ: CPT

## 2019-12-09 PROCEDURE — 85025 COMPLETE CBC W/AUTO DIFF WBC: CPT

## 2019-12-09 RX ORDER — ALBUTEROL SULFATE 2.5 MG/3ML
2.5 SOLUTION RESPIRATORY (INHALATION) 4 TIMES DAILY
Status: COMPLETED | OUTPATIENT
Start: 2019-12-09 | End: 2019-12-09

## 2019-12-09 RX ADMIN — IPRATROPIUM BROMIDE AND ALBUTEROL SULFATE 1 AMPULE: 2.5; .5 SOLUTION RESPIRATORY (INHALATION) at 17:27

## 2019-12-09 RX ADMIN — POTASSIUM BICARBONATE 20 MEQ: 782 TABLET, EFFERVESCENT ORAL at 11:35

## 2019-12-09 RX ADMIN — APIXABAN 5 MG: 5 TABLET, FILM COATED ORAL at 20:28

## 2019-12-09 RX ADMIN — TICAGRELOR 90 MG: 90 TABLET ORAL at 20:28

## 2019-12-09 RX ADMIN — Medication 10 ML: at 20:33

## 2019-12-09 RX ADMIN — PANTOPRAZOLE SODIUM 40 MG: 40 INJECTION, POWDER, FOR SOLUTION INTRAVENOUS at 11:36

## 2019-12-09 RX ADMIN — VITAMIN D, TAB 1000IU (100/BT) 5000 UNITS: 25 TAB at 11:34

## 2019-12-09 RX ADMIN — METOPROLOL TARTRATE 25 MG: 25 TABLET ORAL at 20:28

## 2019-12-09 RX ADMIN — Medication 10 ML: at 11:52

## 2019-12-09 RX ADMIN — BUMETANIDE 1 MG: 0.25 INJECTION INTRAMUSCULAR; INTRAVENOUS at 11:36

## 2019-12-09 RX ADMIN — GABAPENTIN 300 MG: 300 CAPSULE ORAL at 20:28

## 2019-12-09 RX ADMIN — SODIUM CHLORIDE SOLN NEBU 3% 4 ML: 3 NEBU SOLN at 13:10

## 2019-12-09 RX ADMIN — IPRATROPIUM BROMIDE AND ALBUTEROL SULFATE 1 AMPULE: 2.5; .5 SOLUTION RESPIRATORY (INHALATION) at 09:22

## 2019-12-09 RX ADMIN — LISINOPRIL 5 MG: 5 TABLET ORAL at 11:34

## 2019-12-09 RX ADMIN — SODIUM CHLORIDE, PRESERVATIVE FREE 10 ML: 5 INJECTION INTRAVENOUS at 11:52

## 2019-12-09 RX ADMIN — ATORVASTATIN CALCIUM 80 MG: 40 TABLET, FILM COATED ORAL at 20:28

## 2019-12-09 RX ADMIN — METOPROLOL TARTRATE 25 MG: 25 TABLET ORAL at 11:34

## 2019-12-09 RX ADMIN — SODIUM CHLORIDE SOLN NEBU 3% 4 ML: 3 NEBU SOLN at 17:26

## 2019-12-09 RX ADMIN — FLUOXETINE HYDROCHLORIDE 20 MG: 20 CAPSULE ORAL at 20:28

## 2019-12-09 RX ADMIN — CETIRIZINE HYDROCHLORIDE 10 MG: 10 TABLET, FILM COATED ORAL at 11:35

## 2019-12-09 RX ADMIN — POTASSIUM BICARBONATE 20 MEQ: 782 TABLET, EFFERVESCENT ORAL at 20:28

## 2019-12-09 RX ADMIN — LEVOTHYROXINE SODIUM 88 MCG: 88 TABLET ORAL at 06:31

## 2019-12-09 RX ADMIN — TICAGRELOR 90 MG: 90 TABLET ORAL at 11:35

## 2019-12-09 RX ADMIN — DORNASE ALFA 2.5 MG: 1 SOLUTION RESPIRATORY (INHALATION) at 09:22

## 2019-12-09 RX ADMIN — IPRATROPIUM BROMIDE AND ALBUTEROL SULFATE 1 AMPULE: 2.5; .5 SOLUTION RESPIRATORY (INHALATION) at 13:10

## 2019-12-09 RX ADMIN — SODIUM CHLORIDE SOLN NEBU 3% 4 ML: 3 NEBU SOLN at 20:22

## 2019-12-09 RX ADMIN — ALBUTEROL SULFATE 2.5 MG: 2.5 SOLUTION RESPIRATORY (INHALATION) at 20:22

## 2019-12-09 ASSESSMENT — PAIN SCALES - GENERAL
PAINLEVEL_OUTOF10: 0

## 2019-12-10 ENCOUNTER — APPOINTMENT (OUTPATIENT)
Dept: GENERAL RADIOLOGY | Age: 64
DRG: 720 | End: 2019-12-10
Payer: MEDICAID

## 2019-12-10 LAB
ANION GAP SERPL CALCULATED.3IONS-SCNC: 16 MMOL/L (ref 7–16)
ANISOCYTOSIS: ABNORMAL
B.E.: -1.4 MMOL/L (ref -3–3)
BASOPHILS ABSOLUTE: 0 E9/L (ref 0–0.2)
BASOPHILS RELATIVE PERCENT: 0.4 % (ref 0–2)
BUN BLDV-MCNC: 28 MG/DL (ref 8–23)
BURR CELLS: ABNORMAL
CALCIUM SERPL-MCNC: 9.1 MG/DL (ref 8.6–10.2)
CHLORIDE BLD-SCNC: 107 MMOL/L (ref 98–107)
CO2: 20 MMOL/L (ref 22–29)
COHB: 0.3 % (ref 0–1.5)
CREAT SERPL-MCNC: 0.7 MG/DL (ref 0.5–1)
CRITICAL: ABNORMAL
DATE ANALYZED: ABNORMAL
DATE OF COLLECTION: ABNORMAL
EOSINOPHILS ABSOLUTE: 0.42 E9/L (ref 0.05–0.5)
EOSINOPHILS RELATIVE PERCENT: 1.7 % (ref 0–6)
GFR AFRICAN AMERICAN: >60
GFR NON-AFRICAN AMERICAN: >60 ML/MIN/1.73
GLUCOSE BLD-MCNC: 184 MG/DL (ref 74–99)
HCO3: 20.9 MMOL/L (ref 22–26)
HCT VFR BLD CALC: 37.3 % (ref 34–48)
HEMOGLOBIN: 11.2 G/DL (ref 11.5–15.5)
HHB: 2.4 % (ref 0–5)
HYPOCHROMIA: ABNORMAL
LAB: ABNORMAL
LYMPHOCYTES ABSOLUTE: 0.25 E9/L (ref 1.5–4)
LYMPHOCYTES RELATIVE PERCENT: 0.9 % (ref 20–42)
Lab: ABNORMAL
MAGNESIUM: 2.7 MG/DL (ref 1.6–2.6)
MCH RBC QN AUTO: 28.6 PG (ref 26–35)
MCHC RBC AUTO-ENTMCNC: 30 % (ref 32–34.5)
MCV RBC AUTO: 95.4 FL (ref 80–99.9)
METER GLUCOSE: 144 MG/DL (ref 74–99)
METER GLUCOSE: 154 MG/DL (ref 74–99)
METER GLUCOSE: 155 MG/DL (ref 74–99)
METER GLUCOSE: 168 MG/DL (ref 74–99)
METHB: 0.3 % (ref 0–1.5)
MODE: ABNORMAL
MONOCYTES ABSOLUTE: 0.74 E9/L (ref 0.1–0.95)
MONOCYTES RELATIVE PERCENT: 2.6 % (ref 2–12)
NEUTROPHILS ABSOLUTE: 23.28 E9/L (ref 1.8–7.3)
NEUTROPHILS RELATIVE PERCENT: 94.8 % (ref 43–80)
O2 CONTENT: 16.5 ML/DL
O2 SATURATION: 97.6 % (ref 92–98.5)
O2HB: 97 % (ref 94–97)
OPERATOR ID: 1210
OVALOCYTES: ABNORMAL
PATIENT TEMP: 37 C
PCO2: 28 MMHG (ref 35–45)
PDW BLD-RTO: 14.9 FL (ref 11.5–15)
PH BLOOD GAS: 7.49 (ref 7.35–7.45)
PHOSPHORUS: 3.1 MG/DL (ref 2.5–4.5)
PLATELET # BLD: 858 E9/L (ref 130–450)
PMV BLD AUTO: 10.4 FL (ref 7–12)
PO2: 105.2 MMHG (ref 60–100)
POIKILOCYTES: ABNORMAL
POLYCHROMASIA: ABNORMAL
POTASSIUM SERPL-SCNC: 3.3 MMOL/L (ref 3.5–5)
RBC # BLD: 3.91 E12/L (ref 3.5–5.5)
SCHISTOCYTES: ABNORMAL
SODIUM BLD-SCNC: 143 MMOL/L (ref 132–146)
SOURCE, BLOOD GAS: ABNORMAL
THB: 12 G/DL (ref 11.5–16.5)
TIME ANALYZED: 1058
WBC # BLD: 24.5 E9/L (ref 4.5–11.5)

## 2019-12-10 PROCEDURE — 6370000000 HC RX 637 (ALT 250 FOR IP): Performed by: INTERNAL MEDICINE

## 2019-12-10 PROCEDURE — 97535 SELF CARE MNGMENT TRAINING: CPT

## 2019-12-10 PROCEDURE — 71045 X-RAY EXAM CHEST 1 VIEW: CPT

## 2019-12-10 PROCEDURE — 80048 BASIC METABOLIC PNL TOTAL CA: CPT

## 2019-12-10 PROCEDURE — 2700000000 HC OXYGEN THERAPY PER DAY

## 2019-12-10 PROCEDURE — 94660 CPAP INITIATION&MGMT: CPT

## 2019-12-10 PROCEDURE — 82962 GLUCOSE BLOOD TEST: CPT

## 2019-12-10 PROCEDURE — 36600 WITHDRAWAL OF ARTERIAL BLOOD: CPT

## 2019-12-10 PROCEDURE — 6360000002 HC RX W HCPCS: Performed by: INTERNAL MEDICINE

## 2019-12-10 PROCEDURE — 31575 DIAGNOSTIC LARYNGOSCOPY: CPT | Performed by: OTOLARYNGOLOGY

## 2019-12-10 PROCEDURE — 99222 1ST HOSP IP/OBS MODERATE 55: CPT | Performed by: OTOLARYNGOLOGY

## 2019-12-10 PROCEDURE — 6360000002 HC RX W HCPCS: Performed by: NURSE PRACTITIONER

## 2019-12-10 PROCEDURE — 97166 OT EVAL MOD COMPLEX 45 MIN: CPT

## 2019-12-10 PROCEDURE — 94640 AIRWAY INHALATION TREATMENT: CPT

## 2019-12-10 PROCEDURE — 36415 COLL VENOUS BLD VENIPUNCTURE: CPT

## 2019-12-10 PROCEDURE — 2580000003 HC RX 258: Performed by: INTERNAL MEDICINE

## 2019-12-10 PROCEDURE — 6370000000 HC RX 637 (ALT 250 FOR IP): Performed by: FAMILY MEDICINE

## 2019-12-10 PROCEDURE — 83735 ASSAY OF MAGNESIUM: CPT

## 2019-12-10 PROCEDURE — 85025 COMPLETE CBC W/AUTO DIFF WBC: CPT

## 2019-12-10 PROCEDURE — 2060000000 HC ICU INTERMEDIATE R&B

## 2019-12-10 PROCEDURE — 94669 MECHANICAL CHEST WALL OSCILL: CPT

## 2019-12-10 PROCEDURE — 92526 ORAL FUNCTION THERAPY: CPT

## 2019-12-10 PROCEDURE — C9113 INJ PANTOPRAZOLE SODIUM, VIA: HCPCS | Performed by: INTERNAL MEDICINE

## 2019-12-10 PROCEDURE — 6370000000 HC RX 637 (ALT 250 FOR IP): Performed by: NURSE PRACTITIONER

## 2019-12-10 PROCEDURE — 84100 ASSAY OF PHOSPHORUS: CPT

## 2019-12-10 PROCEDURE — 82805 BLOOD GASES W/O2 SATURATION: CPT

## 2019-12-10 RX ORDER — FUROSEMIDE 20 MG/1
20 TABLET ORAL DAILY
Status: DISCONTINUED | OUTPATIENT
Start: 2019-12-10 | End: 2019-12-16 | Stop reason: HOSPADM

## 2019-12-10 RX ORDER — ALBUTEROL SULFATE 2.5 MG/3ML
2.5 SOLUTION RESPIRATORY (INHALATION)
Status: DISCONTINUED | OUTPATIENT
Start: 2019-12-10 | End: 2019-12-16 | Stop reason: HOSPADM

## 2019-12-10 RX ORDER — METHYLPREDNISOLONE SODIUM SUCCINATE 40 MG/ML
40 INJECTION, POWDER, LYOPHILIZED, FOR SOLUTION INTRAMUSCULAR; INTRAVENOUS DAILY
Status: DISCONTINUED | OUTPATIENT
Start: 2019-12-10 | End: 2019-12-12

## 2019-12-10 RX ORDER — ALBUTEROL SULFATE 2.5 MG/3ML
2.5 SOLUTION RESPIRATORY (INHALATION)
Status: DISCONTINUED | OUTPATIENT
Start: 2019-12-10 | End: 2019-12-10

## 2019-12-10 RX ADMIN — SODIUM CHLORIDE SOLN NEBU 3% 4 ML: 3 NEBU SOLN at 12:13

## 2019-12-10 RX ADMIN — APIXABAN 5 MG: 5 TABLET, FILM COATED ORAL at 20:52

## 2019-12-10 RX ADMIN — METOPROLOL TARTRATE 25 MG: 25 TABLET ORAL at 20:52

## 2019-12-10 RX ADMIN — SODIUM CHLORIDE, PRESERVATIVE FREE 10 ML: 5 INJECTION INTRAVENOUS at 09:46

## 2019-12-10 RX ADMIN — Medication 10 ML: at 09:45

## 2019-12-10 RX ADMIN — POTASSIUM BICARBONATE 20 MEQ: 782 TABLET, EFFERVESCENT ORAL at 09:45

## 2019-12-10 RX ADMIN — Medication 10 ML: at 21:03

## 2019-12-10 RX ADMIN — SODIUM CHLORIDE SOLN NEBU 3% 4 ML: 3 NEBU SOLN at 19:43

## 2019-12-10 RX ADMIN — TICAGRELOR 90 MG: 90 TABLET ORAL at 09:45

## 2019-12-10 RX ADMIN — TICAGRELOR 90 MG: 90 TABLET ORAL at 20:52

## 2019-12-10 RX ADMIN — GABAPENTIN 300 MG: 300 CAPSULE ORAL at 09:45

## 2019-12-10 RX ADMIN — DORNASE ALFA 2.5 MG: 1 SOLUTION RESPIRATORY (INHALATION) at 08:35

## 2019-12-10 RX ADMIN — ALBUTEROL SULFATE 2.5 MG: 2.5 SOLUTION RESPIRATORY (INHALATION) at 16:11

## 2019-12-10 RX ADMIN — MEROPENEM 1 G: 1 INJECTION, POWDER, FOR SOLUTION INTRAVENOUS at 17:09

## 2019-12-10 RX ADMIN — LISINOPRIL 5 MG: 5 TABLET ORAL at 09:44

## 2019-12-10 RX ADMIN — FUROSEMIDE 20 MG: 20 TABLET ORAL at 09:44

## 2019-12-10 RX ADMIN — POTASSIUM BICARBONATE 20 MEQ: 782 TABLET, EFFERVESCENT ORAL at 20:52

## 2019-12-10 RX ADMIN — RACEPINEPHRINE HYDROCHLORIDE 11.25 MG: 11.25 SOLUTION RESPIRATORY (INHALATION) at 10:34

## 2019-12-10 RX ADMIN — FLUOXETINE HYDROCHLORIDE 20 MG: 20 CAPSULE ORAL at 20:52

## 2019-12-10 RX ADMIN — CETIRIZINE HYDROCHLORIDE 10 MG: 10 TABLET, FILM COATED ORAL at 09:44

## 2019-12-10 RX ADMIN — METHYLPREDNISOLONE SODIUM SUCCINATE 40 MG: 40 INJECTION, POWDER, FOR SOLUTION INTRAMUSCULAR; INTRAVENOUS at 10:38

## 2019-12-10 RX ADMIN — SODIUM CHLORIDE SOLN NEBU 3% 4 ML: 3 NEBU SOLN at 16:11

## 2019-12-10 RX ADMIN — APIXABAN 5 MG: 5 TABLET, FILM COATED ORAL at 09:45

## 2019-12-10 RX ADMIN — VITAMIN D, TAB 1000IU (100/BT) 5000 UNITS: 25 TAB at 09:44

## 2019-12-10 RX ADMIN — ALBUTEROL SULFATE 2.5 MG: 2.5 SOLUTION RESPIRATORY (INHALATION) at 12:13

## 2019-12-10 RX ADMIN — SODIUM CHLORIDE SOLN NEBU 3% 4 ML: 3 NEBU SOLN at 08:35

## 2019-12-10 RX ADMIN — METOPROLOL TARTRATE 25 MG: 25 TABLET ORAL at 09:45

## 2019-12-10 RX ADMIN — GABAPENTIN 300 MG: 300 CAPSULE ORAL at 20:52

## 2019-12-10 RX ADMIN — ALBUTEROL SULFATE 2.5 MG: 2.5 SOLUTION RESPIRATORY (INHALATION) at 19:43

## 2019-12-10 RX ADMIN — ATORVASTATIN CALCIUM 80 MG: 40 TABLET, FILM COATED ORAL at 20:52

## 2019-12-10 RX ADMIN — PANTOPRAZOLE SODIUM 40 MG: 40 INJECTION, POWDER, FOR SOLUTION INTRAVENOUS at 09:45

## 2019-12-10 RX ADMIN — FLUOXETINE HYDROCHLORIDE 20 MG: 20 CAPSULE ORAL at 09:45

## 2019-12-10 RX ADMIN — FLUTICASONE PROPIONATE 1 SPRAY: 50 SPRAY, METERED NASAL at 09:46

## 2019-12-10 RX ADMIN — LEVOTHYROXINE SODIUM 88 MCG: 88 TABLET ORAL at 06:34

## 2019-12-10 ASSESSMENT — ENCOUNTER SYMPTOMS
COUGH: 0
NAUSEA: 0
STRIDOR: 0
SHORTNESS OF BREATH: 1
COLOR CHANGE: 0
VOICE CHANGE: 0
EYE DISCHARGE: 0
TROUBLE SWALLOWING: 1
VOMITING: 0
ALLERGIC/IMMUNOLOGIC NEGATIVE: 1
EYE REDNESS: 0

## 2019-12-10 ASSESSMENT — PAIN SCALES - GENERAL
PAINLEVEL_OUTOF10: 0

## 2019-12-11 ENCOUNTER — APPOINTMENT (OUTPATIENT)
Dept: GENERAL RADIOLOGY | Age: 64
DRG: 720 | End: 2019-12-11
Payer: MEDICAID

## 2019-12-11 LAB
ANION GAP SERPL CALCULATED.3IONS-SCNC: 18 MMOL/L (ref 7–16)
BASOPHILS ABSOLUTE: 0.05 E9/L (ref 0–0.2)
BASOPHILS RELATIVE PERCENT: 0.3 % (ref 0–2)
BUN BLDV-MCNC: 35 MG/DL (ref 8–23)
CALCIUM SERPL-MCNC: 9.2 MG/DL (ref 8.6–10.2)
CHLORIDE BLD-SCNC: 112 MMOL/L (ref 98–107)
CO2: 21 MMOL/L (ref 22–29)
CREAT SERPL-MCNC: 1 MG/DL (ref 0.5–1)
EOSINOPHILS ABSOLUTE: 0.01 E9/L (ref 0.05–0.5)
EOSINOPHILS RELATIVE PERCENT: 0.1 % (ref 0–6)
GFR AFRICAN AMERICAN: >60
GFR NON-AFRICAN AMERICAN: 56 ML/MIN/1.73
GLUCOSE BLD-MCNC: 178 MG/DL (ref 74–99)
HCT VFR BLD CALC: 37.4 % (ref 34–48)
HEMOGLOBIN: 11.2 G/DL (ref 11.5–15.5)
IMMATURE GRANULOCYTES #: 0.13 E9/L
IMMATURE GRANULOCYTES %: 0.7 % (ref 0–5)
LYMPHOCYTES ABSOLUTE: 0.74 E9/L (ref 1.5–4)
LYMPHOCYTES RELATIVE PERCENT: 3.8 % (ref 20–42)
MAGNESIUM: 2.7 MG/DL (ref 1.6–2.6)
MCH RBC QN AUTO: 28.7 PG (ref 26–35)
MCHC RBC AUTO-ENTMCNC: 29.9 % (ref 32–34.5)
MCV RBC AUTO: 95.9 FL (ref 80–99.9)
METER GLUCOSE: 164 MG/DL (ref 74–99)
METER GLUCOSE: 166 MG/DL (ref 74–99)
METER GLUCOSE: 168 MG/DL (ref 74–99)
METER GLUCOSE: 191 MG/DL (ref 74–99)
MONOCYTES ABSOLUTE: 1.07 E9/L (ref 0.1–0.95)
MONOCYTES RELATIVE PERCENT: 5.4 % (ref 2–12)
NEUTROPHILS ABSOLUTE: 17.7 E9/L (ref 1.8–7.3)
NEUTROPHILS RELATIVE PERCENT: 89.7 % (ref 43–80)
PDW BLD-RTO: 15.1 FL (ref 11.5–15)
PHOSPHORUS: 2.5 MG/DL (ref 2.5–4.5)
PLATELET # BLD: 892 E9/L (ref 130–450)
PMV BLD AUTO: 10.6 FL (ref 7–12)
POTASSIUM SERPL-SCNC: 3.9 MMOL/L (ref 3.5–5)
RBC # BLD: 3.9 E12/L (ref 3.5–5.5)
SODIUM BLD-SCNC: 151 MMOL/L (ref 132–146)
WBC # BLD: 19.7 E9/L (ref 4.5–11.5)

## 2019-12-11 PROCEDURE — 94668 MNPJ CHEST WALL SBSQ: CPT

## 2019-12-11 PROCEDURE — 92611 MOTION FLUOROSCOPY/SWALLOW: CPT

## 2019-12-11 PROCEDURE — 6370000000 HC RX 637 (ALT 250 FOR IP): Performed by: FAMILY MEDICINE

## 2019-12-11 PROCEDURE — 80048 BASIC METABOLIC PNL TOTAL CA: CPT

## 2019-12-11 PROCEDURE — 2580000003 HC RX 258: Performed by: INTERNAL MEDICINE

## 2019-12-11 PROCEDURE — 2060000000 HC ICU INTERMEDIATE R&B

## 2019-12-11 PROCEDURE — 6360000002 HC RX W HCPCS: Performed by: INTERNAL MEDICINE

## 2019-12-11 PROCEDURE — 83735 ASSAY OF MAGNESIUM: CPT

## 2019-12-11 PROCEDURE — 2500000003 HC RX 250 WO HCPCS: Performed by: FAMILY MEDICINE

## 2019-12-11 PROCEDURE — 6370000000 HC RX 637 (ALT 250 FOR IP): Performed by: INTERNAL MEDICINE

## 2019-12-11 PROCEDURE — 94669 MECHANICAL CHEST WALL OSCILL: CPT

## 2019-12-11 PROCEDURE — 84100 ASSAY OF PHOSPHORUS: CPT

## 2019-12-11 PROCEDURE — 36415 COLL VENOUS BLD VENIPUNCTURE: CPT

## 2019-12-11 PROCEDURE — C9113 INJ PANTOPRAZOLE SODIUM, VIA: HCPCS | Performed by: INTERNAL MEDICINE

## 2019-12-11 PROCEDURE — 6360000002 HC RX W HCPCS: Performed by: NURSE PRACTITIONER

## 2019-12-11 PROCEDURE — 2700000000 HC OXYGEN THERAPY PER DAY

## 2019-12-11 PROCEDURE — 94640 AIRWAY INHALATION TREATMENT: CPT

## 2019-12-11 PROCEDURE — 85025 COMPLETE CBC W/AUTO DIFF WBC: CPT

## 2019-12-11 PROCEDURE — 94660 CPAP INITIATION&MGMT: CPT

## 2019-12-11 PROCEDURE — 82962 GLUCOSE BLOOD TEST: CPT

## 2019-12-11 PROCEDURE — 74230 X-RAY XM SWLNG FUNCJ C+: CPT

## 2019-12-11 RX ADMIN — ALBUTEROL SULFATE 2.5 MG: 2.5 SOLUTION RESPIRATORY (INHALATION) at 08:30

## 2019-12-11 RX ADMIN — DORNASE ALFA 2.5 MG: 1 SOLUTION RESPIRATORY (INHALATION) at 08:30

## 2019-12-11 RX ADMIN — Medication 10 ML: at 21:46

## 2019-12-11 RX ADMIN — TICAGRELOR 90 MG: 90 TABLET ORAL at 10:23

## 2019-12-11 RX ADMIN — SODIUM CHLORIDE SOLN NEBU 3% 4 ML: 3 NEBU SOLN at 08:30

## 2019-12-11 RX ADMIN — FLUOXETINE HYDROCHLORIDE 20 MG: 20 CAPSULE ORAL at 10:23

## 2019-12-11 RX ADMIN — ATORVASTATIN CALCIUM 80 MG: 40 TABLET, FILM COATED ORAL at 21:46

## 2019-12-11 RX ADMIN — MEROPENEM 1 G: 1 INJECTION, POWDER, FOR SOLUTION INTRAVENOUS at 18:39

## 2019-12-11 RX ADMIN — APIXABAN 5 MG: 5 TABLET, FILM COATED ORAL at 10:24

## 2019-12-11 RX ADMIN — LEVOTHYROXINE SODIUM 88 MCG: 88 TABLET ORAL at 06:36

## 2019-12-11 RX ADMIN — POTASSIUM BICARBONATE 20 MEQ: 782 TABLET, EFFERVESCENT ORAL at 21:46

## 2019-12-11 RX ADMIN — ALBUTEROL SULFATE 2.5 MG: 2.5 SOLUTION RESPIRATORY (INHALATION) at 12:39

## 2019-12-11 RX ADMIN — BARIUM SULFATE 15 ML: 400 PASTE ORAL at 14:16

## 2019-12-11 RX ADMIN — PANTOPRAZOLE SODIUM 40 MG: 40 INJECTION, POWDER, FOR SOLUTION INTRAVENOUS at 10:23

## 2019-12-11 RX ADMIN — Medication 10 ML: at 10:22

## 2019-12-11 RX ADMIN — VITAMIN D, TAB 1000IU (100/BT) 5000 UNITS: 25 TAB at 10:23

## 2019-12-11 RX ADMIN — POTASSIUM BICARBONATE 20 MEQ: 782 TABLET, EFFERVESCENT ORAL at 10:24

## 2019-12-11 RX ADMIN — METOPROLOL TARTRATE 25 MG: 25 TABLET ORAL at 21:45

## 2019-12-11 RX ADMIN — ALBUTEROL SULFATE 2.5 MG: 2.5 SOLUTION RESPIRATORY (INHALATION) at 20:46

## 2019-12-11 RX ADMIN — CETIRIZINE HYDROCHLORIDE 10 MG: 10 TABLET, FILM COATED ORAL at 10:23

## 2019-12-11 RX ADMIN — SODIUM CHLORIDE SOLN NEBU 3% 4 ML: 3 NEBU SOLN at 12:39

## 2019-12-11 RX ADMIN — METHYLPREDNISOLONE SODIUM SUCCINATE 40 MG: 40 INJECTION, POWDER, FOR SOLUTION INTRAMUSCULAR; INTRAVENOUS at 10:24

## 2019-12-11 RX ADMIN — MEROPENEM 1 G: 1 INJECTION, POWDER, FOR SOLUTION INTRAVENOUS at 10:25

## 2019-12-11 RX ADMIN — SODIUM CHLORIDE SOLN NEBU 3% 4 ML: 3 NEBU SOLN at 20:46

## 2019-12-11 RX ADMIN — SODIUM CHLORIDE SOLN NEBU 3% 4 ML: 3 NEBU SOLN at 16:48

## 2019-12-11 RX ADMIN — LISINOPRIL 5 MG: 5 TABLET ORAL at 10:23

## 2019-12-11 RX ADMIN — METOPROLOL TARTRATE 25 MG: 25 TABLET ORAL at 10:23

## 2019-12-11 RX ADMIN — MEROPENEM 1 G: 1 INJECTION, POWDER, FOR SOLUTION INTRAVENOUS at 00:56

## 2019-12-11 RX ADMIN — GABAPENTIN 300 MG: 300 CAPSULE ORAL at 21:45

## 2019-12-11 RX ADMIN — FLUOXETINE HYDROCHLORIDE 20 MG: 20 CAPSULE ORAL at 21:46

## 2019-12-11 RX ADMIN — GABAPENTIN 300 MG: 300 CAPSULE ORAL at 10:24

## 2019-12-11 RX ADMIN — FLUTICASONE PROPIONATE 1 SPRAY: 50 SPRAY, METERED NASAL at 10:26

## 2019-12-11 RX ADMIN — BARIUM SULFATE 15 ML: 400 SUSPENSION ORAL at 14:17

## 2019-12-11 RX ADMIN — ALBUTEROL SULFATE 2.5 MG: 2.5 SOLUTION RESPIRATORY (INHALATION) at 16:48

## 2019-12-11 RX ADMIN — FUROSEMIDE 20 MG: 20 TABLET ORAL at 10:23

## 2019-12-11 ASSESSMENT — PAIN SCALES - GENERAL
PAINLEVEL_OUTOF10: 0
PAINLEVEL_OUTOF10: 0

## 2019-12-12 ENCOUNTER — ANESTHESIA (OUTPATIENT)
Dept: ENDOSCOPY | Age: 64
DRG: 720 | End: 2019-12-12
Payer: MEDICAID

## 2019-12-12 ENCOUNTER — ANESTHESIA EVENT (OUTPATIENT)
Dept: ENDOSCOPY | Age: 64
DRG: 720 | End: 2019-12-12
Payer: MEDICAID

## 2019-12-12 VITALS — DIASTOLIC BLOOD PRESSURE: 68 MMHG | SYSTOLIC BLOOD PRESSURE: 110 MMHG | OXYGEN SATURATION: 96 %

## 2019-12-12 LAB
ANION GAP SERPL CALCULATED.3IONS-SCNC: 13 MMOL/L (ref 7–16)
BASOPHILS ABSOLUTE: 0.06 E9/L (ref 0–0.2)
BASOPHILS RELATIVE PERCENT: 0.4 % (ref 0–2)
BUN BLDV-MCNC: 33 MG/DL (ref 8–23)
CALCIUM SERPL-MCNC: 9 MG/DL (ref 8.6–10.2)
CHLORIDE BLD-SCNC: 112 MMOL/L (ref 98–107)
CO2: 26 MMOL/L (ref 22–29)
CREAT SERPL-MCNC: 0.9 MG/DL (ref 0.5–1)
EOSINOPHILS ABSOLUTE: 0.02 E9/L (ref 0.05–0.5)
EOSINOPHILS RELATIVE PERCENT: 0.1 % (ref 0–6)
GFR AFRICAN AMERICAN: >60
GFR NON-AFRICAN AMERICAN: >60 ML/MIN/1.73
GLUCOSE BLD-MCNC: 131 MG/DL (ref 74–99)
HCT VFR BLD CALC: 34.3 % (ref 34–48)
HEMOGLOBIN: 10.2 G/DL (ref 11.5–15.5)
IMMATURE GRANULOCYTES #: 0.11 E9/L
IMMATURE GRANULOCYTES %: 0.7 % (ref 0–5)
LYMPHOCYTES ABSOLUTE: 0.97 E9/L (ref 1.5–4)
LYMPHOCYTES RELATIVE PERCENT: 6.3 % (ref 20–42)
MAGNESIUM: 2.8 MG/DL (ref 1.6–2.6)
MCH RBC QN AUTO: 28.7 PG (ref 26–35)
MCHC RBC AUTO-ENTMCNC: 29.7 % (ref 32–34.5)
MCV RBC AUTO: 96.6 FL (ref 80–99.9)
METER GLUCOSE: 127 MG/DL (ref 74–99)
METER GLUCOSE: 130 MG/DL (ref 74–99)
METER GLUCOSE: 135 MG/DL (ref 74–99)
METER GLUCOSE: 139 MG/DL (ref 74–99)
MONOCYTES ABSOLUTE: 1.23 E9/L (ref 0.1–0.95)
MONOCYTES RELATIVE PERCENT: 8 % (ref 2–12)
NEUTROPHILS ABSOLUTE: 12.92 E9/L (ref 1.8–7.3)
NEUTROPHILS RELATIVE PERCENT: 84.5 % (ref 43–80)
PDW BLD-RTO: 14.7 FL (ref 11.5–15)
PHOSPHORUS: 2.4 MG/DL (ref 2.5–4.5)
PLATELET # BLD: 720 E9/L (ref 130–450)
PMV BLD AUTO: 10.6 FL (ref 7–12)
POTASSIUM SERPL-SCNC: 3.8 MMOL/L (ref 3.5–5)
RBC # BLD: 3.55 E12/L (ref 3.5–5.5)
SODIUM BLD-SCNC: 151 MMOL/L (ref 132–146)
WBC # BLD: 15.3 E9/L (ref 4.5–11.5)

## 2019-12-12 PROCEDURE — 84100 ASSAY OF PHOSPHORUS: CPT

## 2019-12-12 PROCEDURE — 2580000003 HC RX 258: Performed by: INTERNAL MEDICINE

## 2019-12-12 PROCEDURE — 6370000000 HC RX 637 (ALT 250 FOR IP): Performed by: SURGERY

## 2019-12-12 PROCEDURE — 3E0G76Z INTRODUCTION OF NUTRITIONAL SUBSTANCE INTO UPPER GI, VIA NATURAL OR ARTIFICIAL OPENING: ICD-10-PCS | Performed by: SURGERY

## 2019-12-12 PROCEDURE — 0DH63UZ INSERTION OF FEEDING DEVICE INTO STOMACH, PERCUTANEOUS APPROACH: ICD-10-PCS | Performed by: SURGERY

## 2019-12-12 PROCEDURE — 2580000003 HC RX 258: Performed by: SURGERY

## 2019-12-12 PROCEDURE — 6360000002 HC RX W HCPCS: Performed by: SURGERY

## 2019-12-12 PROCEDURE — 83735 ASSAY OF MAGNESIUM: CPT

## 2019-12-12 PROCEDURE — 94660 CPAP INITIATION&MGMT: CPT

## 2019-12-12 PROCEDURE — 6370000000 HC RX 637 (ALT 250 FOR IP): Performed by: FAMILY MEDICINE

## 2019-12-12 PROCEDURE — 80048 BASIC METABOLIC PNL TOTAL CA: CPT

## 2019-12-12 PROCEDURE — 3700000001 HC ADD 15 MINUTES (ANESTHESIA): Performed by: SURGERY

## 2019-12-12 PROCEDURE — 7100000001 HC PACU RECOVERY - ADDTL 15 MIN: Performed by: SURGERY

## 2019-12-12 PROCEDURE — C9113 INJ PANTOPRAZOLE SODIUM, VIA: HCPCS | Performed by: SURGERY

## 2019-12-12 PROCEDURE — 2700000000 HC OXYGEN THERAPY PER DAY

## 2019-12-12 PROCEDURE — 97535 SELF CARE MNGMENT TRAINING: CPT

## 2019-12-12 PROCEDURE — 6370000000 HC RX 637 (ALT 250 FOR IP): Performed by: INTERNAL MEDICINE

## 2019-12-12 PROCEDURE — 2709999900 HC NON-CHARGEABLE SUPPLY: Performed by: SURGERY

## 2019-12-12 PROCEDURE — 92526 ORAL FUNCTION THERAPY: CPT

## 2019-12-12 PROCEDURE — 82962 GLUCOSE BLOOD TEST: CPT

## 2019-12-12 PROCEDURE — 2500000003 HC RX 250 WO HCPCS: Performed by: SURGERY

## 2019-12-12 PROCEDURE — 36415 COLL VENOUS BLD VENIPUNCTURE: CPT

## 2019-12-12 PROCEDURE — 3609013300 HC EGD TUBE PLACEMENT: Performed by: SURGERY

## 2019-12-12 PROCEDURE — 94640 AIRWAY INHALATION TREATMENT: CPT

## 2019-12-12 PROCEDURE — 6360000002 HC RX W HCPCS: Performed by: INTERNAL MEDICINE

## 2019-12-12 PROCEDURE — 7100000000 HC PACU RECOVERY - FIRST 15 MIN: Performed by: SURGERY

## 2019-12-12 PROCEDURE — 85025 COMPLETE CBC W/AUTO DIFF WBC: CPT

## 2019-12-12 PROCEDURE — 6360000002 HC RX W HCPCS: Performed by: NURSE ANESTHETIST, CERTIFIED REGISTERED

## 2019-12-12 PROCEDURE — 97530 THERAPEUTIC ACTIVITIES: CPT

## 2019-12-12 PROCEDURE — 3700000000 HC ANESTHESIA ATTENDED CARE: Performed by: SURGERY

## 2019-12-12 PROCEDURE — 2580000003 HC RX 258: Performed by: NURSE ANESTHETIST, CERTIFIED REGISTERED

## 2019-12-12 PROCEDURE — 1200000000 HC SEMI PRIVATE

## 2019-12-12 RX ORDER — MEPERIDINE HYDROCHLORIDE 50 MG/ML
12.5 INJECTION INTRAMUSCULAR; INTRAVENOUS; SUBCUTANEOUS EVERY 5 MIN PRN
Status: DISCONTINUED | OUTPATIENT
Start: 2019-12-12 | End: 2019-12-12 | Stop reason: HOSPADM

## 2019-12-12 RX ORDER — MORPHINE SULFATE 2 MG/ML
2 INJECTION, SOLUTION INTRAMUSCULAR; INTRAVENOUS EVERY 5 MIN PRN
Status: DISCONTINUED | OUTPATIENT
Start: 2019-12-12 | End: 2019-12-12 | Stop reason: HOSPADM

## 2019-12-12 RX ORDER — PREDNISONE 20 MG/1
20 TABLET ORAL DAILY
Status: DISCONTINUED | OUTPATIENT
Start: 2019-12-13 | End: 2019-12-16 | Stop reason: HOSPADM

## 2019-12-12 RX ORDER — GABAPENTIN 250 MG/5ML
300 SOLUTION ORAL 3 TIMES DAILY
Status: DISCONTINUED | OUTPATIENT
Start: 2019-12-12 | End: 2019-12-16 | Stop reason: HOSPADM

## 2019-12-12 RX ORDER — PROPOFOL 10 MG/ML
INJECTION, EMULSION INTRAVENOUS PRN
Status: DISCONTINUED | OUTPATIENT
Start: 2019-12-12 | End: 2019-12-12 | Stop reason: SDUPTHER

## 2019-12-12 RX ORDER — PROMETHAZINE HYDROCHLORIDE 25 MG/ML
6.25 INJECTION, SOLUTION INTRAMUSCULAR; INTRAVENOUS EVERY 10 MIN PRN
Status: DISCONTINUED | OUTPATIENT
Start: 2019-12-12 | End: 2019-12-12 | Stop reason: HOSPADM

## 2019-12-12 RX ORDER — HYDROCODONE BITARTRATE AND ACETAMINOPHEN 5; 325 MG/1; MG/1
2 TABLET ORAL PRN
Status: DISCONTINUED | OUTPATIENT
Start: 2019-12-12 | End: 2019-12-12 | Stop reason: HOSPADM

## 2019-12-12 RX ORDER — MORPHINE SULFATE 2 MG/ML
1 INJECTION, SOLUTION INTRAMUSCULAR; INTRAVENOUS EVERY 5 MIN PRN
Status: DISCONTINUED | OUTPATIENT
Start: 2019-12-12 | End: 2019-12-12 | Stop reason: HOSPADM

## 2019-12-12 RX ORDER — LIDOCAINE HYDROCHLORIDE 10 MG/ML
INJECTION, SOLUTION INFILTRATION; PERINEURAL PRN
Status: DISCONTINUED | OUTPATIENT
Start: 2019-12-12 | End: 2019-12-12 | Stop reason: ALTCHOICE

## 2019-12-12 RX ORDER — HYDROCODONE BITARTRATE AND ACETAMINOPHEN 5; 325 MG/1; MG/1
1 TABLET ORAL PRN
Status: DISCONTINUED | OUTPATIENT
Start: 2019-12-12 | End: 2019-12-12 | Stop reason: HOSPADM

## 2019-12-12 RX ORDER — SODIUM CHLORIDE 9 MG/ML
INJECTION, SOLUTION INTRAVENOUS CONTINUOUS PRN
Status: DISCONTINUED | OUTPATIENT
Start: 2019-12-12 | End: 2019-12-12 | Stop reason: SDUPTHER

## 2019-12-12 RX ADMIN — SODIUM CHLORIDE, PRESERVATIVE FREE 10 ML: 5 INJECTION INTRAVENOUS at 21:00

## 2019-12-12 RX ADMIN — FLUTICASONE PROPIONATE 1 SPRAY: 50 SPRAY, METERED NASAL at 10:19

## 2019-12-12 RX ADMIN — METHYLPREDNISOLONE SODIUM SUCCINATE 40 MG: 40 INJECTION, POWDER, FOR SOLUTION INTRAMUSCULAR; INTRAVENOUS at 10:18

## 2019-12-12 RX ADMIN — PROPOFOL 75 MG: 10 INJECTION, EMULSION INTRAVENOUS at 08:20

## 2019-12-12 RX ADMIN — POTASSIUM BICARBONATE 20 MEQ: 782 TABLET, EFFERVESCENT ORAL at 21:01

## 2019-12-12 RX ADMIN — Medication 10 ML: at 10:18

## 2019-12-12 RX ADMIN — FUROSEMIDE 20 MG: 20 TABLET ORAL at 14:09

## 2019-12-12 RX ADMIN — LISINOPRIL 5 MG: 5 TABLET ORAL at 14:09

## 2019-12-12 RX ADMIN — VITAMIN D, TAB 1000IU (100/BT) 5000 UNITS: 25 TAB at 14:10

## 2019-12-12 RX ADMIN — ALBUTEROL SULFATE 2.5 MG: 2.5 SOLUTION RESPIRATORY (INHALATION) at 17:51

## 2019-12-12 RX ADMIN — LEVOTHYROXINE SODIUM 88 MCG: 88 TABLET ORAL at 06:58

## 2019-12-12 RX ADMIN — SODIUM CHLORIDE SOLN NEBU 3% 4 ML: 3 NEBU SOLN at 17:51

## 2019-12-12 RX ADMIN — TICAGRELOR 90 MG: 90 TABLET ORAL at 21:01

## 2019-12-12 RX ADMIN — CETIRIZINE HYDROCHLORIDE 10 MG: 10 TABLET, FILM COATED ORAL at 14:09

## 2019-12-12 RX ADMIN — MEROPENEM 1 G: 1 INJECTION, POWDER, FOR SOLUTION INTRAVENOUS at 10:22

## 2019-12-12 RX ADMIN — ALBUTEROL SULFATE 2.5 MG: 2.5 SOLUTION RESPIRATORY (INHALATION) at 22:03

## 2019-12-12 RX ADMIN — Medication 10 ML: at 22:00

## 2019-12-12 RX ADMIN — SODIUM CHLORIDE SOLN NEBU 3% 4 ML: 3 NEBU SOLN at 13:34

## 2019-12-12 RX ADMIN — MEROPENEM 1 G: 1 INJECTION, POWDER, FOR SOLUTION INTRAVENOUS at 01:17

## 2019-12-12 RX ADMIN — POTASSIUM BICARBONATE 20 MEQ: 782 TABLET, EFFERVESCENT ORAL at 14:08

## 2019-12-12 RX ADMIN — MEROPENEM 1 G: 1 INJECTION, POWDER, FOR SOLUTION INTRAVENOUS at 20:59

## 2019-12-12 RX ADMIN — FLUOXETINE HYDROCHLORIDE 20 MG: 20 CAPSULE ORAL at 21:00

## 2019-12-12 RX ADMIN — ALBUTEROL SULFATE 2.5 MG: 2.5 SOLUTION RESPIRATORY (INHALATION) at 13:34

## 2019-12-12 RX ADMIN — PROPOFOL 50 MG: 10 INJECTION, EMULSION INTRAVENOUS at 08:23

## 2019-12-12 RX ADMIN — APIXABAN 5 MG: 5 TABLET, FILM COATED ORAL at 21:01

## 2019-12-12 RX ADMIN — SODIUM CHLORIDE, PRESERVATIVE FREE 10 ML: 5 INJECTION INTRAVENOUS at 10:21

## 2019-12-12 RX ADMIN — GABAPENTIN 300 MG: 250 SOLUTION ORAL at 14:10

## 2019-12-12 RX ADMIN — ATORVASTATIN CALCIUM 80 MG: 40 TABLET, FILM COATED ORAL at 21:01

## 2019-12-12 RX ADMIN — PANTOPRAZOLE SODIUM 40 MG: 40 INJECTION, POWDER, FOR SOLUTION INTRAVENOUS at 10:21

## 2019-12-12 RX ADMIN — FLUOXETINE HYDROCHLORIDE 20 MG: 20 CAPSULE ORAL at 14:08

## 2019-12-12 RX ADMIN — METOPROLOL TARTRATE 25 MG: 25 TABLET ORAL at 14:09

## 2019-12-12 RX ADMIN — SODIUM CHLORIDE: 9 INJECTION, SOLUTION INTRAVENOUS at 08:09

## 2019-12-12 RX ADMIN — METOPROLOL TARTRATE 25 MG: 25 TABLET ORAL at 21:01

## 2019-12-12 RX ADMIN — SODIUM CHLORIDE SOLN NEBU 3% 4 ML: 3 NEBU SOLN at 22:03

## 2019-12-12 ASSESSMENT — PAIN SCALES - GENERAL
PAINLEVEL_OUTOF10: 0

## 2019-12-13 ENCOUNTER — APPOINTMENT (OUTPATIENT)
Dept: GENERAL RADIOLOGY | Age: 64
DRG: 720 | End: 2019-12-13
Payer: MEDICAID

## 2019-12-13 LAB
ANION GAP SERPL CALCULATED.3IONS-SCNC: 10 MMOL/L (ref 7–16)
BASOPHILS ABSOLUTE: 0.03 E9/L (ref 0–0.2)
BASOPHILS RELATIVE PERCENT: 0.3 % (ref 0–2)
BUN BLDV-MCNC: 30 MG/DL (ref 8–23)
CALCIUM SERPL-MCNC: 8.9 MG/DL (ref 8.6–10.2)
CHLORIDE BLD-SCNC: 106 MMOL/L (ref 98–107)
CO2: 29 MMOL/L (ref 22–29)
CREAT SERPL-MCNC: 0.7 MG/DL (ref 0.5–1)
EOSINOPHILS ABSOLUTE: 0.05 E9/L (ref 0.05–0.5)
EOSINOPHILS RELATIVE PERCENT: 0.5 % (ref 0–6)
GFR AFRICAN AMERICAN: >60
GFR NON-AFRICAN AMERICAN: >60 ML/MIN/1.73
GLUCOSE BLD-MCNC: 153 MG/DL (ref 74–99)
HCT VFR BLD CALC: 35.7 % (ref 34–48)
HEMOGLOBIN: 10.4 G/DL (ref 11.5–15.5)
IMMATURE GRANULOCYTES #: 0.08 E9/L
IMMATURE GRANULOCYTES %: 0.7 % (ref 0–5)
LYMPHOCYTES ABSOLUTE: 1.03 E9/L (ref 1.5–4)
LYMPHOCYTES RELATIVE PERCENT: 9.4 % (ref 20–42)
MAGNESIUM: 2.7 MG/DL (ref 1.6–2.6)
MCH RBC QN AUTO: 28.8 PG (ref 26–35)
MCHC RBC AUTO-ENTMCNC: 29.1 % (ref 32–34.5)
MCV RBC AUTO: 98.9 FL (ref 80–99.9)
METER GLUCOSE: 146 MG/DL (ref 74–99)
METER GLUCOSE: 154 MG/DL (ref 74–99)
METER GLUCOSE: 174 MG/DL (ref 74–99)
METER GLUCOSE: 174 MG/DL (ref 74–99)
MONOCYTES ABSOLUTE: 0.78 E9/L (ref 0.1–0.95)
MONOCYTES RELATIVE PERCENT: 7.1 % (ref 2–12)
NEUTROPHILS ABSOLUTE: 8.94 E9/L (ref 1.8–7.3)
NEUTROPHILS RELATIVE PERCENT: 82 % (ref 43–80)
PDW BLD-RTO: 14.5 FL (ref 11.5–15)
PHOSPHORUS: 2.3 MG/DL (ref 2.5–4.5)
PLATELET # BLD: 619 E9/L (ref 130–450)
PMV BLD AUTO: 10.9 FL (ref 7–12)
POTASSIUM SERPL-SCNC: 3.9 MMOL/L (ref 3.5–5)
RBC # BLD: 3.61 E12/L (ref 3.5–5.5)
SODIUM BLD-SCNC: 145 MMOL/L (ref 132–146)
WBC # BLD: 10.9 E9/L (ref 4.5–11.5)

## 2019-12-13 PROCEDURE — 85025 COMPLETE CBC W/AUTO DIFF WBC: CPT

## 2019-12-13 PROCEDURE — 6360000002 HC RX W HCPCS: Performed by: SURGERY

## 2019-12-13 PROCEDURE — 2700000000 HC OXYGEN THERAPY PER DAY

## 2019-12-13 PROCEDURE — 92526 ORAL FUNCTION THERAPY: CPT

## 2019-12-13 PROCEDURE — 84100 ASSAY OF PHOSPHORUS: CPT

## 2019-12-13 PROCEDURE — 6370000000 HC RX 637 (ALT 250 FOR IP): Performed by: SURGERY

## 2019-12-13 PROCEDURE — 83735 ASSAY OF MAGNESIUM: CPT

## 2019-12-13 PROCEDURE — 1200000000 HC SEMI PRIVATE

## 2019-12-13 PROCEDURE — 71045 X-RAY EXAM CHEST 1 VIEW: CPT

## 2019-12-13 PROCEDURE — 94640 AIRWAY INHALATION TREATMENT: CPT

## 2019-12-13 PROCEDURE — 36415 COLL VENOUS BLD VENIPUNCTURE: CPT

## 2019-12-13 PROCEDURE — 94660 CPAP INITIATION&MGMT: CPT

## 2019-12-13 PROCEDURE — 82962 GLUCOSE BLOOD TEST: CPT

## 2019-12-13 PROCEDURE — 6370000000 HC RX 637 (ALT 250 FOR IP): Performed by: NURSE PRACTITIONER

## 2019-12-13 PROCEDURE — 2580000003 HC RX 258: Performed by: SURGERY

## 2019-12-13 PROCEDURE — 6370000000 HC RX 637 (ALT 250 FOR IP): Performed by: FAMILY MEDICINE

## 2019-12-13 PROCEDURE — C9113 INJ PANTOPRAZOLE SODIUM, VIA: HCPCS | Performed by: SURGERY

## 2019-12-13 PROCEDURE — 80048 BASIC METABOLIC PNL TOTAL CA: CPT

## 2019-12-13 RX ADMIN — PREDNISONE 20 MG: 20 TABLET ORAL at 10:35

## 2019-12-13 RX ADMIN — GABAPENTIN 300 MG: 250 SOLUTION ORAL at 10:39

## 2019-12-13 RX ADMIN — SODIUM CHLORIDE SOLN NEBU 3% 4 ML: 3 NEBU SOLN at 16:33

## 2019-12-13 RX ADMIN — SODIUM CHLORIDE, PRESERVATIVE FREE 10 ML: 5 INJECTION INTRAVENOUS at 10:54

## 2019-12-13 RX ADMIN — SODIUM CHLORIDE SOLN NEBU 3% 4 ML: 3 NEBU SOLN at 12:54

## 2019-12-13 RX ADMIN — FLUTICASONE PROPIONATE 1 SPRAY: 50 SPRAY, METERED NASAL at 10:39

## 2019-12-13 RX ADMIN — CETIRIZINE HYDROCHLORIDE 10 MG: 10 TABLET, FILM COATED ORAL at 10:35

## 2019-12-13 RX ADMIN — APIXABAN 5 MG: 5 TABLET, FILM COATED ORAL at 10:34

## 2019-12-13 RX ADMIN — PANTOPRAZOLE SODIUM 40 MG: 40 INJECTION, POWDER, FOR SOLUTION INTRAVENOUS at 10:34

## 2019-12-13 RX ADMIN — Medication 10 ML: at 22:28

## 2019-12-13 RX ADMIN — ATORVASTATIN CALCIUM 80 MG: 40 TABLET, FILM COATED ORAL at 22:24

## 2019-12-13 RX ADMIN — Medication 10 ML: at 10:32

## 2019-12-13 RX ADMIN — ALBUTEROL SULFATE 2.5 MG: 2.5 SOLUTION RESPIRATORY (INHALATION) at 16:32

## 2019-12-13 RX ADMIN — FLUOXETINE HYDROCHLORIDE 20 MG: 20 CAPSULE ORAL at 10:34

## 2019-12-13 RX ADMIN — TICAGRELOR 90 MG: 90 TABLET ORAL at 10:35

## 2019-12-13 RX ADMIN — LISINOPRIL 5 MG: 5 TABLET ORAL at 10:34

## 2019-12-13 RX ADMIN — MEROPENEM 1 G: 1 INJECTION, POWDER, FOR SOLUTION INTRAVENOUS at 18:05

## 2019-12-13 RX ADMIN — SODIUM CHLORIDE SOLN NEBU 3% 4 ML: 3 NEBU SOLN at 08:38

## 2019-12-13 RX ADMIN — MEROPENEM 1 G: 1 INJECTION, POWDER, FOR SOLUTION INTRAVENOUS at 03:03

## 2019-12-13 RX ADMIN — POTASSIUM BICARBONATE 20 MEQ: 782 TABLET, EFFERVESCENT ORAL at 10:33

## 2019-12-13 RX ADMIN — MEROPENEM 1 G: 1 INJECTION, POWDER, FOR SOLUTION INTRAVENOUS at 10:29

## 2019-12-13 RX ADMIN — DORNASE ALFA 2.5 MG: 1 SOLUTION RESPIRATORY (INHALATION) at 08:38

## 2019-12-13 RX ADMIN — ALBUTEROL SULFATE 2.5 MG: 2.5 SOLUTION RESPIRATORY (INHALATION) at 12:54

## 2019-12-13 RX ADMIN — POTASSIUM BICARBONATE 20 MEQ: 782 TABLET, EFFERVESCENT ORAL at 22:24

## 2019-12-13 RX ADMIN — GABAPENTIN 300 MG: 250 SOLUTION ORAL at 22:47

## 2019-12-13 RX ADMIN — VITAMIN D, TAB 1000IU (100/BT) 5000 UNITS: 25 TAB at 10:34

## 2019-12-13 RX ADMIN — SODIUM CHLORIDE, PRESERVATIVE FREE 10 ML: 5 INJECTION INTRAVENOUS at 10:39

## 2019-12-13 RX ADMIN — FLUOXETINE HYDROCHLORIDE 20 MG: 20 CAPSULE ORAL at 22:24

## 2019-12-13 RX ADMIN — TICAGRELOR 90 MG: 90 TABLET ORAL at 22:24

## 2019-12-13 RX ADMIN — APIXABAN 5 MG: 5 TABLET, FILM COATED ORAL at 22:24

## 2019-12-13 RX ADMIN — GABAPENTIN 300 MG: 250 SOLUTION ORAL at 13:28

## 2019-12-13 RX ADMIN — ALBUTEROL SULFATE 2.5 MG: 2.5 SOLUTION RESPIRATORY (INHALATION) at 08:37

## 2019-12-13 RX ADMIN — SODIUM CHLORIDE SOLN NEBU 3% 4 ML: 3 NEBU SOLN at 20:00

## 2019-12-13 RX ADMIN — FUROSEMIDE 20 MG: 20 TABLET ORAL at 10:35

## 2019-12-13 RX ADMIN — ALBUTEROL SULFATE 2.5 MG: 2.5 SOLUTION RESPIRATORY (INHALATION) at 20:00

## 2019-12-13 RX ADMIN — SODIUM CHLORIDE, PRESERVATIVE FREE 10 ML: 5 INJECTION INTRAVENOUS at 18:05

## 2019-12-13 RX ADMIN — LEVOTHYROXINE SODIUM 88 MCG: 88 TABLET ORAL at 06:54

## 2019-12-13 ASSESSMENT — PAIN SCALES - GENERAL
PAINLEVEL_OUTOF10: 0

## 2019-12-13 ASSESSMENT — PAIN DESCRIPTION - PROGRESSION: CLINICAL_PROGRESSION: NOT CHANGED

## 2019-12-14 LAB
ANION GAP SERPL CALCULATED.3IONS-SCNC: 12 MMOL/L (ref 7–16)
BASOPHILS ABSOLUTE: 0.03 E9/L (ref 0–0.2)
BASOPHILS RELATIVE PERCENT: 0.3 % (ref 0–2)
BUN BLDV-MCNC: 25 MG/DL (ref 8–23)
CALCIUM SERPL-MCNC: 8.8 MG/DL (ref 8.6–10.2)
CHLORIDE BLD-SCNC: 103 MMOL/L (ref 98–107)
CO2: 27 MMOL/L (ref 22–29)
CREAT SERPL-MCNC: 0.7 MG/DL (ref 0.5–1)
EOSINOPHILS ABSOLUTE: 0.09 E9/L (ref 0.05–0.5)
EOSINOPHILS RELATIVE PERCENT: 0.9 % (ref 0–6)
GFR AFRICAN AMERICAN: >60
GFR NON-AFRICAN AMERICAN: >60 ML/MIN/1.73
GLUCOSE BLD-MCNC: 92 MG/DL (ref 74–99)
HCT VFR BLD CALC: 32.8 % (ref 34–48)
HEMOGLOBIN: 9.6 G/DL (ref 11.5–15.5)
IMMATURE GRANULOCYTES #: 0.06 E9/L
IMMATURE GRANULOCYTES %: 0.6 % (ref 0–5)
LYMPHOCYTES ABSOLUTE: 1.04 E9/L (ref 1.5–4)
LYMPHOCYTES RELATIVE PERCENT: 10 % (ref 20–42)
MAGNESIUM: 2.6 MG/DL (ref 1.6–2.6)
MCH RBC QN AUTO: 28.7 PG (ref 26–35)
MCHC RBC AUTO-ENTMCNC: 29.3 % (ref 32–34.5)
MCV RBC AUTO: 97.9 FL (ref 80–99.9)
METER GLUCOSE: 140 MG/DL (ref 74–99)
METER GLUCOSE: 143 MG/DL (ref 74–99)
METER GLUCOSE: 190 MG/DL (ref 74–99)
MONOCYTES ABSOLUTE: 0.68 E9/L (ref 0.1–0.95)
MONOCYTES RELATIVE PERCENT: 6.5 % (ref 2–12)
NEUTROPHILS ABSOLUTE: 8.54 E9/L (ref 1.8–7.3)
NEUTROPHILS RELATIVE PERCENT: 81.7 % (ref 43–80)
PDW BLD-RTO: 14.3 FL (ref 11.5–15)
PHOSPHORUS: 2.8 MG/DL (ref 2.5–4.5)
PLATELET # BLD: 602 E9/L (ref 130–450)
PMV BLD AUTO: 11.2 FL (ref 7–12)
POTASSIUM SERPL-SCNC: 3.9 MMOL/L (ref 3.5–5)
RBC # BLD: 3.35 E12/L (ref 3.5–5.5)
SODIUM BLD-SCNC: 142 MMOL/L (ref 132–146)
WBC # BLD: 10.4 E9/L (ref 4.5–11.5)

## 2019-12-14 PROCEDURE — 2580000003 HC RX 258: Performed by: SURGERY

## 2019-12-14 PROCEDURE — 94640 AIRWAY INHALATION TREATMENT: CPT

## 2019-12-14 PROCEDURE — 94660 CPAP INITIATION&MGMT: CPT

## 2019-12-14 PROCEDURE — C9113 INJ PANTOPRAZOLE SODIUM, VIA: HCPCS | Performed by: SURGERY

## 2019-12-14 PROCEDURE — 84100 ASSAY OF PHOSPHORUS: CPT

## 2019-12-14 PROCEDURE — 6370000000 HC RX 637 (ALT 250 FOR IP): Performed by: SURGERY

## 2019-12-14 PROCEDURE — 99231 SBSQ HOSP IP/OBS SF/LOW 25: CPT | Performed by: FAMILY MEDICINE

## 2019-12-14 PROCEDURE — 6370000000 HC RX 637 (ALT 250 FOR IP): Performed by: FAMILY MEDICINE

## 2019-12-14 PROCEDURE — 6360000002 HC RX W HCPCS: Performed by: SURGERY

## 2019-12-14 PROCEDURE — 82962 GLUCOSE BLOOD TEST: CPT

## 2019-12-14 PROCEDURE — 80048 BASIC METABOLIC PNL TOTAL CA: CPT

## 2019-12-14 PROCEDURE — 36415 COLL VENOUS BLD VENIPUNCTURE: CPT

## 2019-12-14 PROCEDURE — 83735 ASSAY OF MAGNESIUM: CPT

## 2019-12-14 PROCEDURE — 85025 COMPLETE CBC W/AUTO DIFF WBC: CPT

## 2019-12-14 PROCEDURE — 6370000000 HC RX 637 (ALT 250 FOR IP): Performed by: NURSE PRACTITIONER

## 2019-12-14 PROCEDURE — 1200000000 HC SEMI PRIVATE

## 2019-12-14 RX ADMIN — FLUOXETINE HYDROCHLORIDE 20 MG: 20 CAPSULE ORAL at 22:42

## 2019-12-14 RX ADMIN — GABAPENTIN 300 MG: 250 SOLUTION ORAL at 13:45

## 2019-12-14 RX ADMIN — ALBUTEROL SULFATE 2.5 MG: 2.5 SOLUTION RESPIRATORY (INHALATION) at 16:17

## 2019-12-14 RX ADMIN — ATORVASTATIN CALCIUM 80 MG: 40 TABLET, FILM COATED ORAL at 22:42

## 2019-12-14 RX ADMIN — METOPROLOL TARTRATE 25 MG: 25 TABLET ORAL at 22:43

## 2019-12-14 RX ADMIN — SODIUM CHLORIDE SOLN NEBU 3% 4 ML: 3 NEBU SOLN at 16:18

## 2019-12-14 RX ADMIN — MEROPENEM 1 G: 1 INJECTION, POWDER, FOR SOLUTION INTRAVENOUS at 02:27

## 2019-12-14 RX ADMIN — GABAPENTIN 300 MG: 250 SOLUTION ORAL at 08:53

## 2019-12-14 RX ADMIN — SODIUM CHLORIDE SOLN NEBU 3% 4 ML: 3 NEBU SOLN at 21:38

## 2019-12-14 RX ADMIN — LEVOTHYROXINE SODIUM 88 MCG: 88 TABLET ORAL at 06:04

## 2019-12-14 RX ADMIN — FLUOXETINE HYDROCHLORIDE 20 MG: 20 CAPSULE ORAL at 08:52

## 2019-12-14 RX ADMIN — FUROSEMIDE 20 MG: 20 TABLET ORAL at 08:51

## 2019-12-14 RX ADMIN — PANTOPRAZOLE SODIUM 40 MG: 40 INJECTION, POWDER, FOR SOLUTION INTRAVENOUS at 08:52

## 2019-12-14 RX ADMIN — GABAPENTIN 300 MG: 250 SOLUTION ORAL at 22:45

## 2019-12-14 RX ADMIN — ALBUTEROL SULFATE 2.5 MG: 2.5 SOLUTION RESPIRATORY (INHALATION) at 11:18

## 2019-12-14 RX ADMIN — FLUTICASONE PROPIONATE 1 SPRAY: 50 SPRAY, METERED NASAL at 08:53

## 2019-12-14 RX ADMIN — DORNASE ALFA 2.5 MG: 1 SOLUTION RESPIRATORY (INHALATION) at 11:19

## 2019-12-14 RX ADMIN — TICAGRELOR 90 MG: 90 TABLET ORAL at 22:52

## 2019-12-14 RX ADMIN — MEROPENEM 1 G: 1 INJECTION, POWDER, FOR SOLUTION INTRAVENOUS at 10:02

## 2019-12-14 RX ADMIN — TICAGRELOR 90 MG: 90 TABLET ORAL at 08:52

## 2019-12-14 RX ADMIN — PREDNISONE 20 MG: 20 TABLET ORAL at 08:51

## 2019-12-14 RX ADMIN — ALBUTEROL SULFATE 2.5 MG: 2.5 SOLUTION RESPIRATORY (INHALATION) at 21:38

## 2019-12-14 RX ADMIN — APIXABAN 5 MG: 5 TABLET, FILM COATED ORAL at 08:51

## 2019-12-14 RX ADMIN — MEROPENEM 1 G: 1 INJECTION, POWDER, FOR SOLUTION INTRAVENOUS at 19:48

## 2019-12-14 RX ADMIN — POTASSIUM BICARBONATE 20 MEQ: 782 TABLET, EFFERVESCENT ORAL at 22:44

## 2019-12-14 RX ADMIN — SODIUM CHLORIDE, PRESERVATIVE FREE 10 ML: 5 INJECTION INTRAVENOUS at 08:52

## 2019-12-14 RX ADMIN — APIXABAN 5 MG: 5 TABLET, FILM COATED ORAL at 22:42

## 2019-12-14 RX ADMIN — Medication 10 ML: at 22:43

## 2019-12-14 RX ADMIN — CETIRIZINE HYDROCHLORIDE 10 MG: 10 TABLET, FILM COATED ORAL at 08:51

## 2019-12-14 RX ADMIN — MAGNESIUM HYDROXIDE 30 ML: 400 SUSPENSION ORAL at 10:13

## 2019-12-14 RX ADMIN — POTASSIUM BICARBONATE 20 MEQ: 782 TABLET, EFFERVESCENT ORAL at 08:52

## 2019-12-14 RX ADMIN — VITAMIN D, TAB 1000IU (100/BT) 5000 UNITS: 25 TAB at 08:51

## 2019-12-14 RX ADMIN — SODIUM CHLORIDE SOLN NEBU 3% 4 ML: 3 NEBU SOLN at 11:22

## 2019-12-14 RX ADMIN — Medication 10 ML: at 08:58

## 2019-12-14 ASSESSMENT — PAIN DESCRIPTION - PROGRESSION: CLINICAL_PROGRESSION: NOT CHANGED

## 2019-12-15 LAB
ANION GAP SERPL CALCULATED.3IONS-SCNC: 12 MMOL/L (ref 7–16)
BASOPHILS ABSOLUTE: 0.04 E9/L (ref 0–0.2)
BASOPHILS RELATIVE PERCENT: 0.4 % (ref 0–2)
BUN BLDV-MCNC: 23 MG/DL (ref 8–23)
CALCIUM SERPL-MCNC: 8.8 MG/DL (ref 8.6–10.2)
CHLORIDE BLD-SCNC: 103 MMOL/L (ref 98–107)
CO2: 25 MMOL/L (ref 22–29)
CREAT SERPL-MCNC: 0.7 MG/DL (ref 0.5–1)
EOSINOPHILS ABSOLUTE: 0.12 E9/L (ref 0.05–0.5)
EOSINOPHILS RELATIVE PERCENT: 1.2 % (ref 0–6)
GFR AFRICAN AMERICAN: >60
GFR NON-AFRICAN AMERICAN: >60 ML/MIN/1.73
GLUCOSE BLD-MCNC: 138 MG/DL (ref 74–99)
HCT VFR BLD CALC: 32.5 % (ref 34–48)
HEMOGLOBIN: 9.7 G/DL (ref 11.5–15.5)
IMMATURE GRANULOCYTES #: 0.1 E9/L
IMMATURE GRANULOCYTES %: 1 % (ref 0–5)
LYMPHOCYTES ABSOLUTE: 1.03 E9/L (ref 1.5–4)
LYMPHOCYTES RELATIVE PERCENT: 10.6 % (ref 20–42)
MAGNESIUM: 2.7 MG/DL (ref 1.6–2.6)
MCH RBC QN AUTO: 28.5 PG (ref 26–35)
MCHC RBC AUTO-ENTMCNC: 29.8 % (ref 32–34.5)
MCV RBC AUTO: 95.6 FL (ref 80–99.9)
METER GLUCOSE: 134 MG/DL (ref 74–99)
METER GLUCOSE: 138 MG/DL (ref 74–99)
METER GLUCOSE: 155 MG/DL (ref 74–99)
METER GLUCOSE: 167 MG/DL (ref 74–99)
MONOCYTES ABSOLUTE: 0.69 E9/L (ref 0.1–0.95)
MONOCYTES RELATIVE PERCENT: 7.1 % (ref 2–12)
NEUTROPHILS ABSOLUTE: 7.7 E9/L (ref 1.8–7.3)
NEUTROPHILS RELATIVE PERCENT: 79.7 % (ref 43–80)
PDW BLD-RTO: 14.2 FL (ref 11.5–15)
PHOSPHORUS: 2.9 MG/DL (ref 2.5–4.5)
PLATELET # BLD: 588 E9/L (ref 130–450)
PMV BLD AUTO: 10.9 FL (ref 7–12)
POTASSIUM SERPL-SCNC: 3.6 MMOL/L (ref 3.5–5)
RBC # BLD: 3.4 E12/L (ref 3.5–5.5)
SODIUM BLD-SCNC: 140 MMOL/L (ref 132–146)
WBC # BLD: 9.7 E9/L (ref 4.5–11.5)

## 2019-12-15 PROCEDURE — 6360000002 HC RX W HCPCS: Performed by: SURGERY

## 2019-12-15 PROCEDURE — 94640 AIRWAY INHALATION TREATMENT: CPT

## 2019-12-15 PROCEDURE — 6370000000 HC RX 637 (ALT 250 FOR IP): Performed by: SURGERY

## 2019-12-15 PROCEDURE — 83735 ASSAY OF MAGNESIUM: CPT

## 2019-12-15 PROCEDURE — 6370000000 HC RX 637 (ALT 250 FOR IP): Performed by: FAMILY MEDICINE

## 2019-12-15 PROCEDURE — 80048 BASIC METABOLIC PNL TOTAL CA: CPT

## 2019-12-15 PROCEDURE — 6370000000 HC RX 637 (ALT 250 FOR IP): Performed by: NURSE PRACTITIONER

## 2019-12-15 PROCEDURE — 84100 ASSAY OF PHOSPHORUS: CPT

## 2019-12-15 PROCEDURE — 2580000003 HC RX 258: Performed by: SURGERY

## 2019-12-15 PROCEDURE — 94660 CPAP INITIATION&MGMT: CPT

## 2019-12-15 PROCEDURE — 1200000000 HC SEMI PRIVATE

## 2019-12-15 PROCEDURE — 85025 COMPLETE CBC W/AUTO DIFF WBC: CPT

## 2019-12-15 PROCEDURE — 36415 COLL VENOUS BLD VENIPUNCTURE: CPT

## 2019-12-15 PROCEDURE — C9113 INJ PANTOPRAZOLE SODIUM, VIA: HCPCS | Performed by: SURGERY

## 2019-12-15 PROCEDURE — 82962 GLUCOSE BLOOD TEST: CPT

## 2019-12-15 RX ADMIN — VITAMIN D, TAB 1000IU (100/BT) 5000 UNITS: 25 TAB at 10:32

## 2019-12-15 RX ADMIN — ALBUTEROL SULFATE 2.5 MG: 2.5 SOLUTION RESPIRATORY (INHALATION) at 13:11

## 2019-12-15 RX ADMIN — GABAPENTIN 300 MG: 250 SOLUTION ORAL at 10:34

## 2019-12-15 RX ADMIN — GABAPENTIN 300 MG: 250 SOLUTION ORAL at 16:53

## 2019-12-15 RX ADMIN — FLUOXETINE HYDROCHLORIDE 20 MG: 20 CAPSULE ORAL at 10:32

## 2019-12-15 RX ADMIN — PREDNISONE 20 MG: 20 TABLET ORAL at 10:32

## 2019-12-15 RX ADMIN — GABAPENTIN 300 MG: 250 SOLUTION ORAL at 22:36

## 2019-12-15 RX ADMIN — DORNASE ALFA 2.5 MG: 1 SOLUTION RESPIRATORY (INHALATION) at 09:10

## 2019-12-15 RX ADMIN — ATORVASTATIN CALCIUM 80 MG: 40 TABLET, FILM COATED ORAL at 22:35

## 2019-12-15 RX ADMIN — SODIUM CHLORIDE SOLN NEBU 3% 4 ML: 3 NEBU SOLN at 09:07

## 2019-12-15 RX ADMIN — ALBUTEROL SULFATE 2.5 MG: 2.5 SOLUTION RESPIRATORY (INHALATION) at 09:06

## 2019-12-15 RX ADMIN — LEVOTHYROXINE SODIUM 88 MCG: 88 TABLET ORAL at 06:26

## 2019-12-15 RX ADMIN — FLUTICASONE PROPIONATE 1 SPRAY: 50 SPRAY, METERED NASAL at 10:33

## 2019-12-15 RX ADMIN — POTASSIUM BICARBONATE 20 MEQ: 782 TABLET, EFFERVESCENT ORAL at 10:33

## 2019-12-15 RX ADMIN — MEROPENEM 1 G: 1 INJECTION, POWDER, FOR SOLUTION INTRAVENOUS at 03:59

## 2019-12-15 RX ADMIN — POTASSIUM BICARBONATE 20 MEQ: 782 TABLET, EFFERVESCENT ORAL at 22:36

## 2019-12-15 RX ADMIN — SODIUM CHLORIDE SOLN NEBU 3% 4 ML: 3 NEBU SOLN at 19:13

## 2019-12-15 RX ADMIN — LISINOPRIL 5 MG: 5 TABLET ORAL at 10:31

## 2019-12-15 RX ADMIN — Medication 10 ML: at 10:50

## 2019-12-15 RX ADMIN — Medication 10 ML: at 22:37

## 2019-12-15 RX ADMIN — METOPROLOL TARTRATE 25 MG: 25 TABLET ORAL at 10:33

## 2019-12-15 RX ADMIN — APIXABAN 5 MG: 5 TABLET, FILM COATED ORAL at 22:35

## 2019-12-15 RX ADMIN — FLUOXETINE HYDROCHLORIDE 20 MG: 20 CAPSULE ORAL at 22:36

## 2019-12-15 RX ADMIN — ALBUTEROL SULFATE 2.5 MG: 2.5 SOLUTION RESPIRATORY (INHALATION) at 19:13

## 2019-12-15 RX ADMIN — PANTOPRAZOLE SODIUM 40 MG: 40 INJECTION, POWDER, FOR SOLUTION INTRAVENOUS at 10:33

## 2019-12-15 RX ADMIN — SODIUM CHLORIDE SOLN NEBU 3% 4 ML: 3 NEBU SOLN at 13:12

## 2019-12-15 RX ADMIN — MEROPENEM 1 G: 1 INJECTION, POWDER, FOR SOLUTION INTRAVENOUS at 10:49

## 2019-12-15 RX ADMIN — TICAGRELOR 90 MG: 90 TABLET ORAL at 22:35

## 2019-12-15 RX ADMIN — TICAGRELOR 90 MG: 90 TABLET ORAL at 10:33

## 2019-12-15 RX ADMIN — SODIUM CHLORIDE, PRESERVATIVE FREE 10 ML: 5 INJECTION INTRAVENOUS at 10:50

## 2019-12-15 RX ADMIN — CETIRIZINE HYDROCHLORIDE 10 MG: 10 TABLET, FILM COATED ORAL at 10:32

## 2019-12-15 RX ADMIN — FUROSEMIDE 20 MG: 20 TABLET ORAL at 10:31

## 2019-12-15 ASSESSMENT — PAIN SCALES - GENERAL
PAINLEVEL_OUTOF10: 0

## 2019-12-15 ASSESSMENT — PAIN DESCRIPTION - PROGRESSION: CLINICAL_PROGRESSION: NOT CHANGED

## 2019-12-16 ENCOUNTER — APPOINTMENT (OUTPATIENT)
Dept: GENERAL RADIOLOGY | Age: 64
DRG: 720 | End: 2019-12-16
Payer: MEDICAID

## 2019-12-16 ENCOUNTER — TELEPHONE (OUTPATIENT)
Dept: ADMINISTRATIVE | Age: 64
End: 2019-12-16

## 2019-12-16 VITALS
DIASTOLIC BLOOD PRESSURE: 56 MMHG | HEIGHT: 66 IN | RESPIRATION RATE: 18 BRPM | SYSTOLIC BLOOD PRESSURE: 101 MMHG | HEART RATE: 87 BPM | WEIGHT: 178.4 LBS | OXYGEN SATURATION: 94 % | BODY MASS INDEX: 28.67 KG/M2 | TEMPERATURE: 96.5 F

## 2019-12-16 PROBLEM — N39.0 URINARY TRACT INFECTION WITHOUT HEMATURIA: Status: RESOLVED | Noted: 2018-08-12 | Resolved: 2019-12-16

## 2019-12-16 PROBLEM — A41.9 SEPSIS (HCC): Status: RESOLVED | Noted: 2019-11-28 | Resolved: 2019-12-16

## 2019-12-16 PROBLEM — R07.9 CHEST PAIN: Status: RESOLVED | Noted: 2019-06-25 | Resolved: 2019-12-16

## 2019-12-16 LAB
ANION GAP SERPL CALCULATED.3IONS-SCNC: 13 MMOL/L (ref 7–16)
BASOPHILS ABSOLUTE: 0.06 E9/L (ref 0–0.2)
BASOPHILS RELATIVE PERCENT: 0.5 % (ref 0–2)
BUN BLDV-MCNC: 23 MG/DL (ref 8–23)
CALCIUM SERPL-MCNC: 8.7 MG/DL (ref 8.6–10.2)
CHLORIDE BLD-SCNC: 99 MMOL/L (ref 98–107)
CO2: 22 MMOL/L (ref 22–29)
CREAT SERPL-MCNC: 0.6 MG/DL (ref 0.5–1)
EOSINOPHILS ABSOLUTE: 0.23 E9/L (ref 0.05–0.5)
EOSINOPHILS RELATIVE PERCENT: 1.9 % (ref 0–6)
GFR AFRICAN AMERICAN: >60
GFR NON-AFRICAN AMERICAN: >60 ML/MIN/1.73
GLUCOSE BLD-MCNC: 102 MG/DL (ref 74–99)
HCT VFR BLD CALC: 34.9 % (ref 34–48)
HEMOGLOBIN: 10.5 G/DL (ref 11.5–15.5)
IMMATURE GRANULOCYTES #: 0.14 E9/L
IMMATURE GRANULOCYTES %: 1.1 % (ref 0–5)
LYMPHOCYTES ABSOLUTE: 1.26 E9/L (ref 1.5–4)
LYMPHOCYTES RELATIVE PERCENT: 10.3 % (ref 20–42)
MAGNESIUM: 2.7 MG/DL (ref 1.6–2.6)
MCH RBC QN AUTO: 29 PG (ref 26–35)
MCHC RBC AUTO-ENTMCNC: 30.1 % (ref 32–34.5)
MCV RBC AUTO: 96.4 FL (ref 80–99.9)
METER GLUCOSE: 135 MG/DL (ref 74–99)
METER GLUCOSE: 160 MG/DL (ref 74–99)
MONOCYTES ABSOLUTE: 0.78 E9/L (ref 0.1–0.95)
MONOCYTES RELATIVE PERCENT: 6.4 % (ref 2–12)
NEUTROPHILS ABSOLUTE: 9.77 E9/L (ref 1.8–7.3)
NEUTROPHILS RELATIVE PERCENT: 79.8 % (ref 43–80)
PDW BLD-RTO: 14.2 FL (ref 11.5–15)
PHOSPHORUS: 2.8 MG/DL (ref 2.5–4.5)
PLATELET # BLD: 466 E9/L (ref 130–450)
PMV BLD AUTO: 11.9 FL (ref 7–12)
POTASSIUM SERPL-SCNC: 4 MMOL/L (ref 3.5–5)
RBC # BLD: 3.62 E12/L (ref 3.5–5.5)
SODIUM BLD-SCNC: 134 MMOL/L (ref 132–146)
WBC # BLD: 12.2 E9/L (ref 4.5–11.5)

## 2019-12-16 PROCEDURE — 6370000000 HC RX 637 (ALT 250 FOR IP): Performed by: SURGERY

## 2019-12-16 PROCEDURE — 36415 COLL VENOUS BLD VENIPUNCTURE: CPT

## 2019-12-16 PROCEDURE — 94660 CPAP INITIATION&MGMT: CPT

## 2019-12-16 PROCEDURE — 84100 ASSAY OF PHOSPHORUS: CPT

## 2019-12-16 PROCEDURE — C9113 INJ PANTOPRAZOLE SODIUM, VIA: HCPCS | Performed by: SURGERY

## 2019-12-16 PROCEDURE — 2580000003 HC RX 258: Performed by: SURGERY

## 2019-12-16 PROCEDURE — 6370000000 HC RX 637 (ALT 250 FOR IP): Performed by: FAMILY MEDICINE

## 2019-12-16 PROCEDURE — 6360000002 HC RX W HCPCS: Performed by: SURGERY

## 2019-12-16 PROCEDURE — 85025 COMPLETE CBC W/AUTO DIFF WBC: CPT

## 2019-12-16 PROCEDURE — 80048 BASIC METABOLIC PNL TOTAL CA: CPT

## 2019-12-16 PROCEDURE — 6370000000 HC RX 637 (ALT 250 FOR IP): Performed by: NURSE PRACTITIONER

## 2019-12-16 PROCEDURE — 71045 X-RAY EXAM CHEST 1 VIEW: CPT

## 2019-12-16 PROCEDURE — 94640 AIRWAY INHALATION TREATMENT: CPT

## 2019-12-16 PROCEDURE — 82962 GLUCOSE BLOOD TEST: CPT

## 2019-12-16 PROCEDURE — 83735 ASSAY OF MAGNESIUM: CPT

## 2019-12-16 RX ORDER — GUAIFENESIN 600 MG/1
600 TABLET, EXTENDED RELEASE ORAL 2 TIMES DAILY
Qty: 30 TABLET | Refills: 0 | DISCHARGE
Start: 2019-12-16 | End: 2019-12-31

## 2019-12-16 RX ORDER — ACETAMINOPHEN 325 MG/1
650 TABLET ORAL EVERY 4 HOURS PRN
Qty: 120 TABLET | Refills: 3 | Status: ON HOLD | DISCHARGE
Start: 2019-12-16 | End: 2020-03-09

## 2019-12-16 RX ORDER — LISINOPRIL 5 MG/1
5 TABLET ORAL DAILY
Qty: 30 TABLET | Refills: 3 | Status: ON HOLD | DISCHARGE
Start: 2019-12-16 | End: 2020-03-11 | Stop reason: HOSPADM

## 2019-12-16 RX ORDER — ATORVASTATIN CALCIUM 80 MG/1
80 TABLET, FILM COATED ORAL NIGHTLY
Qty: 30 TABLET | Refills: 3 | DISCHARGE
Start: 2019-12-16 | End: 2020-12-14

## 2019-12-16 RX ORDER — DIAZEPAM 2 MG/1
2 TABLET ORAL EVERY 12 HOURS PRN
Refills: 3 | Status: SHIPPED | DISCHARGE
Start: 2019-12-16 | End: 2019-12-16 | Stop reason: SDUPTHER

## 2019-12-16 RX ORDER — FUROSEMIDE 20 MG/1
20 TABLET ORAL DAILY
Qty: 60 TABLET | Refills: 3 | Status: ON HOLD | DISCHARGE
Start: 2019-12-16 | End: 2020-06-13 | Stop reason: HOSPADM

## 2019-12-16 RX ORDER — DIAZEPAM 2 MG/1
2 TABLET ORAL EVERY 12 HOURS PRN
Qty: 30 TABLET | Refills: 3 | Status: SHIPPED | OUTPATIENT
Start: 2019-12-16 | End: 2020-01-15

## 2019-12-16 RX ORDER — POTASSIUM BICARBONATE 25 MEQ/1
25 TABLET, EFFERVESCENT ORAL 2 TIMES DAILY
Qty: 60 TABLET | Refills: 3 | DISCHARGE
Start: 2019-12-16 | End: 2020-12-14

## 2019-12-16 RX ORDER — PYRIDOXINE HCL (VITAMIN B6) 100 MG
500 TABLET ORAL DAILY
Qty: 60 CAPSULE | Refills: 3 | DISCHARGE
Start: 2019-12-16

## 2019-12-16 RX ORDER — ALBUTEROL SULFATE 2.5 MG/3ML
2.5 SOLUTION RESPIRATORY (INHALATION)
Qty: 120 EACH | Refills: 3 | DISCHARGE
Start: 2019-12-16 | End: 2020-12-14

## 2019-12-16 RX ORDER — LEVOTHYROXINE SODIUM 88 UG/1
88 TABLET ORAL DAILY
Qty: 30 TABLET | Refills: 3 | DISCHARGE
Start: 2019-12-16 | End: 2020-12-14

## 2019-12-16 RX ORDER — FLUOXETINE HYDROCHLORIDE 20 MG/1
20 CAPSULE ORAL 2 TIMES DAILY
Qty: 30 CAPSULE | Refills: 3 | DISCHARGE
Start: 2019-12-16 | End: 2020-12-14

## 2019-12-16 RX ORDER — CLONIDINE HYDROCHLORIDE 0.2 MG/1
0.2 TABLET ORAL EVERY 6 HOURS PRN
Qty: 60 TABLET | Refills: 3 | Status: ON HOLD | DISCHARGE
Start: 2019-12-16 | End: 2020-06-13 | Stop reason: HOSPADM

## 2019-12-16 RX ORDER — GABAPENTIN 250 MG/5ML
300 SOLUTION ORAL 3 TIMES DAILY
Qty: 473 ML | Refills: 3 | DISCHARGE
Start: 2019-12-16 | End: 2020-12-14

## 2019-12-16 RX ORDER — NITROGLYCERIN 0.4 MG/1
TABLET SUBLINGUAL
Qty: 25 TABLET | Refills: 3 | DISCHARGE
Start: 2019-12-16 | End: 2020-12-14

## 2019-12-16 RX ADMIN — TICAGRELOR 90 MG: 90 TABLET ORAL at 09:46

## 2019-12-16 RX ADMIN — SODIUM CHLORIDE, PRESERVATIVE FREE 10 ML: 5 INJECTION INTRAVENOUS at 09:46

## 2019-12-16 RX ADMIN — CETIRIZINE HYDROCHLORIDE 10 MG: 10 TABLET, FILM COATED ORAL at 09:45

## 2019-12-16 RX ADMIN — METOPROLOL TARTRATE 25 MG: 25 TABLET ORAL at 09:46

## 2019-12-16 RX ADMIN — APIXABAN 5 MG: 5 TABLET, FILM COATED ORAL at 09:46

## 2019-12-16 RX ADMIN — FLUOXETINE HYDROCHLORIDE 20 MG: 20 CAPSULE ORAL at 09:45

## 2019-12-16 RX ADMIN — POTASSIUM BICARBONATE 20 MEQ: 782 TABLET, EFFERVESCENT ORAL at 09:46

## 2019-12-16 RX ADMIN — FUROSEMIDE 20 MG: 20 TABLET ORAL at 09:45

## 2019-12-16 RX ADMIN — VITAMIN D, TAB 1000IU (100/BT) 5000 UNITS: 25 TAB at 09:46

## 2019-12-16 RX ADMIN — PANTOPRAZOLE SODIUM 40 MG: 40 INJECTION, POWDER, FOR SOLUTION INTRAVENOUS at 09:46

## 2019-12-16 RX ADMIN — FLUTICASONE PROPIONATE 1 SPRAY: 50 SPRAY, METERED NASAL at 09:47

## 2019-12-16 RX ADMIN — Medication 10 ML: at 09:47

## 2019-12-16 RX ADMIN — ALBUTEROL SULFATE 2.5 MG: 2.5 SOLUTION RESPIRATORY (INHALATION) at 08:00

## 2019-12-16 RX ADMIN — SODIUM CHLORIDE SOLN NEBU 3% 4 ML: 3 NEBU SOLN at 08:00

## 2019-12-16 RX ADMIN — DORNASE ALFA 2.5 MG: 1 SOLUTION RESPIRATORY (INHALATION) at 08:00

## 2019-12-16 RX ADMIN — PREDNISONE 20 MG: 20 TABLET ORAL at 09:45

## 2019-12-16 RX ADMIN — LEVOTHYROXINE SODIUM 88 MCG: 88 TABLET ORAL at 06:34

## 2019-12-16 RX ADMIN — GABAPENTIN 300 MG: 250 SOLUTION ORAL at 09:46

## 2019-12-16 RX ADMIN — LISINOPRIL 5 MG: 5 TABLET ORAL at 09:45

## 2019-12-23 ENCOUNTER — HOSPITAL ENCOUNTER (OUTPATIENT)
Dept: INFUSION THERAPY | Age: 64
Setting detail: INFUSION SERIES
Discharge: HOME | End: 2019-12-23
Payer: MEDICAID

## 2019-12-23 ENCOUNTER — TELEPHONE (OUTPATIENT)
Dept: CARDIOLOGY CLINIC | Age: 64
End: 2019-12-23

## 2019-12-23 VITALS
TEMPERATURE: 97.1 F | SYSTOLIC BLOOD PRESSURE: 110 MMHG | DIASTOLIC BLOOD PRESSURE: 69 MMHG | HEART RATE: 94 BPM | RESPIRATION RATE: 18 BRPM

## 2019-12-23 DIAGNOSIS — G35 MULTIPLE SCLEROSIS (HCC): Primary | ICD-10-CM

## 2019-12-23 PROCEDURE — 6370000000 HC RX 637 (ALT 250 FOR IP): Performed by: PSYCHIATRY & NEUROLOGY

## 2019-12-23 PROCEDURE — 96375 TX/PRO/DX INJ NEW DRUG ADDON: CPT

## 2019-12-23 PROCEDURE — 2580000003 HC RX 258: Performed by: PSYCHIATRY & NEUROLOGY

## 2019-12-23 PROCEDURE — 96366 THER/PROPH/DIAG IV INF ADDON: CPT

## 2019-12-23 PROCEDURE — 96374 THER/PROPH/DIAG INJ IV PUSH: CPT

## 2019-12-23 PROCEDURE — 96365 THER/PROPH/DIAG IV INF INIT: CPT

## 2019-12-23 PROCEDURE — 6360000002 HC RX W HCPCS: Performed by: PSYCHIATRY & NEUROLOGY

## 2019-12-23 RX ORDER — DIPHENHYDRAMINE HYDROCHLORIDE 50 MG/ML
25 INJECTION INTRAMUSCULAR; INTRAVENOUS ONCE
Status: COMPLETED | OUTPATIENT
Start: 2019-12-23 | End: 2019-12-23

## 2019-12-23 RX ORDER — SODIUM CHLORIDE/ALOE VERA
1 GEL (GRAM) NASAL PRN
COMMUNITY
End: 2020-12-14

## 2019-12-23 RX ORDER — EPINEPHRINE 1 MG/ML
0.3 INJECTION, SOLUTION, CONCENTRATE INTRAVENOUS PRN
Status: CANCELLED | OUTPATIENT
Start: 2020-05-25

## 2019-12-23 RX ORDER — ACETAMINOPHEN 325 MG/1
650 TABLET ORAL ONCE
Status: COMPLETED | OUTPATIENT
Start: 2019-12-23 | End: 2019-12-23

## 2019-12-23 RX ORDER — SODIUM CHLORIDE 0.9 % (FLUSH) 0.9 %
10 SYRINGE (ML) INJECTION PRN
Status: CANCELLED | OUTPATIENT
Start: 2020-05-25

## 2019-12-23 RX ORDER — SODIUM CHLORIDE 0.9 % (FLUSH) 0.9 %
SYRINGE (ML) INJECTION
Status: DISPENSED
Start: 2019-12-23 | End: 2019-12-23

## 2019-12-23 RX ORDER — SODIUM CHLORIDE 0.9 % (FLUSH) 0.9 %
5 SYRINGE (ML) INJECTION PRN
Status: CANCELLED | OUTPATIENT
Start: 2020-05-25

## 2019-12-23 RX ORDER — DIPHENHYDRAMINE HYDROCHLORIDE 50 MG/ML
25 INJECTION INTRAMUSCULAR; INTRAVENOUS ONCE
Status: CANCELLED | OUTPATIENT
Start: 2020-05-25

## 2019-12-23 RX ORDER — METHYLPREDNISOLONE SODIUM SUCCINATE 125 MG/2ML
100 INJECTION, POWDER, LYOPHILIZED, FOR SOLUTION INTRAMUSCULAR; INTRAVENOUS ONCE
Status: COMPLETED | OUTPATIENT
Start: 2019-12-23 | End: 2019-12-23

## 2019-12-23 RX ORDER — DIPHENHYDRAMINE HYDROCHLORIDE 50 MG/ML
50 INJECTION INTRAMUSCULAR; INTRAVENOUS ONCE
Status: CANCELLED | OUTPATIENT
Start: 2020-05-25

## 2019-12-23 RX ORDER — SODIUM CHLORIDE 9 MG/ML
INJECTION, SOLUTION INTRAVENOUS CONTINUOUS
Status: DISCONTINUED | OUTPATIENT
Start: 2019-12-23 | End: 2019-12-24 | Stop reason: HOSPADM

## 2019-12-23 RX ORDER — SODIUM CHLORIDE 0.9 % (FLUSH) 0.9 %
10 SYRINGE (ML) INJECTION PRN
Status: DISCONTINUED | OUTPATIENT
Start: 2019-12-23 | End: 2019-12-24 | Stop reason: HOSPADM

## 2019-12-23 RX ORDER — SODIUM CHLORIDE 0.9 % (FLUSH) 0.9 %
5 SYRINGE (ML) INJECTION PRN
Status: DISCONTINUED | OUTPATIENT
Start: 2019-12-23 | End: 2019-12-24 | Stop reason: HOSPADM

## 2019-12-23 RX ORDER — HEPARIN SODIUM (PORCINE) LOCK FLUSH IV SOLN 100 UNIT/ML 100 UNIT/ML
500 SOLUTION INTRAVENOUS PRN
Status: DISCONTINUED | OUTPATIENT
Start: 2019-12-23 | End: 2019-12-24 | Stop reason: HOSPADM

## 2019-12-23 RX ORDER — METHYLPREDNISOLONE SODIUM SUCCINATE 125 MG/2ML
100 INJECTION, POWDER, LYOPHILIZED, FOR SOLUTION INTRAMUSCULAR; INTRAVENOUS ONCE
Status: CANCELLED | OUTPATIENT
Start: 2020-05-25

## 2019-12-23 RX ORDER — ACETAMINOPHEN 325 MG/1
650 TABLET ORAL ONCE
Status: CANCELLED | OUTPATIENT
Start: 2020-05-25

## 2019-12-23 RX ORDER — HEPARIN SODIUM (PORCINE) LOCK FLUSH IV SOLN 100 UNIT/ML 100 UNIT/ML
500 SOLUTION INTRAVENOUS PRN
Status: CANCELLED | OUTPATIENT
Start: 2020-05-25

## 2019-12-23 RX ORDER — SODIUM CHLORIDE 9 MG/ML
INJECTION, SOLUTION INTRAVENOUS CONTINUOUS
Status: CANCELLED
Start: 2020-05-25

## 2019-12-23 RX ORDER — ACETAMINOPHEN 325 MG/1
650 TABLET ORAL ONCE
Status: CANCELLED
Start: 2020-05-25

## 2019-12-23 RX ADMIN — ACETAMINOPHEN 650 MG: 325 TABLET, FILM COATED ORAL at 08:56

## 2019-12-23 RX ADMIN — OCRELIZUMAB 600 MG: 300 INJECTION INTRAVENOUS at 09:53

## 2019-12-23 RX ADMIN — DIPHENHYDRAMINE HYDROCHLORIDE: 50 INJECTION, SOLUTION INTRAMUSCULAR; INTRAVENOUS at 09:08

## 2019-12-23 RX ADMIN — METHYLPREDNISOLONE SODIUM SUCCINATE 100 MG: 125 INJECTION, POWDER, FOR SOLUTION INTRAMUSCULAR; INTRAVENOUS at 09:04

## 2019-12-23 RX ADMIN — SODIUM CHLORIDE: 9 INJECTION, SOLUTION INTRAVENOUS at 09:01

## 2019-12-23 RX ADMIN — SODIUM CHLORIDE, PRESERVATIVE FREE 10 ML: 5 INJECTION INTRAVENOUS at 08:15

## 2019-12-23 ASSESSMENT — PAIN SCALES - GENERAL: PAINLEVEL_OUTOF10: 0

## 2020-01-20 ENCOUNTER — OFFICE VISIT (OUTPATIENT)
Dept: ENT CLINIC | Age: 65
End: 2020-01-20
Payer: MEDICAID

## 2020-01-20 VITALS
OXYGEN SATURATION: 96 % | DIASTOLIC BLOOD PRESSURE: 69 MMHG | HEART RATE: 74 BPM | HEIGHT: 67 IN | BODY MASS INDEX: 28.36 KG/M2 | SYSTOLIC BLOOD PRESSURE: 97 MMHG

## 2020-01-20 PROCEDURE — 99213 OFFICE O/P EST LOW 20 MIN: CPT | Performed by: OTOLARYNGOLOGY

## 2020-01-20 RX ORDER — HYDROCODONE BITARTRATE AND ACETAMINOPHEN 5; 325 MG/1; MG/1
1 TABLET ORAL EVERY 6 HOURS PRN
Status: ON HOLD | COMMUNITY
End: 2020-03-03 | Stop reason: SDUPTHER

## 2020-01-20 RX ORDER — DIAZEPAM 2 MG/1
2 TABLET ORAL EVERY 12 HOURS
Status: ON HOLD | COMMUNITY
End: 2020-03-11 | Stop reason: SDUPTHER

## 2020-01-20 ASSESSMENT — ENCOUNTER SYMPTOMS
VOICE CHANGE: 1
TROUBLE SWALLOWING: 1
SORE THROAT: 1

## 2020-01-20 NOTE — PROGRESS NOTES
60 tablet, Rfl: 3    ticagrelor (BRILINTA) 90 MG TABS tablet, 1 tablet by PEG Tube route 2 times daily, Disp: 60 tablet, Rfl:     levothyroxine (SYNTHROID) 88 MCG tablet, 1 tablet by PEG Tube route Daily, Disp: 30 tablet, Rfl: 3    fluticasone (FLONASE) 50 MCG/ACT nasal spray, 1 spray by Each Nare route daily, Disp: , Rfl:     saline nasal gel (AYR) GEL, 1 each by Nasal route as needed for Congestion, Disp: , Rfl:     albuterol (PROVENTIL) (2.5 MG/3ML) 0.083% nebulizer solution, Take 3 mLs by nebulization every 4 hours (while awake), Disp: 120 each, Rfl: 3    aspirin 81 MG tablet, Take 81 mg by mouth daily, Disp: , Rfl:     ocrelizumab (OCREVUS) 300 MG/10ML SOLN injection, Infuse 20 ml ( total 600mg) every 6 months, Disp: 20 mL, Rfl: 1    Allergies   Allergen Reactions    Pcn [Penicillins] Anaphylaxis    Watermelon [Citrullus Vulgaris]        Family History   Problem Relation Age of Onset    Cancer Mother     Diabetes Father     Heart Disease Father        Social History     Socioeconomic History    Marital status:      Spouse name: Not on file    Number of children: Not on file    Years of education: Not on file    Highest education level: Not on file   Occupational History    Not on file   Social Needs    Financial resource strain: Not on file    Food insecurity:     Worry: Not on file     Inability: Not on file    Transportation needs:     Medical: Not on file     Non-medical: Not on file   Tobacco Use    Smoking status: Former Smoker     Years: 5.00     Types: Cigarettes    Smokeless tobacco: Never Used   Substance and Sexual Activity    Alcohol use: No    Drug use: No    Sexual activity: Not on file   Lifestyle    Physical activity:     Days per week: Not on file     Minutes per session: Not on file    Stress: Not on file   Relationships    Social connections:     Talks on phone: Not on file     Gets together: Not on file     Attends [employer] service: Not on file     Active member of club or organization: Not on file     Attends meetings of clubs or organizations: Not on file     Relationship status: Not on file    Intimate partner violence:     Fear of current or ex partner: Not on file     Emotionally abused: Not on file     Physically abused: Not on file     Forced sexual activity: Not on file   Other Topics Concern    Not on file   Social History Narrative    Not on file       REVIEW OF SYSTEMS:  Review of Systems   HENT: Positive for congestion, sore throat, trouble swallowing and voice change. All other systems reviewed and are negative. Constitutional: Negative for chills and fever. Eyes: Negative for discharge and itching. ENT: Negative for congestion, ear discharge, ear pain, hearing loss and sore throat. Respiratory: Negative for chest tightness and shortness of breath. Cardiovascular: Negative for chestpain and palpitations. Gastrointestinal: Negative for abdominal distention and abdominal pain. Endocrinology: Negative for heat and cold intolerance. Neurological: Negative for focal weaknessor seizure   Skin: Negative for rash and itchiness   Psychiatric: Negative for depression and hallucinations     PHYSICAL EXAM:                                                                                                                BP 97/69 (Site: Left Upper Arm, Position: Sitting)   Pulse 74   Ht 5' 6.5\" (1.689 m)   SpO2 96%   BMI 28.36 kg/m²   Physical Exam  Vitals signs reviewed. HENT:      Head: Normocephalic and atraumatic. Right Ear: Tympanic membrane normal.      Left Ear: Tympanic membrane normal.      Nose: Nose normal.      Mouth/Throat:      Mouth: Mucous membranes are moist.      Pharynx: Oropharynx is clear. Neck:      Musculoskeletal: Neck supple. Constitutional: Appears well-developed and well-nourished. Appears as stated age.    Eyes: Lids and lashes normal, pupils equal, extra ocular muscles intact, sclera clear   ENT: Left sided complete vocal cord paralysis   Neck:  Supple, symmetrical,trachea midline, no adenopathy, thyroid symmetric, not enlarged and no tenderness, skin normal   Respiratory: No increased work of breathing. No stridor or wheezes   Cardiovascular: Regular rate   Skin: Warm and dry   Neurologic:  Alert and oriented     ASSESSMENTAND PLAN:                                                                                                  Diagnosis Orders   1. Vocal cord paralysis, unilateral complete  FL Modified Barium Swallow W Video       60 y.o. female presents with to the office today as a new patient for evaluation of her dysphagia and hoarseness. The patient was hospitalized on 11/28/2019 for sepsis secondary to a UTI. She was intubated during this hospital visit and has had hoarseness of the voice since. A laryngoscopy on 12/2019 showed vocal cord paralysis. Today the patient complains of chronic dysphagia and hoarseness of the voice with sore throat. A repeat laryngoscopy reveals left complete vocal cord paralysis. · Repeat Barium swallow  · Possible vocal cord augmentation (left sided) if patient fails Barium swallow  · Follow up in 1 month(s) or sooner if symptoms acutely exacerbate    Patient was seen and examined with attending physician, who agrees with the assessment andplans. Leticia Julian DO  Resident Physician  [facility]  Otolaryngology Residency  1/20/2020  1:30 PM            Loretta Born  1955      I have discussed the case, including pertinent history and exam findings with the resident. I have seen and examined the patient and the key elements of the encounter have been performed by me. I agree with the assessment, plan and orders as documented by the resident. Patient here for follow up of medical problems. Remainder of medical problems as per resident note.       [id number] Xavier Shah DO  1/28/20

## 2020-01-21 ENCOUNTER — TELEPHONE (OUTPATIENT)
Dept: ENT CLINIC | Age: 65
End: 2020-01-21

## 2020-01-21 ENCOUNTER — OFFICE VISIT (OUTPATIENT)
Dept: NEUROLOGY | Age: 65
End: 2020-01-21
Payer: MEDICAID

## 2020-01-21 VITALS
DIASTOLIC BLOOD PRESSURE: 74 MMHG | BODY MASS INDEX: 28.79 KG/M2 | TEMPERATURE: 98.2 F | RESPIRATION RATE: 18 BRPM | HEART RATE: 69 BPM | HEIGHT: 66 IN | SYSTOLIC BLOOD PRESSURE: 130 MMHG | OXYGEN SATURATION: 98 %

## 2020-01-21 PROCEDURE — 99215 OFFICE O/P EST HI 40 MIN: CPT | Performed by: PSYCHIATRY & NEUROLOGY

## 2020-01-21 NOTE — PROGRESS NOTES
symmetric  Skin: color, texture, turgor normal; no rashes or lesions     Mental Status: alert, oriented, thought content appropriate--- intact memories in all spheres; again a questionable historian  Speech: no dysarthria  Language: laconic, without aphasias    Cranial Nerves:  I: smell NA   II: visual acuity  NA   II: visual fields Full to confrontation   II: pupils PHOENIX--no red desaturation or YONIS   III,VII: ptosis None   III,IV,VI: extraocular muscles  Full ROM   V: mastication Normal   V: facial light touch sensation  Normal   V,VII: corneal reflex  Present   VII: facial muscle function - upper  Normal   VII: facial muscle function - lower Normal   VIII: hearing Normal   IX: soft palate elevation  Normal   IX,X: gag reflex Minimal gag   XI: trapezius strength  5/5   XI: sternocleidomastoid strength 5/5   XI: neck extension strength  5/5   XII: tongue strength  Normal     Motor:  Right   5/5              Left   5/5               Right Bicep  5/5           Left Bicep  5/5              Right Triceps   5/5       Left Triceps  5/5          Right Deltoid  5/5     Left Deltoid  5/5         Right IPS  4/5            Left IPS  4/5               Right Quadriceps  5/5          Left Quadriceps    5/5           Right Gastrocnemius    5/5    Left Gastrocnemius   5/5  Right Ant Tibialis   4-/5  Left Ant Tibialis   0/5    Minimal spasticity in both legs  No drift    Wrist extensors 5/5    Sensory:  Light touch and pinprick intact throughout  Vibration was moderately decreased at the ankles  No spinal sensory level was detected    Coordination:   FN, FFM decreased in both arms  SHU decreased on the left  HS decreased in both legs but more so on the left side    Gait:  She stood with minimal support of 2--- again displaying bilateral foot drop     DTR:   Right Brachioradialis reflex 0  Left Brachioradialis reflex 0  Right Biceps reflex 1+  Left Biceps reflex 1+  Right Triceps reflex 1+  Left Triceps reflex 1+  Right Quadriceps reflex 2+  Left Quadriceps reflex 2+  Right Achilles reflex 0  Left Achilles reflex 0     No pathological reflexes    Her neurological examination remained unchanged. Laboratory/Radiology:     MRI brain---extensive lesion load with questionable enhancing lesions    MRI C-spine: moderate plaques with questionable enhancing lesions as well. Hepatitis screening was negative. A recent blood sugar was 160, with unremarkable CBC with differential.    Assessment: This individual suffers from chronic progressive multiple sclerosis with no significant changes since her last visit. Her EDS remains 7.5. Fortunately, she returned to [address] [place] [address]. However, we now must follow her closely for possible breast cancer. Hopefully, she can continue on Ocrevus. She is otherwise stable medically. Plan:     She will continue with [address] [place] [address] for now. She will keep me informed of her breast cancer work-up. She will call at any time if problems arise. I spent 40 minutes with the patient with over 50 % spent in counseling and disease management. All patient issues were addressed and all questions were answered.     Sal Majano  9:07 AM  1/21/2020

## 2020-01-22 ENCOUNTER — TELEPHONE (OUTPATIENT)
Dept: ENT CLINIC | Age: 65
End: 2020-01-22

## 2020-01-22 NOTE — TELEPHONE ENCOUNTER
Adama Dougherty with [facility] took detailed information to give to  in regards to patients scheduled apt.

## 2020-01-23 ENCOUNTER — PREP FOR PROCEDURE (OUTPATIENT)
Dept: SURGERY | Age: 65
End: 2020-01-23

## 2020-01-23 RX ORDER — SODIUM CHLORIDE 9 MG/ML
INJECTION, SOLUTION INTRAVENOUS CONTINUOUS
Status: CANCELLED | OUTPATIENT
Start: 2020-01-23

## 2020-01-27 ENCOUNTER — HOSPITAL ENCOUNTER (EMERGENCY)
Age: 65
Discharge: HOME | End: 2020-01-27
Attending: EMERGENCY MEDICINE
Payer: MEDICAID

## 2020-01-27 VITALS
DIASTOLIC BLOOD PRESSURE: 67 MMHG | RESPIRATION RATE: 18 BRPM | OXYGEN SATURATION: 98 % | SYSTOLIC BLOOD PRESSURE: 99 MMHG | TEMPERATURE: 98.2 F | HEART RATE: 70 BPM

## 2020-01-27 LAB
ALBUMIN SERPL-MCNC: 3.3 G/DL (ref 3.5–5.2)
ALP BLD-CCNC: 105 U/L (ref 35–104)
ALT SERPL-CCNC: 12 U/L (ref 0–32)
ANION GAP SERPL CALCULATED.3IONS-SCNC: 11 MMOL/L (ref 7–16)
AST SERPL-CCNC: 17 U/L (ref 0–31)
BACTERIA: ABNORMAL /HPF
BASOPHILS ABSOLUTE: 0.04 E9/L (ref 0–0.2)
BASOPHILS RELATIVE PERCENT: 0.4 % (ref 0–2)
BILIRUB SERPL-MCNC: 0.4 MG/DL (ref 0–1.2)
BILIRUBIN URINE: NEGATIVE
BLOOD, URINE: ABNORMAL
BUN BLDV-MCNC: 14 MG/DL (ref 8–23)
CALCIUM SERPL-MCNC: 9 MG/DL (ref 8.6–10.2)
CHLORIDE BLD-SCNC: 105 MMOL/L (ref 98–107)
CLARITY: ABNORMAL
CO2: 23 MMOL/L (ref 22–29)
COLOR: YELLOW
CREAT SERPL-MCNC: 0.7 MG/DL (ref 0.5–1)
EOSINOPHILS ABSOLUTE: 0.32 E9/L (ref 0.05–0.5)
EOSINOPHILS RELATIVE PERCENT: 3 % (ref 0–6)
EPITHELIAL CELLS, UA: ABNORMAL /HPF
GFR AFRICAN AMERICAN: >60
GFR NON-AFRICAN AMERICAN: >60 ML/MIN/1.73
GLUCOSE BLD-MCNC: 119 MG/DL (ref 74–99)
GLUCOSE URINE: NEGATIVE MG/DL
HCT VFR BLD CALC: 38.3 % (ref 34–48)
HEMOGLOBIN: 11.4 G/DL (ref 11.5–15.5)
IMMATURE GRANULOCYTES #: 0.06 E9/L
IMMATURE GRANULOCYTES %: 0.6 % (ref 0–5)
KETONES, URINE: NEGATIVE MG/DL
LEUKOCYTE ESTERASE, URINE: ABNORMAL
LYMPHOCYTES ABSOLUTE: 1 E9/L (ref 1.5–4)
LYMPHOCYTES RELATIVE PERCENT: 9.5 % (ref 20–42)
MCH RBC QN AUTO: 28.1 PG (ref 26–35)
MCHC RBC AUTO-ENTMCNC: 29.8 % (ref 32–34.5)
MCV RBC AUTO: 94.3 FL (ref 80–99.9)
MONOCYTES ABSOLUTE: 0.82 E9/L (ref 0.1–0.95)
MONOCYTES RELATIVE PERCENT: 7.8 % (ref 2–12)
NEUTROPHILS ABSOLUTE: 8.28 E9/L (ref 1.8–7.3)
NEUTROPHILS RELATIVE PERCENT: 78.7 % (ref 43–80)
NITRITE, URINE: POSITIVE
PDW BLD-RTO: 15.8 FL (ref 11.5–15)
PH UA: 7.5 (ref 5–9)
PLATELET # BLD: 457 E9/L (ref 130–450)
PMV BLD AUTO: 10.4 FL (ref 7–12)
POTASSIUM REFLEX MAGNESIUM: 4.2 MMOL/L (ref 3.5–5)
PROTEIN UA: NEGATIVE MG/DL
RBC # BLD: 4.06 E12/L (ref 3.5–5.5)
RBC UA: ABNORMAL /HPF (ref 0–2)
SODIUM BLD-SCNC: 139 MMOL/L (ref 132–146)
SPECIFIC GRAVITY UA: 1.02 (ref 1–1.03)
TOTAL PROTEIN: 6.2 G/DL (ref 6.4–8.3)
UROBILINOGEN, URINE: 0.2 E.U./DL
WBC # BLD: 10.5 E9/L (ref 4.5–11.5)
WBC UA: ABNORMAL /HPF (ref 0–5)

## 2020-01-27 PROCEDURE — 80053 COMPREHEN METABOLIC PANEL: CPT

## 2020-01-27 PROCEDURE — 81001 URINALYSIS AUTO W/SCOPE: CPT

## 2020-01-27 PROCEDURE — 99283 EMERGENCY DEPT VISIT LOW MDM: CPT

## 2020-01-27 PROCEDURE — 85025 COMPLETE CBC W/AUTO DIFF WBC: CPT

## 2020-01-27 PROCEDURE — 2580000003 HC RX 258: Performed by: STUDENT IN AN ORGANIZED HEALTH CARE EDUCATION/TRAINING PROGRAM

## 2020-01-27 PROCEDURE — 36415 COLL VENOUS BLD VENIPUNCTURE: CPT

## 2020-01-27 RX ORDER — SULFAMETHOXAZOLE AND TRIMETHOPRIM 800; 160 MG/1; MG/1
1 TABLET ORAL 2 TIMES DAILY
Qty: 10 TABLET | Refills: 0 | Status: SHIPPED | OUTPATIENT
Start: 2020-01-27 | End: 2020-01-30 | Stop reason: ALTCHOICE

## 2020-01-27 RX ORDER — SODIUM CHLORIDE 9 MG/ML
1000 INJECTION, SOLUTION INTRAVENOUS CONTINUOUS
Status: DISCONTINUED | OUTPATIENT
Start: 2020-01-27 | End: 2020-01-27 | Stop reason: HOSPADM

## 2020-01-27 RX ADMIN — SODIUM CHLORIDE 1000 ML: 9 INJECTION, SOLUTION INTRAVENOUS at 11:28

## 2020-01-27 NOTE — ED PROVIDER NOTES
HPI:  1/27/20, Time: 10:48 AM         Roxana Mirza is a 60 y.o. female presenting to the ED for bleeding from PEG tube insertion site. Patient states that bleeding has been present since tube placement on 12/12/19, but that it became worse last night. She states she is not certain how much it was bleeding. She denies dizziness, blurred vision, fever, and chills. She denies chest pain or shortness of breath. She denies abdominal pain. Review of Systems:   Pertinent positives and negatives are stated within HPI, all other systems reviewed and are negative.          --------------------------------------------- PAST HISTORY ---------------------------------------------  Past Medical History:  has a past medical history of Anxiety, CAD (coronary artery disease), Cystitis, Depression, History of blood transfusion, Hyperlipidemia, Hypertension, Hypothyroidism, MS (multiple sclerosis) ([facility] Neurogenic bladder, Sepsis ([facility] and Unspecified cerebral artery occlusion with cerebral infarction. Past Surgical History:  has a past surgical history that includes Hysterectomy (2001); Dental surgery; Colon surgery (2001); Coronary angioplasty with stent (06/28/2019); and Upper gastrointestinal endoscopy (N/A, 12/12/2019). Social History:  reports that she quit smoking about 20 years ago. Her smoking use included cigarettes. She started smoking about 25 years ago. She has a 5.00 pack-year smoking history. She has never used smokeless tobacco. She reports that she does not drink alcohol or use drugs. Family History: family history includes Cancer in her mother; Diabetes in her father; Heart Disease in her father. The patients home medications have been reviewed.     Allergies: Pcn [penicillins] and Watermelon [citrullus vulgaris]    -------------------------------------------------- RESULTS -------------------------------------------------  All laboratory and radiology results have been personally reviewed by myself   LABS:  Results for orders placed or performed during the hospital encounter of 01/27/20   CBC Auto Differential   Result Value Ref Range    WBC 10.5 4.5 - 11.5 E9/L    RBC 4.06 3.50 - 5.50 E12/L    Hemoglobin 11.4 (L) 11.5 - 15.5 g/dL    Hematocrit 38.3 34.0 - 48.0 %    MCV 94.3 80.0 - 99.9 fL    MCH 28.1 26.0 - 35.0 pg    MCHC 29.8 (L) 32.0 - 34.5 %    RDW 15.8 (H) 11.5 - 15.0 fL    Platelets 744 (H) 885 - 450 E9/L    MPV 10.4 7.0 - 12.0 fL    Neutrophils % 78.7 43.0 - 80.0 %    Immature Granulocytes % 0.6 0.0 - 5.0 %    Lymphocytes % 9.5 (L) 20.0 - 42.0 %    Monocytes % 7.8 2.0 - 12.0 %    Eosinophils % 3.0 0.0 - 6.0 %    Basophils % 0.4 0.0 - 2.0 %    Neutrophils Absolute 8.28 (H) 1.80 - 7.30 E9/L    Immature Granulocytes # 0.06 E9/L    Lymphocytes Absolute 1.00 (L) 1.50 - 4.00 E9/L    Monocytes Absolute 0.82 0.10 - 0.95 E9/L    Eosinophils Absolute 0.32 0.05 - 0.50 E9/L    Basophils Absolute 0.04 0.00 - 0.20 E9/L   Comprehensive Metabolic Panel w/ Reflex to MG   Result Value Ref Range    Sodium 139 132 - 146 mmol/L    Potassium reflex Magnesium 4.2 3.5 - 5.0 mmol/L    Chloride 105 98 - 107 mmol/L    CO2 23 22 - 29 mmol/L    Anion Gap 11 7 - 16 mmol/L    Glucose 119 (H) 74 - 99 mg/dL    BUN 14 8 - 23 mg/dL    CREATININE 0.7 0.5 - 1.0 mg/dL    GFR Non-African American >60 >=60 mL/min/1.73    GFR African American >60     Calcium 9.0 8.6 - 10.2 mg/dL    Total Protein 6.2 (L) 6.4 - 8.3 g/dL    Alb 3.3 (L) 3.5 - 5.2 g/dL    Total Bilirubin 0.4 0.0 - 1.2 mg/dL    Alkaline Phosphatase 105 (H) 35 - 104 U/L    ALT 12 0 - 32 U/L    AST 17 0 - 31 U/L   Urinalysis, reflex to microscopic   Result Value Ref Range    Color, UA Yellow Straw/Yellow    Clarity, UA SL CLOUDY Clear    Glucose, Ur Negative Negative mg/dL    Bilirubin Urine Negative Negative    Ketones, Urine Negative Negative mg/dL    Specific Gravity, UA 1.020 1.005 - 1.030    Blood, Urine TRACE-INTACT Negative    pH, UA 7.5 5.0 - 9.0    Protein, UA found to have dried blood on an irritated area which could be the source of the intermittent bleeding. A quick clot dressing was applied with gauze overtop. Patient remained hemodynamically stable in ED. She was okay for discharge back to NH with prescription for bactrim. Counseling: The emergency provider has spoken with the patient and discussed todays results, in addition to providing specific details for the plan of care and counseling regarding the diagnosis and prognosis. Questions are answered at this time and they are agreeable with the plan.      --------------------------------- IMPRESSION AND DISPOSITION ---------------------------------    IMPRESSION  1. Irritation around percutaneous endoscopic gastrostomy (PEG) tube site ([facility]    2. Urinary tract infection associated with indwelling urethral catheter, initial encounter ([facility]        DISPOSITION  Disposition: Discharge to nursing home  Patient condition is stable      NOTE: This report was transcribed using voice recognition software. Every effort was made to ensure accuracy; however, inadvertent computerized transcription errors may be present     Christine Worrell DO  Resident  01/27/20 1303        ATTENDING PROVIDER ATTESTATION:     Brandyn Lilly presented to the emergency department for evaluation of Wound Check (peg tube placed in early December, there has been spotting on dressing since, today dressing was saturated)   and was initially evaluated by the Medical Resident. See Original ED Note for H&P and ED course above. I have reviewed and discussed the case, including pertinent history (medical, surgical, family and social) and exam findings with the Medical Resident assigned to Brandyn Maxx.   I have personally performed and/or participated in the history, exam, medical decision making, and procedures and agree with all pertinent clinical information and any additional changes or corrections are noted below in my assessment and bleeding from PEG site here  Clean and dry  Old blood  Cleaned, no bleeding  No abdominal pain  Combat gauze and ne, clean dressing applied  Hemodynamically stable  She denies complaints  She is non toxic appearing    1. Irritation around percutaneous endoscopic gastrostomy (PEG) tube site ([facility]    2.  Urinary tract infection associated with indwelling urethral catheter, initial encounter [facility])           Fanny Rizvi MD  01/27/20 0123

## 2020-01-27 NOTE — ED NOTES
Bed: 15  Expected date:   Expected time:   Means of arrival:   Comments:  [address], [facility]  01/27/20 1025

## 2020-01-27 NOTE — ED NOTES
Called [facility] and spoke to Mitchell Ann, stretcher is on way for pt       Allison Fulton, RN  01/27/20 0320

## 2020-01-27 NOTE — ED NOTES
Discharge paperwork signed, notified of pending transportation back to facility, IV removed per discharge protocol.  Pt resting comfortably, no distress noted, easy resp     Marcelle Lombardi, RN  01/27/20 9765

## 2020-01-29 ENCOUNTER — TELEPHONE (OUTPATIENT)
Dept: GENERAL RADIOLOGY | Age: 65
End: 2020-01-29

## 2020-01-29 ENCOUNTER — HOSPITAL ENCOUNTER (OUTPATIENT)
Dept: GENERAL RADIOLOGY | Age: 65
Discharge: HOME | End: 2020-01-31
Payer: MEDICAID

## 2020-01-29 PROCEDURE — 76642 ULTRASOUND BREAST LIMITED: CPT

## 2020-01-29 PROCEDURE — G0279 TOMOSYNTHESIS, MAMMO: HCPCS

## 2020-01-29 RX ORDER — SODIUM CHLORIDE 9 MG/ML
INJECTION, SOLUTION INTRAVENOUS CONTINUOUS
Status: CANCELLED | OUTPATIENT
Start: 2020-01-29

## 2020-01-30 NOTE — PROGRESS NOTES
Spoke with Toby Castle,  at [facility] [phone] regarding arrival instructions. Completed PAT call with Parminder Varela LPN at [facility], [phone]. States pt alert, oriented, signs own consents, able to use call light without difficulty. Mechanical lift, W/C, unable to stand/pivot. To be transported via [employer], accompanied by pt son Jet Smallwood. Denies isolation/wounds. Peg tube in place, pacheco catheter in place. meds via peg tube. Arrival packet faxed to facility.

## 2020-02-04 ENCOUNTER — ANESTHESIA EVENT (OUTPATIENT)
Dept: ENDOSCOPY | Age: 65
End: 2020-02-04
Payer: MEDICAID

## 2020-02-04 ENCOUNTER — HOSPITAL ENCOUNTER (OUTPATIENT)
Age: 65
Setting detail: OUTPATIENT SURGERY
Discharge: HOME | End: 2020-02-04
Attending: SURGERY | Admitting: SURGERY
Payer: MEDICAID

## 2020-02-04 ENCOUNTER — ANESTHESIA (OUTPATIENT)
Dept: ENDOSCOPY | Age: 65
End: 2020-02-04
Payer: MEDICAID

## 2020-02-04 VITALS
WEIGHT: 172 LBS | RESPIRATION RATE: 16 BRPM | TEMPERATURE: 97.2 F | DIASTOLIC BLOOD PRESSURE: 55 MMHG | OXYGEN SATURATION: 95 % | HEIGHT: 66 IN | HEART RATE: 63 BPM | BODY MASS INDEX: 27.64 KG/M2 | SYSTOLIC BLOOD PRESSURE: 106 MMHG

## 2020-02-04 VITALS
SYSTOLIC BLOOD PRESSURE: 123 MMHG | OXYGEN SATURATION: 100 % | RESPIRATION RATE: 19 BRPM | DIASTOLIC BLOOD PRESSURE: 75 MMHG

## 2020-02-04 PROCEDURE — 3700000001 HC ADD 15 MINUTES (ANESTHESIA): Performed by: SURGERY

## 2020-02-04 PROCEDURE — 7100000010 HC PHASE II RECOVERY - FIRST 15 MIN: Performed by: SURGERY

## 2020-02-04 PROCEDURE — 3700000000 HC ANESTHESIA ATTENDED CARE: Performed by: SURGERY

## 2020-02-04 PROCEDURE — 2709999900 HC NON-CHARGEABLE SUPPLY: Performed by: SURGERY

## 2020-02-04 PROCEDURE — 7100000011 HC PHASE II RECOVERY - ADDTL 15 MIN: Performed by: SURGERY

## 2020-02-04 PROCEDURE — 6360000002 HC RX W HCPCS: Performed by: NURSE ANESTHETIST, CERTIFIED REGISTERED

## 2020-02-04 PROCEDURE — 2580000003 HC RX 258: Performed by: NURSE ANESTHETIST, CERTIFIED REGISTERED

## 2020-02-04 PROCEDURE — 3609027000 HC COLONOSCOPY: Performed by: SURGERY

## 2020-02-04 RX ORDER — SODIUM CHLORIDE 9 MG/ML
INJECTION, SOLUTION INTRAVENOUS CONTINUOUS PRN
Status: DISCONTINUED | OUTPATIENT
Start: 2020-02-04 | End: 2020-02-04 | Stop reason: SDUPTHER

## 2020-02-04 RX ORDER — SODIUM CHLORIDE 9 MG/ML
INJECTION, SOLUTION INTRAVENOUS CONTINUOUS
Status: DISCONTINUED | OUTPATIENT
Start: 2020-02-04 | End: 2020-02-04 | Stop reason: HOSPADM

## 2020-02-04 RX ORDER — PROPOFOL 10 MG/ML
INJECTION, EMULSION INTRAVENOUS PRN
Status: DISCONTINUED | OUTPATIENT
Start: 2020-02-04 | End: 2020-02-04 | Stop reason: SDUPTHER

## 2020-02-04 RX ADMIN — PROPOFOL 200 MG: 10 INJECTION, EMULSION INTRAVENOUS at 12:33

## 2020-02-04 RX ADMIN — SODIUM CHLORIDE: 9 INJECTION, SOLUTION INTRAVENOUS at 12:27

## 2020-02-04 ASSESSMENT — LIFESTYLE VARIABLES: SMOKING_STATUS: 0

## 2020-02-04 NOTE — ANESTHESIA POSTPROCEDURE EVALUATION
Department of Anesthesiology  Postprocedure Note    Patient: Luba Moss  MRN: [id number]  YOB: 1955  Date of evaluation: 2/4/2020  Time:  2:01 PM     Procedure Summary     Date:  02/04/20 Room / Location:  [facility] 03 / [facility]    Anesthesia Start:  1227 Anesthesia Stop:  1254    Procedure:  COLONOSCOPY DIAGNOSTIC (N/A ) Diagnosis:  (RECTAL MASS)    Surgeon:  Carlos Finn MD Responsible Provider:  Anrdy Reinoso MD    Anesthesia Type:  MAC ASA Status:  3          Anesthesia Type: MAC    Thalia Phase I: Thalia Score: 10    Thalia Phase II:      Last vitals: Reviewed and per EMR flowsheets.        Anesthesia Post Evaluation    Patient location during evaluation: PACU  Patient participation: complete - patient participated  Level of consciousness: awake and alert  Pain score: 0  Airway patency: patent  Nausea & Vomiting: no vomiting and no nausea  Complications: no  Cardiovascular status: hemodynamically stable  Respiratory status: spontaneous ventilation  Hydration status: stable

## 2020-02-04 NOTE — H&P
History and Physical    Patient's Name/Date of Birth: Mckenna No / 1955, 60 y.o. yo    Date: February 4, 2020     PCP: Jerrica Mata DO     Chief Complaint: abnormal CT      History of Present Illness: 60year old female in nursing home;  Seen by oncology. Imaging suggested rectal mass. Colonoscopy requested. Past Medical History:   Diagnosis Date    Anxiety     CAD (coronary artery disease)     Cystitis     Depression     History of blood transfusion     Hyperlipidemia     Hypertension 08/27/2018    Hypothyroidism     MS (multiple sclerosis) ([facility]     wheelchair bound    Neurogenic bladder 08/09/2018    Sepsis ([facility]     uti hospitalized 11-12/2019    Unspecified cerebral artery occlusion with cerebral infarction 2011    left her incontinent      Past Surgical History:   Procedure Laterality Date    COLON SURGERY  2001    exp lap, lysis of adhesions, release of SBO. [facility]. Dr. Elias Ocasio  06/28/2019    Dr. Chelly Jose- Rt Wrist- 3.0/300mm Resolute-RCA, 2.75/22mm-Circumflex,     DENTAL SURGERY      all removed    HYSTERECTOMY  2001    [facility].  Dr. Zoila Richardson N/A 12/12/2019    EGD PEG INSERTION performed by Damaris Street MD at [address] History   Problem Relation Age of Onset    Cancer Mother     Diabetes Father     Heart Disease Father      Allergies: Pcn [penicillins] and Watermelon [citrullus vulgaris]     Current Facility-Administered Medications   Medication Dose Route Frequency Provider Last Rate Last Dose    0.9 % sodium chloride infusion   Intravenous Continuous Damaris Street MD         Facility-Administered Medications Ordered in Other Encounters   Medication Dose Route Frequency Provider Last Rate Last Dose    0.9 % sodium chloride infusion    Continuous PRN VERENA Culp - CRNA           Social History     Tobacco Use    Smoking status: Former Smoker     Packs/day: 1.00 Years: 5.00     Pack years: 5.00     Types: Cigarettes     Start date:      Last attempt to quit:      Years since quittin.1    Smokeless tobacco: Never Used   Substance Use Topics    Alcohol use: No        Review of Systems:    Other than stated above in the HPI is negative      Physical exam:     Patient Vitals for the past 24 hrs:   BP Temp Temp src Pulse Resp SpO2 Height Weight   20 1136 92/61 97 °F (36.1 °C) Temporal 61 16 97 % 5' 6\" (1.676 m) 172 lb (78 kg)       General appearance: no acute distress  Head: NCAT, PERRLA, EOMI  Neck: supple, no masses  Lungs: CTABL  Heart: RRR  Abdomen: soft, nondistended, nontender, PEG  Extremities: no rash cyanosis edema or jaundice    Labs:    No results for input(s): WBC, HGB, HCT, PLT in the last 72 hours. No results for input(s): CREATININE, BUN, NA, K, CL, CO2 in the last 72 hours. No results for input(s): AST, ALT, ALB, BILITOT, ALKPHOS in the last 72 hours. No results for input(s): LIPASE, AMYLASE in the last 72 hours. No results for input(s): LACTATE in the last 72 hours. No results for input(s): INR, PTT in the last 72 hours. Invalid input(s): PT    Films:  [employer] Digital Diagnostic W Or Wo Cad Bilateral    Result Date: 2020  INDICATION: Bilateral Diagnostic and Abnormal Mammogram in the right breast  HISTORY: The patient has a history of Rectal Cancer. The patient has the following family history of breast cancer:  mother, breast cancer; sister, breast cancer; maternal aunt, breast cancer and maternal aunt, breast cancer. MAMMOGRAM VIEWS: The following mammographic views where obtained: bilateral craniocaudal; bilateral craniocaudal with tomosynthesis; bilateral mediolateral oblique; and bilateral mediolateral oblique with tomosynthesis  ULTRASOUND TECHNIQUE: High-resolution real-time ultrasound scanning was performed. Additional elastography and doppler color flow analysis of any finding was also obtained.   TOMOSYNTHESIS:

## 2020-02-04 NOTE — ANESTHESIA PRE PROCEDURE
route every 6 hours as needed (ZYXXYXPY>310 or FGIGVIYTZ>369)  Patient taking differently: 0.2 mg by PEG Tube route every 6 hours as needed (CMQLCSMH>007 or ACSIJKEIA>577) Take am dos if needed 12/16/19  Yes Margret Hutchinson MD   lisinopril (PRINIVIL;ZESTRIL) 5 MG tablet 1 tablet by PEG Tube route daily 12/16/19  Yes Margret Hutchinson MD   metoprolol tartrate (LOPRESSOR) 25 MG tablet 1 tablet by PEG Tube route 2 times daily  Patient taking differently: 25 mg by PEG Tube route 2 times daily Take am dos 12/16/19  Yes Margret Hutchinson MD   furosemide (LASIX) 20 MG tablet 1 tablet by PEG Tube route daily 12/16/19  Yes Margret Hutchinson MD   magnesium hydroxide (MILK OF MAGNESIA) 400 MG/5ML suspension 30 mLs by Per G Tube route daily as needed for Constipation 12/16/19  Yes Margret Hutchinson MD   Psyllium (METAMUCIL) 28.3 % POWD 862 g by PEG Tube route daily Indications: oral one tbsp in 8 oz of liquid 12/16/19  Yes Margret Hutchinson MD   Cranberry 500 MG CAPS 1 capsule by PEG Tube route daily 12/16/19  Yes Margret Hutchinson MD   potassium bicarbonate (K-LYTE) 25 MEQ disintegrating tablet 1 tablet by PEG Tube route 2 times daily 12/16/19  Yes Margret Hutchinson MD   ticagrelor (BRILINTA) 90 MG TABS tablet 1 tablet by PEG Tube route 2 times daily  Patient taking differently: 90 mg by PEG Tube route 2 times daily 2 day hold preop per facility 12/16/19  Yes Margret Hutchinson MD   levothyroxine (SYNTHROID) 88 MCG tablet 1 tablet by PEG Tube route Daily 12/16/19  Yes Margret Hutchinson MD   fluticasone (FLONASE) 50 MCG/ACT nasal spray 1 spray by Each Nare route daily   Yes Historical Provider, MD   aspirin 81 MG tablet Take 81 mg by mouth daily Indications: peg tube Hold 2 days preop per faccility   Yes Historical Provider, MD   ocrelizumab (OCREVUS) 300 MG/10ML SOLN injection Infuse 20 ml ( total 600mg) every 6 months 10/8/18  Yes Kalie Dowell MD   acetaminophen (TYLENOL) 325 MG tablet 2 tablets by PEG Tube route every 4 hours as needed for Pain 12/16/19   Margret Hutchinson MD Answered      Vital Signs (Current):   Vitals:    01/30/20 1508 02/04/20 1136   BP:  92/61   Pulse:  61   Resp:  16   Temp:  97 °F (36.1 °C)   TempSrc:  Temporal   SpO2:  97%   Weight: 172 lb (78 kg) 172 lb (78 kg)   Height: 5' 6\" (1.676 m) 5' 6\" (1.676 m)                                              BP Readings from Last 3 Encounters:   02/04/20 92/61   01/27/20 99/67   01/24/20 118/62       NPO Status:                                                                                 BMI:   Wt Readings from Last 3 Encounters:   02/04/20 172 lb (78 kg)   01/24/20 178 lb 6.4 oz (80.9 kg)   12/16/19 178 lb 6.4 oz (80.9 kg)     Body mass index is 27.76 kg/m². CBC:   Lab Results   Component Value Date    WBC 10.5 01/27/2020    RBC 4.06 01/27/2020    HGB 11.4 01/27/2020    HCT 38.3 01/27/2020    MCV 94.3 01/27/2020    RDW 15.8 01/27/2020     01/27/2020       CMP:   Lab Results   Component Value Date     01/27/2020    K 4.2 01/27/2020     01/27/2020    CO2 23 01/27/2020    BUN 14 01/27/2020    CREATININE 0.7 01/27/2020    GFRAA >60 01/27/2020    LABGLOM >60 01/27/2020    GLUCOSE 119 01/27/2020    PROT 6.2 01/27/2020    CALCIUM 9.0 01/27/2020    BILITOT 0.4 01/27/2020    ALKPHOS 105 01/27/2020    AST 17 01/27/2020    ALT 12 01/27/2020       POC Tests: No results for input(s): POCGLU, POCNA, POCK, POCCL, POCBUN, POCHEMO, POCHCT in the last 72 hours.     Coags:   Lab Results   Component Value Date    PROTIME 19.8 11/30/2019    INR 1.8 11/30/2019    APTT 33.0 11/30/2019       HCG (If Applicable): No results found for: PREGTESTUR, PREGSERUM, HCG, HCGQUANT     ABGs: No results found for: PHART, PO2ART, ROU9KWU, LXQ8TZB, BEART, K4ELLJGM     Type & Screen (If Applicable):  No results found for: BETHANY [facility]    Anesthesia Evaluation  Patient summary reviewed and Nursing notes reviewed no history of anesthetic complications:   Airway: Mallampati: III  TM distance: <3 FB     Mouth opening: > = 3 FB Dental:    (+) upper dentures, lower dentures and edentulous      Pulmonary: breath sounds clear to auscultation      (-) not a current smoker (former smoker)                           Cardiovascular:  Exercise tolerance: poor (<4 METS),   (+) hypertension:, past MI: > 6 months, CAD:,       ECG reviewed  Rhythm: regular  Rate: normal  Echocardiogram reviewed  Stress test reviewed  Cleared by cardiology     Beta Blocker:  Dose within 24 Hrs         Neuro/Psych:   (+) CVA:, neuromuscular disease:, psychiatric history:depression/anxiety              ROS comment: Multiple sclerosis GI/Hepatic/Renal: Neg GI/Hepatic/Renal ROS            Endo/Other:    (+) hypothyroidism::., .                 Abdominal:           Vascular: negative vascular ROS. Anesthesia Plan      MAC     ASA 3       Induction: intravenous. Anesthetic plan and risks discussed with patient.       Plan discussed with CRNA and surgical team.                  Nita Pedroza MD   2/4/2020

## 2020-02-06 ENCOUNTER — HOSPITAL ENCOUNTER (OUTPATIENT)
Dept: GENERAL RADIOLOGY | Age: 65
Discharge: HOME | End: 2020-02-08
Payer: MEDICAID

## 2020-02-06 PROCEDURE — 88360 TUMOR IMMUNOHISTOCHEM/MANUAL: CPT

## 2020-02-06 PROCEDURE — 19083 BX BREAST 1ST LESION US IMAG: CPT

## 2020-02-06 PROCEDURE — 88305 TISSUE EXAM BY PATHOLOGIST: CPT

## 2020-02-06 PROCEDURE — 2500000003 HC RX 250 WO HCPCS

## 2020-02-06 NOTE — PROCEDURES
Unable to perform post biopsy mammography film. Patient is non ambulatory and is on cat. Radiologist confirmed clip placement in real time under Ultrasound as satisfactory.

## 2020-02-10 ENCOUNTER — HOSPITAL ENCOUNTER (OUTPATIENT)
Dept: GENERAL RADIOLOGY | Age: 65
Discharge: HOME | End: 2020-02-12
Payer: MEDICAID

## 2020-02-10 PROCEDURE — 92611 MOTION FLUOROSCOPY/SWALLOW: CPT

## 2020-02-10 PROCEDURE — 74230 X-RAY XM SWLNG FUNCJ C+: CPT

## 2020-02-10 NOTE — PROGRESS NOTES
SPEECH/LANGUAGE PATHOLOGY  VIDEOFLUOROSCOPIC STUDY OF SWALLOWING (MBS)      PATIENT NAME:  Char Fang      :  1955      TODAY'S DATE:  2/10/2020    SUMMARY OF EVALUATION     DYSPHAGIA DIAGNOSIS:  Oral dysphagia due to dentition      DIET RECOMMENDATIONS:  Dysphagia 2,  Mechanical Soft (Minced & Moist) solids with  thin liquids     FEEDING RECOMMENDATIONS:     Assistance level:  Set-up is required for all oral intake      Compensatory strategies recommended: No strategies are recommended at this time    THERAPY RECOMMENDATIONS:      Therapy at the discretion of facility/treating Speech Pathologist                 PROCEDURE     Consistencies Administered During the Evaluation   Liquids: thin liquid and nectar thick liquid   Solids:  pureed foods and solid foods      Method of Intake:   cup, straw, spoon  Self fed, Fed by clinician      Position:   Seated, upright, Lateral plane    Current Respiratory Status   room air                RESULTS     ORAL STAGE       Decreased mastication due to:  poor/missing dentition          PHARYNGEAL STAGE           ONSET TIME     Onset time of the pharyngeal swallow was adequate       PHARYNGEAL RESIDUALS          Vallecula/Pharyngeal Wall           No significant residuals were noted in the vallecula      Pyriform Sinuses      No significant residuals were noted in the pyriform sinuses    LARYNGEAL PENETRATION     Laryngeal penetration was not present during this evaluation                 ASPIRATION    Aspiration was not present during this evaluation         COMPENSATORY STRATEGIES       Compensatory strategies were not attempted      STRUCTURAL/FUNCTIONAL ANOMALIES       No structural/functional anomalies were noted      CERVICAL ESOPHAGEAL STAGE :        The cervical esophagus appeared adequate                          [x]The admitting diagnosis and active problem list, as listed below have been reviewed prior to initiation of this evaluation.      ADMITTING DIAGNOSIS: Vocal cord paralysis, unilateral complete [J38.01]     ACTIVE PROBLEM LIST:   Patient Active Problem List   Diagnosis    Multiple sclerosis ([facility]    Bilateral foot-drop    Heart failure ([facility]    Myocardial infarction, anteroseptal (HCC)    NSTEMI (non-ST elevated myocardial infarction) ([facility]

## 2020-02-11 ENCOUNTER — CLINICAL DOCUMENTATION (OUTPATIENT)
Dept: GENERAL RADIOLOGY | Age: 65
End: 2020-02-11

## 2020-02-11 ENCOUNTER — OFFICE VISIT (OUTPATIENT)
Dept: CARDIOLOGY CLINIC | Age: 65
End: 2020-02-11
Payer: MEDICAID

## 2020-02-11 VITALS
HEART RATE: 76 BPM | WEIGHT: 170 LBS | RESPIRATION RATE: 18 BRPM | DIASTOLIC BLOOD PRESSURE: 62 MMHG | HEIGHT: 66 IN | SYSTOLIC BLOOD PRESSURE: 118 MMHG | BODY MASS INDEX: 27.32 KG/M2

## 2020-02-11 PROCEDURE — 99214 OFFICE O/P EST MOD 30 MIN: CPT | Performed by: INTERNAL MEDICINE

## 2020-02-11 PROCEDURE — 93000 ELECTROCARDIOGRAM COMPLETE: CPT | Performed by: INTERNAL MEDICINE

## 2020-02-11 NOTE — PROGRESS NOTES
OUTPATIENT CARDIOLOGY FOLLOW-UP    Name: Loretta Born    Age: 60 y.o. Primary Care Physician: Janelle Dixon DO    Date of Service: 2/11/2020    Chief Complaint:   No chief complaint on file. Interim History:   Mrs. Laurel Tan is a 61-year-old female with history of multiple sclerosis with paraplegia, wheel chair bound, and no prior history of cardiac history, history of DVT involving the left lower extremity diagnosed in February 2019 on Eliquis for anticoagulation, ambulates with a walker, history of CVA diagnosed in February 2011 and history of hypertension who presented to the hospital on 5/6/2019 with midsternal chest pressure and blood work and EKG changes suggestive for non-ST elevation MI. After initial chest pain patient remain asymptomatic. Her stress test and cardiac MRI showed evidence of myocardial infarction with no reversible ischemia. Patient was discharged home on medical management. She was readmitted to the hospital on 6/26/2019 with chest pain secondary to unstable angina. She did undergo cardiac catheterization which showed multivessel disease and underwent PCI/ELHAM to mid RCA,and  Circumflex. PCI of the  proximal LAD was attempted and was not successful and was unable to pass balloon beyond the obstruction. She does have left to right collateral.  She did not go for bypass due to multiple sclerosis. She was discharged home on 6/26/2019. She was seen in the office on 7/5/2019, since her last visit, she was hospitalized several times including UTI with sepsis in November 2019. Again she was admitted to our visited emergency room on 1/27/2020 with bleeding around the PEG tube. Now she has a PEG tube placed for feeding due to aspiration risk from dysphagia. She is compliant with medications, as well as salt and fluid intake. He does not take any over-the-counter arthritis medications.   She is still taking triple therapy with aspirin, Brilinta, and Eliquis without any bleeding complications. No new cardiac complaints since last cardiology evaluation. She denies recent chest pain, SOB, palpitations, lightheadedness, dizziness, syncope, PND, or orthopnea. SR on EKG. Review of Systems:   Cardiac: As per HPI  General: No fever, chills  Pulmonary: As per HPI  HEENT: No visual disturbances, difficult swallowing  GI: No nausea, vomiting  Endocrine: No thyroid disease or DM  Musculoskeletal: ABBASI x 4, no focal motor deficits  Skin: Intact, no rashes  Neuro/Psych: No headache or seizures    Past Medical History:  Past Medical History:   Diagnosis Date    Anxiety     CAD (coronary artery disease)     Cystitis     Depression     History of blood transfusion     Hyperlipidemia     Hypertension 08/27/2018    Hypothyroidism     MS (multiple sclerosis) ([facility] [address]     wheelchair bound    Neurogenic bladder 08/09/2018    Sepsis ([facility] [address]     uti hospitalized 11-12/2019    Unspecified cerebral artery occlusion with cerebral infarction 2011    left her incontinent       Past Surgical History:  Past Surgical History:   Procedure Laterality Date    COLON SURGERY  2001    exp lap, lysis of adhesions, release of SBO. [facility]. Dr. Howard Ocasio 2/4/2020    COLONOSCOPY DIAGNOSTIC performed by Matilda Hollins MD at [place] [address]  06/28/2019    Dr. Mirtha Robins- Rt Wrist- 3.0/300mm Resolute-RCA, 2.75/22mm-Circumflex,     DENTAL SURGERY      all removed    HYST[facility]CTOMY  2001    [facility].  Dr. Bella Meredith N/A 12/12/2019    EGD PEG INSERTION performed by Matilda Hollins MD at [facility] ENDOSCOPY       Family History:  Family History   Problem Relation Age of Onset    Cancer Mother     Diabetes Father     Heart Disease Father        Social History:  Social History     Socioeconomic History    Marital status:      Spouse name: Not on file    Number of children: Not on file    Years of education: Not on file   Basim Sexton fraction is 40 %. 3. Wall motion appears to be hypokinetic   Assessment: Anteroseptal infarct  Hypokinesis     Cardiac MRI: images reviewed: There is evidence for edema and delayed enhancement in the anterior   septum compatible with an infarct these findings correlate with the   findings on the nuclear stress study   There is evidence for small region of hypoenhancement along the   inferior septum towards the apex compatible with ischemia   Wall motion along the septum is hypokinetic   Mild mitral insufficiency             Cardiac catheterization - 6/25/2019:   Findings:   Left main: 0% stenosis  LAD: 95 %  PROXIMAL stenosis  Circumflex: SUBTOTAL MID CX OCCLUSION. RCA: Dominant.  80 % PROXIMAL ECCENTRIC stenosis WITH EXTENSIVE COLLATERALS TO LAD AND CX  LV angio: 45%  ejection fraction        Hemodynamics: Ao: 102/65  LV: 101  LVEDP: 19     Interventional procedure:   1. PCI vessel: RCA. Lesion type: A. CHINEDU III flow pre PCI. SEE PROCEDURE PORTION ABOVE  Result: 0% residual stenosis and CHINEDU III distal flow.      2. PCI vessel:  CIRCUMFLEX Lesion type B  CHINEDU I flow pre PCI  SEE PROCEDURE PORTION ABOVE  Result: 0% residual stenosis with CHINEDU III distal flow.      3. PCI vessel:  LAD, Lesion type C  CHINEDU III distal flow  SEE PROCEDURE PORTION ABOVE  Result: no change        The ASCVD Risk score (Eileen Celaya., et al., 2013) failed to calculate for the following reasons: The patient has a prior MI or stroke diagnosis        ASSESSMENT:  · CAD, NSTEMI,- 5/9/2019 with multivessel CAD, S/p PCI/ELHAM to RCA and LCx,, LAD and percent occluded with left-to-left collaterals was not stented due to difficult in advancing the balloon. LAD has good collateral flow. · Hyperlipidemia on high dose statin. · HTN- well controlled. · H/O CVA  · Left lower extremity DVT - 2/2019  · H/o multiple sclerosis with paraparesis and wheel chair bound.   · Dysphagia status post PEG tube placement    Plan:   · She is on  triple therapy and CAD and DVT. Will stop aspirin and continue Eliquis and Brilinta. · Continue high dose statin therapy with atorvastatin  · Continue lisinopril and clonidine for hypertension ( the combination of these two can cause side effects). · Continue rest of the current medications  · Echo results discussed with the patient and showed normal LV function. · She was advised to get a fasting lipid profile and CMP in one week   · Follow up with me in 6 months. The patient's current medication list, allergies, problem list and results of all previously ordered testing were reviewed at today's visit.   Janette Espinal MD  [facility]) Cardiology

## 2020-02-11 NOTE — PROGRESS NOTES
Right breast biopsy pathology report faxed to Dr. Cynthia Spears at [facility]. Electronically signed by Vladislav Griffith RN, BSN on 2/11/2020 at 4:32 PM

## 2020-02-17 ENCOUNTER — OFFICE VISIT (OUTPATIENT)
Dept: ENT CLINIC | Age: 65
End: 2020-02-17
Payer: MEDICAID

## 2020-02-17 VITALS
SYSTOLIC BLOOD PRESSURE: 99 MMHG | HEIGHT: 67 IN | BODY MASS INDEX: 28.25 KG/M2 | HEART RATE: 68 BPM | OXYGEN SATURATION: 96 % | DIASTOLIC BLOOD PRESSURE: 60 MMHG | WEIGHT: 180 LBS

## 2020-02-17 PROCEDURE — 99204 OFFICE O/P NEW MOD 45 MIN: CPT | Performed by: OTOLARYNGOLOGY

## 2020-02-17 ASSESSMENT — ENCOUNTER SYMPTOMS
STRIDOR: 0
TROUBLE SWALLOWING: 0
PHOTOPHOBIA: 0
VOICE CHANGE: 1
SHORTNESS OF BREATH: 0
NAUSEA: 0
VOMITING: 0

## 2020-02-17 NOTE — PROGRESS NOTES
Department of Otolaryngology  Office Consult Note  2/17/20      Subjective:      Patient ID: Stew Luo is a 60 y.o. female. HPI: Patient presents as  new patient for hospital follow-up for dysphagia. Patient has history of multiple sclerosis and was recently seen in the hospital.  We evaluated her airway with a scope and also ordered a swallow study. She is here today to review the swallow study results. Patient also had a PEG tube placed by general surgery. She is reportedly eating by mouth without issue. She is breathing well. She reports dysphonia but attributes this to the multiple sclerosis. No other ear nose or throat complaints at this time. Review of Systems   Constitutional: Negative for chills and fever. HENT: Positive for nosebleeds and voice change. Negative for trouble swallowing. Eyes: Negative for photophobia and visual disturbance. Respiratory: Negative for shortness of breath and stridor. Cardiovascular: Negative for chest pain and palpitations. Gastrointestinal: Negative for nausea and vomiting. Musculoskeletal: Positive for gait problem. Skin: Negative for rash and wound. Neurological: Positive for headaches. Negative for dizziness and facial asymmetry. Psychiatric/Behavioral: Negative for agitation and hallucinations. Past Medical History:   Diagnosis Date    Anxiety     CAD (coronary artery disease)     Cystitis     Depression     History of blood transfusion     Hyperlipidemia     Hypertension 08/27/2018    Hypothyroidism     MS (multiple sclerosis) ([facility] [address]     wheelchair bound    Neurogenic bladder 08/09/2018    Sepsis ([facility] [address]     uti hospitalized 11-12/2019    Unspecified cerebral artery occlusion with cerebral infarction 2011    left her incontinent     Past Surgical History:   Procedure Laterality Date    COLON SURGERY  2001    exp lap, lysis of adhesions, release of SBO. [facility].  Dr. Yoel Rockwell COLONOSCOPY N/A 2/4/2020    COLONOSCOPY DIAGNOSTIC performed by Sintia Bolden MD at [place] [address]  06/28/2019    Dr. Ignacia Bloch- Rt Wrist- 3.0/300mm Resolute-RCA, 2.75/22mm-Circumflex,     DENTAL SURGERY      all removed    HYSTERECTOMY  2001    [facility]. Dr. Jimmie Menon N/A 12/12/2019    EGD PEG INSERTION performed by Sintia Bolden MD at [address]       Current Outpatient Medications:     diazepam (VALIUM) 2 MG tablet, Take 2 mg by mouth every 12 hours. Take am dos, Disp: , Rfl:     HYDROcodone-acetaminophen (NORCO) 5-325 MG per tablet, Take 1 tablet by mouth every 6 hours as needed for Pain. Take am dos if needed, Disp: , Rfl:     saline nasal gel (AYR) GEL, 1 each by Nasal route as needed for Congestion, Disp: , Rfl:     albuterol (PROVENTIL) (2.5 MG/3ML) 0.083% nebulizer solution, Take 3 mLs by nebulization every 4 hours (while awake) (Patient taking differently: Take 2.5 mg by nebulization every 4 hours (while awake) Take am dos if needed), Disp: 120 each, Rfl: 3    gabapentin (NEURONTIN) 250 MG/5ML solution, 6 mLs by PEG Tube route 3 times daily. (Patient taking differently: 300 mg by PEG Tube route 3 times daily. Take am dos), Disp: 473 mL, Rfl: 3    acetaminophen (TYLENOL) 325 MG tablet, 2 tablets by PEG Tube route every 4 hours as needed for Pain, Disp: 120 tablet, Rfl: 3    nitroGLYCERIN (NITROSTAT) 0.4 MG SL tablet, up to max of 3 total doses. per peg tube If no relief after 1 dose, call 911., Disp: 25 tablet, Rfl: 3    apixaban (ELIQUIS) 5 MG TABS tablet, 1 tablet by Per G Tube route 2 times daily (Patient taking differently: 5 mg by Per G Tube route 2 times daily Hold 2 days per facility), Disp: , Rfl:     FLUoxetine (PROZAC) 20 MG capsule, 1 capsule by PEG Tube route 2 times daily (Patient taking differently: 20 mg by PEG Tube route 2 times daily Take am dos), Disp: 30 capsule, Rfl: 3    atorvastatin (LIPITOR) 80 MG tablet, 1 tablet by PEG Tube route nightly, Last attempt to quit: 2000     Years since quittin.1    Smokeless tobacco: Never Used   Substance Use Topics    Alcohol use: No    Drug use: No     Family History   Problem Relation Age of Onset    Cancer Mother     Diabetes Father     Heart Disease Father            Objective:   BP 99/60 (Site: Right Upper Arm, Position: Sitting, Cuff Size: Large Adult)   Pulse 68   Ht 5' 6.5\" (1.689 m)   Wt 180 lb (81.6 kg)   SpO2 96%   BMI 28.62 kg/m²     Physical Exam  Constitutional:       General: She is not in acute distress. Appearance: She is not diaphoretic. HENT:      Head: Normocephalic and atraumatic. Right Ear: Tympanic membrane and external ear normal.      Left Ear: Tympanic membrane and external ear normal.      Nose:      Comments: Right nasal cavity dry with some bloody crust.     Mouth/Throat:      Mouth: Mucous membranes are moist.   Eyes:      Conjunctiva/sclera: Conjunctivae normal.      Pupils: Pupils are equal, round, and reactive to light. Neck:      Musculoskeletal: Normal range of motion and neck supple. Cardiovascular:      Rate and Rhythm: Normal rate. Pulmonary:      Effort: Pulmonary effort is normal. No respiratory distress. Abdominal:      General: There is no distension. Musculoskeletal: Normal range of motion. Lymphadenopathy:      Cervical: No cervical adenopathy. Skin:     General: Skin is warm and dry. Neurological:      Mental Status: She is alert and oriented to person, place, and time. Impression   Mild oral delay likely due to lack of dentition,   otherwise unremarkable modified barium swallow exam.       This examination was performed and dictated by Sury Mulligan PA-C with   indirect supervision and Zandra Deal. Kellee Castro MD reviewed and concurred with   the findings. Assessment:       Diagnosis Orders   1.  Oral phase dysphagia             Plan:        Patient with history of dysphasia and multiple sclerosis  Swallow study is unremarkable  Patient already eating  Okay to resume diet and advance as tolerated  Would recommend nutrition evaluation to assess for adequate nutrition  PEG tube per general surgery  Follow-up as needed      Electronically signed by Wilma Chavez DO on 2/17/20 at10:26 AM              Trish Sexton  1955      I have discussed the case, including pertinent history and exam findings with the resident. I have seen and examined the patient and the key elements of the encounter have been performed by me. I agree with the assessment, plan and orders as documented by the resident. Patient here for follow up of medical problems. Remainder of medical problems as per resident note.       [facility],   2/20/20

## 2020-02-18 ENCOUNTER — HOSPITAL ENCOUNTER (OUTPATIENT)
Age: 65
Discharge: HOME | End: 2020-02-20
Payer: MEDICAID

## 2020-02-18 LAB
ALBUMIN SERPL-MCNC: 2.9 G/DL (ref 3.5–5.2)
ALP BLD-CCNC: 105 U/L (ref 35–104)
ALT SERPL-CCNC: 15 U/L (ref 0–32)
ANION GAP SERPL CALCULATED.3IONS-SCNC: 11 MMOL/L (ref 7–16)
AST SERPL-CCNC: 17 U/L (ref 0–31)
BILIRUB SERPL-MCNC: 0.2 MG/DL (ref 0–1.2)
BUN BLDV-MCNC: 18 MG/DL (ref 8–23)
CALCIUM SERPL-MCNC: 8.9 MG/DL (ref 8.6–10.2)
CHLORIDE BLD-SCNC: 100 MMOL/L (ref 98–107)
CHOLESTEROL, TOTAL: 98 MG/DL (ref 0–199)
CO2: 23 MMOL/L (ref 22–29)
CREAT SERPL-MCNC: 0.7 MG/DL (ref 0.5–1)
GFR AFRICAN AMERICAN: >60
GFR NON-AFRICAN AMERICAN: >60 ML/MIN/1.73
GLUCOSE BLD-MCNC: 124 MG/DL (ref 74–99)
HDLC SERPL-MCNC: 28 MG/DL
LDL CHOLESTEROL CALCULATED: 39 MG/DL (ref 0–99)
POTASSIUM SERPL-SCNC: 3.3 MMOL/L (ref 3.5–5)
SODIUM BLD-SCNC: 134 MMOL/L (ref 132–146)
TOTAL PROTEIN: 5.4 G/DL (ref 6.4–8.3)
TRIGL SERPL-MCNC: 153 MG/DL (ref 0–149)
VLDLC SERPL CALC-MCNC: 31 MG/DL

## 2020-02-18 PROCEDURE — 36415 COLL VENOUS BLD VENIPUNCTURE: CPT

## 2020-02-18 PROCEDURE — 80053 COMPREHEN METABOLIC PANEL: CPT

## 2020-02-18 PROCEDURE — 80061 LIPID PANEL: CPT

## 2020-02-21 ENCOUNTER — HOSPITAL ENCOUNTER (OUTPATIENT)
Dept: PET IMAGING | Age: 65
Discharge: HOME | End: 2020-02-23
Payer: MEDICAID

## 2020-02-21 LAB — METER GLUCOSE: 98 MG/DL (ref 74–99)

## 2020-02-21 PROCEDURE — A9552 F18 FDG: HCPCS | Performed by: RADIOLOGY

## 2020-02-21 PROCEDURE — 3430000000 HC RX DIAGNOSTIC RADIOPHARMACEUTICAL: Performed by: RADIOLOGY

## 2020-02-21 PROCEDURE — 78815 PET IMAGE W/CT SKULL-THIGH: CPT

## 2020-02-21 PROCEDURE — 82962 GLUCOSE BLOOD TEST: CPT

## 2020-02-21 RX ORDER — FLUDEOXYGLUCOSE F 18 200 MCI/ML
15 INJECTION, SOLUTION INTRAVENOUS
Status: COMPLETED | OUTPATIENT
Start: 2020-02-21 | End: 2020-02-21

## 2020-02-21 RX ADMIN — FLUDEOXYGLUCOSE F 18 15 MILLICURIE: 200 INJECTION, SOLUTION INTRAVENOUS at 12:30

## 2020-02-28 ENCOUNTER — PREP FOR PROCEDURE (OUTPATIENT)
Dept: SURGERY | Age: 65
End: 2020-02-28

## 2020-02-28 ENCOUNTER — TELEPHONE (OUTPATIENT)
Dept: CARDIOLOGY CLINIC | Age: 65
End: 2020-02-28

## 2020-02-28 RX ORDER — SODIUM CHLORIDE 0.9 % (FLUSH) 0.9 %
10 SYRINGE (ML) INJECTION EVERY 12 HOURS SCHEDULED
Status: CANCELLED | OUTPATIENT
Start: 2020-02-28

## 2020-02-28 RX ORDER — SODIUM CHLORIDE, SODIUM LACTATE, POTASSIUM CHLORIDE, CALCIUM CHLORIDE 600; 310; 30; 20 MG/100ML; MG/100ML; MG/100ML; MG/100ML
INJECTION, SOLUTION INTRAVENOUS CONTINUOUS
Status: CANCELLED | OUTPATIENT
Start: 2020-02-28

## 2020-02-28 NOTE — PROGRESS NOTES
Completed PAT call with Hesham Santillan, RN at [facility] [phone]. States pt alert, oriented x4, signs own consents, , uses call light without difficulty, mechanical lift, wheelchair dependent. Denies isolation/wounds. Urinary catheter present. PEG in place. Pt to be transported via [address]. Pt son, Anjel Velazquez to meet pt at [facility]. Facility to complete CBC and BMP and fax results to PAT prior to [place]. Arrival packet faxed.

## 2020-02-28 NOTE — PROGRESS NOTES
[facility] PRE-ADMISSION TESTING INSTRUCTIONS    The Preadmission Testing patient is instructed accordingly using the following criteria (check applicable):    ARRIVAL INSTRUCTIONS:  [x] Parking the day of Surgery is located in the Main Entrance lot. Upon entering the door, make an immediate right to the surgery reception desk    [x] Bring photo ID and insurance card    [] Bring in a copy of Living will or Durable Power of  papers. [x] Please be sure to arrange for responsible adult to provide transportation to and from the hospital    [x] Please arrange for responsible adult to be with you for the 24 hour period post procedure due to having anesthesia      GENERAL INSTRUCTIONS:    [x] Nothing by mouth after midnight, including gum, candy, mints or water    [x] You may brush your teeth, but do not swallow any water    [x] Take medications as instructed with 1-2 oz of water    [x] Stop herbal supplements and vitamins 5 days prior to procedure    [x] Follow preop dosing of blood thinners per physician instructions    [] Take 1/2 dose of evening insulin, but no insulin after midnight    [] No oral diabetic medications after midnight    [] If diabetic and have low blood sugar or feel symptomatic, take 1-2oz apple juice only    [] Bring inhalers day of surgery    [] Bring C-PAP/ Bi-Pap day of surgery    [] Bring urine specimen day of surgery    [x] Shower or bath with soap, lather and rinse well, AM of Surgery, no lotion, powders or creams to surgical site    [] Follow bowel prep as instructed per surgeon    [x] No tobacco products within 24 hours of surgery     [x] No alcohol or illegal drug use within 24 hours of surgery.     [x] Jewelry, body piercing's, eyeglasses, contact lenses and dentures are not permitted into surgery (bring cases)      [x] Please do not wear any nail polish, make up or hair products on the day of surgery    [] If not already done, you can expect a call from registration    [x] You can expect a call the business day prior to procedure to notify you if your arrival time changes    [x] If you receive a survey after surgery we would greatly appreciate your comments    [] Parent/guardian of a minor must accompany their child and remain on the premises  the entire time they are under our care     [] Pediatric patients may bring favorite toy, blanket or comfort item with them    [x] A caregiver or family member must remain with the patient during their stay if they are mentally handicapped, have dementia, disoriented or unable to use a call light or would be a safety concern if left unattended    [x] Please notify surgeon if you develop any illness between now and time of surgery (cold, cough, sore throat, fever, nausea, vomiting) or any signs of infections  including skin, wounds, and dental.    [x]  The Outpatient Pharmacy is available to fill your prescription here on your day of surgery, ask your preop nurse for details    [] Other instructions  EDUCATIONAL MATERIALS PROVIDED:    [] PAT Preoperative Education Packet/Booklet     [] Medication List    [] Fluoroscopy Information Pamphlet    [] Transfusion bracelet applied with instructions    [] Joint replacement video reviewed    [] Shower with soap, lather and rinse well, and use CHG wipes provided the evening before surgery as instructed

## 2020-02-28 NOTE — TELEPHONE ENCOUNTER
Dr Eric Valladares and Denise's office is calling for patient to hold Eliquis and Brilinta 3 days prior to her masectomy on 3/3/2020. Please advise.

## 2020-03-02 ENCOUNTER — HOSPITAL ENCOUNTER (OUTPATIENT)
Age: 65
Discharge: HOME | End: 2020-03-04
Payer: MEDICAID

## 2020-03-02 PROCEDURE — 85025 COMPLETE CBC W/AUTO DIFF WBC: CPT

## 2020-03-02 PROCEDURE — 36415 COLL VENOUS BLD VENIPUNCTURE: CPT

## 2020-03-02 NOTE — PROGRESS NOTES
Reported to [facility] from 3/2/2020 and [facility] from 2/18/2020.  No needs

## 2020-03-03 ENCOUNTER — ANESTHESIA (OUTPATIENT)
Dept: OPERATING ROOM | Age: 65
End: 2020-03-03
Payer: MEDICAID

## 2020-03-03 ENCOUNTER — ANESTHESIA EVENT (OUTPATIENT)
Dept: OPERATING ROOM | Age: 65
End: 2020-03-03
Payer: MEDICAID

## 2020-03-03 ENCOUNTER — HOSPITAL ENCOUNTER (OUTPATIENT)
Age: 65
Setting detail: OBSERVATION
Discharge: NURSING, REHAB OR LONG-TERM CARE | End: 2020-03-05
Attending: SURGERY | Admitting: SURGERY
Payer: MEDICAID

## 2020-03-03 VITALS — OXYGEN SATURATION: 100 % | DIASTOLIC BLOOD PRESSURE: 73 MMHG | SYSTOLIC BLOOD PRESSURE: 182 MMHG

## 2020-03-03 PROBLEM — C50.411 CARCINOMA OF UPPER-OUTER QUADRANT OF FEMALE BREAST, RIGHT (HCC): Status: ACTIVE | Noted: 2020-03-03

## 2020-03-03 PROBLEM — C50.411 CARCINOMA OF UPPER-OUTER QUADRANT OF RIGHT FEMALE BREAST (HCC): Status: ACTIVE | Noted: 2020-03-03

## 2020-03-03 LAB
HCT VFR BLD CALC: 35.7 % (ref 34–48)
HEMOGLOBIN: 10.8 G/DL (ref 11.5–15.5)

## 2020-03-03 PROCEDURE — 6360000002 HC RX W HCPCS: Performed by: STUDENT IN AN ORGANIZED HEALTH CARE EDUCATION/TRAINING PROGRAM

## 2020-03-03 PROCEDURE — 2500000003 HC RX 250 WO HCPCS: Performed by: SURGERY

## 2020-03-03 PROCEDURE — 2580000003 HC RX 258: Performed by: STUDENT IN AN ORGANIZED HEALTH CARE EDUCATION/TRAINING PROGRAM

## 2020-03-03 PROCEDURE — 3700000001 HC ADD 15 MINUTES (ANESTHESIA): Performed by: SURGERY

## 2020-03-03 PROCEDURE — 36415 COLL VENOUS BLD VENIPUNCTURE: CPT

## 2020-03-03 PROCEDURE — 2500000003 HC RX 250 WO HCPCS: Performed by: STUDENT IN AN ORGANIZED HEALTH CARE EDUCATION/TRAINING PROGRAM

## 2020-03-03 PROCEDURE — 3600000013 HC SURGERY LEVEL 3 ADDTL 15MIN: Performed by: SURGERY

## 2020-03-03 PROCEDURE — 6360000002 HC RX W HCPCS: Performed by: NURSE ANESTHETIST, CERTIFIED REGISTERED

## 2020-03-03 PROCEDURE — 3700000000 HC ANESTHESIA ATTENDED CARE: Performed by: SURGERY

## 2020-03-03 PROCEDURE — 6370000000 HC RX 637 (ALT 250 FOR IP): Performed by: STUDENT IN AN ORGANIZED HEALTH CARE EDUCATION/TRAINING PROGRAM

## 2020-03-03 PROCEDURE — 85018 HEMOGLOBIN: CPT

## 2020-03-03 PROCEDURE — 2580000003 HC RX 258: Performed by: NURSE ANESTHETIST, CERTIFIED REGISTERED

## 2020-03-03 PROCEDURE — G0378 HOSPITAL OBSERVATION PER HR: HCPCS

## 2020-03-03 PROCEDURE — 87081 CULTURE SCREEN ONLY: CPT

## 2020-03-03 PROCEDURE — 94664 DEMO&/EVAL PT USE INHALER: CPT

## 2020-03-03 PROCEDURE — 7100000000 HC PACU RECOVERY - FIRST 15 MIN: Performed by: SURGERY

## 2020-03-03 PROCEDURE — 2500000003 HC RX 250 WO HCPCS: Performed by: NURSE ANESTHETIST, CERTIFIED REGISTERED

## 2020-03-03 PROCEDURE — 88307 TISSUE EXAM BY PATHOLOGIST: CPT

## 2020-03-03 PROCEDURE — L8000 MASTECTOMY BRA: HCPCS | Performed by: SURGERY

## 2020-03-03 PROCEDURE — 2709999900 HC NON-CHARGEABLE SUPPLY: Performed by: SURGERY

## 2020-03-03 PROCEDURE — 6360000002 HC RX W HCPCS: Performed by: SURGERY

## 2020-03-03 PROCEDURE — 85014 HEMATOCRIT: CPT

## 2020-03-03 PROCEDURE — 7100000001 HC PACU RECOVERY - ADDTL 15 MIN: Performed by: SURGERY

## 2020-03-03 PROCEDURE — 3600000003 HC SURGERY LEVEL 3 BASE: Performed by: SURGERY

## 2020-03-03 PROCEDURE — 94640 AIRWAY INHALATION TREATMENT: CPT

## 2020-03-03 RX ORDER — HYDROCODONE BITARTRATE AND ACETAMINOPHEN 5; 325 MG/1; MG/1
1 TABLET ORAL PRN
Status: DISCONTINUED | OUTPATIENT
Start: 2020-03-03 | End: 2020-03-03 | Stop reason: HOSPADM

## 2020-03-03 RX ORDER — ONDANSETRON 2 MG/ML
4 INJECTION INTRAMUSCULAR; INTRAVENOUS EVERY 6 HOURS PRN
Status: DISCONTINUED | OUTPATIENT
Start: 2020-03-03 | End: 2020-03-05 | Stop reason: HOSPADM

## 2020-03-03 RX ORDER — FENTANYL CITRATE 50 UG/ML
INJECTION, SOLUTION INTRAMUSCULAR; INTRAVENOUS PRN
Status: DISCONTINUED | OUTPATIENT
Start: 2020-03-03 | End: 2020-03-03 | Stop reason: SDUPTHER

## 2020-03-03 RX ORDER — GABAPENTIN 250 MG/5ML
300 SOLUTION ORAL 3 TIMES DAILY
Status: DISCONTINUED | OUTPATIENT
Start: 2020-03-03 | End: 2020-03-05 | Stop reason: HOSPADM

## 2020-03-03 RX ORDER — ACETAMINOPHEN 325 MG/1
650 TABLET ORAL EVERY 4 HOURS PRN
Status: DISCONTINUED | OUTPATIENT
Start: 2020-03-03 | End: 2020-03-05 | Stop reason: HOSPADM

## 2020-03-03 RX ORDER — DIAZEPAM 2 MG/1
2 TABLET ORAL EVERY 12 HOURS
Status: DISCONTINUED | OUTPATIENT
Start: 2020-03-03 | End: 2020-03-05 | Stop reason: HOSPADM

## 2020-03-03 RX ORDER — SODIUM CHLORIDE 0.9 % (FLUSH) 0.9 %
10 SYRINGE (ML) INJECTION EVERY 12 HOURS SCHEDULED
Status: DISCONTINUED | OUTPATIENT
Start: 2020-03-03 | End: 2020-03-05 | Stop reason: HOSPADM

## 2020-03-03 RX ORDER — HYDROCODONE BITARTRATE AND ACETAMINOPHEN 5; 325 MG/1; MG/1
1 TABLET ORAL EVERY 6 HOURS PRN
Qty: 28 TABLET | Refills: 0 | Status: ON HOLD | OUTPATIENT
Start: 2020-03-03 | End: 2020-03-11 | Stop reason: HOSPADM

## 2020-03-03 RX ORDER — FLUOXETINE HYDROCHLORIDE 20 MG/1
20 CAPSULE ORAL 2 TIMES DAILY
Status: DISCONTINUED | OUTPATIENT
Start: 2020-03-03 | End: 2020-03-05 | Stop reason: HOSPADM

## 2020-03-03 RX ORDER — SODIUM CHLORIDE, SODIUM LACTATE, POTASSIUM CHLORIDE, CALCIUM CHLORIDE 600; 310; 30; 20 MG/100ML; MG/100ML; MG/100ML; MG/100ML
INJECTION, SOLUTION INTRAVENOUS CONTINUOUS
Status: DISCONTINUED | OUTPATIENT
Start: 2020-03-03 | End: 2020-03-03

## 2020-03-03 RX ORDER — OXYCODONE HYDROCHLORIDE AND ACETAMINOPHEN 5; 325 MG/1; MG/1
1 TABLET ORAL EVERY 6 HOURS PRN
Status: ON HOLD | COMMUNITY
End: 2020-03-05 | Stop reason: HOSPADM

## 2020-03-03 RX ORDER — LEVOTHYROXINE SODIUM 88 UG/1
88 TABLET ORAL DAILY
Status: DISCONTINUED | OUTPATIENT
Start: 2020-03-03 | End: 2020-03-05 | Stop reason: HOSPADM

## 2020-03-03 RX ORDER — DIPHENHYDRAMINE HYDROCHLORIDE 50 MG/ML
12.5 INJECTION INTRAMUSCULAR; INTRAVENOUS
Status: DISCONTINUED | OUTPATIENT
Start: 2020-03-03 | End: 2020-03-03 | Stop reason: HOSPADM

## 2020-03-03 RX ORDER — CLONIDINE HYDROCHLORIDE 0.2 MG/1
0.2 TABLET ORAL EVERY 6 HOURS PRN
Status: DISCONTINUED | OUTPATIENT
Start: 2020-03-03 | End: 2020-03-05 | Stop reason: HOSPADM

## 2020-03-03 RX ORDER — ATORVASTATIN CALCIUM 40 MG/1
80 TABLET, FILM COATED ORAL NIGHTLY
Status: DISCONTINUED | OUTPATIENT
Start: 2020-03-03 | End: 2020-03-05 | Stop reason: HOSPADM

## 2020-03-03 RX ORDER — DEXAMETHASONE SODIUM PHOSPHATE 4 MG/ML
INJECTION, SOLUTION INTRA-ARTICULAR; INTRALESIONAL; INTRAMUSCULAR; INTRAVENOUS; SOFT TISSUE PRN
Status: DISCONTINUED | OUTPATIENT
Start: 2020-03-03 | End: 2020-03-03 | Stop reason: SDUPTHER

## 2020-03-03 RX ORDER — METHYLENE BLUE 10 MG/ML
INJECTION INTRAVENOUS PRN
Status: DISCONTINUED | OUTPATIENT
Start: 2020-03-03 | End: 2020-03-03 | Stop reason: ALTCHOICE

## 2020-03-03 RX ORDER — MORPHINE SULFATE 2 MG/ML
2 INJECTION, SOLUTION INTRAMUSCULAR; INTRAVENOUS
Status: DISCONTINUED | OUTPATIENT
Start: 2020-03-03 | End: 2020-03-04

## 2020-03-03 RX ORDER — LIDOCAINE HYDROCHLORIDE 10 MG/ML
INJECTION, SOLUTION INFILTRATION; PERINEURAL PRN
Status: DISCONTINUED | OUTPATIENT
Start: 2020-03-03 | End: 2020-03-03 | Stop reason: ALTCHOICE

## 2020-03-03 RX ORDER — LISINOPRIL 5 MG/1
5 TABLET ORAL DAILY
Status: DISCONTINUED | OUTPATIENT
Start: 2020-03-03 | End: 2020-03-05 | Stop reason: HOSPADM

## 2020-03-03 RX ORDER — CLINDAMYCIN PHOSPHATE 900 MG/50ML
900 INJECTION INTRAVENOUS
Status: COMPLETED | OUTPATIENT
Start: 2020-03-03 | End: 2020-03-03

## 2020-03-03 RX ORDER — NITROGLYCERIN 0.4 MG/1
0.4 TABLET SUBLINGUAL EVERY 5 MIN PRN
Status: DISCONTINUED | OUTPATIENT
Start: 2020-03-03 | End: 2020-03-05 | Stop reason: HOSPADM

## 2020-03-03 RX ORDER — HYDROCODONE BITARTRATE AND ACETAMINOPHEN 5; 325 MG/1; MG/1
2 TABLET ORAL PRN
Status: DISCONTINUED | OUTPATIENT
Start: 2020-03-03 | End: 2020-03-03 | Stop reason: HOSPADM

## 2020-03-03 RX ORDER — SODIUM CHLORIDE 0.9 % (FLUSH) 0.9 %
10 SYRINGE (ML) INJECTION EVERY 12 HOURS SCHEDULED
Status: DISCONTINUED | OUTPATIENT
Start: 2020-03-03 | End: 2020-03-03 | Stop reason: HOSPADM

## 2020-03-03 RX ORDER — SODIUM CHLORIDE 0.9 % (FLUSH) 0.9 %
10 SYRINGE (ML) INJECTION PRN
Status: DISCONTINUED | OUTPATIENT
Start: 2020-03-03 | End: 2020-03-05 | Stop reason: HOSPADM

## 2020-03-03 RX ORDER — PROPOFOL 10 MG/ML
INJECTION, EMULSION INTRAVENOUS PRN
Status: DISCONTINUED | OUTPATIENT
Start: 2020-03-03 | End: 2020-03-03 | Stop reason: SDUPTHER

## 2020-03-03 RX ORDER — SODIUM CHLORIDE, SODIUM LACTATE, POTASSIUM CHLORIDE, CALCIUM CHLORIDE 600; 310; 30; 20 MG/100ML; MG/100ML; MG/100ML; MG/100ML
INJECTION, SOLUTION INTRAVENOUS CONTINUOUS PRN
Status: DISCONTINUED | OUTPATIENT
Start: 2020-03-03 | End: 2020-03-03 | Stop reason: SDUPTHER

## 2020-03-03 RX ORDER — ONDANSETRON 2 MG/ML
INJECTION INTRAMUSCULAR; INTRAVENOUS PRN
Status: DISCONTINUED | OUTPATIENT
Start: 2020-03-03 | End: 2020-03-03 | Stop reason: SDUPTHER

## 2020-03-03 RX ORDER — ONDANSETRON 4 MG/1
4 TABLET, ORALLY DISINTEGRATING ORAL EVERY 8 HOURS PRN
Status: DISCONTINUED | OUTPATIENT
Start: 2020-03-03 | End: 2020-03-05 | Stop reason: HOSPADM

## 2020-03-03 RX ORDER — MORPHINE SULFATE 4 MG/ML
4 INJECTION, SOLUTION INTRAMUSCULAR; INTRAVENOUS
Status: DISCONTINUED | OUTPATIENT
Start: 2020-03-03 | End: 2020-03-04

## 2020-03-03 RX ORDER — ALBUTEROL SULFATE 2.5 MG/3ML
2.5 SOLUTION RESPIRATORY (INHALATION)
Status: DISCONTINUED | OUTPATIENT
Start: 2020-03-03 | End: 2020-03-05 | Stop reason: HOSPADM

## 2020-03-03 RX ORDER — MIDAZOLAM HYDROCHLORIDE 1 MG/ML
INJECTION INTRAMUSCULAR; INTRAVENOUS PRN
Status: DISCONTINUED | OUTPATIENT
Start: 2020-03-03 | End: 2020-03-03 | Stop reason: SDUPTHER

## 2020-03-03 RX ORDER — OXYCODONE HYDROCHLORIDE 5 MG/1
10 TABLET ORAL EVERY 4 HOURS PRN
Status: DISCONTINUED | OUTPATIENT
Start: 2020-03-03 | End: 2020-03-04

## 2020-03-03 RX ORDER — DEXTROSE, SODIUM CHLORIDE, AND POTASSIUM CHLORIDE 5; .45; .15 G/100ML; G/100ML; G/100ML
INJECTION INTRAVENOUS CONTINUOUS
Status: DISCONTINUED | OUTPATIENT
Start: 2020-03-03 | End: 2020-03-04

## 2020-03-03 RX ORDER — LIDOCAINE HYDROCHLORIDE 20 MG/ML
INJECTION, SOLUTION EPIDURAL; INFILTRATION; INTRACAUDAL; PERINEURAL PRN
Status: DISCONTINUED | OUTPATIENT
Start: 2020-03-03 | End: 2020-03-03 | Stop reason: SDUPTHER

## 2020-03-03 RX ORDER — FLUTICASONE PROPIONATE 50 MCG
1 SPRAY, SUSPENSION (ML) NASAL DAILY
Status: DISCONTINUED | OUTPATIENT
Start: 2020-03-03 | End: 2020-03-05 | Stop reason: HOSPADM

## 2020-03-03 RX ORDER — FUROSEMIDE 20 MG/1
20 TABLET ORAL DAILY
Status: DISCONTINUED | OUTPATIENT
Start: 2020-03-03 | End: 2020-03-05 | Stop reason: HOSPADM

## 2020-03-03 RX ORDER — MEPERIDINE HYDROCHLORIDE 25 MG/ML
12.5 INJECTION INTRAMUSCULAR; INTRAVENOUS; SUBCUTANEOUS EVERY 5 MIN PRN
Status: DISCONTINUED | OUTPATIENT
Start: 2020-03-03 | End: 2020-03-03 | Stop reason: HOSPADM

## 2020-03-03 RX ORDER — OXYCODONE HYDROCHLORIDE 5 MG/1
5 TABLET ORAL EVERY 4 HOURS PRN
Status: DISCONTINUED | OUTPATIENT
Start: 2020-03-03 | End: 2020-03-05 | Stop reason: HOSPADM

## 2020-03-03 RX ADMIN — LIDOCAINE HYDROCHLORIDE 100 MG: 20 INJECTION, SOLUTION EPIDURAL; INFILTRATION; INTRACAUDAL; PERINEURAL at 08:56

## 2020-03-03 RX ADMIN — SODIUM CHLORIDE, PRESERVATIVE FREE 10 ML: 5 INJECTION INTRAVENOUS at 17:49

## 2020-03-03 RX ADMIN — LEVOTHYROXINE SODIUM 88 MCG: 88 TABLET ORAL at 13:48

## 2020-03-03 RX ADMIN — PROPOFOL 100 MG: 10 INJECTION, EMULSION INTRAVENOUS at 08:56

## 2020-03-03 RX ADMIN — ONDANSETRON HYDROCHLORIDE 4 MG: 2 INJECTION, SOLUTION INTRAMUSCULAR; INTRAVENOUS at 10:07

## 2020-03-03 RX ADMIN — POTASSIUM BICARBONATE 20 MEQ: 782 TABLET, EFFERVESCENT ORAL at 13:47

## 2020-03-03 RX ADMIN — SODIUM CHLORIDE, POTASSIUM CHLORIDE, SODIUM LACTATE AND CALCIUM CHLORIDE: 600; 310; 30; 20 INJECTION, SOLUTION INTRAVENOUS at 08:34

## 2020-03-03 RX ADMIN — LISINOPRIL 5 MG: 5 TABLET ORAL at 13:48

## 2020-03-03 RX ADMIN — FENTANYL CITRATE 100 MCG: 50 INJECTION, SOLUTION INTRAMUSCULAR; INTRAVENOUS at 08:56

## 2020-03-03 RX ADMIN — GABAPENTIN 300 MG: 250 SOLUTION ORAL at 15:52

## 2020-03-03 RX ADMIN — POTASSIUM CHLORIDE, DEXTROSE MONOHYDRATE AND SODIUM CHLORIDE: 150; 5; 450 INJECTION, SOLUTION INTRAVENOUS at 23:04

## 2020-03-03 RX ADMIN — MORPHINE SULFATE 4 MG: 4 INJECTION, SOLUTION INTRAMUSCULAR; INTRAVENOUS at 15:51

## 2020-03-03 RX ADMIN — FLUOXETINE 20 MG: 20 CAPSULE ORAL at 23:01

## 2020-03-03 RX ADMIN — DEXAMETHASONE SODIUM PHOSPHATE 10 MG: 4 INJECTION, SOLUTION INTRAMUSCULAR; INTRAVENOUS at 09:06

## 2020-03-03 RX ADMIN — MIDAZOLAM 1 MG: 1 INJECTION INTRAMUSCULAR; INTRAVENOUS at 08:49

## 2020-03-03 RX ADMIN — ALBUTEROL SULFATE 2.5 MG: 2.5 SOLUTION RESPIRATORY (INHALATION) at 19:38

## 2020-03-03 RX ADMIN — ATORVASTATIN CALCIUM 80 MG: 40 TABLET, FILM COATED ORAL at 23:02

## 2020-03-03 RX ADMIN — MORPHINE SULFATE 4 MG: 4 INJECTION, SOLUTION INTRAMUSCULAR; INTRAVENOUS at 13:48

## 2020-03-03 RX ADMIN — ALBUTEROL SULFATE 2.5 MG: 2.5 SOLUTION RESPIRATORY (INHALATION) at 16:20

## 2020-03-03 RX ADMIN — PSYLLIUM HUSK 1 PACKET: 3.4 GRANULE ORAL at 13:47

## 2020-03-03 RX ADMIN — CLINDAMYCIN IN 5 PERCENT DEXTROSE 900 MG: 18 INJECTION, SOLUTION INTRAVENOUS at 08:49

## 2020-03-03 RX ADMIN — POTASSIUM BICARBONATE 20 MEQ: 782 TABLET, EFFERVESCENT ORAL at 23:01

## 2020-03-03 RX ADMIN — MORPHINE SULFATE 4 MG: 4 INJECTION, SOLUTION INTRAMUSCULAR; INTRAVENOUS at 17:48

## 2020-03-03 RX ADMIN — DIAZEPAM 2 MG: 2 TABLET ORAL at 14:49

## 2020-03-03 RX ADMIN — FLUTICASONE PROPIONATE 1 SPRAY: 50 SPRAY, METERED NASAL at 13:47

## 2020-03-03 RX ADMIN — SODIUM CHLORIDE, PRESERVATIVE FREE 10 ML: 5 INJECTION INTRAVENOUS at 15:51

## 2020-03-03 RX ADMIN — POTASSIUM CHLORIDE, DEXTROSE MONOHYDRATE AND SODIUM CHLORIDE: 150; 5; 450 INJECTION, SOLUTION INTRAVENOUS at 13:49

## 2020-03-03 RX ADMIN — FUROSEMIDE 20 MG: 20 TABLET ORAL at 13:48

## 2020-03-03 RX ADMIN — MIDAZOLAM 1 MG: 1 INJECTION INTRAMUSCULAR; INTRAVENOUS at 08:50

## 2020-03-03 ASSESSMENT — PAIN DESCRIPTION - PAIN TYPE
TYPE: ACUTE PAIN;SURGICAL PAIN
TYPE: ACUTE PAIN;SURGICAL PAIN

## 2020-03-03 ASSESSMENT — PAIN SCALES - GENERAL
PAINLEVEL_OUTOF10: 7
PAINLEVEL_OUTOF10: 0
PAINLEVEL_OUTOF10: 8
PAINLEVEL_OUTOF10: 0
PAINLEVEL_OUTOF10: 3
PAINLEVEL_OUTOF10: 0
PAINLEVEL_OUTOF10: 7
PAINLEVEL_OUTOF10: 7
PAINLEVEL_OUTOF10: 0
PAINLEVEL_OUTOF10: 3

## 2020-03-03 ASSESSMENT — LIFESTYLE VARIABLES: SMOKING_STATUS: 0

## 2020-03-03 ASSESSMENT — PAIN - FUNCTIONAL ASSESSMENT
PAIN_FUNCTIONAL_ASSESSMENT: PREVENTS OR INTERFERES SOME ACTIVE ACTIVITIES AND ADLS
PAIN_FUNCTIONAL_ASSESSMENT: PREVENTS OR INTERFERES SOME ACTIVE ACTIVITIES AND ADLS

## 2020-03-03 ASSESSMENT — PAIN DESCRIPTION - PROGRESSION
CLINICAL_PROGRESSION: NOT CHANGED
CLINICAL_PROGRESSION: NOT CHANGED

## 2020-03-03 ASSESSMENT — PAIN DESCRIPTION - LOCATION
LOCATION: BREAST
LOCATION: BREAST

## 2020-03-03 ASSESSMENT — PAIN DESCRIPTION - ONSET
ONSET: ON-GOING
ONSET: ON-GOING

## 2020-03-03 ASSESSMENT — PAIN DESCRIPTION - ORIENTATION
ORIENTATION: RIGHT
ORIENTATION: RIGHT

## 2020-03-03 ASSESSMENT — PAIN DESCRIPTION - FREQUENCY
FREQUENCY: INTERMITTENT
FREQUENCY: INTERMITTENT

## 2020-03-03 ASSESSMENT — PAIN DESCRIPTION - DESCRIPTORS
DESCRIPTORS: ACHING;DISCOMFORT;SORE
DESCRIPTORS: ACHING;DISCOMFORT;SORE

## 2020-03-03 NOTE — ANESTHESIA POSTPROCEDURE EVALUATION
Department of Anesthesiology  Postprocedure Note    Patient: Papa Fowler  MRN: [id number]  YOB: 1955  Date of evaluation: 3/3/2020  Time:  11:12 AM     Procedure Summary     Date:  03/03/20 Room / Location:  [address]    Anesthesia Start:  8049 Anesthesia Stop:  0047    Procedure:  RIGHT BREAST MASTECTOMY WITH SENTINEL NODE DISSECTION WITH BLUE DYE (Right Breast) Diagnosis:  (RIGHT BREAST CANCER)    Surgeon:  Maritza Nixon MD Responsible Provider:  Eben Malik MD    Anesthesia Type:  general ASA Status:  4          Anesthesia Type: general    Thalia Phase I: Thalia Score: 8    Thalia Phase II:      Last vitals: Reviewed and per EMR flowsheets.        Anesthesia Post Evaluation    Patient location during evaluation: PACU  Patient participation: complete - patient participated  Level of consciousness: awake and alert  Airway patency: patent  Nausea & Vomiting: no nausea and no vomiting  Complications: no  Cardiovascular status: hemodynamically stable  Respiratory status: acceptable  Hydration status: euvolemic

## 2020-03-03 NOTE — ANESTHESIA PRE PROCEDURE
Facility-Administered Medications   Medication Dose Route Frequency Provider Last Rate Last Dose    methylene blue 0.5% injection 50 mg  10 mL Intravenous Once Aurora Lema MD        clindamycin (CLEOCIN) 900 mg in dextrose 5 % 50 mL IVPB  900 mg Intravenous On Call to [address] [place] [address] MD Anni        lactated ringers infusion   Intravenous Continuous Aurora Lema MD        sodium chloride flush 0.9 % injection 10 mL  10 mL Intravenous 2 times per day Aurora Lema MD        meperidine (DEMEROL) injection 12.5 mg  12.5 mg Intravenous Q5 Min PRN Mari Padilla MD        diphenhydrAMINE (BENADRYL) injection 12.5 mg  12.5 mg Intravenous Once PRN Mari Padilla MD        HYDROcodone-acetaminophen [facility]) 5-325 MG per tablet 1 tablet  1 tablet Oral PRN Mari Padilla MD        Or    HYDROcodone-acetaminophen (NORCO) 5-325 MG per tablet 2 tablet  2 tablet Oral PRN Mari Padilla MD        HYDROmorphone (DILAUDID) injection 0.5 mg  0.5 mg Intravenous Q5 Min PRN Mari Padilla MD        HYDROmorphone (DILAUDID) injection 0.25 mg  0.25 mg Intravenous Q5 Min PRN Mari Padilla MD           Allergies:     Allergies   Allergen Reactions    Pcn [Penicillins] Anaphylaxis    Watermelon [Citrullus Vulgaris]        Problem List:    Patient Active Problem List   Diagnosis Code    Multiple sclerosis ([facility] [address] G35    Bilateral foot-drop M21.371, M21.372    Heart failure (HCC) I50.9    Myocardial infarction, anteroseptal (HCC) I21.09    NSTEMI (non-ST elevated myocardial infarction) ([facility] [address] I21.4    Carcinoma of upper-outer quadrant of female breast, right ([address] C50.411       Past Medical History:        Diagnosis Date    Anxiety     Breast cancer ([facility] [address] 02/2020    right    CAD (coronary artery disease)     Cystitis     Depression     Elizondo catheter status     History of blood transfusion     Hyperlipidemia     Hypertension 08/27/2018    Hypothyroidism     MS (multiple sclerosis) ([facility] [address]     wheelchair bound    Neurogenic bladder 2018    Sepsis ([facility] [address]     uti hospitalized -2019    Total self-care deficit     Unspecified cerebral artery occlusion with cerebral infarction     left her incontinent    Wheelchair dependent        Past Surgical History:        Procedure Laterality Date    COLON SURGERY      exp lap, lysis of adhesions, release of SBO. [facility]. Dr. Samira Amado 2020    COLONOSCOPY DIAGNOSTIC performed by Brown Quintero MD at [place] [address]  2019    Dr. Nel Travis- Rt Wrist- 3.0/300mm Resolute-RCA, 2.75/22mm-Circumflex,     DENTAL SURGERY      all removed    HYSTERECTOMY      [facility]. Dr. Martha Christopher N/A 2019    EGD PEG INSERTION performed by Brown Quintero MD at [facility] History:    Social History     Tobacco Use    Smoking status: Former Smoker     Packs/day: 1.00     Years: 5.00     Pack years: 5.00     Types: Cigarettes     Start date:      Last attempt to quit:      Years since quittin.1    Smokeless tobacco: Never Used   Substance Use Topics    Alcohol use: No                                Counseling given: Not Answered      Vital Signs (Current):   Vitals:    20 1111   Weight: 179 lb (81.2 kg)   Height: 5' 6\" (1.676 m)                                              BP Readings from Last 3 Encounters:   20 99/60   20 118/62   20 (!) 106/55       NPO Status: Time of last liquid consumption: 2100                                                                               BMI:   Wt Readings from Last 3 Encounters:   20 179 lb (81.2 kg)   20 180 lb (81.6 kg)   20 170 lb (77.1 kg)     Body mass index is 28.89 kg/m².     CBC:   Lab Results   Component Value Date    WBC 10.5 2020    RBC 4.06 2020    HGB 11.4 2020    HCT 38.3 2020    MCV 94.3 2020    RDW 15.8 2020     2020 negative vascular ROS. Anesthesia Plan      general     ASA 4     (#4 LMA)  Induction: intravenous. MIPS: Postoperative opioids intended and Prophylactic antiemetics administered. Anesthetic plan and risks discussed with patient.       Plan discussed with CRNA and surgical team.                  Shai Ornelas MD   3/3/2020

## 2020-03-03 NOTE — OP NOTE
[facility]  OPERATIVE REPORT  3/3/2020      DATE OF PROCEDURE:  3/3/2020    SURGEON:  Dougie Brown MD    ASSISTANT:  Lili Curling, DO    PREOPERATIVE DIAGNOSIS:  Right  breast cancer    POSTOPERATIVE DIAGNOSIS:  Right breast cancer, pending pathology. OPERATION:  Simple mastectomy with  sentinel lymph node biopsy. Methylene blue dye injection. ANESTHESIA:  General.    ESTIMATED BLOOD LOSS:  20 mL. COMPLICATIONS:  None. CONDITION:  Stable. SPECIMENS:  Right breast tissue. [place] node #1    DRAINS:  1 closed suction drains. INDICATIONS:  This is a 81-year-old female with a history of a right breast cancer. The risks, benefits, complications, and alternatives of the procedure, including the risks of bleeding, infection, neurovascular injury, lymphedema, chronic pain, deformity, and anesthesia reactions, were explained to the patient. The patient understood these risks and agreed to proceed. PROCEDURE NOTE: The patient was then taken to the operating room and positioned supine on the operating table. Pneumatic compression devices were placed on the lower extremities bilaterally. The patient was placed under general anesthesia. The right chest and axillae were prepped with ChloraPrep and draped in the usual sterile fashion. The nipple-areolar complex of the right breast was raised, and then 10 mL of methylene blue dye was injected into this region. This was then massaged into the breast for 5 minutes to allow for penetration into the lymphatics. The transverse elliptical incisions for the simple mastectomy were then marked with a surgical marker. These were measured for length to ensure ease of approximation at the end of the procedure. A #15-blade scalpel was used to make the skin incisions at the right breast.  A simple mastectomy was performed. Sharp dissection with electrocautery was used.   Dissection was carried down to the chest wall, both superiorly and

## 2020-03-04 LAB
ANION GAP SERPL CALCULATED.3IONS-SCNC: 10 MMOL/L (ref 7–16)
BUN BLDV-MCNC: 10 MG/DL (ref 8–23)
CALCIUM SERPL-MCNC: 8.8 MG/DL (ref 8.6–10.2)
CHLORIDE BLD-SCNC: 103 MMOL/L (ref 98–107)
CO2: 23 MMOL/L (ref 22–29)
CREAT SERPL-MCNC: 0.7 MG/DL (ref 0.5–1)
GFR AFRICAN AMERICAN: >60
GFR NON-AFRICAN AMERICAN: >60 ML/MIN/1.73
GLUCOSE BLD-MCNC: 172 MG/DL (ref 74–99)
HCT VFR BLD CALC: 30.4 % (ref 34–48)
HEMOGLOBIN: 9.3 G/DL (ref 11.5–15.5)
MAGNESIUM: 1.7 MG/DL (ref 1.6–2.6)
MCH RBC QN AUTO: 29.1 PG (ref 26–35)
MCHC RBC AUTO-ENTMCNC: 30.6 % (ref 32–34.5)
MCV RBC AUTO: 95 FL (ref 80–99.9)
ORGANISM: ABNORMAL
PDW BLD-RTO: 15.9 FL (ref 11.5–15)
PHOSPHORUS: 2.8 MG/DL (ref 2.5–4.5)
PLATELET # BLD: 439 E9/L (ref 130–450)
PMV BLD AUTO: 10.1 FL (ref 7–12)
POTASSIUM SERPL-SCNC: 4.9 MMOL/L (ref 3.5–5)
RBC # BLD: 3.2 E12/L (ref 3.5–5.5)
SODIUM BLD-SCNC: 136 MMOL/L (ref 132–146)
WBC # BLD: 14.5 E9/L (ref 4.5–11.5)

## 2020-03-04 PROCEDURE — 36415 COLL VENOUS BLD VENIPUNCTURE: CPT

## 2020-03-04 PROCEDURE — 6370000000 HC RX 637 (ALT 250 FOR IP): Performed by: STUDENT IN AN ORGANIZED HEALTH CARE EDUCATION/TRAINING PROGRAM

## 2020-03-04 PROCEDURE — 2580000003 HC RX 258: Performed by: STUDENT IN AN ORGANIZED HEALTH CARE EDUCATION/TRAINING PROGRAM

## 2020-03-04 PROCEDURE — 84100 ASSAY OF PHOSPHORUS: CPT

## 2020-03-04 PROCEDURE — G0378 HOSPITAL OBSERVATION PER HR: HCPCS

## 2020-03-04 PROCEDURE — 80048 BASIC METABOLIC PNL TOTAL CA: CPT

## 2020-03-04 PROCEDURE — 83735 ASSAY OF MAGNESIUM: CPT

## 2020-03-04 PROCEDURE — 2500000003 HC RX 250 WO HCPCS: Performed by: SURGERY

## 2020-03-04 PROCEDURE — 94640 AIRWAY INHALATION TREATMENT: CPT

## 2020-03-04 PROCEDURE — 85027 COMPLETE CBC AUTOMATED: CPT

## 2020-03-04 PROCEDURE — 6360000002 HC RX W HCPCS: Performed by: STUDENT IN AN ORGANIZED HEALTH CARE EDUCATION/TRAINING PROGRAM

## 2020-03-04 RX ORDER — SODIUM CHLORIDE, SODIUM LACTATE, POTASSIUM CHLORIDE, CALCIUM CHLORIDE 600; 310; 30; 20 MG/100ML; MG/100ML; MG/100ML; MG/100ML
INJECTION, SOLUTION INTRAVENOUS CONTINUOUS
Status: DISCONTINUED | OUTPATIENT
Start: 2020-03-04 | End: 2020-03-04

## 2020-03-04 RX ADMIN — ACETAMINOPHEN 650 MG: 325 TABLET ORAL at 13:11

## 2020-03-04 RX ADMIN — GABAPENTIN 300 MG: 250 SOLUTION ORAL at 21:07

## 2020-03-04 RX ADMIN — GABAPENTIN 300 MG: 250 SOLUTION ORAL at 15:52

## 2020-03-04 RX ADMIN — SODIUM CHLORIDE, POTASSIUM CHLORIDE, SODIUM LACTATE AND CALCIUM CHLORIDE: 600; 310; 30; 20 INJECTION, SOLUTION INTRAVENOUS at 16:51

## 2020-03-04 RX ADMIN — FLUOXETINE 20 MG: 20 CAPSULE ORAL at 21:15

## 2020-03-04 RX ADMIN — POTASSIUM CHLORIDE, DEXTROSE MONOHYDRATE AND SODIUM CHLORIDE: 150; 5; 450 INJECTION, SOLUTION INTRAVENOUS at 06:40

## 2020-03-04 RX ADMIN — ALBUTEROL SULFATE 2.5 MG: 2.5 SOLUTION RESPIRATORY (INHALATION) at 08:16

## 2020-03-04 RX ADMIN — OXYCODONE HYDROCHLORIDE 5 MG: 5 TABLET ORAL at 21:05

## 2020-03-04 RX ADMIN — ALBUTEROL SULFATE 2.5 MG: 2.5 SOLUTION RESPIRATORY (INHALATION) at 19:43

## 2020-03-04 RX ADMIN — MUPIROCIN: 20 OINTMENT TOPICAL at 21:16

## 2020-03-04 RX ADMIN — ATORVASTATIN CALCIUM 80 MG: 40 TABLET, FILM COATED ORAL at 21:05

## 2020-03-04 RX ADMIN — ACETAMINOPHEN 650 MG: 325 TABLET ORAL at 18:48

## 2020-03-04 RX ADMIN — ALBUTEROL SULFATE 2.5 MG: 2.5 SOLUTION RESPIRATORY (INHALATION) at 13:13

## 2020-03-04 RX ADMIN — FLUOXETINE 20 MG: 20 CAPSULE ORAL at 08:39

## 2020-03-04 RX ADMIN — ALBUTEROL SULFATE 2.5 MG: 2.5 SOLUTION RESPIRATORY (INHALATION) at 16:36

## 2020-03-04 RX ADMIN — SODIUM CHLORIDE, PRESERVATIVE FREE 10 ML: 5 INJECTION INTRAVENOUS at 20:59

## 2020-03-04 RX ADMIN — FLUTICASONE PROPIONATE 1 SPRAY: 50 SPRAY, METERED NASAL at 08:38

## 2020-03-04 RX ADMIN — ACETAMINOPHEN 650 MG: 325 TABLET ORAL at 08:38

## 2020-03-04 RX ADMIN — POTASSIUM CHLORIDE, DEXTROSE MONOHYDRATE AND SODIUM CHLORIDE: 150; 5; 450 INJECTION, SOLUTION INTRAVENOUS at 15:01

## 2020-03-04 RX ADMIN — LEVOTHYROXINE SODIUM 88 MCG: 88 TABLET ORAL at 06:40

## 2020-03-04 RX ADMIN — GABAPENTIN 300 MG: 250 SOLUTION ORAL at 08:44

## 2020-03-04 ASSESSMENT — PAIN DESCRIPTION - LOCATION
LOCATION: BREAST

## 2020-03-04 ASSESSMENT — PAIN DESCRIPTION - FREQUENCY
FREQUENCY: CONTINUOUS
FREQUENCY: CONTINUOUS

## 2020-03-04 ASSESSMENT — PAIN DESCRIPTION - ONSET
ONSET: ON-GOING
ONSET: GRADUAL

## 2020-03-04 ASSESSMENT — PAIN DESCRIPTION - PAIN TYPE
TYPE: SURGICAL PAIN
TYPE: ACUTE PAIN;SURGICAL PAIN
TYPE: SURGICAL PAIN

## 2020-03-04 ASSESSMENT — PAIN SCALES - GENERAL
PAINLEVEL_OUTOF10: 0
PAINLEVEL_OUTOF10: 5
PAINLEVEL_OUTOF10: 8
PAINLEVEL_OUTOF10: 10
PAINLEVEL_OUTOF10: 5
PAINLEVEL_OUTOF10: 10
PAINLEVEL_OUTOF10: 9
PAINLEVEL_OUTOF10: 8

## 2020-03-04 ASSESSMENT — PAIN DESCRIPTION - DESCRIPTORS
DESCRIPTORS: SORE
DESCRIPTORS: ACHING;DISCOMFORT;SORE

## 2020-03-04 ASSESSMENT — PAIN DESCRIPTION - ORIENTATION
ORIENTATION: RIGHT

## 2020-03-04 ASSESSMENT — PAIN DESCRIPTION - PROGRESSION
CLINICAL_PROGRESSION: GRADUALLY WORSENING
CLINICAL_PROGRESSION: NOT CHANGED

## 2020-03-04 NOTE — CARE COORDINATION
Introduced my self and provided explanation of CM role to patient. Patient is awake, alert, and aware of current diagnosis and treatment plan including post op care, BP monitoring, IV fluids. Patient resides in a LTC facility, [facility], and she acknowledges plan for return to same. Gen surg following BP and if satisfactory have indicated potential discharge as soon as this PM.  Patient voices understanding and agreement to same. Per Dortha Hodgkin at facility, no-precert is required for patient return. JOSE has completed charles/sohail and initiated DICK (N-17) forms. Will follow along with  and assist with discharge planning as necessary. Rommel Kyle.  Tom, MSN, RN  [facility] Case Management  [phone]

## 2020-03-04 NOTE — CARE COORDINATION
Social Work discharge planning  Pt is from [facility], where bed is held per [employer] and precert NOT needed to return. N17 started. Ambulance forms in folder for [facility] [employer]. Will follow with CM.   Electronically signed by Rosalia Ferrara on 3/4/2020 at 2:40 PM

## 2020-03-04 NOTE — DISCHARGE INSTR - COC
BLUE DYE performed by Rosalia Nice MD at [facility] 12/12/2019    EGD PEG INSERTION performed by Rosalia Nice MD at [facility]       Immunization History: There is no immunization history on file for this patient.     Active Problems:  Patient Active Problem List   Diagnosis Code    Multiple sclerosis ([facility] G35    Bilateral foot-drop M21.371, M21.372    Heart failure (HCC) I50.9    Myocardial infarction, anteroseptal (HCC) I21.09    NSTEMI (non-ST elevated myocardial infarction) ([facility] I21.4    Carcinoma of upper-outer quadrant of female breast, right ([facility] C50.411    Carcinoma of upper-outer quadrant of right female breast ([facility] C50.411       Isolation/Infection:   Isolation          No Isolation        Patient Infection Status     Infection Onset Added Last Indicated Last Indicated By Review Planned Expiration Resolved Resolved By    MRSA 03/03/20 03/04/20 03/03/20 Culture, MRSA, Screening        Resolved    MRSA 11/30/19 12/01/19 11/30/19 MRSA SCREENING CULTURE ONLY   12/09/19 Dina Laurent RN    ESBL (Extended Spectrum Beta Lactamase)  08/13/18 08/13/18 Dina Laurent RN   06/26/19 Dina Laurent RN    E Coli Urine 8/9/18          Nurse Assessment:  Last Vital Signs: BP (!) 86/52   Pulse 66   Temp 97.8 °F (36.6 °C) (Oral)   Resp 16   Ht 5' 6\" (1.676 m)   Wt 188 lb 9.6 oz (85.5 kg)   SpO2 94%   BMI 30.44 kg/m²     Last documented pain score (0-10 scale): Pain Level: 8  Last Weight:   Wt Readings from Last 1 Encounters:   03/04/20 188 lb 9.6 oz (85.5 kg)     Mental Status:  oriented and alert    IV Access:  - None    Nursing Mobility/ADLs:  Walking   Assisted  Transfer  Assisted  Bathing  Assisted  Dressing  Assisted  Toileting  Assisted  Feeding  Assisted  Med Admin  Assisted  Med Delivery   whole    Wound Care Documentation and Therapy:        Elimination:  Continence:   · Bowel: yes  · Bladder: pacheco  Urinary Catheter: came in with pacheco

## 2020-03-04 NOTE — PLAN OF CARE
Problem: Pain:  Goal: Pain level will decrease  Description  Pain level will decrease  Outcome: Met This Shift  Goal: Control of acute pain  Description  Control of acute pain  3/4/2020 1224 by Marisela Boo RN  Outcome: Met This Shift  3/4/2020 0554 by Josiane Loza RN  Outcome: Met This Shift     Problem: Risk for Impaired Skin Integrity  Goal: Tissue integrity - skin and mucous membranes  Description  Structural intactness and normal physiological function of skin and  mucous membranes.   Outcome: Met This Shift     Problem: Falls - Risk of:  Goal: Will remain free from falls  Description  Will remain free from falls  3/4/2020 1224 by Marisela Boo RN  Outcome: Met This Shift  3/4/2020 0554 by Josiane Loza RN  Outcome: Met This Shift  Goal: Absence of physical injury  Description  Absence of physical injury  3/4/2020 1224 by Marisela Boo RN  Outcome: Met This Shift  3/4/2020 0554 by Josiane Loza RN  Outcome: Met This Shift

## 2020-03-04 NOTE — PROGRESS NOTES
SURGERY  DAILY PROGRESS NOTE  3/4/2020    Chief Complaint:  Breast cancer    Subjective:  Pain controlled, tolerating diet w/ no N/V    Objective:  BP (!) 86/56   Pulse 63   Temp 98.4 °F (36.9 °C) (Oral)   Resp 16   Ht 5' 6\" (1.676 m)   Wt 188 lb 9.6 oz (85.5 kg)   SpO2 96%   BMI 30.44 kg/m²     GENERAL:  Laying in bed, awake, alert, cooperative, no apparent distress  LUNGS:  No increased work of breathing  CHEST:  Incision c/d/i with no swelling or fluctuance  CARDIOVASCULAR:  Regular rate   ABDOMEN:  Soft, no tenderness, non distended  EXTREMITIES:  Moves all 4 extremities    Assessment/Plan:  60 y.o. female w/ breast CA s/p right mastectomy 3/3    JHONY  Pain control  Possible D/C in pm pending BP    Electronically signed by Charley Espinoza MD on 3/4/2020 at 8:57 AM     Seen/examined  BP on low side but no evidence of bleeding  Discharge tomorrow  JSGadyMD

## 2020-03-04 NOTE — PATIENT CARE CONFERENCE
P Quality Flow/Interdisciplinary Rounds Progress Note        Quality Flow Rounds held on March 4, 2020    Disciplines Attending:  Bedside Nurse, ,  and Nursing Unit [address] was admitted on 3/3/2020  7:00 AM    Anticipated Discharge Date:  Expected Discharge Date: 03/05/20    Disposition:    Kee Score:  Kee Scale Score: 16    Readmission Risk              Risk of Unplanned Readmission:        28           Discussed patient goal for the day, patient clinical progression, and barriers to discharge.   The following Goal(s) of the Day/Commitment(s) have been identified:  Discharge - Obtain Order- possible later today      Tiera Mcknight  March 4, 2020

## 2020-03-04 NOTE — PROGRESS NOTES
Spoke with Dr. John Bang regarding manual BP. No new orders at this time. Will continue to monitor. Patient is asymptomatic.

## 2020-03-05 VITALS
TEMPERATURE: 98.7 F | OXYGEN SATURATION: 94 % | WEIGHT: 183 LBS | HEART RATE: 87 BPM | HEIGHT: 66 IN | RESPIRATION RATE: 16 BRPM | SYSTOLIC BLOOD PRESSURE: 107 MMHG | BODY MASS INDEX: 29.41 KG/M2 | DIASTOLIC BLOOD PRESSURE: 61 MMHG

## 2020-03-05 PROCEDURE — 6370000000 HC RX 637 (ALT 250 FOR IP): Performed by: STUDENT IN AN ORGANIZED HEALTH CARE EDUCATION/TRAINING PROGRAM

## 2020-03-05 PROCEDURE — 94640 AIRWAY INHALATION TREATMENT: CPT

## 2020-03-05 PROCEDURE — 2580000003 HC RX 258: Performed by: STUDENT IN AN ORGANIZED HEALTH CARE EDUCATION/TRAINING PROGRAM

## 2020-03-05 PROCEDURE — G0378 HOSPITAL OBSERVATION PER HR: HCPCS

## 2020-03-05 PROCEDURE — 6360000002 HC RX W HCPCS: Performed by: STUDENT IN AN ORGANIZED HEALTH CARE EDUCATION/TRAINING PROGRAM

## 2020-03-05 RX ORDER — OXYCODONE HYDROCHLORIDE 5 MG/1
5 TABLET ORAL EVERY 4 HOURS PRN
Qty: 5 TABLET | Refills: 0 | Status: ON HOLD | OUTPATIENT
Start: 2020-03-05 | End: 2020-03-11 | Stop reason: HOSPADM

## 2020-03-05 RX ADMIN — FLUOXETINE 20 MG: 20 CAPSULE ORAL at 09:00

## 2020-03-05 RX ADMIN — OXYCODONE HYDROCHLORIDE 5 MG: 5 TABLET ORAL at 13:33

## 2020-03-05 RX ADMIN — LEVOTHYROXINE SODIUM 88 MCG: 88 TABLET ORAL at 06:07

## 2020-03-05 RX ADMIN — ALBUTEROL SULFATE 2.5 MG: 2.5 SOLUTION RESPIRATORY (INHALATION) at 08:32

## 2020-03-05 RX ADMIN — FUROSEMIDE 20 MG: 20 TABLET ORAL at 09:00

## 2020-03-05 RX ADMIN — MUPIROCIN: 20 OINTMENT TOPICAL at 09:00

## 2020-03-05 RX ADMIN — OXYCODONE HYDROCHLORIDE 5 MG: 5 TABLET ORAL at 09:00

## 2020-03-05 RX ADMIN — ALBUTEROL SULFATE 2.5 MG: 2.5 SOLUTION RESPIRATORY (INHALATION) at 13:04

## 2020-03-05 RX ADMIN — DIAZEPAM 2 MG: 2 TABLET ORAL at 09:00

## 2020-03-05 RX ADMIN — SODIUM CHLORIDE, PRESERVATIVE FREE 10 ML: 5 INJECTION INTRAVENOUS at 09:06

## 2020-03-05 RX ADMIN — GABAPENTIN 300 MG: 250 SOLUTION ORAL at 08:59

## 2020-03-05 RX ADMIN — FLUTICASONE PROPIONATE 1 SPRAY: 50 SPRAY, METERED NASAL at 09:04

## 2020-03-05 ASSESSMENT — PAIN DESCRIPTION - DESCRIPTORS: DESCRIPTORS: DISCOMFORT;SORE

## 2020-03-05 ASSESSMENT — PAIN DESCRIPTION - PROGRESSION: CLINICAL_PROGRESSION: GRADUALLY IMPROVING

## 2020-03-05 ASSESSMENT — PAIN DESCRIPTION - LOCATION: LOCATION: BREAST

## 2020-03-05 ASSESSMENT — PAIN DESCRIPTION - FREQUENCY: FREQUENCY: CONTINUOUS

## 2020-03-05 ASSESSMENT — PAIN SCALES - GENERAL
PAINLEVEL_OUTOF10: 8
PAINLEVEL_OUTOF10: 10
PAINLEVEL_OUTOF10: 5

## 2020-03-05 ASSESSMENT — PAIN DESCRIPTION - PAIN TYPE: TYPE: ACUTE PAIN;SURGICAL PAIN

## 2020-03-05 ASSESSMENT — PAIN - FUNCTIONAL ASSESSMENT: PAIN_FUNCTIONAL_ASSESSMENT: PREVENTS OR INTERFERES SOME ACTIVE ACTIVITIES AND ADLS

## 2020-03-05 ASSESSMENT — PAIN DESCRIPTION - ONSET: ONSET: ON-GOING

## 2020-03-05 ASSESSMENT — PAIN DESCRIPTION - ORIENTATION: ORIENTATION: RIGHT

## 2020-03-05 NOTE — CARE COORDINATION
Social Work discharge planning   Pt is returning to [place] [address] at [facility] via [facility] (set up with [place] [facility] [employer]). Sw notified pt/son Angelica Dela Cruz with [facility], Charge CHRISSIE Gallegos and CHRISSIE Goncalves.  Electronically signed by Kenneth Sanches on 3/5/2020 at 10:15 AM

## 2020-03-05 NOTE — DISCHARGE SUMMARY
Physician Discharge Summary     Patient ID:  Kirk Nunez  [id number]  89 y.o.  1955    Admit date: 3/3/2020    Discharge date and time: No discharge date for patient encounter. Admitting Physician: Silvino Henao MD     Admission Diagnoses: Carcinoma of upper-outer quadrant of female breast, right ([facility] [C50.411]  Carcinoma of upper-outer quadrant of right female breast, unspecified estrogen receptor status ([facility] Reiseñor 3    Discharge Diagnoses:   Patient Active Problem List   Diagnosis    [facility] sclerosis [facility]    Bilateral foot-drop    Heart failure ([facility]    Myocardial infarction, anteroseptal ([facility]    NSTEMI (non-ST elevated myocardial infarction) ([facility]    Carcinoma of upper-outer quadrant of female breast, right ([facility]    Carcinoma of upper-outer quadrant of right female breast [facility])       Hospital Course: Admit on 3/3/2020 for a right mastectomy. She progressed well post op and was discharged in stable condition. Consults: none    Significant Diagnostic Studies:   No results found. Discharge Exam:  GENERAL:  Laying in bed, awake, alert, cooperative, no apparent distress  LUNGS:  No increased work of breathing  CHEST:  Incision c/d/i with no swelling or fluctuance  CARDIOVASCULAR:  Regular rate   ABDOMEN:  Soft, no tenderness, non distended  EXTREMITIES:  Moves all 4 extremities    Discharge Medications:       Medication List      START taking these medications    oxyCODONE 5 MG immediate release tablet  Commonly known as:  ROXICODONE  Take 1 tablet by mouth every 4 hours as needed for Pain for up to 5 days.         CHANGE how you take these medications    albuterol (2.5 MG/3ML) 0.083% nebulizer solution  Commonly known as:  PROVENTIL  Take 3 mLs by nebulization every 4 hours (while awake)  What changed:  additional instructions     apixaban 5 MG Tabs tablet  Commonly known as:  Eliquis  1 tablet by Per G Tube route 2 times daily  What changed:  additional instructions     cloNIDine 0.2 MG tablet  Commonly known as:  CATAPRES  1 tablet by PEG Tube route every 6 hours as needed (WCKYZIEH>125 or CHJSQWUKX>887)  What changed:  additional instructions     FLUoxetine 20 MG capsule  Commonly known as:  PROZAC  1 capsule by PEG Tube route 2 times daily  What changed:  additional instructions     gabapentin 250 MG/5ML solution  Commonly known as:  NEURONTIN  6 mLs by PEG Tube route 3 times daily. What changed:  additional instructions     metoprolol tartrate 25 MG tablet  Commonly known as:  LOPRESSOR  1 tablet by PEG Tube route 2 times daily  What changed:  additional instructions     ticagrelor 90 MG Tabs tablet  Commonly known as:  BRILINTA  1 tablet by PEG Tube route 2 times daily  What changed:  additional instructions        CONTINUE taking these medications    acetaminophen 325 MG tablet  Commonly known as:  TYLENOL  2 tablets by PEG Tube route every 4 hours as needed for Pain     atorvastatin 80 MG tablet  Commonly known as:  LIPITOR  1 tablet by PEG Tube route nightly     Cranberry 500 MG Caps  1 capsule by PEG Tube route daily     diazePAM 2 MG tablet  Commonly known as:  VALIUM     fluticasone 50 MCG/ACT nasal spray  Commonly known as:  FLONASE     furosemide 20 MG tablet  Commonly known as:  LASIX  1 tablet by PEG Tube route daily     HYDROcodone-acetaminophen 5-325 MG per tablet  Commonly known as:  NORCO  Take 1 tablet by mouth every 6 hours as needed for Pain for up to 7 days. Take am dos if needed 03/03     levothyroxine 88 MCG tablet  Commonly known as:  SYNTHROID  1 tablet by PEG Tube route Daily     lisinopril 5 MG tablet  Commonly known as:  PRINIVIL;ZESTRIL  1 tablet by PEG Tube route daily     magnesium hydroxide 400 MG/5ML suspension  Commonly known as:  MILK OF MAGNESIA  30 mLs by Per G Tube route daily as needed for Constipation     nitroGLYCERIN 0.4 MG SL tablet  Commonly known as:  NITROSTAT  up to max of 3 total doses. per peg tube If no relief after 1 dose, call 911.

## 2020-03-08 ENCOUNTER — APPOINTMENT (OUTPATIENT)
Dept: CT IMAGING | Age: 65
DRG: 720 | End: 2020-03-08
Payer: MEDICAID

## 2020-03-08 ENCOUNTER — HOSPITAL ENCOUNTER (INPATIENT)
Age: 65
LOS: 3 days | Discharge: ANOTHER HOSPITAL | DRG: 720 | End: 2020-03-11
Attending: EMERGENCY MEDICINE | Admitting: FAMILY MEDICINE
Payer: MEDICAID

## 2020-03-08 ENCOUNTER — APPOINTMENT (OUTPATIENT)
Dept: GENERAL RADIOLOGY | Age: 65
DRG: 720 | End: 2020-03-08
Payer: MEDICAID

## 2020-03-08 PROBLEM — A41.9 SEPSIS (HCC): Status: ACTIVE | Noted: 2020-03-08

## 2020-03-08 LAB
ALBUMIN SERPL-MCNC: 3.3 G/DL (ref 3.5–5.2)
ALP BLD-CCNC: 124 U/L (ref 35–104)
ALT SERPL-CCNC: 37 U/L (ref 0–32)
ANION GAP SERPL CALCULATED.3IONS-SCNC: 14 MMOL/L (ref 7–16)
ANISOCYTOSIS: ABNORMAL
APTT: 32.8 SEC (ref 24.5–35.1)
AST SERPL-CCNC: 61 U/L (ref 0–31)
BACTERIA: ABNORMAL /HPF
BASOPHILS ABSOLUTE: 0 E9/L (ref 0–0.2)
BASOPHILS RELATIVE PERCENT: 0.3 % (ref 0–2)
BILIRUB SERPL-MCNC: 0.8 MG/DL (ref 0–1.2)
BILIRUBIN URINE: NEGATIVE
BLOOD, URINE: ABNORMAL
BUN BLDV-MCNC: 14 MG/DL (ref 8–23)
CALCIUM SERPL-MCNC: 8.9 MG/DL (ref 8.6–10.2)
CHLORIDE BLD-SCNC: 100 MMOL/L (ref 98–107)
CHP ED QC CHECK: YES
CLARITY: CLEAR
CO2: 22 MMOL/L (ref 22–29)
COLOR: YELLOW
CREAT SERPL-MCNC: 0.8 MG/DL (ref 0.5–1)
EKG ATRIAL RATE: 116 BPM
EKG P AXIS: 58 DEGREES
EKG P-R INTERVAL: 170 MS
EKG Q-T INTERVAL: 334 MS
EKG QRS DURATION: 66 MS
EKG QTC CALCULATION (BAZETT): 464 MS
EKG R AXIS: -30 DEGREES
EKG T AXIS: 15 DEGREES
EKG VENTRICULAR RATE: 116 BPM
EOSINOPHILS ABSOLUTE: 0 E9/L (ref 0.05–0.5)
EOSINOPHILS RELATIVE PERCENT: 0.1 % (ref 0–6)
GFR AFRICAN AMERICAN: >60
GFR NON-AFRICAN AMERICAN: >60 ML/MIN/1.73
GLUCOSE BLD-MCNC: 109 MG/DL (ref 74–99)
GLUCOSE BLD-MCNC: 112 MG/DL
GLUCOSE URINE: NEGATIVE MG/DL
HCT VFR BLD CALC: 33.4 % (ref 34–48)
HEMOGLOBIN: 10.3 G/DL (ref 11.5–15.5)
INFLUENZA A BY PCR: DETECTED
INFLUENZA B BY PCR: NOT DETECTED
INR BLD: 2
KETONES, URINE: NEGATIVE MG/DL
LACTIC ACID, SEPSIS: 1.3 MMOL/L (ref 0.5–1.9)
LEUKOCYTE ESTERASE, URINE: ABNORMAL
LYMPHOCYTES ABSOLUTE: 0.77 E9/L (ref 1.5–4)
LYMPHOCYTES RELATIVE PERCENT: 7 % (ref 20–42)
MCH RBC QN AUTO: 28.5 PG (ref 26–35)
MCHC RBC AUTO-ENTMCNC: 30.8 % (ref 32–34.5)
MCV RBC AUTO: 92.5 FL (ref 80–99.9)
METER GLUCOSE: 112 MG/DL (ref 74–99)
MONOCYTES ABSOLUTE: 0.11 E9/L (ref 0.1–0.95)
MONOCYTES RELATIVE PERCENT: 0.9 % (ref 2–12)
NEUTROPHILS ABSOLUTE: 10.12 E9/L (ref 1.8–7.3)
NEUTROPHILS RELATIVE PERCENT: 92.1 % (ref 43–80)
NITRITE, URINE: NEGATIVE
NUCLEATED RED BLOOD CELLS: 0.9 /100 WBC
PDW BLD-RTO: 18.6 FL (ref 11.5–15)
PH UA: 7 (ref 5–9)
PLATELET # BLD: 393 E9/L (ref 130–450)
PMV BLD AUTO: 10.6 FL (ref 7–12)
POIKILOCYTES: ABNORMAL
POLYCHROMASIA: ABNORMAL
POTASSIUM SERPL-SCNC: 4.7 MMOL/L (ref 3.5–5)
PROTEIN UA: NEGATIVE MG/DL
PROTHROMBIN TIME: 23.1 SEC (ref 9.3–12.4)
RBC # BLD: 3.61 E12/L (ref 3.5–5.5)
RBC UA: ABNORMAL /HPF (ref 0–2)
SODIUM BLD-SCNC: 136 MMOL/L (ref 132–146)
SPECIFIC GRAVITY UA: 1.01 (ref 1–1.03)
TARGET CELLS: ABNORMAL
TOTAL PROTEIN: 6.1 G/DL (ref 6.4–8.3)
TROPONIN: <0.01 NG/ML (ref 0–0.03)
UROBILINOGEN, URINE: 0.2 E.U./DL
WBC # BLD: 11 E9/L (ref 4.5–11.5)
WBC UA: ABNORMAL /HPF (ref 0–5)

## 2020-03-08 PROCEDURE — 87186 SC STD MICRODIL/AGAR DIL: CPT

## 2020-03-08 PROCEDURE — 71045 X-RAY EXAM CHEST 1 VIEW: CPT

## 2020-03-08 PROCEDURE — 80053 COMPREHEN METABOLIC PANEL: CPT

## 2020-03-08 PROCEDURE — 2580000003 HC RX 258: Performed by: EMERGENCY MEDICINE

## 2020-03-08 PROCEDURE — 85730 THROMBOPLASTIN TIME PARTIAL: CPT

## 2020-03-08 PROCEDURE — 93010 ELECTROCARDIOGRAM REPORT: CPT | Performed by: INTERNAL MEDICINE

## 2020-03-08 PROCEDURE — 2000000000 HC ICU R&B

## 2020-03-08 PROCEDURE — 2580000003 HC RX 258: Performed by: FAMILY MEDICINE

## 2020-03-08 PROCEDURE — 83605 ASSAY OF LACTIC ACID: CPT

## 2020-03-08 PROCEDURE — 6360000002 HC RX W HCPCS: Performed by: FAMILY MEDICINE

## 2020-03-08 PROCEDURE — 85610 PROTHROMBIN TIME: CPT

## 2020-03-08 PROCEDURE — 93005 ELECTROCARDIOGRAM TRACING: CPT | Performed by: EMERGENCY MEDICINE

## 2020-03-08 PROCEDURE — 96366 THER/PROPH/DIAG IV INF ADDON: CPT

## 2020-03-08 PROCEDURE — 94640 AIRWAY INHALATION TREATMENT: CPT

## 2020-03-08 PROCEDURE — 96368 THER/DIAG CONCURRENT INF: CPT

## 2020-03-08 PROCEDURE — 87040 BLOOD CULTURE FOR BACTERIA: CPT

## 2020-03-08 PROCEDURE — 6370000000 HC RX 637 (ALT 250 FOR IP): Performed by: FAMILY MEDICINE

## 2020-03-08 PROCEDURE — 84484 ASSAY OF TROPONIN QUANT: CPT

## 2020-03-08 PROCEDURE — 6370000000 HC RX 637 (ALT 250 FOR IP): Performed by: EMERGENCY MEDICINE

## 2020-03-08 PROCEDURE — 6370000000 HC RX 637 (ALT 250 FOR IP)

## 2020-03-08 PROCEDURE — 87502 INFLUENZA DNA AMP PROBE: CPT

## 2020-03-08 PROCEDURE — 81001 URINALYSIS AUTO W/SCOPE: CPT

## 2020-03-08 PROCEDURE — 94760 N-INVAS EAR/PLS OXIMETRY 1: CPT

## 2020-03-08 PROCEDURE — 96365 THER/PROPH/DIAG IV INF INIT: CPT

## 2020-03-08 PROCEDURE — 85025 COMPLETE CBC W/AUTO DIFF WBC: CPT

## 2020-03-08 PROCEDURE — 82962 GLUCOSE BLOOD TEST: CPT

## 2020-03-08 PROCEDURE — 99285 EMERGENCY DEPT VISIT HI MDM: CPT

## 2020-03-08 PROCEDURE — 74176 CT ABD & PELVIS W/O CONTRAST: CPT

## 2020-03-08 PROCEDURE — 87088 URINE BACTERIA CULTURE: CPT

## 2020-03-08 RX ORDER — SODIUM CHLORIDE 9 MG/ML
INJECTION, SOLUTION INTRAVENOUS CONTINUOUS
Status: ACTIVE | OUTPATIENT
Start: 2020-03-08 | End: 2020-03-09

## 2020-03-08 RX ORDER — SODIUM CHLORIDE 0.9 % (FLUSH) 0.9 %
10 SYRINGE (ML) INJECTION PRN
Status: DISCONTINUED | OUTPATIENT
Start: 2020-03-08 | End: 2020-03-09 | Stop reason: SDUPTHER

## 2020-03-08 RX ORDER — ACETAMINOPHEN 325 MG/1
650 TABLET ORAL ONCE
Status: COMPLETED | OUTPATIENT
Start: 2020-03-08 | End: 2020-03-08

## 2020-03-08 RX ORDER — 0.9 % SODIUM CHLORIDE 0.9 %
1000 INTRAVENOUS SOLUTION INTRAVENOUS ONCE
Status: COMPLETED | OUTPATIENT
Start: 2020-03-08 | End: 2020-03-08

## 2020-03-08 RX ORDER — OSELTAMIVIR PHOSPHATE 75 MG/1
75 CAPSULE ORAL 2 TIMES DAILY
Status: DISCONTINUED | OUTPATIENT
Start: 2020-03-08 | End: 2020-03-11 | Stop reason: HOSPADM

## 2020-03-08 RX ORDER — SODIUM CHLORIDE 0.9 % (FLUSH) 0.9 %
10 SYRINGE (ML) INJECTION EVERY 12 HOURS SCHEDULED
Status: DISCONTINUED | OUTPATIENT
Start: 2020-03-08 | End: 2020-03-09 | Stop reason: SDUPTHER

## 2020-03-08 RX ORDER — ACETAMINOPHEN 325 MG/1
TABLET ORAL
Status: COMPLETED
Start: 2020-03-08 | End: 2020-03-08

## 2020-03-08 RX ORDER — ALBUTEROL SULFATE 2.5 MG/3ML
2.5 SOLUTION RESPIRATORY (INHALATION) EVERY 4 HOURS PRN
Status: DISCONTINUED | OUTPATIENT
Start: 2020-03-08 | End: 2020-03-11 | Stop reason: HOSPADM

## 2020-03-08 RX ADMIN — DEXTROSE MONOHYDRATE 2000 MG: 50 INJECTION, SOLUTION INTRAVENOUS at 14:12

## 2020-03-08 RX ADMIN — ACETAMINOPHEN 650 MG: 325 TABLET ORAL at 22:21

## 2020-03-08 RX ADMIN — ACETAMINOPHEN 650 MG: 325 TABLET ORAL at 12:15

## 2020-03-08 RX ADMIN — CEFEPIME HYDROCHLORIDE 2 G: 2 INJECTION, POWDER, FOR SOLUTION INTRAVENOUS at 13:30

## 2020-03-08 RX ADMIN — SODIUM CHLORIDE 1000 ML: 9 INJECTION, SOLUTION INTRAVENOUS at 12:16

## 2020-03-08 RX ADMIN — IPRATROPIUM BROMIDE 0.5 MG: 0.5 SOLUTION RESPIRATORY (INHALATION) at 14:48

## 2020-03-08 RX ADMIN — OSELTAMIVIR PHOSPHATE 75 MG: 75 CAPSULE ORAL at 22:21

## 2020-03-08 RX ADMIN — OSELTAMIVIR PHOSPHATE 75 MG: 75 CAPSULE ORAL at 14:00

## 2020-03-08 RX ADMIN — SODIUM CHLORIDE: 9 INJECTION, SOLUTION INTRAVENOUS at 15:30

## 2020-03-08 RX ADMIN — SODIUM CHLORIDE 1000 ML: 9 INJECTION, SOLUTION INTRAVENOUS at 14:08

## 2020-03-08 ASSESSMENT — PAIN DESCRIPTION - FREQUENCY: FREQUENCY: CONTINUOUS

## 2020-03-08 ASSESSMENT — PAIN SCALES - GENERAL
PAINLEVEL_OUTOF10: 8
PAINLEVEL_OUTOF10: 8

## 2020-03-08 ASSESSMENT — PAIN DESCRIPTION - LOCATION: LOCATION: BREAST;CHEST

## 2020-03-08 ASSESSMENT — PAIN DESCRIPTION - ORIENTATION: ORIENTATION: RIGHT

## 2020-03-08 ASSESSMENT — PAIN DESCRIPTION - ONSET: ONSET: ON-GOING

## 2020-03-08 ASSESSMENT — PAIN DESCRIPTION - PAIN TYPE: TYPE: ACUTE PAIN

## 2020-03-08 ASSESSMENT — PAIN DESCRIPTION - DESCRIPTORS: DESCRIPTORS: CONSTANT;SORE

## 2020-03-08 NOTE — ED PROVIDER NOTES
HPI:  3/8/20,   Time: 11:58 AM EDT       Villa Rangel is a 60 y.o. female presenting to the ED For fever and chills starting 3 to 4 days ago. Symptoms came on suddenly. Patient reports the only symptom she has been having otherwise has been a nonproductive cough for the last 4 to 5 days. Does endorse some shortness of breath. She is also reporting some abdominal pain as well as nausea and diarrhea, nonbloody. T-max 102 according to nursing home where she is currently at. She recently had side mastectomy about 3 weeks ago with SANDRO drain and G-tube in place. Not currently receiving any chemotherapy or radiation. She denies any urinary symptoms. Does have catheter in place. Review of Systems:   Pertinent positives and negatives are stated within HPI, all other systems reviewed and are negative.          --------------------------------------------- PAST HISTORY ---------------------------------------------  Past Medical History:  has a past medical history of Anxiety, Breast cancer ([facility] CAD (coronary artery disease), Cystitis, Depression, [place] catheter status, History of blood transfusion, Hyperlipidemia, Hypertension, Hypothyroidism, MS (multiple sclerosis) ([facility] Neurogenic bladder, Sepsis ([facility] Total self-care deficit, Unspecified cerebral artery occlusion with cerebral infarction, and Wheelchair dependent. Past Surgical History:  has a past surgical history that includes Hysterectomy (2001); Dental surgery; Colon surgery (2001); Coronary angioplasty with stent (06/28/2019); Upper gastrointestinal endoscopy (N/A, 12/12/2019); Colonoscopy (N/A, 2/4/2020); and Mastectomy, modified radical (Right, 3/3/2020). Social History:  reports that she quit smoking about 20 years ago. Her smoking use included cigarettes. She started smoking about 25 years ago. She has a 5.00 pack-year smoking history.  She has never used smokeless tobacco. She reports that she does not drink alcohol or use Straw/Yellow    Clarity, UA Clear Clear    Glucose, Ur Negative Negative mg/dL    Bilirubin Urine Negative Negative    Ketones, Urine Negative Negative mg/dL    Specific Gravity, UA 1.015 1.005 - 1.030    Blood, Urine SMALL (A) Negative    pH, UA 7.0 5.0 - 9.0    Protein, UA Negative Negative mg/dL    Urobilinogen, Urine 0.2 <2.0 E.U./dL    Nitrite, Urine Negative Negative    Leukocyte Esterase, Urine MODERATE (A) Negative   Microscopic Urinalysis   Result Value Ref Range    WBC, UA 2-5 0 - 5 /HPF    RBC, UA 1-3 0 - 2 /HPF    Bacteria, UA FEW (A) None Seen /HPF   POCT Glucose   Result Value Ref Range    Glucose 112 mg/dL    QC OK? yes    POCT Glucose   Result Value Ref Range    Meter Glucose 112 (H) 74 - 99 mg/dL   EKG 12 Lead   Result Value Ref Range    Ventricular Rate 116 BPM    Atrial Rate 116 BPM    P-R Interval 170 ms    QRS Duration 66 ms    Q-T Interval 334 ms    QTc Calculation (Bazett) 464 ms    P Axis 58 degrees    R Axis -30 degrees    T Axis 15 degrees       RADIOLOGY:  Interpreted by Radiologist.  CT ABDOMEN PELVIS WO CONTRAST Additional Contrast? None   Final Result   Left hemicolon is distended with fecal content in the rectum is   distended to 8 cm with some ventral mural thickening. The finding   could indicate impaction or constipation, and after disimpaction,   evaluation of the anterior rectal wall may be beneficial to exclude   proctitis. Some increased density in the subcutaneous fat over the right hip   could represent early decubitus ulceration type change, and should   also be examined to confirm or exclude induration or inflammation. Otherwise unremarkable with incidental nonacute findings are listed   above. XR CHEST PORTABLE   Final Result   Patchy density in the left lower lung suggests early interstitial   pneumonia without consolidation or effusion. A small tubular structure overlying the right lower chest may be a   small caliber chest tube.  No associated pneumothorax or pleural fluid   collection is noted. EKG: This EKG is signed and interpreted by the EP. Time: 1202  Rate: 116  Rhythm: Sinus  Interpretation: Left axis deviation, low voltage, age-indeterminate inferior infarct, possible age-indeterminate anterolateral infarct  Comparison: stable as compared to patient's most recent EKG      ------------------------- NURSING NOTES AND VITALS REVIEWED ---------------------------   The nursing notes within the ED encounter and vital signs as below have been reviewed by myself. BP 91/62   Pulse 76   Temp 99.2 °F (37.3 °C) (Oral)   Resp 22   Ht 5' 6\" (1.676 m)   Wt 183 lb (83 kg)   SpO2 95%   BMI 29.54 kg/m²   Oxygen Saturation Interpretation: Abnormal placed on 2 L     The patients available past medical records and past encounters were reviewed.         ------------------------------ ED COURSE/MEDICAL DECISION MAKING----------------------  Medications   sodium chloride flush 0.9 % injection 10 mL (has no administration in time range)   sodium chloride flush 0.9 % injection 10 mL (has no administration in time range)   0.9 % sodium chloride infusion ( Intravenous New Bag 3/8/20 1530)   albuterol (PROVENTIL) nebulizer solution 2.5 mg (has no administration in time range)   cefepime (MAXIPIME) 2 g IVPB extended (mini-bag) (2 g Intravenous New Bag 3/8/20 1330)   oseltamivir (TAMIFLU) capsule 75 mg (75 mg Oral Given 3/8/20 1400)   acetaminophen (TYLENOL) tablet 650 mg (650 mg Oral Given 3/8/20 1215)   0.9 % sodium chloride bolus (0 mLs Intravenous Stopped 3/8/20 1408)   ipratropium (ATROVENT) 0.02 % nebulizer solution 0.5 mg (0.5 mg Nebulization Given 3/8/20 1448)   0.9 % sodium chloride bolus (0 mLs Intravenous Stopped 3/8/20 5153)   vancomycin (VANCOCIN) 2,000 mg in dextrose 5 % 500 mL IVPB (2,000 mg Intravenous New Bag 3/8/20 1412)         Medical Decision Makin-year-old female with recent right mastectomy, SANDRO drain in place, presenting with fever of 102 for the last 3 to 4 days. Has had some associated cough and abdominal pain and diarrhea associated with this. Temp 102.1 here, mildly hypotensive at 88/61 and slightly tachycardic at 118. Pulse ox dipping into the high 80s, started on 2L oxygen. WBCs 11.0. Chest x-ray showing patchy density in the left lower lung suggesting early interstitial pneumonia without consolidation or effusion, started on Vanco and cefepime. Lactate normal 1.3. UA with moderate leuk esterase. Urine micro with few bacteria. 1330: Pt reports improvement in symptoms after liter bolus. Rapid flu positive for flu A. Started on Tamiflu.   1520: Spoke with Dr. Osvaldo Ly regarding admission, requested that we discussed with critical care team as patient previously had developed sepsis after UTI and was intubated. 1615: Spoke with Dr. Anamaria Rowland admission to the ICU. Evaluated patient while in the ED and accepted her into the ICU. This patient's ED course included: a personal history and physicial examination, re-evaluation prior to disposition, multiple bedside re-evaluations, IV medications, cardiac monitoring, continuous pulse oximetry and complex medical decision making and emergency management    This patient has remained hemodynamically stable during their ED course. Re-Evaluations:             Re-evaluation. Patients symptoms show no change    Re-examination  3/8/20   1330          Vital Signs:   Vitals:    03/08/20 1448 03/08/20 1454 03/08/20 1534 03/08/20 1603   BP:  97/64 90/60 91/62   Pulse:  55 54 76   Resp: 20 21 22 22   Temp:       TempSrc:       SpO2: 98% 95% 96% 95%   Weight:       Height:         Card/Pulm:  Rhythm: normal rate. Heart Sounds: no murmurs, gallops, or rubs. clear to auscultation, no wheezes or rales and unlabored breathing. Capillary Refill: normal.  Radial Pulse:  present 2+. Skin:  Dry and Warm.         Consultations:             Family Medicine-  Jeffry Mayorga    Critical Care:  Dr. Josefa Pereyra patient while in the ED and accepted her into the ICU. Counseling: The emergency provider has spoken with the patient and discussed todays results, in addition to providing specific details for the plan of care and counseling regarding the diagnosis and prognosis. Questions are answered at this time and they are agreeable with the plan.       --------------------------------- IMPRESSION AND DISPOSITION ---------------------------------    IMPRESSION  1. Hypoxia    2. Pneumonia due to organism    3. Septicemia ([facility]        DISPOSITION  Disposition: Admit to ICU  Patient condition is stable    NOTE: This report was transcribed using voice recognition software.  Every effort was made to ensure accuracy; however, inadvertent computerized transcription errors may be present       Neno Veras MD  03/08/20 1940

## 2020-03-08 NOTE — ED NOTES
Bed: 21  Expected date:   Expected time:   Means of arrival:   Comments:  MATEO Eduardo RN  03/08/20 1141

## 2020-03-09 ENCOUNTER — APPOINTMENT (OUTPATIENT)
Dept: GENERAL RADIOLOGY | Age: 65
DRG: 720 | End: 2020-03-09
Payer: MEDICAID

## 2020-03-09 LAB
ALBUMIN SERPL-MCNC: 2.9 G/DL (ref 3.5–5.2)
ALP BLD-CCNC: 96 U/L (ref 35–104)
ALT SERPL-CCNC: 30 U/L (ref 0–32)
ANION GAP SERPL CALCULATED.3IONS-SCNC: 12 MMOL/L (ref 7–16)
ANISOCYTOSIS: ABNORMAL
AST SERPL-CCNC: 35 U/L (ref 0–31)
BASOPHILS ABSOLUTE: 0.04 E9/L (ref 0–0.2)
BASOPHILS RELATIVE PERCENT: 0.9 % (ref 0–2)
BILIRUB SERPL-MCNC: 0.5 MG/DL (ref 0–1.2)
BUN BLDV-MCNC: 11 MG/DL (ref 8–23)
BURR CELLS: ABNORMAL
CALCIUM SERPL-MCNC: 7.6 MG/DL (ref 8.6–10.2)
CHLORIDE BLD-SCNC: 111 MMOL/L (ref 98–107)
CO2: 20 MMOL/L (ref 22–29)
CREAT SERPL-MCNC: 0.7 MG/DL (ref 0.5–1)
EOSINOPHILS ABSOLUTE: 0 E9/L (ref 0.05–0.5)
EOSINOPHILS RELATIVE PERCENT: 0 % (ref 0–6)
FOLATE: >20 NG/ML (ref 4.8–24.2)
GFR AFRICAN AMERICAN: >60
GFR NON-AFRICAN AMERICAN: >60 ML/MIN/1.73
GLUCOSE BLD-MCNC: 92 MG/DL (ref 74–99)
HCT VFR BLD CALC: 31.6 % (ref 34–48)
HEMOGLOBIN: 9.1 G/DL (ref 11.5–15.5)
LYMPHOCYTES ABSOLUTE: 0.43 E9/L (ref 1.5–4)
LYMPHOCYTES RELATIVE PERCENT: 8.7 % (ref 20–42)
MAGNESIUM: 1.9 MG/DL (ref 1.6–2.6)
MCH RBC QN AUTO: 28.8 PG (ref 26–35)
MCHC RBC AUTO-ENTMCNC: 28.8 % (ref 32–34.5)
MCV RBC AUTO: 100 FL (ref 80–99.9)
MONOCYTES ABSOLUTE: 0.34 E9/L (ref 0.1–0.95)
MONOCYTES RELATIVE PERCENT: 7 % (ref 2–12)
NEUTROPHILS ABSOLUTE: 4.03 E9/L (ref 1.8–7.3)
NEUTROPHILS RELATIVE PERCENT: 83.5 % (ref 43–80)
OVALOCYTES: ABNORMAL
PDW BLD-RTO: 18.3 FL (ref 11.5–15)
PLATELET # BLD: 266 E9/L (ref 130–450)
PMV BLD AUTO: 10.5 FL (ref 7–12)
POIKILOCYTES: ABNORMAL
POTASSIUM REFLEX MAGNESIUM: 3.3 MMOL/L (ref 3.5–5)
PROCALCITONIN: 0.18 NG/ML (ref 0–0.08)
RBC # BLD: 3.16 E12/L (ref 3.5–5.5)
SODIUM BLD-SCNC: 143 MMOL/L (ref 132–146)
TOTAL PROTEIN: 4.9 G/DL (ref 6.4–8.3)
VITAMIN B-12: 415 PG/ML (ref 211–946)
WBC # BLD: 4.8 E9/L (ref 4.5–11.5)

## 2020-03-09 PROCEDURE — 51702 INSERT TEMP BLADDER CATH: CPT

## 2020-03-09 PROCEDURE — 84145 PROCALCITONIN (PCT): CPT

## 2020-03-09 PROCEDURE — 2580000003 HC RX 258: Performed by: INTERNAL MEDICINE

## 2020-03-09 PROCEDURE — 87205 SMEAR GRAM STAIN: CPT

## 2020-03-09 PROCEDURE — 2700000000 HC OXYGEN THERAPY PER DAY

## 2020-03-09 PROCEDURE — 6360000002 HC RX W HCPCS: Performed by: INTERNAL MEDICINE

## 2020-03-09 PROCEDURE — 94664 DEMO&/EVAL PT USE INHALER: CPT

## 2020-03-09 PROCEDURE — 71045 X-RAY EXAM CHEST 1 VIEW: CPT

## 2020-03-09 PROCEDURE — 36415 COLL VENOUS BLD VENIPUNCTURE: CPT

## 2020-03-09 PROCEDURE — 2060000000 HC ICU INTERMEDIATE R&B

## 2020-03-09 PROCEDURE — 94640 AIRWAY INHALATION TREATMENT: CPT

## 2020-03-09 PROCEDURE — 6370000000 HC RX 637 (ALT 250 FOR IP): Performed by: INTERNAL MEDICINE

## 2020-03-09 PROCEDURE — 82607 VITAMIN B-12: CPT

## 2020-03-09 PROCEDURE — 85025 COMPLETE CBC W/AUTO DIFF WBC: CPT

## 2020-03-09 PROCEDURE — 83735 ASSAY OF MAGNESIUM: CPT

## 2020-03-09 PROCEDURE — 82746 ASSAY OF FOLIC ACID SERUM: CPT

## 2020-03-09 PROCEDURE — 6360000002 HC RX W HCPCS: Performed by: STUDENT IN AN ORGANIZED HEALTH CARE EDUCATION/TRAINING PROGRAM

## 2020-03-09 PROCEDURE — 80053 COMPREHEN METABOLIC PANEL: CPT

## 2020-03-09 PROCEDURE — 2580000003 HC RX 258: Performed by: STUDENT IN AN ORGANIZED HEALTH CARE EDUCATION/TRAINING PROGRAM

## 2020-03-09 PROCEDURE — 6370000000 HC RX 637 (ALT 250 FOR IP): Performed by: STUDENT IN AN ORGANIZED HEALTH CARE EDUCATION/TRAINING PROGRAM

## 2020-03-09 PROCEDURE — 99255 IP/OBS CONSLTJ NEW/EST HI 80: CPT | Performed by: INTERNAL MEDICINE

## 2020-03-09 RX ORDER — POT CHLORIDE/POT BICARB/CIT AC 25 MEQ
25 TABLET, EFFERVESCENT ORAL DAILY
Status: DISCONTINUED | OUTPATIENT
Start: 2020-03-09 | End: 2020-03-09 | Stop reason: CLARIF

## 2020-03-09 RX ORDER — SODIUM CHLORIDE 0.9 % (FLUSH) 0.9 %
10 SYRINGE (ML) INJECTION PRN
Status: DISCONTINUED | OUTPATIENT
Start: 2020-03-09 | End: 2020-03-11 | Stop reason: HOSPADM

## 2020-03-09 RX ORDER — DIAZEPAM 2 MG/1
2 TABLET ORAL EVERY 12 HOURS
Status: DISCONTINUED | OUTPATIENT
Start: 2020-03-09 | End: 2020-03-11 | Stop reason: HOSPADM

## 2020-03-09 RX ORDER — FLUOXETINE HYDROCHLORIDE 20 MG/1
20 CAPSULE ORAL 2 TIMES DAILY
Status: DISCONTINUED | OUTPATIENT
Start: 2020-03-09 | End: 2020-03-11 | Stop reason: HOSPADM

## 2020-03-09 RX ORDER — GUAIFENESIN AND DEXTROMETHORPHAN HYDROBROMIDE 100; 10 MG/5ML; MG/5ML
5 SOLUTION ORAL EVERY 4 HOURS PRN
Status: ON HOLD | COMMUNITY
End: 2020-06-12 | Stop reason: CLARIF

## 2020-03-09 RX ORDER — FUROSEMIDE 20 MG/1
20 TABLET ORAL DAILY
Status: DISCONTINUED | OUTPATIENT
Start: 2020-03-09 | End: 2020-03-11 | Stop reason: HOSPADM

## 2020-03-09 RX ORDER — ATORVASTATIN CALCIUM 80 MG/1
80 TABLET, FILM COATED ORAL NIGHTLY
Status: DISCONTINUED | OUTPATIENT
Start: 2020-03-09 | End: 2020-03-11 | Stop reason: HOSPADM

## 2020-03-09 RX ORDER — POLYETHYLENE GLYCOL 3350 17 G/17G
17 POWDER, FOR SOLUTION ORAL DAILY PRN
Status: DISCONTINUED | OUTPATIENT
Start: 2020-03-09 | End: 2020-03-11 | Stop reason: HOSPADM

## 2020-03-09 RX ORDER — POTASSIUM CHLORIDE 7.45 MG/ML
10 INJECTION INTRAVENOUS
Status: COMPLETED | OUTPATIENT
Start: 2020-03-09 | End: 2020-03-09

## 2020-03-09 RX ORDER — PROMETHAZINE HYDROCHLORIDE 25 MG/1
12.5 TABLET ORAL EVERY 6 HOURS PRN
Status: DISCONTINUED | OUTPATIENT
Start: 2020-03-09 | End: 2020-03-11 | Stop reason: HOSPADM

## 2020-03-09 RX ORDER — ONDANSETRON 2 MG/ML
4 INJECTION INTRAMUSCULAR; INTRAVENOUS EVERY 6 HOURS PRN
Status: DISCONTINUED | OUTPATIENT
Start: 2020-03-09 | End: 2020-03-11 | Stop reason: HOSPADM

## 2020-03-09 RX ORDER — SODIUM CHLORIDE 0.9 % (FLUSH) 0.9 %
10 SYRINGE (ML) INJECTION EVERY 12 HOURS SCHEDULED
Status: DISCONTINUED | OUTPATIENT
Start: 2020-03-09 | End: 2020-03-11 | Stop reason: HOSPADM

## 2020-03-09 RX ORDER — GABAPENTIN 250 MG/5ML
300 SOLUTION ORAL 3 TIMES DAILY
Status: DISCONTINUED | OUTPATIENT
Start: 2020-03-09 | End: 2020-03-11 | Stop reason: HOSPADM

## 2020-03-09 RX ORDER — ALBUTEROL SULFATE 2.5 MG/3ML
2.5 SOLUTION RESPIRATORY (INHALATION)
Status: DISCONTINUED | OUTPATIENT
Start: 2020-03-09 | End: 2020-03-11 | Stop reason: HOSPADM

## 2020-03-09 RX ORDER — LISINOPRIL 5 MG/1
5 TABLET ORAL DAILY
Status: DISCONTINUED | OUTPATIENT
Start: 2020-03-09 | End: 2020-03-11 | Stop reason: HOSPADM

## 2020-03-09 RX ORDER — LEVOTHYROXINE SODIUM 88 UG/1
88 TABLET ORAL DAILY
Status: DISCONTINUED | OUTPATIENT
Start: 2020-03-09 | End: 2020-03-11 | Stop reason: HOSPADM

## 2020-03-09 RX ORDER — ACETAMINOPHEN 325 MG/1
650 TABLET ORAL EVERY 6 HOURS PRN
Status: DISCONTINUED | OUTPATIENT
Start: 2020-03-09 | End: 2020-03-11 | Stop reason: HOSPADM

## 2020-03-09 RX ORDER — HYDROCODONE BITARTRATE AND ACETAMINOPHEN 5; 325 MG/1; MG/1
1 TABLET ORAL EVERY 6 HOURS PRN
Status: DISCONTINUED | OUTPATIENT
Start: 2020-03-09 | End: 2020-03-11 | Stop reason: HOSPADM

## 2020-03-09 RX ORDER — ACETAMINOPHEN 650 MG/1
650 SUPPOSITORY RECTAL EVERY 6 HOURS PRN
Status: DISCONTINUED | OUTPATIENT
Start: 2020-03-09 | End: 2020-03-11 | Stop reason: HOSPADM

## 2020-03-09 RX ORDER — POTASSIUM CHLORIDE 20MEQ/15ML
20 LIQUID (ML) ORAL 2 TIMES DAILY
Status: ON HOLD | COMMUNITY
End: 2020-03-11 | Stop reason: HOSPADM

## 2020-03-09 RX ADMIN — HYDROCODONE BITARTRATE AND ACETAMINOPHEN 1 TABLET: 5; 325 TABLET ORAL at 09:23

## 2020-03-09 RX ADMIN — TICAGRELOR 90 MG: 90 TABLET ORAL at 22:46

## 2020-03-09 RX ADMIN — ATORVASTATIN CALCIUM 80 MG: 80 TABLET, FILM COATED ORAL at 22:10

## 2020-03-09 RX ADMIN — GABAPENTIN 300 MG: 250 SOLUTION ORAL at 14:25

## 2020-03-09 RX ADMIN — MEROPENEM 1 G: 1 INJECTION, POWDER, FOR SOLUTION INTRAVENOUS at 05:18

## 2020-03-09 RX ADMIN — APIXABAN 5 MG: 5 TABLET, FILM COATED ORAL at 09:24

## 2020-03-09 RX ADMIN — FLUOXETINE HYDROCHLORIDE 20 MG: 20 CAPSULE ORAL at 22:10

## 2020-03-09 RX ADMIN — LISINOPRIL 5 MG: 5 TABLET ORAL at 09:24

## 2020-03-09 RX ADMIN — ALBUTEROL SULFATE 2.5 MG: 2.5 SOLUTION RESPIRATORY (INHALATION) at 08:00

## 2020-03-09 RX ADMIN — LEVOTHYROXINE SODIUM 88 MCG: 0.09 TABLET ORAL at 07:22

## 2020-03-09 RX ADMIN — OSELTAMIVIR PHOSPHATE 75 MG: 75 CAPSULE ORAL at 09:27

## 2020-03-09 RX ADMIN — CEFEPIME 2 G: 2 INJECTION, POWDER, FOR SOLUTION INTRAVENOUS at 09:30

## 2020-03-09 RX ADMIN — CEFEPIME 2 G: 2 INJECTION, POWDER, FOR SOLUTION INTRAVENOUS at 19:01

## 2020-03-09 RX ADMIN — POTASSIUM CHLORIDE 10 MEQ: 7.46 INJECTION, SOLUTION INTRAVENOUS at 12:00

## 2020-03-09 RX ADMIN — TICAGRELOR 90 MG: 90 TABLET ORAL at 09:24

## 2020-03-09 RX ADMIN — PSYLLIUM HUSK 1 PACKET: 3.4 GRANULE ORAL at 09:24

## 2020-03-09 RX ADMIN — FLUOXETINE HYDROCHLORIDE 20 MG: 20 CAPSULE ORAL at 09:24

## 2020-03-09 RX ADMIN — VANCOMYCIN HYDROCHLORIDE 1000 MG: 1 INJECTION, POWDER, LYOPHILIZED, FOR SOLUTION INTRAVENOUS at 19:02

## 2020-03-09 RX ADMIN — SODIUM CHLORIDE: 9 INJECTION, SOLUTION INTRAVENOUS at 11:30

## 2020-03-09 RX ADMIN — SODIUM CHLORIDE: 9 INJECTION, SOLUTION INTRAVENOUS at 23:59

## 2020-03-09 RX ADMIN — GABAPENTIN 300 MG: 250 SOLUTION ORAL at 22:48

## 2020-03-09 RX ADMIN — APIXABAN 5 MG: 5 TABLET, FILM COATED ORAL at 22:46

## 2020-03-09 RX ADMIN — VANCOMYCIN HYDROCHLORIDE 1000 MG: 1 INJECTION, POWDER, LYOPHILIZED, FOR SOLUTION INTRAVENOUS at 07:38

## 2020-03-09 RX ADMIN — METOPROLOL TARTRATE 25 MG: 25 TABLET, FILM COATED ORAL at 09:23

## 2020-03-09 RX ADMIN — POTASSIUM CHLORIDE 10 MEQ: 7.46 INJECTION, SOLUTION INTRAVENOUS at 10:55

## 2020-03-09 RX ADMIN — Medication 10 ML: at 09:24

## 2020-03-09 RX ADMIN — OSELTAMIVIR PHOSPHATE 75 MG: 75 CAPSULE ORAL at 22:10

## 2020-03-09 RX ADMIN — ALBUTEROL SULFATE 2.5 MG: 2.5 SOLUTION RESPIRATORY (INHALATION) at 20:53

## 2020-03-09 RX ADMIN — POTASSIUM BICARBONATE 20 MEQ: 782 TABLET, EFFERVESCENT ORAL at 11:00

## 2020-03-09 RX ADMIN — GABAPENTIN 300 MG: 250 SOLUTION ORAL at 10:57

## 2020-03-09 RX ADMIN — ALBUTEROL SULFATE 2.5 MG: 2.5 SOLUTION RESPIRATORY (INHALATION) at 11:40

## 2020-03-09 ASSESSMENT — PAIN SCALES - GENERAL
PAINLEVEL_OUTOF10: 9
PAINLEVEL_OUTOF10: 5
PAINLEVEL_OUTOF10: 2
PAINLEVEL_OUTOF10: 0
PAINLEVEL_OUTOF10: 0

## 2020-03-09 ASSESSMENT — PAIN DESCRIPTION - PAIN TYPE: TYPE: ACUTE PAIN

## 2020-03-09 ASSESSMENT — PAIN DESCRIPTION - FREQUENCY: FREQUENCY: CONTINUOUS

## 2020-03-09 ASSESSMENT — PAIN DESCRIPTION - LOCATION: LOCATION: ABDOMEN

## 2020-03-09 ASSESSMENT — PAIN DESCRIPTION - DESCRIPTORS: DESCRIPTORS: ACHING

## 2020-03-09 NOTE — CONSULTS
GENERAL SURGERY  CONSULT NOTE  3/9/2020    Physician Consulted: Dr. Angelic Gonzales  Reason for Consult: Recent mastectomy   Referring Physician: Dr. Jacquelyn SAEZ  Papa Fowler is a 60 y.o. female who presents for evaluation of recent mastectomy. Patient underwent mastectomy on 3/3 for infiltrating ductal carcinoma and mucinous carcinoma. She states her pain has been up but controlled with pain medications. She states she has not been feeling well and was admitted to the ICU for Flu A positive. She is on eliquis, she is unsure why. Past Medical History:   Diagnosis Date    Anxiety     Breast cancer ([facility] 02/2020    right    CAD (coronary artery disease)     Cystitis     Depression     Elizondo catheter status     History of blood transfusion     Hyperlipidemia     Hypertension 08/27/2018    Hypothyroidism     MS (multiple sclerosis) ([facility]     wheelchair bound    Neurogenic bladder 08/09/2018    Sepsis ([facility]     uti hospitalized 11-12/2019    Total self-care deficit     Unspecified cerebral artery occlusion with cerebral infarction 2011    left her incontinent    Wheelchair dependent        Past Surgical History:   Procedure Laterality Date    COLON SURGERY  2001    exp lap, lysis of adhesions, release of SBO. [facility]. Dr. Diandra Jimenez 2/4/2020    COLONOSCOPY DIAGNOSTIC performed by Maritza Nixon MD at [place] [address]  06/28/2019    Dr. Gomez Bonds- Rt Wrist- 3.0/300mm Resolute-RCA, 2.75/22mm-Circumflex,     DENTAL SURGERY      all removed    HYSTERECTOMY  2001    [facility].  Dr. Charley Wray, MODIFIED RADICAL Right 3/3/2020    RIGHT BREAST MASTECTOMY WITH SENTINEL NODE DISSECTION WITH BLUE DYE performed by Maritza Nixon MD at [facility] N/A 12/12/2019    EGD PEG INSERTION performed by Mraitza Nixon MD at [address] [place] [address]       Medications Prior to Admission:    Prior to Admission medications    Medication Sig Start route daily Indications: oral one tbsp in 8 oz of liquid 12/16/19  Yes Neftali Mendez MD   Cranberry 500 MG CAPS 1 capsule by PEG Tube route daily 12/16/19  Yes Neftali Mendez MD   potassium bicarbonate (K-LYTE) 25 MEQ disintegrating tablet 1 tablet by PEG Tube route 2 times daily 12/16/19  Yes Neftali Mendez MD   ticagrelor (BRILINTA) 90 MG TABS tablet 1 tablet by PEG Tube route 2 times daily  Patient taking differently: 90 mg by PEG Tube route 2 times daily 2 day hold preop per facility 12/16/19  Yes Neftali Mendez MD   levothyroxine (SYNTHROID) 88 MCG tablet 1 tablet by PEG Tube route Daily 12/16/19  Yes Neftali Mendez MD   fluticasone (FLONASE) 50 MCG/ACT nasal spray 1 spray by Each Nare route daily   Yes Cleopatra Vance MD   oxyCODONE (ROXICODONE) 5 MG immediate release tablet Take 1 tablet by mouth every 4 hours as needed for Pain for up to 5 days. 3/5/20 3/10/20  Jermaine Mac MD   HYDROcodone-acetaminophen (NORCO) 5-325 MG per tablet Take 1 tablet by mouth every 6 hours as needed for Pain for up to 7 days. Take am dos if needed 03/03 3/3/20 3/10/20  Jose Lizama,    nitroGLYCERIN (NITROSTAT) 0.4 MG SL tablet up to max of 3 total doses. per peg tube If no relief after 1 dose, call 911. 12/16/19   Neftali Mendez MD   cloNIDine (CATAPRES) 0.2 MG tablet 1 tablet by PEG Tube route every 6 hours as needed (RSCWZGJQ>323 or DKHTUOHUK>615)  Patient taking differently: 0.1 mg by PEG Tube route every 6 hours as needed (EGEAQUYC>637 or OYSWCBEKD>815)  61/11/82   Neftali Mendez MD   magnesium hydroxide (MILK OF MAGNESIA) 400 MG/5ML suspension 30 mLs by Per G Tube route daily as needed for Constipation  Patient taking differently: 30 mLs by Per G Tube route daily as needed for Constipation If no BM in 48 hours 12/16/19   Neftali Mendez MD       Allergies   Allergen Reactions    Pcn [Penicillins] Anaphylaxis    Watermelon [Citrullus Vulgaris]        Family History   Problem Relation Age of Onset    Cancer Mother     Diabetes Father     Heart Disease Father        Social History     Tobacco Use    Smoking status: Former Smoker     Packs/day: 1.00     Years: 5.00     Pack years: 5.00     Types: Cigarettes     Start date:      Last attempt to quit: 2000     Years since quittin.2    Smokeless tobacco: Never Used   Substance Use Topics    Alcohol use: No    Drug use: No         Review of Systems: All pertinent ROS reviewed in HPI, all other ROS otherwise negative. PHYSICAL EXAM:    Vitals:    20 0500   BP: (!) 110/59   Pulse: 63   Resp: 18   Temp:    SpO2: 100%       General Appearance:  awake, alert, oriented, in no acute distress  Skin:  Skin color, texture, turgor normal. No rashes or lesions. Head/face:  NCAT  Breast: R breast mastectomy site c/d/i with covering steri strips. SANDRO drain in place with serosanguinous. Eyes:  No gross abnormalities. Lungs:  No increased work of breathing on 3L  Heart:  RR  Abdomen:  Soft, non-tender, non-distended, PEG in place   LABS:  CBC  Recent Labs     20  1216   WBC 11.0   HGB 10.3*   HCT 33.4*        BMP  Recent Labs     20  1216      K 4.7      CO2 22   BUN 14   CREATININE 0.8   CALCIUM 8.9     Liver Function  Recent Labs     20  1216   BILITOT 0.8   AST 61*   ALT 37*   ALKPHOS 124*   PROT 6.1*   LABALBU 3.3*     No results for input(s): LACTATE in the last 72 hours.   Recent Labs     20  1216   INR 2.0       RADIOLOGY  Ct Abdomen Pelvis Wo Contrast Additional Contrast? None    Result Date: 3/8/2020  Patient MRN:  [id number] : 1955 Age: 60 years Gender: Female Order Date:  3/8/2020 12:30 PM EXAM: CT ABDOMEN PELVIS WO CONTRAST NUMBER OF IMAGES \ views:  397 INDICATION: Abdominal pain, diarrhea, fever, dizziness, chills COMPARISON: Abdominal radiographs 2019 and 2019, CT abdomen pelvis with contrast 3628 TECHNIQUE: Helical imaging was extended from the lower chest to the lower pelvis without contrast, and axial images were supplemented with sagittal and coronal MPR images. Low-dose CT  acquisition technique included one of following options; 1 . Automated exposure control, 2. Adjustment of MA and or KV according to patient's size or 3. Use of iterative reconstruction. FINDINGS: CHEST: Lower thoracic images show dense coronary calcification and/or cardiac stents without acute cardiac decompensation. There is some thickening of the infrahilar airways bilaterally, suggesting a degree of bronchiectasis. There is very minimal density along the pleural surface in the posterior sulcus on the left, but no consolidation or effusion. LIVER: The liver is uniform in parenchymal density, and no focal lesions are identified. The stable right posterior lobe subcapsular hemangioma measures 2.7 cm diameter, and is uncomplicated. Smaller subcapsular and subdiaphragmatic similar findings are noted in the right lobe. These are also unchanged. GALLBLADDER AND BILIARY TREE: The gallbladder is normal in appearance, and there is no biliary ductal ectasia. SPLEEN:The spleen is not enlarged, and shows no focal pathology. ADRENALS:  The adrenals are normal in appearance bilaterally. PANCREAS:The pancreas shows no evidence of acute inflammation or mass lesions. KIDNEYS/BLADDER: The kidneys show limited detail without contrast administration, but there is no evidence of collecting system calcification or perinephric inflammatory change, and no evidence of hydronephrosis. Small cortical cysts are noted along the lateral left kidney. Ureters are similar in course and caliber, and the bladder is fractionally depicted, since it is decompressed with a Elizondo catheter. APPENDIX:  Not acutely inflamed BOWEL:  There is no evidence of inflammation, obstruction, or perforation of enteric structures. The rectum is distended with fecal content to a diameter of 8 cm, which may indicate a degree of inspissation or impaction.  On coronal images, the ventral mural contours of the rectum are slightly thickened without other acute findings. The more proximal colon shows moderate fecal distention to the level of the proximal transverse colon. Small bowel is unremarkable. There is a PEG tube in the stomach. PELVIS:  There is no pelvic adenopathy or dependent free pelvic fluid. Terald New LYMPHATICS:There is no mesenteric or retroperitoneal adenopathy. VASCULAR: There is no evidence of acute vascular pathology involving mesenteric or retroperitoneal great vessels. SKELETAL: Disc space narrowing at the L3-4 level is documented. There is soft tissue density in the subcutaneous fat overlying the hips, with some confluence on the right that could represent a fluid collection-is there any evidence of decubitus ulcers? This is a new or progressive finding. Left hemicolon is distended with fecal content in the rectum is distended to 8 cm with some ventral mural thickening. The finding could indicate impaction or constipation, and after disimpaction, evaluation of the anterior rectal wall may be beneficial to exclude proctitis. Some increased density in the subcutaneous fat over the right hip could represent early decubitus ulceration type change, and should also be examined to confirm or exclude induration or inflammation. Otherwise unremarkable with incidental nonacute findings are listed above. Xr Chest Portable    Result Date: 3/8/2020  Patient MRN: [id number] : 1955 Age:  60 years Gender: Female Order Date: 3/8/2020 12:15 PM Exam: XR CHEST PORTABLE Number of Images: 1 view Indication:  Sepsis Comparison: Prior anterior upright chest studies 2019 and 2019 Findings: The lungs are symmetrically expanded, and show patchy interstitial density in the left lower lung, retrocardiac region without obscuring the left hemidiaphragm, which makes significant consolidation or pleural effusion unlikely. . There appears to be a small chest tube in the right lower chest, the tip likely in the posterior sulcus. Cardiovascular shadows show tortuosity and intimal calcification of the thoracic aorta, but otherwise are normal in appearance. There is no evidence of cardiac enlargement or decompensation. Skeletal structures show no evidence of acute pathology. Mild dextroscoliotic curvature of the thoracic spine and degenerative changes of the right shoulder and spine are noted. Overlying EKG leads are present. Patchy density in the left lower lung suggests early interstitial pneumonia without consolidation or effusion. A small tubular structure overlying the right lower chest may be a small caliber chest tube. No associated pneumothorax or pleural fluid collection is noted.         ASSESSMENT:  60 y.o. female with recent mastectomy on 3/3 with admission for Flu positive    PLAN:  Plan per primary  Incision appears well healed  SANDRO drain in place  Will d/w further pathology recommendations with patient once medically stable and discharged from hospital    Will d/w Dr. Kristie Rodriguez     Electronically signed by Nicky Sun MD on 3/9/20 at 5:18 AM EDT

## 2020-03-09 NOTE — CARE COORDINATION
Met with patient at the bedside to discuss transition of care at discharge. She came to us from [facility]; patient states she has been there for @ 11 months now. Her plan is to return to [facility] at discharge. Per Shira Cook patient is a bedhold. Patient has a hx MS and is mostly wc bound. Patient has 3 sons and 2 brothers. CM/SW will continue to follow for discharge planning.

## 2020-03-09 NOTE — H&P
[address]                  [address] [facility],  [facility]                              HISTORY AND PHYSICAL    PATIENT NAME: Sue Lu                  :        1955  MED REC NO:   [id number]                            ROOM:       [id number]  ACCOUNT NO:   [de-identified]                           ADMIT DATE: 2020  PROVIDER:     Margret Hutchinson MD    CHIEF COMPLAINT:  Fever, chills, cough, influenza. HISTORY OF PRESENTING ILLNESS:  This is a 60-year-old with advanced  multiple sclerosis, history of coronary artery disease with recent  hospitalization for urosepsis, who presented to the emergency room with  fever, cough, and congestion. She was found to be positive for  influenza, also possible UTI with sepsis with pressures in the 90s. The  patient was initially admitted to MICU for possible sepsis. At present,  the patient is sitting up on nasal cannula, complains of cough and  congestion, but no shortness of breath. RECENT AND PRESENT MEDICATIONS:  See med rec in the chart. PAST MEDICAL HISTORY:  Once again positive for longstanding multiple  sclerosis; history of a myocardial infarction; coronary artery disease;  recent history of breast cancer, status post mastectomy just several  weeks ago. Rest of her surgical history positive for hysterectomy in  , angioplasty with stent in , and once again mastectomy on  2020, this was to the right breast.    SOCIAL HISTORY:  She now resides at the nursing home. Does not smoke,  quit in . Does not drink alcohol. FAMILY MEDICAL HISTORY:  Noncontributory. REVIEW OF SYSTEMS:  ALLERGIES:  PENICILLIN and WATERMELON. SKIN/LYMPHATICS:  Negative. CENTRAL NERVOUS SYSTEM:  Positive for MS.  DIGESTIVE:  Denies nausea, vomiting, diarrhea, melena or hematochezia. RESPIRATORY:  Does complain of cough, congestion.   GENITOURINARY:  Denies dysuria, frequency, hematuria. MUSCULOSKELETAL:  Negative. PHYSICAL EXAMINATION:  GENERAL:  The patient is sitting up, in no acute distress. VITAL SIGNS:  Temperature 98, pulse 66, respirations 20, pressure 90/57,  O2 saturation is 94%. SKIN:  Shows pallor but no jaundice. EYES:  Reveal no icterus. NECK:  Supple without bruits or masses. CHEST:  Show diminished breath sounds, scattered rhonchi. HEART:  Distant but regular rate and rhythm. ABDOMEN:  Soft, nontender at this time. LOWER EXTREMITIES:  Show mild edema. NEUROLOGICAL:  She is alert and oriented. She has had generalized  weakness, but no focal neurological deficits. LABORATORIES:  At the time of admission, CMP was basically unremarkable. Troponin was negative. White count was 11.0, hemoglobin 10.3. INR 2.0. Rapid influenza was positive for A. DIAGNOSTIC IMPRESSION:  Influenza A. Also, make sure she does not have  sepsis secondary to UTI which she has had in the past due to an  indwelling Elizondo catheter. The patient was placed on Tamiflu, was also  placed on IV antibiotics per ID. Surgery has been consulted, checked  the mastectomy site also. DISCHARGE PLAN:  Back to the nursing home when stable.         Margo Medina MD    D: 03/09/2020 12:42:07       T: 03/09/2020 12:45:47     [email or web address]  Job#: [id number]     Doc#: [id number]    CC:

## 2020-03-09 NOTE — CONSULTS
Department of Internal Medicine  Infectious Diseases   Consult Note      Reason for Consult:  Sepsis       Requesting Physician:  Dr Sue Baldwin:               Pt is known to me . This is a 60 yrs old female with hx of multiple sclerosis, neurogenic bladder - chronic indwelling Elizondo cathter, breast cancer s/p right mastectomy ( 3 weeks ago )  nursing home resident presented to the ER with fever (102 F) , chills, not feeling well for ~ 2-3  days . She reports cough , nasal congestion, denies chest pain, shortness of breath, reports nausea, mild abdomen pain, loose stool . She was tachycardic in the ER . WBC 11 k, lactic acid 1.3  Chest x ray - patchy density in the left lower base, CT abdomen and pelvis -distended left hemicolon . Rapid flu test +ve for Influenza A , UA - clear, wbc  2-5 /HPF   She was given vancomycin, meropenem and tamiflu       Past Medical History:    Past Medical History:   Diagnosis Date    Anxiety     [facility] ([facility] 02/2020    right    CAD (coronary artery disease)     Cystitis     Depression     Elizondo catheter status     History of blood transfusion     Hyperlipidemia     Hypertension 08/27/2018    Hypothyroidism     MS (multiple sclerosis) ([facility]     wheelchair bound    Neurogenic bladder 08/09/2018    Sepsis ([facility]     uti hospitalized 11-12/2019    Total self-care deficit     Unspecified cerebral artery occlusion with cerebral infarction 2011    left her incontinent    Wheelchair dependent          Past Surgical History:      Past Surgical History:   Procedure Laterality Date    COLON SURGERY  2001    exp lap, lysis of adhesions, release of SBO. [facility].  Dr. Ramez Horner 2/4/2020    COLONOSCOPY DIAGNOSTIC performed by Jim Barbosa MD at [place] [address]  06/28/2019    Dr. Salinas Jones- Rt Wrist- 3.0/300mm Resolute-RCA, 2.75/22mm-Circumflex,     DENTAL SURGERY      all removed    Manasa Rivera MD        Or   Clemente Zhang acetaminophen (TYLENOL) suppository 650 mg  650 mg Rectal Q6H PRN Emanuel Russo MD        polyethylene glycol (GLYCOLAX) packet 17 g  17 g Oral Daily PRN Emanuel Russo MD        promethazine (PHENERGAN) tablet 12.5 mg  12.5 mg Oral Q6H PRN Emanuel Russo MD        Or    [facility] injection 4 mg  4 mg Intravenous Q6H PRN Emanuel Russo MD        meropenem (MERREM) 1 g in sodium chloride 0.9 % 100 mL IVPB (mini-bag)  1 g Intravenous Siria Cortes MD   Stopped at 20 0548    0.9 % sodium chloride infusion   Intravenous Continuous Fanny Gentile  mL/hr at 20 0445      albuterol (PROVENTIL) nebulizer solution 2.5 mg  2.5 mg Nebulization Q4H PRN Vivian Monreal MD        oseltamivir (TAMIFLU) capsule 75 mg  75 mg Oral BID Vivian Monreal MD   75 mg at 20 2221       Allergies:  Pcn [penicillins] and Watermelon [citrullus vulgaris]    Social History:      Social History     Socioeconomic History    Marital status:      Spouse name: Not on file    Number of children: Not on file    Years of education: Not on file    Highest education level: Not on file   Occupational History    Not on file   Social Needs    Financial resource strain: Not on file    Food insecurity     Worry: Not on file     Inability: Not on file   [employer] needs     Medical: Not on file     Non-medical: Not on file   Tobacco Use    Smoking status: Former Smoker     Packs/day: 1.00     Years: 5.00     Pack years: 5.00     Types: Cigarettes     Start date:      Last attempt to quit: 2000     Years since quittin.2    Smokeless tobacco: Never Used   Substance and Sexual Activity    Alcohol use: No    Drug use: No    Sexual activity: Not Currently   Lifestyle    Physical activity     Days per week: Not on file     Minutes per session: Not on file    Stress: Not on file   Relationships    Social connections     Talks on phone: Not on file Differential:      Lab Results   Component Value Date    WBC 4.8 03/09/2020    RBC 3.16 03/09/2020    HGB 9.1 03/09/2020    HCT 31.6 03/09/2020     03/09/2020    .0 03/09/2020    MCH 28.8 03/09/2020    MCHC 28.8 03/09/2020    RDW 18.3 03/09/2020    NRBC 0.9 03/08/2020    LYMPHOPCT 7.0 03/08/2020    MONOPCT 0.9 03/08/2020    BASOPCT 0.3 03/08/2020    MONOSABS 0.11 03/08/2020    LYMPHSABS 0.77 03/08/2020    EOSABS 0.00 03/08/2020    BASOSABS 0.00 03/08/2020       CMP     Lab Results   Component Value Date     03/09/2020    K 3.3 03/09/2020     03/09/2020    CO2 20 03/09/2020    BUN 11 03/09/2020    CREATININE 0.7 03/09/2020    GFRAA >60 03/09/2020    LABGLOM >60 03/09/2020    GLUCOSE 92 03/09/2020    PROT 4.9 03/09/2020    LABALBU 2.9 03/09/2020    CALCIUM 7.6 03/09/2020    BILITOT 0.5 03/09/2020    ALKPHOS 96 03/09/2020    AST 35 03/09/2020    ALT 30 03/09/2020         Hepatic Function Panel:    Lab Results   Component Value Date    ALKPHOS 96 03/09/2020    ALT 30 03/09/2020    AST 35 03/09/2020    PROT 4.9 03/09/2020    BILITOT 0.5 03/09/2020    BILIDIR <0.2 12/02/2019    IBILI see below 12/02/2019    LABALBU 2.9 03/09/2020       PT/INR:    Lab Results   Component Value Date    PROTIME 23.1 03/08/2020    INR 2.0 03/08/2020       TSH:    Lab Results   Component Value Date    TSH 1.960 12/01/2019       U/A:    Lab Results   Component Value Date    COLORU Yellow 03/08/2020    PHUR 7.0 03/08/2020    WBCUA 2-5 03/08/2020    RBCUA 1-3 03/08/2020    BACTERIA FEW 03/08/2020    CLARITYU Clear 03/08/2020    SPECGRAV 1.015 03/08/2020    LEUKOCYTESUR MODERATE 03/08/2020    UROBILINOGEN 0.2 03/08/2020    BILIRUBINUR Negative 03/08/2020    BLOODU SMALL 03/08/2020    GLUCOSEU Negative 03/08/2020       ABG:  No results found for: YXG0YGU, BEART, I3LLGATY, PHART, THGBART, NOV6OJX, PO2ART, VDP8WFJ    MICROBIOLOGY:    Blood culture - pending     Rapid flu test +Ve for Influenza A         Radiology :    Chest X ray - LLL opacity     CT scan of abdomen and pelvis -  Left hemicolon is distended with fecal content in the rectum is  distended to 8 cm with some ventral mural thickening. The finding  could indicate impaction or constipation, and after disimpaction,  evaluation of the anterior rectal wall may be beneficial to exclude  proctitis. Some increased density in the subcutaneous fat over the right hip  could represent early decubitus ulceration type change, and should  also be examined to confirm or exclude induration or inflammation. Otherwise unremarkable with incidental nonacute findings are listed  Above. IMPRESSION:     1. Influenza A infection   2. Bacteriuria - in the presence of indwelling Elizondo catheter   3. Fever   4. S/P right mastectomy - no s/s of infection     RECOMMENDATIONS:      1. Tamiflu 75 mg po q 12 hrs  2. Vancomycin 1 gramIV q 12 hrs, Cefepime 2 grams IV q 12 hrs , await blood cx  and urine cx   3. Droplet isolation   4.  Transfer out of MICU     Thank you Dr Juan M Willard for the consult

## 2020-03-09 NOTE — CONSULTS
mg by PEG Tube route 3 times daily. Take am [facility] 12/16/19 1/15/24  David Cuadra MD   acetaminophen (TYLENOL) 325 MG tablet 2 tablets by PEG Tube route every 4 hours as needed for Pain 12/16/19   David Cuadra MD   nitroGLYCERIN (NITROSTAT) 0.4 MG SL tablet up to max of 3 total doses. per peg tube If no relief after 1 dose, call 911. 12/16/19   David Cuadra MD   apixaban (ELIQUIS) 5 MG TABS tablet 1 tablet by Per G Tube route 2 times daily  Patient taking differently: 5 mg by Per G Tube route 2 times daily Hold 2 days per facility 12/16/19   David Cuadra MD   FLUoxetine (PROZAC) 20 MG capsule 1 capsule by PEG Tube route 2 times daily  Patient taking differently: 20 mg by PEG Tube route 2 times daily Take am dos 12/16/19   David Cuadra MD   atorvastatin (LIPITOR) 80 MG tablet 1 tablet by PEG Tube route nightly 12/16/19   David Cuadra MD   cloNIDine (CATAPRES) 0.2 MG tablet 1 tablet by PEG Tube route every 6 hours as needed (JDGIRHKH>143 or DTSLZGCJZ>845)  Patient taking differently: 0.2 mg by PEG Tube route every 6 hours as needed (DQHAENFD>572 or GHYLQQUFT>955) Take am dos if needed 12/16/19   David Cuadra MD   lisinopril (PRINIVIL;ZESTRIL) 5 MG tablet 1 tablet by PEG Tube route daily 12/16/19   David Cuadra MD   metoprolol tartrate (LOPRESSOR) 25 MG tablet 1 tablet by PEG Tube route 2 times daily  Patient taking differently: 25 mg by PEG Tube route 2 times daily Take am dos 12/16/19   David Cuadra MD   furosemide (LASIX) 20 MG tablet 1 tablet by PEG Tube route daily 12/16/19   David Cuadra MD   magnesium hydroxide (MILK OF MAGNESIA) 400 MG/5ML suspension 30 mLs by Per G Tube route daily as needed for Constipation 12/16/19   David Cuadra MD   Psyllium (METAMUCIL) 28.3 % POWD 862 g by PEG Tube route daily Indications: oral one tbsp in 8 oz of liquid 12/16/19   David Cuadra MD   Cranberry 500 MG CAPS 1 capsule by PEG Tube route daily 12/16/19   David Cuadra MD   potassium bicarbonate (K-LYTE) 25 MEQ disintegrating tablet 1 tablet by polyphagia, polydipsia or polyuria  Hematology: no increased bleeding, no bruising, no lymphadenopathy  Skin: no skin change noticed by patient  Psychology: no depressed mood, no suicidal ideation    Physical Exam       Physical Exam:  · Vitals: BP 93/60   Pulse 84   Temp 99.7 °F (37.6 °C)   Resp 20   Ht 5' 6\" (1.676 m)   Wt 183 lb (83 kg)   SpO2 98%   BMI 29.54 kg/m²     · I & O - 24hr: I/O this shift:  · In: 1000 [I.V.:1000]  · Out: 500 [Urine:500]   · General Appearance: alert, appears stated age and cooperative  · HEENT:  Head: Normocephalic, no lesions, without obvious abnormality. · Neck: no adenopathy, no carotid bruit, no JVD, supple, symmetrical, trachea midline and thyroid not enlarged, symmetric, no tenderness/mass/nodules  · Lung: clear to auscultation bilaterally, status post right mastectomy, with PJ drain  · Heart: regular rate and rhythm, S1, S2 normal, no murmur, click, rub or gallop  · Abdomen: soft, non-tender; bowel sounds normal; no masses,  no organomegaly, G Tube. · Extremities:  extremities normal, atraumatic, no cyanosis or edema  · Musculokeletal: No joint swelling, no muscle tenderness. ROM normal in all joints of extremities.    · Neurologic: Mental status: Alert, oriented, thought content appropriate    Labs     CBC:   Lab Results   Component Value Date    WBC 11.0 03/08/2020    RBC 3.61 03/08/2020    HGB 10.3 03/08/2020    HCT 33.4 03/08/2020     03/08/2020    MCV 92.5 03/08/2020     BMP:    Lab Results   Component Value Date     03/08/2020    K 4.7 03/08/2020    K 4.2 01/27/2020     03/08/2020    CO2 22 03/08/2020    BUN 14 03/08/2020    CREATININE 0.8 03/08/2020    GLUCOSE 112 03/08/2020    CALCIUM 8.9 03/08/2020     Hepatic Function Panel:    Lab Results   Component Value Date    ALKPHOS 124 03/08/2020    AST 61 03/08/2020    ALT 37 03/08/2020    PROT 6.1 03/08/2020    LABALBU 3.3 03/08/2020    BILITOT 0.8 03/08/2020     Cardiac Enzymes:   Lab Results Component Value Date    CKMB 7.5 (H) 05/06/2019    TROPONINI <0.01 03/08/2020    TROPONINI <0.01 11/29/2019    TROPONINI <0.01 11/28/2019     PT/INR:    Lab Results   Component Value Date    PROTIME 23.1 03/08/2020    INR 2.0 03/08/2020     ABG:  No results found for: PHART, FMB8VTF, PO2ART, X5MLVCGS, KLY0MJH, BEART  TSH:    Lab Results   Component Value Date    TSH 1.960 12/01/2019        Notable Cultures:       Culture  Date sent  Result    Nasal      Sputum      Body Fluid      Urine Strep pneumonia Ag        Urine Legionella Ag        Blood        Urine          Antibiotic  Days  Day started                                       Oxygen:   Nasal cannula L/min     Face mask %     Reservoirs mask %       ABG   Vent Settings     PH   Mode      PCO2   TV      PO2   RR      HCO3   PS      Sat%   PEEP      FIO2   FIO2        P/F          Lines:  Site  Day  Date inserted     TLC              PICC              Arterial line              Peripheral line                           DVT Prophylaxis      Heparin         Enoxaparin        PCDs        Imaging studies:   Imaging  Date  Result    CXR                           Resident's Assessment & Plan     Neurology    Relapsing remitting multiple sclerosis, secondary progressive. Following with neurology as outpatient, chronic Ocrevus infusion. EDSS  7.0-7.5  Alert, awake, oriented x3. G-tube for aspiration. History of CVA. Cardiovascular    Ischemic cardiomyopathy, status post PCI LCx and RCA     Continue ticagrelol, home dose. Resume metoprolol tartrate 25 twice daily, and lisinopril 5 mg daily, and atorvastatin 80 mg. Lower limb DVT. On apixaban 5 mg twice daily, will continue. Pulmonary    Not on home oxygen, stable respiratory status, on 2 L of nasal cannula, no respiratory distress. Renal    Stable kidney function. Monitor intake and output, daily weights. Hematology    Chronic normocytic anemia.     Baseline hemoglobin is 10-11, stable. Endocrine    Hypothyroidism    Continue levothyroxine 88 MCG daily. Infectious Disease    Urinary tract infection, less likely to be surgical site infection (status post right mastectomy). Patient was given vancomycin and cefepime in the ED. Urinalysis was positive, send urine culture, urine Gram stain. Start meropenem 1 g every 8 (history of ESBL in the past). Resume gabapentin, hydrocodone, then diazepam.    Upper respiratory tract infection, secondary to influenza A virus, with possible consolidation versus effusion on the left lower lobe. Started on Tamiflu 75 twice daily in the ED, will continue. Received vancomycin in the ED. Repeat chest x-ray, follow procalcitonin, respiratory culture, respiratory Gram stain, respiratory viral panel. Blood culture sent. Gastroenterology    Has PEG tube. Hold on starting tube feeding for now. DVT/GI prophylaxis: Low molecular weight heparin. Irving Kaplan M.D. , PGY-1  Internal Medicine    Attending Physician: Dr. Lexi Fernando, [id number] [facility] 7 ICU Attending Statement     Ayo Scott was seen, examined and discussed with the multi-disciplinary ICU team during rounds. A addendum note may be separate to the residents note. If not then, I have personally seen and examined the patient and the key elements of the encounter were performed by me (> 85 % time). The medications & laboratory data was discussed and adjusted where necessary. The radiographic images were reviewed either as a group or with radiologist.  Any changes are document or changed if felt dis-concordant with the exam or history. The above findings were corroborated, plans confirmed and changes made if needed. Family was updated at the bedside as available. Key issues of the case were discussed among consultants. Critical Care time is documented if appropriate.       Yamileth Couch DO, MPH  Professor of Medicine

## 2020-03-10 PROBLEM — J10.1 INFLUENZA A: Status: ACTIVE | Noted: 2020-03-10

## 2020-03-10 PROBLEM — I95.9 HYPOTENSION: Status: ACTIVE | Noted: 2020-03-10

## 2020-03-10 LAB
ALBUMIN SERPL-MCNC: 2.6 G/DL (ref 3.5–5.2)
ALP BLD-CCNC: 83 U/L (ref 35–104)
ALT SERPL-CCNC: 26 U/L (ref 0–32)
ANION GAP SERPL CALCULATED.3IONS-SCNC: 13 MMOL/L (ref 7–16)
AST SERPL-CCNC: 32 U/L (ref 0–31)
BASOPHILS ABSOLUTE: 0.01 E9/L (ref 0–0.2)
BASOPHILS RELATIVE PERCENT: 0.3 % (ref 0–2)
BILIRUB SERPL-MCNC: 0.4 MG/DL (ref 0–1.2)
BUN BLDV-MCNC: 8 MG/DL (ref 8–23)
CALCIUM SERPL-MCNC: 8 MG/DL (ref 8.6–10.2)
CHLORIDE BLD-SCNC: 108 MMOL/L (ref 98–107)
CO2: 19 MMOL/L (ref 22–29)
CREAT SERPL-MCNC: 0.6 MG/DL (ref 0.5–1)
EOSINOPHILS ABSOLUTE: 0.08 E9/L (ref 0.05–0.5)
EOSINOPHILS RELATIVE PERCENT: 2.7 % (ref 0–6)
GFR AFRICAN AMERICAN: >60
GFR NON-AFRICAN AMERICAN: >60 ML/MIN/1.73
GLUCOSE BLD-MCNC: 114 MG/DL (ref 74–99)
GRAM STAIN ORDERABLE: NORMAL
HCT VFR BLD CALC: 27.2 % (ref 34–48)
HEMOGLOBIN: 7.9 G/DL (ref 11.5–15.5)
IMMATURE GRANULOCYTES #: 0.01 E9/L
IMMATURE GRANULOCYTES %: 0.3 % (ref 0–5)
LYMPHOCYTES ABSOLUTE: 0.76 E9/L (ref 1.5–4)
LYMPHOCYTES RELATIVE PERCENT: 26.1 % (ref 20–42)
MAGNESIUM: 1.9 MG/DL (ref 1.6–2.6)
MCH RBC QN AUTO: 28.4 PG (ref 26–35)
MCHC RBC AUTO-ENTMCNC: 29 % (ref 32–34.5)
MCV RBC AUTO: 97.8 FL (ref 80–99.9)
MONOCYTES ABSOLUTE: 0.43 E9/L (ref 0.1–0.95)
MONOCYTES RELATIVE PERCENT: 14.8 % (ref 2–12)
NEUTROPHILS ABSOLUTE: 1.62 E9/L (ref 1.8–7.3)
NEUTROPHILS RELATIVE PERCENT: 55.8 % (ref 43–80)
PDW BLD-RTO: 17.7 FL (ref 11.5–15)
PLATELET # BLD: 239 E9/L (ref 130–450)
PMV BLD AUTO: 10.3 FL (ref 7–12)
POTASSIUM REFLEX MAGNESIUM: 3.3 MMOL/L (ref 3.5–5)
RBC # BLD: 2.78 E12/L (ref 3.5–5.5)
SODIUM BLD-SCNC: 140 MMOL/L (ref 132–146)
TOTAL PROTEIN: 4.7 G/DL (ref 6.4–8.3)
WBC # BLD: 2.9 E9/L (ref 4.5–11.5)

## 2020-03-10 PROCEDURE — 6370000000 HC RX 637 (ALT 250 FOR IP): Performed by: INTERNAL MEDICINE

## 2020-03-10 PROCEDURE — 80053 COMPREHEN METABOLIC PANEL: CPT

## 2020-03-10 PROCEDURE — 6370000000 HC RX 637 (ALT 250 FOR IP): Performed by: FAMILY MEDICINE

## 2020-03-10 PROCEDURE — 6360000002 HC RX W HCPCS: Performed by: INTERNAL MEDICINE

## 2020-03-10 PROCEDURE — 2060000000 HC ICU INTERMEDIATE R&B

## 2020-03-10 PROCEDURE — 83735 ASSAY OF MAGNESIUM: CPT

## 2020-03-10 PROCEDURE — 2700000000 HC OXYGEN THERAPY PER DAY

## 2020-03-10 PROCEDURE — 2580000003 HC RX 258: Performed by: INTERNAL MEDICINE

## 2020-03-10 PROCEDURE — 85025 COMPLETE CBC W/AUTO DIFF WBC: CPT

## 2020-03-10 PROCEDURE — 94640 AIRWAY INHALATION TREATMENT: CPT

## 2020-03-10 PROCEDURE — 36415 COLL VENOUS BLD VENIPUNCTURE: CPT

## 2020-03-10 RX ADMIN — FLUOXETINE HYDROCHLORIDE 20 MG: 20 CAPSULE ORAL at 09:04

## 2020-03-10 RX ADMIN — ALBUTEROL SULFATE 2.5 MG: 2.5 SOLUTION RESPIRATORY (INHALATION) at 10:58

## 2020-03-10 RX ADMIN — VANCOMYCIN HYDROCHLORIDE 1000 MG: 1 INJECTION, POWDER, LYOPHILIZED, FOR SOLUTION INTRAVENOUS at 06:07

## 2020-03-10 RX ADMIN — OSELTAMIVIR PHOSPHATE 75 MG: 75 CAPSULE ORAL at 21:37

## 2020-03-10 RX ADMIN — GABAPENTIN 300 MG: 250 SOLUTION ORAL at 13:53

## 2020-03-10 RX ADMIN — METOPROLOL TARTRATE 25 MG: 25 TABLET, FILM COATED ORAL at 09:04

## 2020-03-10 RX ADMIN — Medication 10 ML: at 09:05

## 2020-03-10 RX ADMIN — LEVOTHYROXINE SODIUM 88 MCG: 0.09 TABLET ORAL at 06:35

## 2020-03-10 RX ADMIN — CEFEPIME 2 G: 2 INJECTION, POWDER, FOR SOLUTION INTRAVENOUS at 06:07

## 2020-03-10 RX ADMIN — TICAGRELOR 90 MG: 90 TABLET ORAL at 21:37

## 2020-03-10 RX ADMIN — ATORVASTATIN CALCIUM 80 MG: 80 TABLET, FILM COATED ORAL at 21:37

## 2020-03-10 RX ADMIN — PSYLLIUM HUSK 1 PACKET: 3.4 GRANULE ORAL at 09:04

## 2020-03-10 RX ADMIN — APIXABAN 5 MG: 5 TABLET, FILM COATED ORAL at 21:37

## 2020-03-10 RX ADMIN — GABAPENTIN 300 MG: 250 SOLUTION ORAL at 09:00

## 2020-03-10 RX ADMIN — OSELTAMIVIR PHOSPHATE 75 MG: 75 CAPSULE ORAL at 09:03

## 2020-03-10 RX ADMIN — FLUOXETINE HYDROCHLORIDE 20 MG: 20 CAPSULE ORAL at 21:37

## 2020-03-10 RX ADMIN — TICAGRELOR 90 MG: 90 TABLET ORAL at 09:04

## 2020-03-10 RX ADMIN — ALBUTEROL SULFATE 2.5 MG: 2.5 SOLUTION RESPIRATORY (INHALATION) at 20:53

## 2020-03-10 RX ADMIN — ACETAMINOPHEN 650 MG: 325 TABLET ORAL at 23:28

## 2020-03-10 RX ADMIN — GABAPENTIN 300 MG: 250 SOLUTION ORAL at 21:38

## 2020-03-10 RX ADMIN — Medication 10 ML: at 22:07

## 2020-03-10 RX ADMIN — SODIUM CHLORIDE: 9 INJECTION, SOLUTION INTRAVENOUS at 11:47

## 2020-03-10 RX ADMIN — APIXABAN 5 MG: 5 TABLET, FILM COATED ORAL at 09:05

## 2020-03-10 RX ADMIN — METOPROLOL TARTRATE 25 MG: 25 TABLET, FILM COATED ORAL at 21:38

## 2020-03-10 RX ADMIN — HYDROCODONE BITARTRATE AND ACETAMINOPHEN 1 TABLET: 5; 325 TABLET ORAL at 21:37

## 2020-03-10 RX ADMIN — POTASSIUM BICARBONATE 20 MEQ: 782 TABLET, EFFERVESCENT ORAL at 09:05

## 2020-03-10 RX ADMIN — POTASSIUM BICARBONATE 20 MEQ: 782 TABLET, EFFERVESCENT ORAL at 19:18

## 2020-03-10 ASSESSMENT — PAIN DESCRIPTION - LOCATION: LOCATION: BREAST

## 2020-03-10 ASSESSMENT — PAIN SCALES - GENERAL
PAINLEVEL_OUTOF10: 0
PAINLEVEL_OUTOF10: 4
PAINLEVEL_OUTOF10: 8
PAINLEVEL_OUTOF10: 7

## 2020-03-10 ASSESSMENT — PAIN DESCRIPTION - PAIN TYPE: TYPE: ACUTE PAIN;SURGICAL PAIN

## 2020-03-10 ASSESSMENT — PAIN DESCRIPTION - DESCRIPTORS: DESCRIPTORS: DISCOMFORT

## 2020-03-10 ASSESSMENT — PAIN DESCRIPTION - ONSET: ONSET: ON-GOING

## 2020-03-10 ASSESSMENT — PAIN DESCRIPTION - FREQUENCY: FREQUENCY: INTERMITTENT

## 2020-03-10 ASSESSMENT — PAIN DESCRIPTION - ORIENTATION: ORIENTATION: RIGHT

## 2020-03-10 ASSESSMENT — PAIN DESCRIPTION - PROGRESSION: CLINICAL_PROGRESSION: NOT CHANGED

## 2020-03-10 NOTE — PROGRESS NOTES
K+ = 3.3. Call placed to physician and new order obtained to give extra 20 MEQ now and to start 40 MEQ once daily tomorrow.

## 2020-03-10 NOTE — PLAN OF CARE
Problem: Inadequate oral food/beverage intake (NI-2.1)  Goal: Food and/or Nutrient Delivery  Description: Individualized approach for food/nutrient provision.   Outcome: Met This Shift

## 2020-03-10 NOTE — PLAN OF CARE
Problem: Falls - Risk of:  Goal: Will remain free from falls  Description: Will remain free from falls  Outcome: Met This Shift  Goal: Absence of physical injury  Description: Absence of physical injury  Outcome: Met This Shift     Problem: Pain:  Goal: Pain level will decrease  Description: Pain level will decrease  Outcome: Met This Shift  Goal: Control of acute pain  Description: Control of acute pain  Outcome: Met This Shift  Goal: Control of chronic pain  Description: Control of chronic pain  Outcome: Met This Shift     Problem: Discharge Planning:  Goal: Discharged to appropriate level of care  Description: Discharged to appropriate level of care  Outcome: Met This Shift     Problem: Gas Exchange - Impaired:  Goal: Levels of oxygenation will improve  Description: Levels of oxygenation will improve  Outcome: Met This Shift     Problem: Infection, Septic Shock:  Goal: Will show no infection signs and symptoms  Description: Will show no infection signs and symptoms  Outcome: Met This Shift     Problem: Infection - Ventilator-Associated Pneumonia:  Goal: Absence of pulmonary infection  Description: Absence of pulmonary infection  Outcome: Met This Shift     Problem: Tissue Perfusion, Altered:  Goal: Circulatory function within specified parameters  Description: Circulatory function within specified parameters  Outcome: Met This Shift     Problem: Venous Thromboembolism:  Goal: Will show no signs or symptoms of venous thromboembolism  Description: Will show no signs or symptoms of venous thromboembolism  Outcome: Met This Shift  Goal: Absence of signs or symptoms of impaired coagulation  Description: Absence of signs or symptoms of impaired coagulation  Outcome: Met This Shift

## 2020-03-10 NOTE — PROGRESS NOTES
Department of Internal Medicine  Infectious Diseases   Progress  Note      C/C : ESBL E coli bacteriuria, Influenza A infection     Pt is awake and alert  Denies fever and chills  Reports cough   Denies SOB   Denies bladder spasm     Current Facility-Administered Medications   Medication Dose Route Frequency Provider Last Rate Last Dose    albuterol (PROVENTIL) nebulizer solution 2.5 mg  2.5 mg Nebulization Q4H WA Dale Mcmahan MD   2.5 mg at 03/10/20 1058    apixaban (ELIQUIS) tablet 5 mg  5 mg Per G Tube BID aDle Mcmahan MD   5 mg at 03/10/20 0905    atorvastatin (LIPITOR) tablet 80 mg  80 mg PEG Tube Nightly Dale Mcmahan MD   80 mg at 03/09/20 2210    [Held by provider] diazePAM (VALIUM) tablet 2 mg  2 mg Oral Q12H Dale Mcmahan MD        FLUoxetine (PROZAC) capsule 20 mg  20 mg PEG Tube BID Dale Mcmahan MD   20 mg at 03/10/20 0904    [Held by provider] furosemide (LASIX) tablet 20 mg  20 mg PEG Tube Daily Dale Mcmahan MD        gabapentin (NEURONTIN) 250 MG/5ML solution 300 mg  300 mg PEG Tube TID Dale Mcmahan MD   300 mg at 03/10/20 1353    HYDROcodone-acetaminophen (NORCO) 5-325 MG per tablet 1 tablet  1 tablet Oral Q6H PRN Dale Mcmahan MD   1 tablet at 03/09/20 0923    levothyroxine (SYNTHROID) tablet 88 mcg  88 mcg PEG Tube Daily Dale Mcmahan MD   88 mcg at 03/10/20 0635    [Held by provider] lisinopril (PRINIVIL;ZESTRIL) tablet 5 mg  5 mg PEG Tube Daily Dale Mcmahan MD   5 mg at 03/09/20 0924    metoprolol tartrate (LOPRESSOR) tablet 25 mg  25 mg PEG Tube BID Dale Mcmahan MD   25 mg at 03/10/20 0904    psyllium (KONSYL) 28.3 % packet 1 packet  1 packet Per G Tube Daily Dale Mcmahan MD   1 packet at 03/10/20 0904    ticagrelor (BRILINTA) tablet 90 mg  90 mg PEG Tube BID Dale Mcmahan MD   90 mg at 03/10/20 0904    sodium chloride flush 0.9 % injection 10 mL  10 mL Intravenous 2 times per day Dale Mcmahan MD   10 mL at 03/10/20 9472    sodium chloride flush 0.9 % injection 10 mL  10 mL Intravenous PRN Elizabeth Curtis MD        acetaminophen (TYLENOL) tablet 650 mg  650 mg Oral Q6H PRN Elizabeth Curtis MD        Or   Carmela Gtz acetaminophen (TYLENOL) suppository 650 mg  650 mg Rectal Q6H PRN Elizabeth Curtis MD        polyethylene glycol (GLYCOLAX) packet 17 g  17 g Oral Daily PRN Elizabeth Curtis MD        promethazine (PHENERGAN) tablet 12.5 mg  12.5 mg Oral Q6H PRN Elizabeth Curtis MD        Or    ondansetron [facility]) injection 4 mg  4 mg Intravenous Q6H PRN Elizabeth Curtis MD        vancomycin 1000 mg IVPB in 250 mL D5W addavial  1,000 mg Intravenous Q12H Elizabeth Curtis MD   Stopped at 03/10/20 0742    cefepime (MAXIPIME) 2 g IVPB extended (mini-bag)  2 g Intravenous Q12H Elizabeth Curtis MD   Stopped at 03/10/20 1136    And    0.9 % sodium chloride infusion admixture   Intravenous Q12H Elizabeth Curtis MD   Stopped at 03/10/20 1321    potassium bicarb-citric acid (EFFER-K) effervescent tablet 20 mEq  20 mEq Oral Daily Elizabeth Curtis MD   20 mEq at 03/10/20 0905    albuterol (PROVENTIL) nebulizer solution 2.5 mg  2.5 mg Nebulization Q4H PRN Elizabeth Curtis MD        oseltamivir (TAMIFLU) capsule 75 mg  75 mg Oral BID Elizabeth Curtis MD   75 mg at 03/10/20 8039         REVIEW OF SYSTEMS:      CONSTITUTIONAL:  Denies fever . HEENT: denies blurring of vision or double vision, denies hearing problem  RESPIRATORY: Cough   CARDIOVASCULAR:  Denies palpitation  GASTROINTESTINAL: nausea, loose stool, pain   GENITOURINARY: indwelling Elizondo catheter   INTEGUMENT: right mastectomy, SANDRO drain in place   HEMATOLOGIC/LYMPHATIC:  Denies lymph node swelling, gum bleeding or easy bruising.   MUSCULOSKELETAL:  Denies leg pain , joint pain , joint swelling  NEUROLOGICAL:  Weakness     PHYSICAL EXAM:      Vitals:     BP (!) 97/58   Pulse 78   Temp 98.6 °F (37 °C) (Temporal)   Resp 20   Ht 5' 6\" (1.676 m)   Wt 172 lb 9.6 oz (78.3 kg)   SpO2 95%   BMI 27.86 kg/m²     General Appearance:    Awake, alert , no acute distress. Head:    Normocephalic, atraumatic   Eyes:   + pallor, no icterus,   Ears:    No obvious deformity or drainage.    Nose:   No nasal drainage   Throat:   Mucosa moist, no oral thrush   Neck:   Supple, no lymphadenopathy   Back:     no CVA tenderness   Lungs:     Scattered wheeze    Heart:    Irregular    Abdomen:     Soft, non-tender, bowel sounds present , PEG tube in place    Extremities:    + edema,,non tender    Pulses:   Dorsalis pedis palpable    Skin:    Right breast mastectomy, clean wound, SANDRO drain with blood mixed drainage        CBC with Differential:      Lab Results   Component Value Date    WBC 2.9 03/10/2020    RBC 2.78 03/10/2020    HGB 7.9 03/10/2020    HCT 27.2 03/10/2020     03/10/2020    MCV 97.8 03/10/2020    MCH 28.4 03/10/2020    MCHC 29.0 03/10/2020    RDW 17.7 03/10/2020    NRBC 0.9 03/08/2020    LYMPHOPCT 26.1 03/10/2020    MONOPCT 14.8 03/10/2020    BASOPCT 0.3 03/10/2020    MONOSABS 0.43 03/10/2020    LYMPHSABS 0.76 03/10/2020    EOSABS 0.08 03/10/2020    BASOSABS 0.01 03/10/2020       CMP     Lab Results   Component Value Date     03/10/2020    K 3.3 03/10/2020     03/10/2020    CO2 19 03/10/2020    BUN 8 03/10/2020    CREATININE 0.6 03/10/2020    GFRAA >60 03/10/2020    LABGLOM >60 03/10/2020    GLUCOSE 114 03/10/2020    PROT 4.7 03/10/2020    LABALBU 2.6 03/10/2020    CALCIUM 8.0 03/10/2020    BILITOT 0.4 03/10/2020    ALKPHOS 83 03/10/2020    AST 32 03/10/2020    ALT 26 03/10/2020         Hepatic Function Panel:    Lab Results   Component Value Date    ALKPHOS 83 03/10/2020    ALT 26 03/10/2020    AST 32 03/10/2020    PROT 4.7 03/10/2020    BILITOT 0.4 03/10/2020    BILIDIR <0.2 12/02/2019    IBILI see below 12/02/2019    LABALBU 2.6 03/10/2020       PT/INR:    Lab Results   Component Value Date    PROTIME 23.1 03/08/2020    INR 2.0 03/08/2020       TSH:    Lab Results

## 2020-03-10 NOTE — PROGRESS NOTES
Orders: None  · Anthropometric Measures:  · Ht: 5' 6\" (167.6 cm)   · Current Body Wt: 182 lb (82.6 kg)(3/10 bed scale)  · Admission Body Wt: 173 lb (78.5 kg)(3/9 actual)  · Usual Body Wt: 185 lb (83.9 kg)(6/2019 bed scale, per EMR)  · % Weight Change: wt elevated since admit w/ fluctuations noted per EMR review.  pt denies unplanned wt loss at this time  · Ideal Body Wt: 130 lb (59 kg), % Ideal Body 133%(using admit wt)  · BMI Classification: BMI 25.0 - 29.9 Overweight(BMI 27.8 using admit wt)    Nutrition Interventions:   Continue current diet, Start ONS(Ensure BID)  Continued Inpatient Monitoring, Education Initiated, Coordination of Care(ONS options/preferences reviewed)    Nutrition Evaluation:   · Evaluation: Goals set   · Goals: pt to consume >50% most meals/ONS    · Monitoring: Meal Intake, Supplement Intake, Diet Tolerance, Skin Integrity, I&O, Wound Healing, Weight, Pertinent Labs, Monitor Bowel Function      Electronically signed by German Harrison, MS, RD, LD on 3/10/20 at 3:51 PM EDT    Contact Number: [phone]

## 2020-03-10 NOTE — PROGRESS NOTES
Subjective: The patient is awake and alert. No problems overnight. Denies chest pain, angina, and dyspnea. Denies abdominal pain. Tolerating diet. No nausea or vomiting. Objective:    BP 97/62   Pulse 77   Temp 98.5 °F (36.9 °C) (Temporal)   Resp 20   Ht 5' 6\" (1.676 m)   Wt 172 lb 9.6 oz (78.3 kg)   SpO2 95%   BMI 27.86 kg/m²     Heart:  RRR, no murmurs, gallops, or rubs.   Lungs:  CTA bilaterally, no wheeze, rales or rhonchi  Abd: bowel sounds present, nontender, nondistended, no masses  Extrem:  No clubbing, cyanosis, or edema    CBC with Differential:    Lab Results   Component Value Date    WBC 2.9 03/10/2020    RBC 2.78 03/10/2020    HGB 7.9 03/10/2020    HCT 27.2 03/10/2020     03/10/2020    MCV 97.8 03/10/2020    MCH 28.4 03/10/2020    MCHC 29.0 03/10/2020    RDW 17.7 03/10/2020    NRBC 0.9 03/08/2020    LYMPHOPCT 26.1 03/10/2020    MONOPCT 14.8 03/10/2020    BASOPCT 0.3 03/10/2020    MONOSABS 0.43 03/10/2020    LYMPHSABS 0.76 03/10/2020    EOSABS 0.08 03/10/2020    BASOSABS 0.01 03/10/2020     CMP:    Lab Results   Component Value Date     03/10/2020    K 3.3 03/10/2020     03/10/2020    CO2 19 03/10/2020    BUN 8 03/10/2020    CREATININE 0.6 03/10/2020    GFRAA >60 03/10/2020    LABGLOM >60 03/10/2020    GLUCOSE 114 03/10/2020    PROT 4.7 03/10/2020    LABALBU 2.6 03/10/2020    CALCIUM 8.0 03/10/2020    BILITOT 0.4 03/10/2020    ALKPHOS 83 03/10/2020    AST 32 03/10/2020    ALT 26 03/10/2020    Blood cultures no growth  Urine culture greater than 100,000 Escherichia coli    Assessment:    Patient Active Problem List   Diagnosis    Multiple sclerosis ([address]    Bilateral foot-drop    Heart failure (HCC)    Myocardial infarction, anteroseptal (HCC)    [facility]STEM[facility]non-ST elevated [facility]) [facility]    Carcinoma of upper-outer quadrant of female breast, right ([facility]    Carcinoma of upper-outer quadrant of right female breast ([facility]    Sepsis ([facility]    Influenza A

## 2020-03-11 VITALS
BODY MASS INDEX: 28.77 KG/M2 | TEMPERATURE: 97 F | WEIGHT: 179 LBS | RESPIRATION RATE: 18 BRPM | SYSTOLIC BLOOD PRESSURE: 90 MMHG | HEIGHT: 66 IN | HEART RATE: 65 BPM | DIASTOLIC BLOOD PRESSURE: 50 MMHG | OXYGEN SATURATION: 94 %

## 2020-03-11 PROBLEM — A41.9 SEPSIS (HCC): Status: RESOLVED | Noted: 2020-03-08 | Resolved: 2020-03-11

## 2020-03-11 LAB
ALBUMIN SERPL-MCNC: 2.8 G/DL (ref 3.5–5.2)
ALP BLD-CCNC: 82 U/L (ref 35–104)
ALT SERPL-CCNC: 22 U/L (ref 0–32)
ANION GAP SERPL CALCULATED.3IONS-SCNC: 11 MMOL/L (ref 7–16)
ANISOCYTOSIS: ABNORMAL
AST SERPL-CCNC: 28 U/L (ref 0–31)
BASOPHILS ABSOLUTE: 0.06 E9/L (ref 0–0.2)
BASOPHILS RELATIVE PERCENT: 1.8 % (ref 0–2)
BILIRUB SERPL-MCNC: 0.3 MG/DL (ref 0–1.2)
BUN BLDV-MCNC: 6 MG/DL (ref 8–23)
BURR CELLS: ABNORMAL
CALCIUM SERPL-MCNC: 8.2 MG/DL (ref 8.6–10.2)
CHLORIDE BLD-SCNC: 109 MMOL/L (ref 98–107)
CO2: 22 MMOL/L (ref 22–29)
CREAT SERPL-MCNC: 0.7 MG/DL (ref 0.5–1)
EOSINOPHILS ABSOLUTE: 0.09 E9/L (ref 0.05–0.5)
EOSINOPHILS RELATIVE PERCENT: 2.8 % (ref 0–6)
GFR AFRICAN AMERICAN: >60
GFR NON-AFRICAN AMERICAN: >60 ML/MIN/1.73
GLUCOSE BLD-MCNC: 84 MG/DL (ref 74–99)
HCT VFR BLD CALC: 29.1 % (ref 34–48)
HEMOGLOBIN: 8.5 G/DL (ref 11.5–15.5)
LYMPHOCYTES ABSOLUTE: 0.96 E9/L (ref 1.5–4)
LYMPHOCYTES RELATIVE PERCENT: 31.2 % (ref 20–42)
MAGNESIUM: 2.2 MG/DL (ref 1.6–2.6)
MCH RBC QN AUTO: 28.6 PG (ref 26–35)
MCHC RBC AUTO-ENTMCNC: 29.2 % (ref 32–34.5)
MCV RBC AUTO: 98 FL (ref 80–99.9)
METAMYELOCYTES RELATIVE PERCENT: 0.9 % (ref 0–1)
MONOCYTES ABSOLUTE: 0.19 E9/L (ref 0.1–0.95)
MONOCYTES RELATIVE PERCENT: 6.4 % (ref 2–12)
NEUTROPHILS ABSOLUTE: 1.8 E9/L (ref 1.8–7.3)
NEUTROPHILS RELATIVE PERCENT: 56.9 % (ref 43–80)
ORGANISM: ABNORMAL
OVALOCYTES: ABNORMAL
PDW BLD-RTO: 17.2 FL (ref 11.5–15)
PLATELET # BLD: 246 E9/L (ref 130–450)
PMV BLD AUTO: 10.6 FL (ref 7–12)
POIKILOCYTES: ABNORMAL
POLYCHROMASIA: ABNORMAL
POTASSIUM REFLEX MAGNESIUM: 3.5 MMOL/L (ref 3.5–5)
RBC # BLD: 2.97 E12/L (ref 3.5–5.5)
SODIUM BLD-SCNC: 142 MMOL/L (ref 132–146)
TOTAL PROTEIN: 4.8 G/DL (ref 6.4–8.3)
URINE CULTURE, ROUTINE: ABNORMAL
WBC # BLD: 3.1 E9/L (ref 4.5–11.5)

## 2020-03-11 PROCEDURE — 2700000000 HC OXYGEN THERAPY PER DAY

## 2020-03-11 PROCEDURE — 6370000000 HC RX 637 (ALT 250 FOR IP): Performed by: INTERNAL MEDICINE

## 2020-03-11 PROCEDURE — 2580000003 HC RX 258: Performed by: INTERNAL MEDICINE

## 2020-03-11 PROCEDURE — 80053 COMPREHEN METABOLIC PANEL: CPT

## 2020-03-11 PROCEDURE — 94640 AIRWAY INHALATION TREATMENT: CPT

## 2020-03-11 PROCEDURE — 85025 COMPLETE CBC W/AUTO DIFF WBC: CPT

## 2020-03-11 PROCEDURE — 6360000002 HC RX W HCPCS: Performed by: INTERNAL MEDICINE

## 2020-03-11 PROCEDURE — 36415 COLL VENOUS BLD VENIPUNCTURE: CPT

## 2020-03-11 PROCEDURE — 6370000000 HC RX 637 (ALT 250 FOR IP): Performed by: FAMILY MEDICINE

## 2020-03-11 PROCEDURE — 83735 ASSAY OF MAGNESIUM: CPT

## 2020-03-11 RX ORDER — OSELTAMIVIR PHOSPHATE 75 MG/1
75 CAPSULE ORAL 2 TIMES DAILY
Qty: 20 CAPSULE | Refills: 0 | Status: SHIPPED | OUTPATIENT
Start: 2020-03-11 | End: 2020-03-13

## 2020-03-11 RX ORDER — DIAZEPAM 2 MG/1
2 TABLET ORAL 2 TIMES DAILY PRN
Qty: 60 TABLET | Refills: 3 | Status: SHIPPED | OUTPATIENT
Start: 2020-03-11 | End: 2020-04-10

## 2020-03-11 RX ADMIN — ALBUTEROL SULFATE 2.5 MG: 2.5 SOLUTION RESPIRATORY (INHALATION) at 09:56

## 2020-03-11 RX ADMIN — METOPROLOL TARTRATE 25 MG: 25 TABLET, FILM COATED ORAL at 09:10

## 2020-03-11 RX ADMIN — GABAPENTIN 300 MG: 250 SOLUTION ORAL at 09:09

## 2020-03-11 RX ADMIN — APIXABAN 5 MG: 5 TABLET, FILM COATED ORAL at 09:10

## 2020-03-11 RX ADMIN — POTASSIUM BICARBONATE 40 MEQ: 782 TABLET, EFFERVESCENT ORAL at 09:10

## 2020-03-11 RX ADMIN — TICAGRELOR 90 MG: 90 TABLET ORAL at 09:10

## 2020-03-11 RX ADMIN — OSELTAMIVIR PHOSPHATE 75 MG: 75 CAPSULE ORAL at 09:09

## 2020-03-11 RX ADMIN — FLUOXETINE HYDROCHLORIDE 20 MG: 20 CAPSULE ORAL at 09:09

## 2020-03-11 RX ADMIN — Medication 10 ML: at 09:10

## 2020-03-11 RX ADMIN — LEVOTHYROXINE SODIUM 88 MCG: 0.09 TABLET ORAL at 06:58

## 2020-03-11 RX ADMIN — PSYLLIUM HUSK 1 PACKET: 3.4 GRANULE ORAL at 09:10

## 2020-03-11 ASSESSMENT — PAIN SCALES - GENERAL: PAINLEVEL_OUTOF10: 0

## 2020-03-11 ASSESSMENT — PAIN DESCRIPTION - PROGRESSION: CLINICAL_PROGRESSION: NOT CHANGED

## 2020-03-11 NOTE — DISCHARGE INSTR - COC
sclerosis ([facility] G35    Bilateral foot-drop M21.371, M21.372    Heart failure (HCC) I50.9    Myocardial infarction, anteroseptal (HCC) I21.09    NSTEMI (non-ST elevated myocardial infarction) ([facility] I21.4    Carcinoma of upper-outer quadrant of female breast, right ([facility] C50.411    Carcinoma of upper-outer quadrant of right female breast ([facility] C50.411    Influenza A J10.1    Hypotension I95.9       Isolation/Infection:   Isolation          Droplet        Patient Infection Status     Infection Onset Added Last Indicated Last Indicated By Review Planned Expiration Resolved Resolved By    ESBL (Extended Spectrum Beta Lactamase) 03/08/20 03/10/20 03/08/20 Culture, Urine        Ecoli urine 3/8/20    INFLUENZA 03/08/20 03/08/20 03/08/20 Rapid influenza A/B antigens 03/15/20 04/07/20      Resolved    MRSA 03/03/20 03/04/20 03/03/20 Culture, MRSA, Screening   03/05/20 Bartolo Davila RN    MRSA 11/30/19 12/01/19 11/30/19 MRSA SCREENING CULTURE ONLY   12/09/19 Zhou Miguel RN    ESBL (Extended Spectrum Beta Lactamase)  08/13/18 08/13/18 Zhou Miguel RN   06/26/19 Zhou Miguel RN    E Coli Urine 8/9/18          Nurse Assessment:  Last Vital Signs: BP (!) 93/51   Pulse 65   Temp 97.4 °F (36.3 °C) (Temporal)   Resp 16   Ht 5' 6\" (1.676 m)   Wt 179 lb (81.2 kg)   SpO2 96%   BMI 28.89 kg/m²     Last documented pain score (0-10 scale): Pain Level: 4  Last Weight:   Wt Readings from Last 1 Encounters:   03/11/20 179 lb (81.2 kg)     Mental Status:  oriented and alert    IV Access:  - None    Nursing Mobility/ADLs:  Walking   Dependent  Transfer  Dependent  Bathing  Dependent  Dressing  Dependent  Toileting  Dependent  Feeding  Dependent  Med Admin  Assisted  Med Delivery   crushed and PEG TUBE    Wound Care Documentation and Therapy:        Elimination:  Continence:   · Bowel: No  · Bladder: No  Urinary Catheter: Indication for Use of Catheter: chronic pacheco   Colostomy/Ileostomy/Ileal Conduit: No       Date Antonio Hunter for greater 30 days.      Update Admission H&P: No change in H&P    PHYSICIAN SIGNATURE:  Electronically signed by Katharina Lanier MD on 3/11/20 at 8:29 AM EDT

## 2020-03-11 NOTE — PLAN OF CARE
Problem: Falls - Risk of:  Goal: Will remain free from falls  Description: Will remain free from falls  Outcome: Met This Shift     Problem: Pain:  Goal: Pain level will decrease  Description: Pain level will decrease  Outcome: Met This Shift  Goal: Control of acute pain  Description: Control of acute pain  Outcome: Met This Shift     Problem: Infection, Septic Shock:  Goal: Will show no infection signs and symptoms  Description: Will show no infection signs and symptoms  Outcome: Met This Shift

## 2020-03-13 LAB
BLOOD CULTURE, ROUTINE: NORMAL
CULTURE, BLOOD 2: NORMAL

## 2020-04-08 ENCOUNTER — TELEPHONE (OUTPATIENT)
Dept: NEUROLOGY | Age: 65
End: 2020-04-08

## 2020-04-10 PROBLEM — J10.1 INFLUENZA A: Status: RESOLVED | Noted: 2020-03-10 | Resolved: 2020-04-10

## 2020-04-14 ENCOUNTER — HOSPITAL ENCOUNTER (OUTPATIENT)
Age: 65
Discharge: HOME | End: 2020-04-16
Payer: MEDICAID

## 2020-04-14 PROCEDURE — 80061 LIPID PANEL: CPT

## 2020-04-14 PROCEDURE — 84443 ASSAY THYROID STIM HORMONE: CPT

## 2020-04-14 PROCEDURE — 36415 COLL VENOUS BLD VENIPUNCTURE: CPT

## 2020-04-14 PROCEDURE — 80048 BASIC METABOLIC PNL TOTAL CA: CPT

## 2020-04-14 PROCEDURE — 82306 VITAMIN D 25 HYDROXY: CPT

## 2020-04-25 ENCOUNTER — APPOINTMENT (OUTPATIENT)
Dept: GENERAL RADIOLOGY | Age: 65
End: 2020-04-25
Payer: MEDICAID

## 2020-04-25 ENCOUNTER — HOSPITAL ENCOUNTER (EMERGENCY)
Age: 65
Discharge: HOME | End: 2020-04-25
Attending: EMERGENCY MEDICINE
Payer: MEDICAID

## 2020-04-25 VITALS
RESPIRATION RATE: 16 BRPM | TEMPERATURE: 99 F | DIASTOLIC BLOOD PRESSURE: 72 MMHG | HEIGHT: 66 IN | BODY MASS INDEX: 28.77 KG/M2 | WEIGHT: 179 LBS | OXYGEN SATURATION: 95 % | HEART RATE: 73 BPM | SYSTOLIC BLOOD PRESSURE: 110 MMHG

## 2020-04-25 LAB
ALBUMIN SERPL-MCNC: 3.8 G/DL (ref 3.5–5.2)
ALP BLD-CCNC: 139 U/L (ref 35–104)
ALT SERPL-CCNC: 19 U/L (ref 0–32)
ANION GAP SERPL CALCULATED.3IONS-SCNC: 16 MMOL/L (ref 7–16)
ANISOCYTOSIS: ABNORMAL
AST SERPL-CCNC: 30 U/L (ref 0–31)
B.E.: -9.4 MMOL/L (ref -3–3)
BACTERIA: ABNORMAL /HPF
BASOPHILS ABSOLUTE: 0.02 E9/L (ref 0–0.2)
BASOPHILS RELATIVE PERCENT: 0.2 % (ref 0–2)
BILIRUB SERPL-MCNC: 0.4 MG/DL (ref 0–1.2)
BILIRUBIN URINE: NEGATIVE
BLOOD, URINE: ABNORMAL
BUN BLDV-MCNC: 11 MG/DL (ref 8–23)
BURR CELLS: ABNORMAL
CALCIUM SERPL-MCNC: 9.3 MG/DL (ref 8.6–10.2)
CHLORIDE BLD-SCNC: 99 MMOL/L (ref 98–107)
CLARITY: ABNORMAL
CO2: 22 MMOL/L (ref 22–29)
COHB: 0.4 % (ref 0–1.5)
COLOR: YELLOW
COMMENT: ABNORMAL
CREAT SERPL-MCNC: 0.6 MG/DL (ref 0.5–1)
CRITICAL: ABNORMAL
DATE ANALYZED: ABNORMAL
DATE OF COLLECTION: ABNORMAL
EOSINOPHILS ABSOLUTE: 0.04 E9/L (ref 0.05–0.5)
EOSINOPHILS RELATIVE PERCENT: 0.4 % (ref 0–6)
GFR AFRICAN AMERICAN: >60
GFR NON-AFRICAN AMERICAN: >60 ML/MIN/1.73
GLUCOSE BLD-MCNC: 117 MG/DL (ref 74–99)
GLUCOSE URINE: NEGATIVE MG/DL
HCO3: 18.2 MMOL/L (ref 22–26)
HCT VFR BLD CALC: 39.4 % (ref 34–48)
HEMOGLOBIN: 12.5 G/DL (ref 11.5–15.5)
HHB: 7.3 % (ref 0–5)
IMMATURE GRANULOCYTES #: 0.05 E9/L
IMMATURE GRANULOCYTES %: 0.5 % (ref 0–5)
KETONES, URINE: NEGATIVE MG/DL
LAB: ABNORMAL
LACTIC ACID: 1.2 MMOL/L (ref 0.5–2.2)
LEUKOCYTE ESTERASE, URINE: ABNORMAL
LYMPHOCYTES ABSOLUTE: 0.43 E9/L (ref 1.5–4)
LYMPHOCYTES RELATIVE PERCENT: 4.1 % (ref 20–42)
Lab: ABNORMAL
MAGNESIUM: 1.7 MG/DL (ref 1.6–2.6)
MCH RBC QN AUTO: 28.5 PG (ref 26–35)
MCHC RBC AUTO-ENTMCNC: 31.7 % (ref 32–34.5)
MCV RBC AUTO: 90 FL (ref 80–99.9)
METHB: 0.2 % (ref 0–1.5)
MODE: ABNORMAL
MONOCYTES ABSOLUTE: 0.58 E9/L (ref 0.1–0.95)
MONOCYTES RELATIVE PERCENT: 5.5 % (ref 2–12)
NEUTROPHILS ABSOLUTE: 9.37 E9/L (ref 1.8–7.3)
NEUTROPHILS RELATIVE PERCENT: 89.3 % (ref 43–80)
NITRITE, URINE: NEGATIVE
O2 CONTENT: 16.9 ML/DL
O2 SATURATION: 92.7 % (ref 92–98.5)
O2HB: 92.1 % (ref 94–97)
OPERATOR ID: 187
OVALOCYTES: ABNORMAL
PATIENT TEMP: 37 C
PCO2: 45.8 MMHG (ref 35–45)
PDW BLD-RTO: 15.9 FL (ref 11.5–15)
PH BLOOD GAS: 7.22 (ref 7.35–7.45)
PH UA: 7 (ref 5–9)
PLATELET # BLD: 342 E9/L (ref 130–450)
PMV BLD AUTO: 10.6 FL (ref 7–12)
PO2: 82.3 MMHG (ref 75–100)
POIKILOCYTES: ABNORMAL
POLYCHROMASIA: ABNORMAL
POTASSIUM REFLEX MAGNESIUM: 3.5 MMOL/L (ref 3.5–5)
PROTEIN UA: NEGATIVE MG/DL
RBC # BLD: 4.38 E12/L (ref 3.5–5.5)
RBC UA: ABNORMAL /HPF (ref 0–2)
SARS-COV-2, NAAT: DETECTED
SODIUM BLD-SCNC: 137 MMOL/L (ref 132–146)
SOURCE, BLOOD GAS: ABNORMAL
SPECIFIC GRAVITY UA: 1.01 (ref 1–1.03)
THB: 13 G/DL (ref 11.5–16.5)
TIME ANALYZED: 1818
TOTAL PROTEIN: 6.6 G/DL (ref 6.4–8.3)
UROBILINOGEN, URINE: 0.2 E.U./DL
WBC # BLD: 10.5 E9/L (ref 4.5–11.5)
WBC UA: ABNORMAL /HPF (ref 0–5)

## 2020-04-25 PROCEDURE — 2580000003 HC RX 258: Performed by: NURSE PRACTITIONER

## 2020-04-25 PROCEDURE — 82805 BLOOD GASES W/O2 SATURATION: CPT

## 2020-04-25 PROCEDURE — 83605 ASSAY OF LACTIC ACID: CPT

## 2020-04-25 PROCEDURE — 87040 BLOOD CULTURE FOR BACTERIA: CPT

## 2020-04-25 PROCEDURE — 6370000000 HC RX 637 (ALT 250 FOR IP): Performed by: NURSE PRACTITIONER

## 2020-04-25 PROCEDURE — 83735 ASSAY OF MAGNESIUM: CPT

## 2020-04-25 PROCEDURE — 96365 THER/PROPH/DIAG IV INF INIT: CPT

## 2020-04-25 PROCEDURE — 80053 COMPREHEN METABOLIC PANEL: CPT

## 2020-04-25 PROCEDURE — 87088 URINE BACTERIA CULTURE: CPT

## 2020-04-25 PROCEDURE — 99283 EMERGENCY DEPT VISIT LOW MDM: CPT

## 2020-04-25 PROCEDURE — U0002 COVID-19 LAB TEST NON-CDC: HCPCS

## 2020-04-25 PROCEDURE — 85025 COMPLETE CBC W/AUTO DIFF WBC: CPT

## 2020-04-25 PROCEDURE — 71045 X-RAY EXAM CHEST 1 VIEW: CPT

## 2020-04-25 PROCEDURE — 87186 SC STD MICRODIL/AGAR DIL: CPT

## 2020-04-25 PROCEDURE — 6360000002 HC RX W HCPCS: Performed by: NURSE PRACTITIONER

## 2020-04-25 PROCEDURE — 81001 URINALYSIS AUTO W/SCOPE: CPT

## 2020-04-25 RX ORDER — ACETAMINOPHEN 500 MG
1000 TABLET ORAL ONCE
Status: COMPLETED | OUTPATIENT
Start: 2020-04-25 | End: 2020-04-25

## 2020-04-25 RX ORDER — 0.9 % SODIUM CHLORIDE 0.9 %
1000 INTRAVENOUS SOLUTION INTRAVENOUS ONCE
Status: COMPLETED | OUTPATIENT
Start: 2020-04-25 | End: 2020-04-25

## 2020-04-25 RX ADMIN — CEFEPIME HYDROCHLORIDE 2 G: 2 INJECTION, POWDER, FOR SOLUTION INTRAVENOUS at 20:28

## 2020-04-25 RX ADMIN — SODIUM CHLORIDE 1000 ML: 9 INJECTION, SOLUTION INTRAVENOUS at 19:06

## 2020-04-25 RX ADMIN — ACETAMINOPHEN 1000 MG: 500 TABLET ORAL at 19:00

## 2020-04-25 ASSESSMENT — PAIN SCALES - GENERAL: PAINLEVEL_OUTOF10: 0

## 2020-04-25 NOTE — ED PROVIDER NOTES
ED Attending  CC: Bela     Department of Emergency Medicine   ED  Provider Note  Admit Date/RoomTime: 4/25/2020  5:53 PM  ED Room: 36/36   Chief Complaint   Fever (sudden onset today after lunch, no antipyretics given patient states she has been coughing)    History of Present Illness   Source of history provided by:  patient. History/Exam Limitations: none. Mey Carmichael is a 60 y.o. old female who has a past medical history of:   Past Medical History:   Diagnosis Date    Anxiety     Breast cancer ([facility] [address] 02/2020    right    CAD (coronary artery disease)     Cystitis     Depression     Elizondo catheter status     History of blood transfusion     Hyperlipidemia     Hypertension 08/27/2018    Hypothyroidism     [facility]     wheelchair bound    Neurogenic bladder 08/09/2018    Sepsis ([facility]     uti hospitalized 11-12/2019    Total self-care deficit     Unspecified cerebral artery occlusion with cerebral infarction 2011    left her incontinent    Wheelchair dependent     presents to the emergency department by ambulance where the patient received see Ambulance Run Sheet prior to arrival., with complaints of sudden onset non-productive cough which began 1 day(s) prior to arrival.  The symptoms are associated with fever and chills and denies abdominal pain, calf pain, chest pain, dizziness, fatigue, leg swelling, palpitations, rapid heart beat, shortness of breath, slow heart beat or syncope. She has prior history of pneumonia in the past.  Since onset the symptoms have been stable and mild in severity. The symptoms are aggravated by nothing and relieved by nothing. Patient is currently residing at [facility] which is a nursing facility. She does have a history of a previous stroke as well as MS. She states she did coughing yesterday. She is denying any type of chest pain or shortness of breath. Upon arrival to emergency department patient's temperature was 101.9.   She is E9/L    Anisocytosis 1+     Polychromasia 1+     Poikilocytes 3+     [place] Cells 3+     Ovalocytes 1+    Lactic Acid, Plasma   Result Value Ref Range    Lactic Acid 1.2 0.5 - 2.2 mmol/L   Urinalysis, reflex to microscopic   Result Value Ref Range    Color, UA Yellow Straw/Yellow    Clarity, UA CLOUDY (A) Clear    Glucose, Ur Negative Negative mg/dL    Bilirubin Urine Negative Negative    Ketones, Urine Negative Negative mg/dL    Specific Gravity, UA 1.015 1.005 - 1.030    Blood, Urine LARGE (A) Negative    pH, UA 7.0 5.0 - 9.0    Protein, UA Negative Negative mg/dL    Urobilinogen, Urine 0.2 <2.0 E.U./dL    Nitrite, Urine Negative Negative    Leukocyte Esterase, Urine LARGE (A) Negative   COVID-19   Result Value Ref Range    SARS-CoV-2, NAAT DETECTED (AA) Not Detected   Blood Gas, Arterial   Result Value Ref Range    Date Analyzed [id number]     Time Analyzed 1818     Source: Blood Arterial     pH, Blood Gas 7.216 (L) 7.350 - 7.450    PCO2 45.8 (H) 35.0 - 45.0 mmHg    PO2 82.3 75.0 - 100.0 mmHg    HCO3 18.2 (L) 22.0 - 26.0 mmol/L    B.E. -9.4 (L) -3.0 - 3.0 mmol/L    O2 Sat 92.7 92.0 - 98.5 %    O2Hb 92.1 (L) 94.0 - 97.0 %    COHb 0.4 0.0 - 1.5 %    MetHb 0.2 0.0 - 1.5 %    O2 Content 16.9 mL/dL    HHb 7.3 (H) 0.0 - 5.0 %    tHb (est) 13.0 11.5 - 16.5 g/dL    Mode NRB 15L     Comment       IGNORE THSES RESULTS, RESULTS DO NOT BELONG TO THIS PATIENT    Date Of Collection      Time Collected      Pt Temp 37.0 C     ID [id number]     Lab [id number]     Critical(s) Notified . No Critical Values    Microscopic Urinalysis   Result Value Ref Range    WBC, UA 10-20 (A) 0 - 5 /HPF    RBC, UA 5-10 (A) 0 - 2 /HPF    Bacteria, UA MANY (A) None Seen /HPF   Magnesium   Result Value Ref Range    Magnesium 1.7 1.6 - 2.6 mg/dL     Imaging: All Radiology results interpreted by Radiologist unless otherwise noted. XR CHEST PORTABLE   Final Result   No acute cardiopulmonary process.           ED Course / Medical Decision Making transcription errors may be present.   END OF ED PROVIDER NOTE      Ranulfo Mejia, VERENA - NP  04/25/20 5280

## 2020-04-25 NOTE — ED NOTES
Bed: 36  Expected date:   Expected time:   Means of arrival:   Comments:  Azeem Pappas RN  04/25/20 5705

## 2020-04-26 NOTE — ED NOTES
[facility] approx 30-60 minute.       Ivonne Owens RN  04/25/20 2053

## 2020-04-27 LAB
ORGANISM: ABNORMAL
URINE CULTURE, ROUTINE: ABNORMAL

## 2020-04-28 NOTE — DISCHARGE SUMMARY
Admit Date: 3/8/2020 11:43 AM   Discharge Date: 3/11/2020    Patient Active Problem List   Diagnosis    Multiple sclerosis ([facility]    Bilateral foot-drop    Heart failure ([facility]    Myocardial infarction, anteroseptal ([facility]    NSTEMI (non-ST elevated myocardial infarction) ([facility]    Carcinoma of upper-outer quadrant of female breast, right ([facility]    Carcinoma of upper-outer quadrant of right female breast ([facility]    Hypotension        Present on Admission:   (Resolved) Sepsis ([facility]   (Resolved) Influenza A   Hypotension        [facility]   Home Medication Instructions [id number]    Printed on:04/28/20 1942   Medication Information                      albuterol (PROVENTIL) (2.5 MG/3ML) 0.083% nebulizer solution  Take 3 mLs by nebulization every 4 hours (while awake)             apixaban (ELIQUIS) 5 MG TABS tablet  1 tablet by Per G Tube route 2 times daily             atorvastatin (LIPITOR) 80 MG tablet  1 tablet by PEG Tube route nightly             cloNIDine (CATAPRES) 0.2 MG tablet  1 tablet by PEG Tube route every 6 hours as needed ([id number]>918 or [id number]>764)             Cranberry 500 MG CAPS  1 capsule by PEG Tube route daily             Dextromethorphan-guaiFENesin  MG/5ML SYRP  Take 5 mLs by mouth every 4 hours as needed for Cough x5 days for UTI. Start date 3-7-20             FLUoxetine (PROZAC) 20 MG capsule  1 capsule by PEG Tube route 2 times daily             fluticasone (FLONASE) 50 MCG/ACT nasal spray  1 spray by Each Nare route daily             furosemide (LASIX) 20 MG tablet  1 tablet by PEG Tube route daily             gabapentin (NEURONTIN) 250 MG/5ML solution  6 mLs by PEG Tube route 3 times daily.              levothyroxine (SYNTHROID) 88 MCG tablet  1 tablet by PEG Tube route Daily             magnesium hydroxide (MILK OF MAGNESIA) 400 MG/5ML suspension  30 mLs by Per G Tube route daily as needed for Constipation             metoprolol tartrate (LOPRESSOR) 25 MG tablet  1 tablet by PEG Tube route 2 times daily             nitroGLYCERIN (NITROSTAT) 0.4 MG SL tablet  up to max of 3 total doses. per peg tube If no relief after 1 dose, call 911. potassium bicarbonate (K-LYTE) 25 MEQ disintegrating tablet  1 tablet by PEG Tube route 2 times daily             Psyllium (METAMUCIL) 28.3 % POWD  862 g by PEG Tube route daily Indications: oral one tbsp in 8 oz of liquid             saline nasal gel (AYR) GEL  1 each by Nasal route as needed for Congestion             ticagrelor (BRILINTA) 90 MG TABS tablet  1 tablet by PEG Tube route 2 times daily                  Hospital Course/Procedures:60year-old with multiple sclerosis and coronary artery disease presented to the emergency room on 3/8/2020 with fever cough and possible UTI. She initially was admitted to MICU due to possible urosepsis and low blood pressure. She was placed on IV fluids and empirically treated with IV vancomycin and cefepime. She was positive for influenza A and started on Tamiflu. Blood cultures were negative ruling out sepsis. Urine culture was positive for 100,000 Escherichia coli which infectious disease thought was secondary to her indwelling Elizondo catheter. Antibiotics were discontinued and Tamiflu was continued and patient was discharged back to the nursing home in stable condition on 3/11/2020.   Consultants Following:infectious disease, critical care    Disposition:ECF    Follow-up:she will be followed by the facility physician      Jasmyne Arroyo  4/28/2020  7:42 PM

## 2020-04-30 LAB
BLOOD CULTURE, ROUTINE: NORMAL
CULTURE, BLOOD 2: NORMAL

## 2020-05-22 ENCOUNTER — HOSPITAL ENCOUNTER (OUTPATIENT)
Age: 65
Discharge: HOME | End: 2020-05-24
Payer: MEDICAID

## 2020-05-22 LAB
BASOPHILS ABSOLUTE: 0.05 E9/L (ref 0–0.2)
BASOPHILS RELATIVE PERCENT: 0.7 % (ref 0–2)
EOSINOPHILS ABSOLUTE: 0.37 E9/L (ref 0.05–0.5)
EOSINOPHILS RELATIVE PERCENT: 5 % (ref 0–6)
HCT VFR BLD CALC: 39.1 % (ref 34–48)
HEMOGLOBIN: 12.1 G/DL (ref 11.5–15.5)
IMMATURE GRANULOCYTES #: 0.03 E9/L
IMMATURE GRANULOCYTES %: 0.4 % (ref 0–5)
LYMPHOCYTES ABSOLUTE: 1.71 E9/L (ref 1.5–4)
LYMPHOCYTES RELATIVE PERCENT: 23.3 % (ref 20–42)
MCH RBC QN AUTO: 29.1 PG (ref 26–35)
MCHC RBC AUTO-ENTMCNC: 30.9 % (ref 32–34.5)
MCV RBC AUTO: 94 FL (ref 80–99.9)
MONOCYTES ABSOLUTE: 0.8 E9/L (ref 0.1–0.95)
MONOCYTES RELATIVE PERCENT: 10.9 % (ref 2–12)
NEUTROPHILS ABSOLUTE: 4.37 E9/L (ref 1.8–7.3)
NEUTROPHILS RELATIVE PERCENT: 59.7 % (ref 43–80)
PDW BLD-RTO: 15.9 FL (ref 11.5–15)
PLATELET # BLD: 307 E9/L (ref 130–450)
PMV BLD AUTO: 11.1 FL (ref 7–12)
RBC # BLD: 4.16 E12/L (ref 3.5–5.5)
WBC # BLD: 7.3 E9/L (ref 4.5–11.5)

## 2020-05-22 PROCEDURE — 36415 COLL VENOUS BLD VENIPUNCTURE: CPT

## 2020-05-22 PROCEDURE — 85025 COMPLETE CBC W/AUTO DIFF WBC: CPT

## 2020-05-27 ENCOUNTER — HOSPITAL ENCOUNTER (OUTPATIENT)
Age: 65
Discharge: HOME | End: 2020-05-29
Payer: MEDICAID

## 2020-05-27 PROCEDURE — U0003 INFECTIOUS AGENT DETECTION BY NUCLEIC ACID (DNA OR RNA); SEVERE ACUTE RESPIRATORY SYNDROME CORONAVIRUS 2 (SARS-COV-2) (CORONAVIRUS DISEASE [COVID-19]), AMPLIFIED PROBE TECHNIQUE, MAKING USE OF HIGH THROUGHPUT TECHNOLOGIES AS DESCRIBED BY CMS-2020-01-R: HCPCS

## 2020-05-28 ENCOUNTER — HOSPITAL ENCOUNTER (OUTPATIENT)
Age: 65
Discharge: HOME | End: 2020-05-30
Payer: MEDICAID

## 2020-05-28 LAB
SARS-COV-2: NOT DETECTED
SOURCE: NORMAL

## 2020-05-28 PROCEDURE — U0003 INFECTIOUS AGENT DETECTION BY NUCLEIC ACID (DNA OR RNA); SEVERE ACUTE RESPIRATORY SYNDROME CORONAVIRUS 2 (SARS-COV-2) (CORONAVIRUS DISEASE [COVID-19]), AMPLIFIED PROBE TECHNIQUE, MAKING USE OF HIGH THROUGHPUT TECHNOLOGIES AS DESCRIBED BY CMS-2020-01-R: HCPCS

## 2020-05-31 LAB
SARS-COV-2: NOT DETECTED
SOURCE: NORMAL

## 2020-06-11 ENCOUNTER — HOSPITAL ENCOUNTER (INPATIENT)
Age: 65
LOS: 1 days | Discharge: NURSING, REHAB OR LONG-TERM CARE | DRG: 137 | End: 2020-06-13
Attending: EMERGENCY MEDICINE | Admitting: FAMILY MEDICINE
Payer: MEDICAID

## 2020-06-11 ENCOUNTER — APPOINTMENT (OUTPATIENT)
Dept: CT IMAGING | Age: 65
DRG: 137 | End: 2020-06-11
Payer: MEDICAID

## 2020-06-11 ENCOUNTER — APPOINTMENT (OUTPATIENT)
Dept: GENERAL RADIOLOGY | Age: 65
DRG: 137 | End: 2020-06-11
Payer: MEDICAID

## 2020-06-11 LAB
ABO/RH: NORMAL
ALBUMIN SERPL-MCNC: 3.7 G/DL (ref 3.5–5.2)
ALP BLD-CCNC: 135 U/L (ref 35–104)
ALT SERPL-CCNC: 31 U/L (ref 0–32)
ANION GAP SERPL CALCULATED.3IONS-SCNC: 11 MMOL/L (ref 7–16)
ANTIBODY SCREEN: NORMAL
AST SERPL-CCNC: 43 U/L (ref 0–31)
BACTERIA: ABNORMAL /HPF
BASOPHILS ABSOLUTE: 0.07 E9/L (ref 0–0.2)
BASOPHILS RELATIVE PERCENT: 0.8 % (ref 0–2)
BILIRUB SERPL-MCNC: 0.2 MG/DL (ref 0–1.2)
BILIRUBIN URINE: NEGATIVE
BLOOD, URINE: ABNORMAL
BUN BLDV-MCNC: 13 MG/DL (ref 8–23)
CALCIUM SERPL-MCNC: 8.9 MG/DL (ref 8.6–10.2)
CHLORIDE BLD-SCNC: 101 MMOL/L (ref 98–107)
CLARITY: ABNORMAL
CO2: 29 MMOL/L (ref 22–29)
COLOR: YELLOW
CREAT SERPL-MCNC: 0.7 MG/DL (ref 0.5–1)
CRYSTALS, UA: ABNORMAL /HPF
CRYSTALS, UA: ABNORMAL /HPF
EOSINOPHILS ABSOLUTE: 0.39 E9/L (ref 0.05–0.5)
EOSINOPHILS RELATIVE PERCENT: 4.5 % (ref 0–6)
GFR AFRICAN AMERICAN: >60
GFR NON-AFRICAN AMERICAN: >60 ML/MIN/1.73
GLUCOSE BLD-MCNC: 113 MG/DL (ref 74–99)
GLUCOSE URINE: NEGATIVE MG/DL
HCT VFR BLD CALC: 38 % (ref 34–48)
HEMOGLOBIN: 11.8 G/DL (ref 11.5–15.5)
IMMATURE GRANULOCYTES #: 0.02 E9/L
IMMATURE GRANULOCYTES %: 0.2 % (ref 0–5)
KETONES, URINE: NEGATIVE MG/DL
LACTIC ACID: 1.2 MMOL/L (ref 0.5–2.2)
LEUKOCYTE ESTERASE, URINE: ABNORMAL
LYMPHOCYTES ABSOLUTE: 1.38 E9/L (ref 1.5–4)
LYMPHOCYTES RELATIVE PERCENT: 16 % (ref 20–42)
MAGNESIUM: 2.2 MG/DL (ref 1.6–2.6)
MCH RBC QN AUTO: 29.6 PG (ref 26–35)
MCHC RBC AUTO-ENTMCNC: 31.1 % (ref 32–34.5)
MCV RBC AUTO: 95.2 FL (ref 80–99.9)
MONOCYTES ABSOLUTE: 0.81 E9/L (ref 0.1–0.95)
MONOCYTES RELATIVE PERCENT: 9.4 % (ref 2–12)
NEUTROPHILS ABSOLUTE: 5.94 E9/L (ref 1.8–7.3)
NEUTROPHILS RELATIVE PERCENT: 69.1 % (ref 43–80)
NITRITE, URINE: NEGATIVE
PDW BLD-RTO: 16.1 FL (ref 11.5–15)
PH UA: >=9 (ref 5–9)
PLATELET # BLD: 394 E9/L (ref 130–450)
PMV BLD AUTO: 10.7 FL (ref 7–12)
POTASSIUM SERPL-SCNC: 5.2 MMOL/L (ref 3.5–5)
PRO-BNP: 313 PG/ML (ref 0–125)
PROTEIN UA: ABNORMAL MG/DL
RBC # BLD: 3.99 E12/L (ref 3.5–5.5)
RBC UA: ABNORMAL /HPF (ref 0–2)
REASON FOR REJECTION: NORMAL
REJECTED TEST: NORMAL
SARS-COV-2, NAAT: DETECTED
SODIUM BLD-SCNC: 141 MMOL/L (ref 132–146)
SPECIFIC GRAVITY UA: 1.01 (ref 1–1.03)
TOTAL PROTEIN: 6.6 G/DL (ref 6.4–8.3)
TROPONIN: <0.01 NG/ML (ref 0–0.03)
UROBILINOGEN, URINE: 1 E.U./DL
WBC # BLD: 8.6 E9/L (ref 4.5–11.5)
WBC UA: ABNORMAL /HPF (ref 0–5)

## 2020-06-11 PROCEDURE — U0002 COVID-19 LAB TEST NON-CDC: HCPCS

## 2020-06-11 PROCEDURE — 80053 COMPREHEN METABOLIC PANEL: CPT

## 2020-06-11 PROCEDURE — 83880 ASSAY OF NATRIURETIC PEPTIDE: CPT

## 2020-06-11 PROCEDURE — 74177 CT ABD & PELVIS W/CONTRAST: CPT

## 2020-06-11 PROCEDURE — 86850 RBC ANTIBODY SCREEN: CPT

## 2020-06-11 PROCEDURE — 6360000004 HC RX CONTRAST MEDICATION: Performed by: RADIOLOGY

## 2020-06-11 PROCEDURE — 85025 COMPLETE CBC W/AUTO DIFF WBC: CPT

## 2020-06-11 PROCEDURE — 2580000003 HC RX 258: Performed by: NURSE PRACTITIONER

## 2020-06-11 PROCEDURE — 36415 COLL VENOUS BLD VENIPUNCTURE: CPT

## 2020-06-11 PROCEDURE — 83605 ASSAY OF LACTIC ACID: CPT

## 2020-06-11 PROCEDURE — 84484 ASSAY OF TROPONIN QUANT: CPT

## 2020-06-11 PROCEDURE — 99284 EMERGENCY DEPT VISIT MOD MDM: CPT

## 2020-06-11 PROCEDURE — 2580000003 HC RX 258: Performed by: RADIOLOGY

## 2020-06-11 PROCEDURE — 81001 URINALYSIS AUTO W/SCOPE: CPT

## 2020-06-11 PROCEDURE — 86900 BLOOD TYPING SEROLOGIC ABO: CPT

## 2020-06-11 PROCEDURE — 71045 X-RAY EXAM CHEST 1 VIEW: CPT

## 2020-06-11 PROCEDURE — 6360000002 HC RX W HCPCS: Performed by: NURSE PRACTITIONER

## 2020-06-11 PROCEDURE — 86901 BLOOD TYPING SEROLOGIC RH(D): CPT

## 2020-06-11 PROCEDURE — 83735 ASSAY OF MAGNESIUM: CPT

## 2020-06-11 PROCEDURE — 93005 ELECTROCARDIOGRAM TRACING: CPT | Performed by: NURSE PRACTITIONER

## 2020-06-11 PROCEDURE — 94761 N-INVAS EAR/PLS OXIMETRY MLT: CPT

## 2020-06-11 PROCEDURE — C9113 INJ PANTOPRAZOLE SODIUM, VIA: HCPCS | Performed by: NURSE PRACTITIONER

## 2020-06-11 PROCEDURE — 96365 THER/PROPH/DIAG IV INF INIT: CPT

## 2020-06-11 PROCEDURE — 96375 TX/PRO/DX INJ NEW DRUG ADDON: CPT

## 2020-06-11 RX ORDER — 0.9 % SODIUM CHLORIDE 0.9 %
1000 INTRAVENOUS SOLUTION INTRAVENOUS ONCE
Status: COMPLETED | OUTPATIENT
Start: 2020-06-11 | End: 2020-06-11

## 2020-06-11 RX ORDER — SODIUM CHLORIDE 0.9 % (FLUSH) 0.9 %
10 SYRINGE (ML) INJECTION PRN
Status: DISCONTINUED | OUTPATIENT
Start: 2020-06-11 | End: 2020-06-12

## 2020-06-11 RX ORDER — PANTOPRAZOLE SODIUM 40 MG/10ML
40 INJECTION, POWDER, LYOPHILIZED, FOR SOLUTION INTRAVENOUS ONCE
Status: COMPLETED | OUTPATIENT
Start: 2020-06-11 | End: 2020-06-11

## 2020-06-11 RX ADMIN — PANTOPRAZOLE SODIUM 40 MG: 40 INJECTION, POWDER, FOR SOLUTION INTRAVENOUS at 21:32

## 2020-06-11 RX ADMIN — IOPAMIDOL 500 ML: 755 INJECTION, SOLUTION INTRAVENOUS at 22:58

## 2020-06-11 RX ADMIN — Medication 10 ML: at 22:59

## 2020-06-11 RX ADMIN — CEFEPIME HYDROCHLORIDE 2 G: 2 INJECTION, POWDER, FOR SOLUTION INTRAVENOUS at 23:14

## 2020-06-11 RX ADMIN — SODIUM CHLORIDE 1000 ML: 9 INJECTION, SOLUTION INTRAVENOUS at 21:34

## 2020-06-11 NOTE — ED NOTES
Bed: 35  Expected date: 6/11/20  Expected time:   Means of arrival:   Comments:  OMNI PEG TUBE     Ayse Hargrove RN  06/11/20 0558

## 2020-06-11 NOTE — ED PROVIDER NOTES
ED Physician   HPI:  6/11/20, Time: 7:27 PM EDT         Adria Callejas is a 60 y.o. female presenting to the ED for patria occult blood noticed in her PEG tube. Patient reports that she has noticed this blood in her PEG tube for 1 week but the nursing home noticed it today. Patient had a right breast mastectomy with sentinel node dissection on 3/03/20 with Dr. Amanda Villanueva. Plan of care for right breast carcinoma undetermined related to pandemic. Patient presented to the ED on 4/25/20 with a chief complaint of fever. Patient was noted to be COVID-19 positive during that ED visit. Patient has been COVID-19 tested on 5/26/2020 not detected and 5/28/2020 not detected. Patient has an extensive past medical history including multiple sclerosis heart failure atrial septal myocardial infarction non-STEMI. Patient's home medications include Eliquis and Brilinta for history of DVT. Patient denies any shortness of breath, fever, chest pain, any black dark tarry stools, nausea or vomiting. Review of Systems:   Pertinent positives and negatives are stated within HPI, all other systems reviewed and are negative.          --------------------------------------------- PAST HISTORY ---------------------------------------------  Past Medical History:  has a past medical history of Anxiety, Breast cancer ([facility] [address] CAD (coronary artery disease), Cystitis, Depression, Elizondo catheter status, History of blood transfusion, Hyperlipidemia, Hypertension, Hypothyroidism, MS (multiple sclerosis) ([facility] [facility] Neurogenic bladder, Sepsis ([facility] [facility] Total self-care deficit, Unspecified cerebral artery occlusion with cerebral infarction, and Wheelchair dependent. Past Surgical History:  has a past surgical history that includes Hysterectomy (2001); Dental surgery; Colon surgery (2001); Coronary angioplasty with stent (06/28/2019); Upper gastrointestinal endoscopy (N/A, 12/12/2019);  Colonoscopy (N/A, 2/4/2020); and Mastectomy, modified radical (Right, Sodium 141 132 - 146 mmol/L    Potassium 5.2 (H) 3.5 - 5.0 mmol/L    Chloride 101 98 - 107 mmol/L    CO2 29 22 - 29 mmol/L    Anion Gap 11 7 - 16 mmol/L    Glucose 113 (H) 74 - 99 mg/dL    BUN 13 8 - 23 mg/dL    CREATININE 0.7 0.5 - 1.0 mg/dL    GFR Non-African American >60 >=60 mL/min/1.73    GFR African American >60     Calcium 8.9 8.6 - 10.2 mg/dL    Total Protein 6.6 6.4 - 8.3 g/dL    Alb 3.7 3.5 - 5.2 g/dL    Total Bilirubin 0.2 0.0 - 1.2 mg/dL    Alkaline Phosphatase 135 (H) 35 - 104 U/L    ALT 31 0 - 32 U/L    AST 43 (H) 0 - 31 U/L   Urinalysis   Result Value Ref Range    Color, UA Yellow Straw/Yellow    Clarity, UA CLOUDY (A) Clear    Glucose, Ur Negative Negative mg/dL    Bilirubin Urine Negative Negative    Ketones, Urine Negative Negative mg/dL    Specific Gravity, UA 1.010 1.005 - 1.030    Blood, Urine LARGE (A) Negative    pH, UA >=9.0 5.0 - 9.0    Protein, UA TRACE Negative mg/dL    Urobilinogen, Urine 1.0 <2.0 E.U./dL    Nitrite, Urine Negative Negative    Leukocyte Esterase, Urine LARGE (A) Negative   Magnesium   Result Value Ref Range    Magnesium 2.2 1.6 - 2.6 mg/dL   COVID-19   Result Value Ref Range    SARS-CoV-2, NAAT DETECTED (AA) Not Detected   SPECIMEN REJECTION   Result Value Ref Range    Rejected Test PT,PTT     Reason for Rejection see below    Microscopic Urinalysis   Result Value Ref Range    WBC, UA 5-10 (A) 0 - 5 /HPF    RBC, UA 10-20 (A) 0 - 2 /HPF    Bacteria, UA MANY (A) None Seen /HPF    Crystals, UA Moderate (A) None Seen /HPF    Crystals, UA Few (A) None Seen /HPF   TYPE AND SCREEN   Result Value Ref Range    ABO/Rh A POS     Antibody Screen NEG        RADIOLOGY:  Interpreted by Radiologist.  CTA CHEST W CONTRAST   Final Result   1. No acute pulmonary emboli. 2. Minimal ground-glass opacities within the upper and mid lung zones, possibly    secondary to pneumonitis, although hydrostatic/cardiogenic pulmonary edema    could produce this appearance.        This report has been catheter related to history of MS, has history of ESBL in urine. Extremities: Weak bilateral upper extremities related to MS, bilateral foot drop, patient reports that she gets around in a wheelchair and Alicia Odon lift while at nursing home. Skin: warm and dry without rash  Neurologic: GCS 15, cranial nerves II through XII intact, speech clear and appropriate  Psych: Normal Affect      ------------------------------ ED COURSE/MEDICAL DECISION MAKING----------------------  Medications   sodium chloride flush 0.9 % injection 10 mL (10 mLs Intravenous Given 6/11/20 2259)   iopamidol (ISOVUE-370) 76 % injection 60 mL (has no administration in time range)   sodium chloride flush 0.9 % injection 10 mL (has no administration in time range)   0.9 % sodium chloride bolus (0 mLs Intravenous Stopped 6/11/20 2314)   pantoprazole (PROTONIX) injection 40 mg (40 mg Intravenous Given 6/11/20 2132)   cefepime (MAXIPIME) 2 g IVPB extended (mini-bag) (0 g Intravenous Stopped 6/12/20 0004)   iopamidol (ISOVUE-370) 76 % injection 110 mL (500 mLs Intravenous Given 6/11/20 2258)         ED COURSE:       Medical Decision Making: We will obtain labs to rule out any infectious etiology versus possibly bleeding. Will obtain twelve-lead EKG and troponin to rule out any acute cardiac events. Patient has history of ESBL in urine, will obtain urinalysis. Will obtain BNP and portable chest x-ray, patient recently tested positive for COVID-19 on 4/25/2020. Patient is currently living in nursing home will test patient for COVID-19. Will obtain gastric occult and Hemoccult to look for possible sources of bleeding, will prepare type and screen. Chest x-ray resulted showing no evidence of any acute cardiopulmonary process. Performed Gastro and Hemoccult, Gastro occult tested positive, Hemoccult tested negative. Chest x-ray shows no evidence of acute pulmonary process.   WBC 8.6, RBCs 3.9, hemoglobin 11.8 compared to 5/22/20 and had RBCs of 4.16 and results, in addition to providing specific details for the plan of care and counseling regarding the diagnosis and prognosis. Questions are answered at this time and they are agreeable with the plan.      --------------------------------- IMPRESSION AND DISPOSITION ---------------------------------    IMPRESSION  1. COVID-19    2. Abdominal pain, unspecified abdominal location    3. Urinary tract infection associated with indwelling urethral catheter, initial encounter ([facility] [address] [facility]    4. Gastrointestinal hemorrhage, unspecified gastrointestinal hemorrhage type    5. Hypoxia    6. Liver lesion        DISPOSITION  Disposition: Admit to telemetry  Patient condition is good      NOTE: This report was transcribed using voice recognition software. Every effort was made to ensure accuracy; however, inadvertent computerized transcription errors may be present       Luba Arredondo, VERENA - [facility]  06/12/20 [address] [facility], VERENA - [facility]  06/12/20 7113    ATTENDING PROVIDER ATTESTATION:     I have personally performed and/or participated in the history, exam, medical decision making, and procedures and agree with all pertinent clinical information. I have also reviewed and agree with the past medical, family and social history unless otherwise noted. My findings/Plan: Patient presented because of noted blood in her PEG tube. Patient is on Eliquis as well as oral anti-. Patient reporting some mild abdominal pain around PEG tube site. Patient reporting no hematemesis. Patient reporting no black or tarry stools. There is no history of chest pain. Patient here was being worked up for GI bleed. Labs noted and reviewed. EKG was also noted. While here in the emergency department it was noted that patient was hypoxic and went down to 88%. Patient does have a history of COVID positive. But that was in April. Patient was rechecked here again due to her hypoxia and history was positive.   Patient underwent CT of her chest as well as CT of her abdomen. CT shows no PE but does show pneumonitis. Patient is afebrile here. Patient is currently on supplemental oxygen she is in no respiratory distress. Plan will be to admit to monitored bed call will be placed to [facility] internal medicine.          Giovanni Ahumada MD  06/12/20 2368 Sonja Rausch MD  06/12/20 2942

## 2020-06-12 ENCOUNTER — APPOINTMENT (OUTPATIENT)
Dept: CT IMAGING | Age: 65
DRG: 137 | End: 2020-06-12
Payer: MEDICAID

## 2020-06-12 PROBLEM — K92.2 GI BLEED: Status: ACTIVE | Noted: 2020-06-12

## 2020-06-12 LAB
EKG ATRIAL RATE: 66 BPM
EKG P AXIS: 47 DEGREES
EKG P-R INTERVAL: 222 MS
EKG Q-T INTERVAL: 432 MS
EKG QRS DURATION: 68 MS
EKG QTC CALCULATION (BAZETT): 452 MS
EKG R AXIS: -15 DEGREES
EKG T AXIS: 52 DEGREES
EKG VENTRICULAR RATE: 66 BPM
HCT VFR BLD CALC: 36.8 % (ref 34–48)
HEMOGLOBIN: 11.5 G/DL (ref 11.5–15.5)
INR BLD: 1.2
MCH RBC QN AUTO: 29.8 PG (ref 26–35)
MCHC RBC AUTO-ENTMCNC: 31.3 % (ref 32–34.5)
MCV RBC AUTO: 95.3 FL (ref 80–99.9)
PDW BLD-RTO: 16.2 FL (ref 11.5–15)
PLATELET # BLD: 372 E9/L (ref 130–450)
PMV BLD AUTO: 10.5 FL (ref 7–12)
PROTHROMBIN TIME: 13.7 SEC (ref 9.3–12.4)
RBC # BLD: 3.86 E12/L (ref 3.5–5.5)
REASON FOR REJECTION: NORMAL
REJECTED TEST: NORMAL
TSH SERPL DL<=0.05 MIU/L-ACNC: 3.91 UIU/ML (ref 0.27–4.2)
WBC # BLD: 9 E9/L (ref 4.5–11.5)

## 2020-06-12 PROCEDURE — 85610 PROTHROMBIN TIME: CPT

## 2020-06-12 PROCEDURE — 6370000000 HC RX 637 (ALT 250 FOR IP): Performed by: INTERNAL MEDICINE

## 2020-06-12 PROCEDURE — 87077 CULTURE AEROBIC IDENTIFY: CPT

## 2020-06-12 PROCEDURE — 36415 COLL VENOUS BLD VENIPUNCTURE: CPT

## 2020-06-12 PROCEDURE — 2140000000 HC CCU INTERMEDIATE R&B

## 2020-06-12 PROCEDURE — 2580000003 HC RX 258: Performed by: RADIOLOGY

## 2020-06-12 PROCEDURE — 86300 IMMUNOASSAY TUMOR CA 15-3: CPT

## 2020-06-12 PROCEDURE — C9113 INJ PANTOPRAZOLE SODIUM, VIA: HCPCS | Performed by: FAMILY MEDICINE

## 2020-06-12 PROCEDURE — 87186 SC STD MICRODIL/AGAR DIL: CPT

## 2020-06-12 PROCEDURE — 71275 CT ANGIOGRAPHY CHEST: CPT

## 2020-06-12 PROCEDURE — 85027 COMPLETE CBC AUTOMATED: CPT

## 2020-06-12 PROCEDURE — 6360000002 HC RX W HCPCS: Performed by: INTERNAL MEDICINE

## 2020-06-12 PROCEDURE — 84443 ASSAY THYROID STIM HORMONE: CPT

## 2020-06-12 PROCEDURE — 2580000003 HC RX 258: Performed by: FAMILY MEDICINE

## 2020-06-12 PROCEDURE — 6360000002 HC RX W HCPCS: Performed by: FAMILY MEDICINE

## 2020-06-12 PROCEDURE — C9113 INJ PANTOPRAZOLE SODIUM, VIA: HCPCS | Performed by: INTERNAL MEDICINE

## 2020-06-12 PROCEDURE — 6370000000 HC RX 637 (ALT 250 FOR IP): Performed by: FAMILY MEDICINE

## 2020-06-12 PROCEDURE — 87088 URINE BACTERIA CULTURE: CPT

## 2020-06-12 RX ORDER — ACETAMINOPHEN 325 MG/1
650 TABLET ORAL ONCE
Status: CANCELLED | OUTPATIENT
Start: 2020-06-12

## 2020-06-12 RX ORDER — SODIUM CHLORIDE 9 MG/ML
INJECTION, SOLUTION INTRAVENOUS CONTINUOUS
Status: DISCONTINUED | OUTPATIENT
Start: 2020-06-12 | End: 2020-06-14 | Stop reason: HOSPADM

## 2020-06-12 RX ORDER — METHYLPREDNISOLONE SODIUM SUCCINATE 125 MG/2ML
50 INJECTION, POWDER, LYOPHILIZED, FOR SOLUTION INTRAMUSCULAR; INTRAVENOUS ONCE
Status: CANCELLED
Start: 2020-06-12

## 2020-06-12 RX ORDER — ACETAMINOPHEN 325 MG/1
1000 TABLET ORAL EVERY 4 HOURS PRN
COMMUNITY
End: 2022-02-28 | Stop reason: DRUGHIGH

## 2020-06-12 RX ORDER — SODIUM CHLORIDE 9 MG/ML
INJECTION, SOLUTION INTRAVENOUS CONTINUOUS
Status: CANCELLED | OUTPATIENT
Start: 2020-06-12

## 2020-06-12 RX ORDER — ONDANSETRON 2 MG/ML
4 INJECTION INTRAMUSCULAR; INTRAVENOUS EVERY 6 HOURS PRN
Status: DISCONTINUED | OUTPATIENT
Start: 2020-06-12 | End: 2020-06-14 | Stop reason: HOSPADM

## 2020-06-12 RX ORDER — PANTOPRAZOLE SODIUM 40 MG/10ML
40 INJECTION, POWDER, LYOPHILIZED, FOR SOLUTION INTRAVENOUS 2 TIMES DAILY
Status: DISCONTINUED | OUTPATIENT
Start: 2020-06-12 | End: 2020-06-14 | Stop reason: HOSPADM

## 2020-06-12 RX ORDER — POLYETHYLENE GLYCOL 3350 17 G/17G
17 POWDER, FOR SOLUTION ORAL DAILY PRN
Status: DISCONTINUED | OUTPATIENT
Start: 2020-06-12 | End: 2020-06-14 | Stop reason: HOSPADM

## 2020-06-12 RX ORDER — LEVOTHYROXINE SODIUM 88 UG/1
88 TABLET ORAL DAILY
Status: DISCONTINUED | OUTPATIENT
Start: 2020-06-12 | End: 2020-06-14 | Stop reason: HOSPADM

## 2020-06-12 RX ORDER — ATORVASTATIN CALCIUM 80 MG/1
80 TABLET, FILM COATED ORAL NIGHTLY
Status: DISCONTINUED | OUTPATIENT
Start: 2020-06-12 | End: 2020-06-14 | Stop reason: HOSPADM

## 2020-06-12 RX ORDER — PROMETHAZINE HYDROCHLORIDE 25 MG/1
12.5 TABLET ORAL EVERY 6 HOURS PRN
Status: DISCONTINUED | OUTPATIENT
Start: 2020-06-12 | End: 2020-06-14 | Stop reason: HOSPADM

## 2020-06-12 RX ORDER — PANTOPRAZOLE SODIUM 40 MG/10ML
40 INJECTION, POWDER, LYOPHILIZED, FOR SOLUTION INTRAVENOUS DAILY
Status: DISCONTINUED | OUTPATIENT
Start: 2020-06-12 | End: 2020-06-12

## 2020-06-12 RX ORDER — ACETAMINOPHEN 325 MG/1
650 TABLET ORAL EVERY 6 HOURS PRN
Status: DISCONTINUED | OUTPATIENT
Start: 2020-06-12 | End: 2020-06-14 | Stop reason: HOSPADM

## 2020-06-12 RX ORDER — METHYLPREDNISOLONE SODIUM SUCCINATE 125 MG/2ML
125 INJECTION, POWDER, LYOPHILIZED, FOR SOLUTION INTRAMUSCULAR; INTRAVENOUS ONCE
Status: CANCELLED | OUTPATIENT
Start: 2020-06-12

## 2020-06-12 RX ORDER — DIPHENHYDRAMINE HYDROCHLORIDE 50 MG/ML
25 INJECTION INTRAMUSCULAR; INTRAVENOUS ONCE
Status: CANCELLED
Start: 2020-06-12

## 2020-06-12 RX ORDER — SODIUM CHLORIDE 0.9 % (FLUSH) 0.9 %
10 SYRINGE (ML) INJECTION PRN
Status: COMPLETED | OUTPATIENT
Start: 2020-06-12 | End: 2020-06-12

## 2020-06-12 RX ORDER — FLUTICASONE PROPIONATE 50 MCG
1 SPRAY, SUSPENSION (ML) NASAL DAILY
Status: DISCONTINUED | OUTPATIENT
Start: 2020-06-12 | End: 2020-06-14 | Stop reason: HOSPADM

## 2020-06-12 RX ORDER — GABAPENTIN 250 MG/5ML
300 SOLUTION ORAL 3 TIMES DAILY
Status: DISCONTINUED | OUTPATIENT
Start: 2020-06-12 | End: 2020-06-14 | Stop reason: HOSPADM

## 2020-06-12 RX ORDER — DIPHENHYDRAMINE HYDROCHLORIDE 50 MG/ML
50 INJECTION INTRAMUSCULAR; INTRAVENOUS ONCE
Status: CANCELLED | OUTPATIENT
Start: 2020-06-12

## 2020-06-12 RX ORDER — FLUOXETINE HYDROCHLORIDE 20 MG/1
20 CAPSULE ORAL 2 TIMES DAILY
Status: DISCONTINUED | OUTPATIENT
Start: 2020-06-12 | End: 2020-06-14 | Stop reason: HOSPADM

## 2020-06-12 RX ORDER — PANTOPRAZOLE SODIUM 40 MG/1
40 TABLET, DELAYED RELEASE ORAL DAILY
Qty: 14 TABLET | Refills: 0 | Status: SHIPPED | OUTPATIENT
Start: 2020-06-12 | End: 2020-12-14

## 2020-06-12 RX ORDER — DIPHENHYDRAMINE HYDROCHLORIDE 50 MG/ML
25 INJECTION INTRAMUSCULAR; INTRAVENOUS ONCE
Status: CANCELLED | OUTPATIENT
Start: 2020-06-12

## 2020-06-12 RX ORDER — SODIUM CHLORIDE 0.9 % (FLUSH) 0.9 %
10 SYRINGE (ML) INJECTION PRN
Status: CANCELLED | OUTPATIENT
Start: 2020-06-12

## 2020-06-12 RX ORDER — EPINEPHRINE 1 MG/ML
0.3 INJECTION, SOLUTION, CONCENTRATE INTRAVENOUS PRN
Status: CANCELLED | OUTPATIENT
Start: 2020-06-12

## 2020-06-12 RX ORDER — METHYLPREDNISOLONE SODIUM SUCCINATE 125 MG/2ML
100 INJECTION, POWDER, LYOPHILIZED, FOR SOLUTION INTRAMUSCULAR; INTRAVENOUS ONCE
Status: CANCELLED | OUTPATIENT
Start: 2020-06-12

## 2020-06-12 RX ADMIN — APIXABAN 5 MG: 5 TABLET, FILM COATED ORAL at 13:02

## 2020-06-12 RX ADMIN — PANTOPRAZOLE SODIUM 40 MG: 40 INJECTION, POWDER, FOR SOLUTION INTRAVENOUS at 22:07

## 2020-06-12 RX ADMIN — GABAPENTIN 300 MG: 250 SUSPENSION ORAL at 22:07

## 2020-06-12 RX ADMIN — ATORVASTATIN CALCIUM 80 MG: 80 TABLET, FILM COATED ORAL at 22:06

## 2020-06-12 RX ADMIN — SODIUM CHLORIDE, PRESERVATIVE FREE 10 ML: 5 INJECTION INTRAVENOUS at 09:27

## 2020-06-12 RX ADMIN — GABAPENTIN 300 MG: 250 SUSPENSION ORAL at 11:29

## 2020-06-12 RX ADMIN — LEVOTHYROXINE SODIUM 88 MCG: 0.09 TABLET ORAL at 09:27

## 2020-06-12 RX ADMIN — TICAGRELOR 90 MG: 90 TABLET ORAL at 13:02

## 2020-06-12 RX ADMIN — TICAGRELOR 90 MG: 90 TABLET ORAL at 22:06

## 2020-06-12 RX ADMIN — FLUOXETINE HYDROCHLORIDE 20 MG: 20 CAPSULE ORAL at 09:27

## 2020-06-12 RX ADMIN — PANTOPRAZOLE SODIUM 40 MG: 40 INJECTION, POWDER, FOR SOLUTION INTRAVENOUS at 09:27

## 2020-06-12 RX ADMIN — APIXABAN 5 MG: 5 TABLET, FILM COATED ORAL at 22:07

## 2020-06-12 RX ADMIN — SODIUM CHLORIDE: 9 INJECTION, SOLUTION INTRAVENOUS at 06:53

## 2020-06-12 RX ADMIN — FLUTICASONE PROPIONATE 1 SPRAY: 50 SPRAY, METERED NASAL at 09:26

## 2020-06-12 RX ADMIN — FLUOXETINE HYDROCHLORIDE 20 MG: 20 CAPSULE ORAL at 22:06

## 2020-06-12 RX ADMIN — METOPROLOL TARTRATE 25 MG: 25 TABLET, FILM COATED ORAL at 09:27

## 2020-06-12 RX ADMIN — METOPROLOL TARTRATE 25 MG: 25 TABLET, FILM COATED ORAL at 22:06

## 2020-06-12 ASSESSMENT — PAIN SCALES - GENERAL
PAINLEVEL_OUTOF10: 0
PAINLEVEL_OUTOF10: 0

## 2020-06-12 NOTE — H&P
Hospital Medicine History & Physical      PCP: Peter Renee DO    Date of Admission: 6/11/2020    Date of Service: Pt seen/examined on 6/12/2020 and Admitted to Inpatient with expected LOS greater than two midnights due to medical therapy. Chief Complaint: Blood in PEG tube      History Of Present Illness:      60 y.o. female history of breast cancer, CAD, hypertension, MS with neurogenic bladder who presents with blood in PEG tube. She states that she noticed blood in PEG tube for 1 week. However she states the SNF noted yesterday. The patient is on Eliquis and Brilinta for DVT and CAD. She had recent right breast mastectomy on 3/320 and also ER visit on 4/25/2024 COVID-19. In ER, she was normotensive and afebrile. She was hypoxic at 88% thus placed on 2 L nasal cannula. Lab work was pertinent for elevated AST, hemoglobin 11.8 [12.1 on 5/22]. UA consistent with UTI. COVID-19 test positive. CTA chest showed minimal groundglass opacities with an upper and midlung zones. CT Abdo/pelvis showed 6 x 6 cm focus of hyperenhancement within the inferior aspect of right lobe of liver [malignancy cannot be excluded] couple multiple stable lesions in hepatic parenchyma, small rectal fecal impaction, soft tissue mass adjacent to rectum, third spacing in the subcutaneous tissues of the hips.     Past Medical History:          Diagnosis Date    Anxiety     Breast cancer ([address] 02/2020    right    CAD (coronary artery disease)     Cystitis     Depression     Elizondo catheter status     History of blood transfusion     Hyperlipidemia     Hypertension 08/27/2018    Hypothyroidism     MS (multiple sclerosis) ([address]     wheelchair bound    Neurogenic bladder 08/09/2018    Sepsis ([address]     uti hospitalized 11-12/2019    Total self-care deficit     Unspecified cerebral artery occlusion with cerebral infarction 2011    left her incontinent    Wheelchair dependent        Past Surgical History: Procedure Laterality Date    COLON SURGERY  2001    exp lap, lysis of adhesions, release of SBO. [facility]. Dr. Cassi Moss 2/4/2020    COLONOSCOPY DIAGNOSTIC performed by Franco Sanders MD at [place] [address]  06/28/2019    Dr. Sheyla Maldonado- Rt Wrist- 3.0/300mm Resolute-RCA, 2.75/22mm-Circumflex,     DENTAL SURGERY      all removed    HYSTERECTOMY  2001    [facility]. Dr. Barry Chappell, MODIFIED RADICAL Right 3/3/2020    RIGHT BREAST MASTECTOMY WITH SENTINEL NODE DISSECTION WITH BLUE DYE performed by Franco Sanders MD at [facility] N/A 12/12/2019    EGD PEG INSERTION performed by Franco Sanders MD at [address] [place] [address]       Medications Prior to Admission:      Prior to Admission medications    Medication Sig Start Date End Date Taking? Authorizing Provider   acetaminophen (TYLENOL) 325 MG tablet Take 650 mg by mouth every 6 hours as needed for Pain or Fever   Yes Historical Provider, MD   saline nasal gel (AYR) GEL 1 each by Nasal route as needed for Congestion   Yes Historical Provider, MD   albuterol (PROVENTIL) (2.5 MG/3ML) 0.083% nebulizer solution Take 3 mLs by nebulization every 4 hours (while awake)  Patient taking differently: Take 2.5 mg by nebulization every 4 hours (while awake) Take am dos if needed 12/16/19  Yes Lenin Barbour MD   gabapentin (NEURONTIN) 250 MG/5ML solution 6 mLs by PEG Tube route 3 times daily. Patient taking differently: Take 300 mg by mouth 3 times daily. Take am dos 12/16/19 1/15/24 Yes Lenin Barbour MD   nitroGLYCERIN (NITROSTAT) 0.4 MG SL tablet up to max of 3 total doses. per peg tube If no relief after 1 dose, call 911. 12/16/19  Yes Lenin Barbour MD   apixaban (ELIQUIS) 5 MG TABS tablet 1 tablet by Per G Tube route 2 times daily  Patient taking differently: 5 mg by Per G Tube route 2 times daily Hold 2 days per facility 12/16/19  Yes Lenin Barbour MD   FLUoxetine (PROZAC) 20 MG capsule 1 capsule by PEG Tube at [facility]    TOBACCO:   reports that she quit smoking about 20 years ago. Her smoking use included cigarettes. She started smoking about 25 years ago. She has a 5.00 pack-year smoking history. She has never used smokeless tobacco.  ETOH:   reports no history of alcohol use. E-Cigarettes Vaping or Juuling     Questions Responses    Vaping Use Never User    Start Date     Does device contain nicotine? Never    Quit Date     Vaping Type             Family History:      Reviewed in detail and negative for DM, CAD, Cancer, CVA. Positive as follows:        Problem Relation Age of Onset    Cancer Mother     Diabetes Father     Heart Disease Father        REVIEW OF SYSTEMS:   Pertinent positives as noted in the HPI. All other systems reviewed and negative. PHYSICAL EXAM PERFORMED:    /72   Pulse 66   Temp 98.7 °F (37.1 °C) (Infrared)   Resp 17   Ht 5' 6\" (1.676 m)   Wt 176 lb 9.6 oz (80.1 kg)   SpO2 96%   BMI 28.50 kg/m²     General appearance:  No apparent distress, appears stated age and cooperative. HEENT:  Normal cephalic, atraumatic without obvious deformity. Pupils equal, round, and reactive to light. Extra ocular muscles intact. Conjunctivae/corneas clear. Neck: Supple, with full range of motion. No jugular venous distention. Trachea midline. Respiratory:  Normal respiratory effort. Clear to auscultation, bilaterally without Rales/Wheezes/Rhonchi. Cardiovascular:  Regular rate and rhythm with normal S1/S2 without murmurs, rubs or gallops. Abdomen: Soft, non-tender, non-distended with normal bowel sounds. PEG tube in place  Musculoskeletal:  No clubbing, cyanosis or edema bilaterally. Full range of motion without deformity. Skin: Skin color, texture, turgor normal.  No rashes or lesions.   Neurologic: Cranial nerves: II-XII intact, grossly non-focal.  Psychiatric:  Alert and oriented, thought content appropriate, normal insight    Labs:     Recent Labs     06/11/20 1947 06/12/20  1100   WBC 8.6 9.0   HGB 11.8 11.5   HCT 38.0 36.8    372     Recent Labs     06/11/20 1947      K 5.2*      CO2 29   BUN 13   CREATININE 0.7   CALCIUM 8.9     Recent Labs     06/11/20 1947   AST 43*   ALT 31   BILITOT 0.2   ALKPHOS 135*     Recent Labs     06/12/20  0910   INR 1.2     Recent Labs     06/11/20 1947   TROPONINI <0.01       Urinalysis:      Lab Results   Component Value Date    NITRU Negative 06/11/2020    WBCUA 5-10 06/11/2020    BACTERIA MANY 06/11/2020    RBCUA 10-20 06/11/2020    BLOODU LARGE 06/11/2020    SPECGRAV 1.010 06/11/2020    GLUCOSEU Negative 06/11/2020       Radiology:     EKG: First degree AV block at 66 bpm, no ST elevation or depression    CTA CHEST W CONTRAST   Final Result   1. No acute pulmonary emboli. 2. Minimal ground-glass opacities within the upper and mid lung zones, possibly    secondary to pneumonitis, although hydrostatic/cardiogenic pulmonary edema    could produce this appearance. This report has been electronically signed by Michael Thomas MD.      Michael Additional Contrast? None   Final Result      6 x 6 cm focus of hyperenhancement within the inferior aspect of the   right lobe of liver is probably regenerating nodule with malignancy to   be excluded. Multiple stable lesions elsewhere within the hepatic parenchyma   probably are benign cyst or hemangioma. A percutaneous endogastric feeding tube appears to be appropriately   configured. Small rectal fecal impaction. Soft tissue mass adjacent to the right rectum was present on the prior   studies dating back to 2018. There is fluid within the subcutaneous tissues compatible with third   spacing, especially in the region of the hips. XR CHEST PORTABLE   Final Result      No evidence for acute cardiopulmonary process.             US DUP LOWER EXTREMITIES BILATERAL VENOUS    (Results Pending)       ASSESSMENT:    Active Hospital Problems    Diagnosis Date Noted    GI bleed [K92.2] 06/12/2020   #Acute GI bleed  -H&H every 6 hours changed to daily per Gen Surgery  -Continue IVF continue to hold Lasix. Resume diet  -Continue PPI  -Resume Eliquis and Brilinta per Gen Surgery  -Appreciate General Surgery recommendations    #Acute hypoxic respiratory failure-due to COVID-19 pneumonia  -Wean oxygen as tolerated  -ID consultation    #Recurrent COVID-19 infection versus persistent infection  -Supportive care. ID consultation has above. #History of DVT-no imaging of DVT in the past in epic system. Will check ultrasound lower extremity. If no DVT will consider discontinuing Eliquis. #Breast cancer status post right mastectomy and sentinel node biopsy  -Oncology consultation due to findings on CT involving liver    #Elevated AST-likely related to COVID-19. #History of CAD status post PCI-cardiac cath was 6/28/2019. Resume brillinta    DVT Prophylaxis: SCDs due to GI bleed  Diet: DIET DENTAL SOFT;  Code Status: Full Code    PT/OT Eval Status: As needed    Dispo -return to [facility] after GI work-up. Diane Almeida MD    Thank you Ramon Pollard DO for the opportunity to be involved in this patient's care. If you have any questions or concerns please feel free to contact me at [phone].

## 2020-06-12 NOTE — CONSULTS
[facility]      Patient Name: Charan He  YOB: 1955  PCP: Navid Garcia DO   Referring Provider:      Reason for Consultation:   Chief Complaint   Patient presents with    Other     PEG TUBE FULL OF BLOOD AND LEAKING AROUND IT        History of Present Illness: This pt is a 59 yo female who has a history of R simple mastectomy in 3/20, after R core biopsy showed mixed mucinous infiltrating ductal ca on 2/6/20. The pathology from mastectomy showed invasive carcinoma with mucinous differentiation (1.9 cm), component of DCIS, grade 2, clear margins, SLN was 1/1 + 11 mm involved. ER/NE+, HER2 -. Stage pT1C, pN1a. It does not appear an oncotype Dx was sent or that patient has followed with medical oncology. She is now admitted with blood in her PEG tube, though she is not significantly anemic. Platelets and WBC are normal. CTA showed some minimal ground glass opacities in the upper and mid lung zones. CT A/P showed a 6 x 6 cm hyper-enhancing mass in the R lobe of the liver (thought to be a regenerating nodule) and multiple stable liver lesions thought to be cysts or hemangioma. There is a soft tissue mass adjacent to the R rectum which dates back to 2018 and is stable. *COVID19+*    Hx of CAD, hypertension, MS with neurogenic bladder, and DVT.  On Eliquis and Brilinta    Diagnostic Data:     Past Medical History:   Diagnosis Date    Anxiety     [facility] [address] 02/2020    right    CAD (coronary artery disease)     Cystitis     Depression     Elizondo catheter status     History of blood transfusion     Hyperlipidemia     Hypertension 08/27/2018    Hypothyroidism     MS (multiple sclerosis) ([facility]     wheelchair bound    Neurogenic bladder 08/09/2018    Sepsis ([facility]     uti hospitalized 11-12/2019    Total self-care deficit     Unspecified cerebral artery occlusion with cerebral infarction 2011    left her Results   Component Value Date     06/11/2020    K 5.2 (H) 06/11/2020     06/11/2020    CO2 29 06/11/2020    BUN 13 06/11/2020    CREATININE 0.7 06/11/2020    GLUCOSE 113 (H) 06/11/2020    CALCIUM 8.9 06/11/2020    PROT 6.6 06/11/2020    LABALBU 3.7 06/11/2020    BILITOT 0.2 06/11/2020    ALKPHOS 135 (H) 06/11/2020    AST 43 (H) 06/11/2020    ALT 31 06/11/2020    LABGLOM >60 06/11/2020    GFRAA >60 06/11/2020       No results found for: IRON, TIBC, FERRITIN        Radiology-    CTA CHEST W CONTRAST   Final Result   1. No acute pulmonary emboli. 2. Minimal ground-glass opacities within the upper and mid lung zones, possibly    secondary to pneumonitis, although hydrostatic/cardiogenic pulmonary edema    could produce this appearance. This report has been electronically signed by Catia Madrid MD.      Michael Additional Contrast? None   Final Result      6 x 6 cm focus of hyperenhancement within the inferior aspect of the   right lobe of liver is probably regenerating nodule with malignancy to   be excluded. Multiple stable lesions elsewhere within the hepatic parenchyma   probably are benign cyst or hemangioma. A percutaneous endogastric feeding tube appears to be appropriately   configured. Small rectal fecal impaction. Soft tissue mass adjacent to the right rectum was present on the prior   studies dating back to 2018. There is fluid within the subcutaneous tissues compatible with third   spacing, especially in the region of the hips. XR CHEST PORTABLE   Final Result      No evidence for acute cardiopulmonary process. US DUP LOWER EXTREMITIES BILATERAL VENOUS    (Results Pending)         ASSESSMENT/PLAN :  61 yo female  R breast IDC (mucinous), s/p R mastectomy. Stage pT1C, pN1a. No chemo/XRT  DVT on Eliquis  CAD on Brilinta  Gastric hemorrhage  COVID19+    - Hgb is stable.  No further evidence of GI bleeding  - Restarted on [facility] [place], operated by [facility] per general surgery  - I can not find a previous oncotype Dx score, and she did not receive chemotherapy. She does not appear to be on any anti-hormonal therapy with an AI as an outpatient. She has not followed with a medical oncologist as far as I am aware  - Liver biopsy should be considered. Will send for tumor markers first and make decision depending on results  - At the very least, she should be started on an adjuvant AI as an outpatient. She is already >120 days since diagnosis, which means should her oncotype dx indicate she is appropriate for chemo she would be in a poor risk group. Will check this as an outpatient, pathology department unable to accept EMR order? H+H stable  - Will follow    Thank you for this consult.  Please call with further questions or concerns      Electronically signed by Andrei Garrett MD on 6/12/2020 at 2:23 PM

## 2020-06-12 NOTE — CARE COORDINATION
SOCIAL WORK/DISCHARGE PLANNING:  Pt is a long term resident at [facility]. Per [facility] pickard, pt is a bed hold and can return when medically ready. They are requesting covid test prior to discharge. It does not have to be negative, they will just need results for proper placement within the facility. Placed call to pt's Zac CHOW([phone]). Left message for him to return plans to confirm plan is to return. Envelope completed. Usman Sit LSW  [phone]    Per [facility] pickard, facility will require co-vid test prior to pt discharge,however will not have to wait for negative result. Test required for proper placement within the facility. If pt ready for discharge  Tomorrow, and can go via wheelchair, [facility] transport will be available until 2pm. Please call Kaleb Belle at [phone].   Usman Sit LSW

## 2020-06-12 NOTE — CONSULTS
Department of Internal Medicine  Infectious Diseases   Consult Note      Reason for Consult:   COVID 23 , UTI       Requesting Physician:  Dr Rachel Jamison:      This is a 60 yrs old female with hx of multiple sclerosis, neurogenic bladder - chronic indwelling Elizondo cathter, breast cancer s/p right mastectomy ,  nursing home resident presented to the ER with bleeding from the PEG site - she reported pain in the abdomen . She states that she was positive for COVID 19 about one month ago at the nursing home . She denied increased shortness of breath or cough , or chest pain . She denied fever and chills   WBC was 9 K , COVID 19 ( again ) +Ve, CT chests - GGO   Oxygen saturation ~ 96% on 2 L of NC oxygen     Past Medical History:      {  Past Medical History:   Diagnosis Date    Anxiety     Breast cancer ([facility] 02/2020    right    CAD (coronary artery disease)     Cystitis     Depression     Elizondo catheter status     History of blood transfusion     Hyperlipidemia     Hypertension 08/27/2018    Hypothyroidism     MS (multiple sclerosis) ([facility]     wheelchair bound    Neurogenic bladder 08/09/2018    Sepsis ([facility]     uti hospitalized 11-12/2019    Total self-care deficit     Unspecified cerebral artery occlusion with cerebral infarction 2011    left her incontinent    Wheelchair dependent          Past Surgical History:    Past Surgical History:   Procedure Laterality Date    COLON SURGERY  2001    exp lap, lysis of adhesions, release of SBO. [facility]. Dr. Caty See 2/4/2020    COLONOSCOPY DIAGNOSTIC performed by Carlos Florentino MD at [place] [address]  06/28/2019    Dr. Irvin Baez- Rt Wrist- 3.0/300mm Resolute-RCA, 2.75/22mm-Circumflex,     DENTAL SURGERY      all removed    HYSTERECTOMY  2001    [facility].  Dr. Hafsa Malik, MODIFIED RADICAL Right 3/3/2020    RIGHT BREAST MASTECTOMY WITH SENTINEL NODE DISSECTION WITH BLUE DYE performed by Mariluz Zamorano MD at [address] N/A 12/12/2019    EGD PEG INSERTION performed by Mariluz Zamorano MD at [facility]         Current Medications:      Current Facility-Administered Medications   Medication Dose Route Frequency Provider Last Rate Last Dose    acetaminophen (TYLENOL) tablet 650 mg  650 mg Oral Q6H PRN Marilyn Lenz MD        atorvastatin (LIPITOR) tablet 80 mg  80 mg Oral Nightly Marilyn eLnz MD        FLUoxetine (PROZAC) capsule 20 mg  20 mg Oral BID Marilyn Lenz MD   20 mg at 06/12/20 0927    fluticasone (FLONASE) 50 MCG/ACT nasal spray 1 spray  1 spray Each Nostril Daily Marilyn Lenz MD   1 spray at 06/12/20 0926    gabapentin (NEURONTIN) 250 MG/5ML solution 300 mg  300 mg Oral TID Marilyn Lenz MD   300 mg at 06/12/20 1129    levothyroxine (SYNTHROID) tablet 88 mcg  88 mcg Oral Daily Marilyn Lenz MD   88 mcg at 06/12/20 9248    metoprolol tartrate (LOPRESSOR) tablet 25 mg  25 mg Oral BID Marilyn Lenz MD   25 mg at 06/12/20 0927    0.9 % sodium chloride infusion   Intravenous Continuous Marilyn Lenz MD 75 mL/hr at 06/12/20 0653      polyethylene glycol (GLYCOLAX) packet 17 g  17 g Oral Daily PRN Marilyn Lenz MD        promethazine (PHENERGAN) tablet 12.5 mg  12.5 mg Oral Q6H PRN Marilyn Lenz MD        Or    ondansetron (ZOFRAN) injection 4 mg  4 mg Intravenous Q6H PRN Marilyn Lenz MD        pantoprazole (PROTONIX) injection 40 mg  40 mg Intravenous BID Nataliya Frias MD        ticagrelor (BRILINTA) tablet 90 mg  90 mg PEG Tube BID Nataliya Frias MD   90 mg at 06/12/20 1302    apixaban (ELIQUIS) tablet 5 mg  5 mg Per G Tube BID Nataliya Frias MD   5 mg at 06/12/20 1302       Allergies:  Pcn [penicillins] and Watermelon [citrullus vulgaris]    Social History:    Social History     Socioeconomic History    Marital status:      Spouse name: Not on file    Number of children: Denies leg pain , joint pain , joint swelling  NEUROLOGICAL:  Weakness     PHYSICAL EXAM:      Vitals:     Vitals:    06/12/20 1215   BP: 121/72   Pulse: 66   Resp: 17   Temp: 98.7 °F (37.1 °C)   SpO2: 96%       General Appearance:    Awake, alert , no acute distress. Head:    Normocephalic, atraumatic   Eyes:    + pallor, no icterus,   Ears:    No obvious deformity or drainage.    Nose:   No nasal drainage   Throat:   Mucosa moist, no oral thrush   Neck:   Supple, no lymphadenopathy   Back:     no CVA tenderness   Lungs:     Clear to auscultation bilaterally    Heart:    Regular rate and rhythm, no murmur,   Abdomen:     Soft, non-tender, bowel sounds present , PEG tube - no erythema , no drainage    Extremities:  No edema    Pulses:   Dorsalis pedis palpable    Skin:   no rashes or lesions       DATA:      CBC with Differential:      Lab Results   Component Value Date    WBC 9.0 06/12/2020    RBC 3.86 06/12/2020    HGB 11.5 06/12/2020    HCT 36.8 06/12/2020     06/12/2020    MCV 95.3 06/12/2020    MCH 29.8 06/12/2020    MCHC 31.3 06/12/2020    RDW 16.2 06/12/2020    NRBC 0.9 03/08/2020    METASPCT 0.9 03/11/2020    LYMPHOPCT 16.0 06/11/2020    MONOPCT 9.4 06/11/2020    BASOPCT 0.8 06/11/2020    MONOSABS 0.81 06/11/2020    LYMPHSABS 1.38 06/11/2020    EOSABS 0.39 06/11/2020    BASOSABS 0.07 06/11/2020       CMP     Lab Results   Component Value Date     06/11/2020    K 5.2 06/11/2020    K 3.5 04/25/2020     06/11/2020    CO2 29 06/11/2020    BUN 13 06/11/2020    CREATININE 0.7 06/11/2020    GFRAA >60 06/11/2020    LABGLOM >60 06/11/2020    GLUCOSE 113 06/11/2020    PROT 6.6 06/11/2020    LABALBU 3.7 06/11/2020    CALCIUM 8.9 06/11/2020    BILITOT 0.2 06/11/2020    ALKPHOS 135 06/11/2020    AST 43 06/11/2020    ALT 31 06/11/2020         Hepatic Function Panel:    Lab Results   Component Value Date    ALKPHOS 135 06/11/2020    ALT 31 06/11/2020    AST 43 06/11/2020    PROT 6.6 06/11/2020

## 2020-06-12 NOTE — ED NOTES
Pt incontinent of stools. Gena area cleansed, pt with small open area to buttock.  Pt repositioned      Jacqueline Jiménez RN  06/12/20 5308

## 2020-06-12 NOTE — ED NOTES
Bed: 19  Expected date:   Expected time:   Means of arrival:   Comments:  Jennifer Daniel RN  06/12/20 0212

## 2020-06-13 VITALS
DIASTOLIC BLOOD PRESSURE: 82 MMHG | RESPIRATION RATE: 16 BRPM | TEMPERATURE: 98.7 F | SYSTOLIC BLOOD PRESSURE: 129 MMHG | BODY MASS INDEX: 28.06 KG/M2 | OXYGEN SATURATION: 96 % | HEIGHT: 66 IN | HEART RATE: 74 BPM | WEIGHT: 174.6 LBS

## 2020-06-13 LAB
ANION GAP SERPL CALCULATED.3IONS-SCNC: 13 MMOL/L (ref 7–16)
BASOPHILS ABSOLUTE: 0.06 E9/L (ref 0–0.2)
BASOPHILS RELATIVE PERCENT: 0.7 % (ref 0–2)
BUN BLDV-MCNC: 7 MG/DL (ref 8–23)
CALCIUM SERPL-MCNC: 8.9 MG/DL (ref 8.6–10.2)
CHLORIDE BLD-SCNC: 109 MMOL/L (ref 98–107)
CO2: 22 MMOL/L (ref 22–29)
CREAT SERPL-MCNC: 0.7 MG/DL (ref 0.5–1)
EOSINOPHILS ABSOLUTE: 0.32 E9/L (ref 0.05–0.5)
EOSINOPHILS RELATIVE PERCENT: 3.5 % (ref 0–6)
GFR AFRICAN AMERICAN: >60
GFR NON-AFRICAN AMERICAN: >60 ML/MIN/1.73
GLUCOSE BLD-MCNC: 91 MG/DL (ref 74–99)
HCT VFR BLD CALC: 35 % (ref 34–48)
HEMOGLOBIN: 11.1 G/DL (ref 11.5–15.5)
IMMATURE GRANULOCYTES #: 0.05 E9/L
IMMATURE GRANULOCYTES %: 0.5 % (ref 0–5)
LYMPHOCYTES ABSOLUTE: 1.13 E9/L (ref 1.5–4)
LYMPHOCYTES RELATIVE PERCENT: 12.4 % (ref 20–42)
MCH RBC QN AUTO: 29.7 PG (ref 26–35)
MCHC RBC AUTO-ENTMCNC: 31.7 % (ref 32–34.5)
MCV RBC AUTO: 93.6 FL (ref 80–99.9)
MONOCYTES ABSOLUTE: 0.81 E9/L (ref 0.1–0.95)
MONOCYTES RELATIVE PERCENT: 8.9 % (ref 2–12)
NEUTROPHILS ABSOLUTE: 6.77 E9/L (ref 1.8–7.3)
NEUTROPHILS RELATIVE PERCENT: 74 % (ref 43–80)
PDW BLD-RTO: 16.2 FL (ref 11.5–15)
PLATELET # BLD: 416 E9/L (ref 130–450)
PMV BLD AUTO: 10.9 FL (ref 7–12)
POTASSIUM SERPL-SCNC: 3.3 MMOL/L (ref 3.5–5)
RBC # BLD: 3.74 E12/L (ref 3.5–5.5)
SARS-COV-2, NAAT: NOT DETECTED
SODIUM BLD-SCNC: 144 MMOL/L (ref 132–146)
WBC # BLD: 9.1 E9/L (ref 4.5–11.5)

## 2020-06-13 PROCEDURE — C9113 INJ PANTOPRAZOLE SODIUM, VIA: HCPCS | Performed by: INTERNAL MEDICINE

## 2020-06-13 PROCEDURE — 6370000000 HC RX 637 (ALT 250 FOR IP): Performed by: FAMILY MEDICINE

## 2020-06-13 PROCEDURE — 6370000000 HC RX 637 (ALT 250 FOR IP): Performed by: INTERNAL MEDICINE

## 2020-06-13 PROCEDURE — 36415 COLL VENOUS BLD VENIPUNCTURE: CPT

## 2020-06-13 PROCEDURE — 2580000003 HC RX 258: Performed by: FAMILY MEDICINE

## 2020-06-13 PROCEDURE — U0002 COVID-19 LAB TEST NON-CDC: HCPCS

## 2020-06-13 PROCEDURE — 85025 COMPLETE CBC W/AUTO DIFF WBC: CPT

## 2020-06-13 PROCEDURE — 80048 BASIC METABOLIC PNL TOTAL CA: CPT

## 2020-06-13 PROCEDURE — 6360000002 HC RX W HCPCS: Performed by: INTERNAL MEDICINE

## 2020-06-13 RX ADMIN — APIXABAN 5 MG: 5 TABLET, FILM COATED ORAL at 08:14

## 2020-06-13 RX ADMIN — GABAPENTIN 300 MG: 250 SUSPENSION ORAL at 13:46

## 2020-06-13 RX ADMIN — LEVOTHYROXINE SODIUM 88 MCG: 0.09 TABLET ORAL at 05:21

## 2020-06-13 RX ADMIN — SODIUM CHLORIDE: 9 INJECTION, SOLUTION INTRAVENOUS at 05:21

## 2020-06-13 RX ADMIN — FLUTICASONE PROPIONATE 1 SPRAY: 50 SPRAY, METERED NASAL at 08:15

## 2020-06-13 RX ADMIN — PANTOPRAZOLE SODIUM 40 MG: 40 INJECTION, POWDER, FOR SOLUTION INTRAVENOUS at 08:14

## 2020-06-13 RX ADMIN — POTASSIUM BICARBONATE 20 MEQ: 782 TABLET, EFFERVESCENT ORAL at 16:32

## 2020-06-13 RX ADMIN — FLUOXETINE HYDROCHLORIDE 20 MG: 20 CAPSULE ORAL at 08:14

## 2020-06-13 RX ADMIN — METOPROLOL TARTRATE 25 MG: 25 TABLET, FILM COATED ORAL at 08:14

## 2020-06-13 RX ADMIN — TICAGRELOR 90 MG: 90 TABLET ORAL at 08:14

## 2020-06-13 RX ADMIN — GABAPENTIN 300 MG: 250 SUSPENSION ORAL at 08:13

## 2020-06-13 NOTE — CARE COORDINATION
Patient for discharge back to [facility] today at 9699 with physicians. Facility notified of discharge time. Son, Vera Ernst notified of discharge time. COVID pending. They can accept back even if she remains positive. For questions I can be reached at [phone].  Renee Peck [place]

## 2020-06-13 NOTE — DISCHARGE SUMMARY
spondylosis, rotoscoliosis and facet arthropathy are identified within the spine. Osteophytosis and eburnation of sacroiliac joints and hips. Skeletal structures are negative for evidence of aggressive process or acute fracture. There are calcifications within the aorta and its branches which are otherwise unremarkable. 6 x 6 cm focus of hyperenhancement within the inferior aspect of the right lobe of liver is probably regenerating nodule with malignancy to be excluded. Multiple stable lesions elsewhere within the hepatic parenchyma probably are benign cyst or hemangioma. A percutaneous endogastric feeding tube appears to be appropriately configured. Small rectal fecal impaction. Soft tissue mass adjacent to the right rectum was present on the prior studies dating back to 2018. There is fluid within the subcutaneous tissues compatible with third spacing, especially in the region of the hips. Xr Chest Portable    Result Date: 6/11/2020  Clinical indications: covid-19  covid-19 TECHNIQUE: Single frontal projection of the chest (1 view). COMPARISON: April 25, 2020. FINDINGS: The heart is enlarged. There is calcification within thoracic aorta. Acromioclavicular arthropathy. Chronic elevation right hemidiaphragm. The heart, lungs, mediastinum and regional skeleton are otherwise unremarkable. No evidence for acute cardiopulmonary process. Cta Chest W Contrast    Result Date: 6/12/2020  PROCEDURE INFORMATION: Exam: CT Angiography Chest With Contrast Exam date and time: 6/12/2020 12:00 AM Age: 60years old Clinical indication: Shortness of breath;  Additional info: R/O pe, patient now hypoxic TECHNIQUE: Imaging protocol: Computed tomographic angiography of the chest with intravenous contrast. 3D rendering: MIP and/or 3D reconstructed images were created by the technologist. Radiation optimization: All CT scans at this facility use at least one of these dose optimization techniques: automated exposure control; mA and/or kV adjustment per patient size (includes targeted exams where dose is matched to clinical indication); or iterative reconstruction. Contrast material: ISOVUE 370; Contrast volume: 60 ml; Contrast route: IV;  COMPARISON: CTA PULMONARY W CONTRAST 5/6/2019 6:04 AM FINDINGS: Tubes, catheters and devices: Percutaneous gastrostomy tube in place. Pulmonary arteries: No acute pulmonary emboli. Aorta: Atherosclerotic disease of the thoracic aorta, without aneurysm or dissection. Lungs: Minimal bibasilar atelectasis or scarring. Minimal ground-glass opacities within the upper and mid lung zones, possibly secondary to pneumonitis, although hydrostatic/cardiogenic pulmonary edema could produce this appearance. Pleural space: Unremarkable. No pneumothorax. No pleural effusion. Heart: Moderate three-vessel coronary artery atherosclerotic disease. Lymph nodes: Unremarkable. No enlarged lymph nodes. Kidneys and ureters: Lobulation of the visualized renal parenchyma bilaterally, possibly developmental versus scarring. Bones/joints: Unremarkable. No acute fracture. Soft tissues: Unremarkable. 1. No acute pulmonary emboli. 2. Minimal ground-glass opacities within the upper and mid lung zones, possibly secondary to pneumonitis, although hydrostatic/cardiogenic pulmonary edema could produce this appearance. This report has been electronically signed by Nadege Taylor MD.    Disposition:  Stable    Discharge Instructions/Follow-up: Back to SNF    Code Status:  Full Code     Activity: activity as tolerated    Diet: cardiac diet    Labs:  For convenience and continuity at follow-up the following most recent labs are provided:      CBC:    Lab Results   Component Value Date    WBC 9.1 06/13/2020    HGB 11.1 06/13/2020    HCT 35.0 06/13/2020     06/13/2020       Renal:    Lab Results   Component Value Date     06/13/2020    K 3.3 06/13/2020    K 3.5 04/25/2020     06/13/2020    CO2 22 06/13/2020    BUN 7

## 2020-06-13 NOTE — PROGRESS NOTES
Dr. Deedee Talbert on unit and notified that the pt k 3.2 this morning.
ID consult called to office. Office returned call. Dr. Allison Hopes to see the pt.
>60 06/13/2020    GLUCOSE 91 06/13/2020    PROT 6.6 06/11/2020    LABALBU 3.7 06/11/2020    CALCIUM 8.9 06/13/2020    BILITOT 0.2 06/11/2020    ALKPHOS 135 06/11/2020    AST 43 06/11/2020    ALT 31 06/11/2020         Hepatic Function Panel:    Lab Results   Component Value Date    ALKPHOS 135 06/11/2020    ALT 31 06/11/2020    AST 43 06/11/2020    PROT 6.6 06/11/2020    BILITOT 0.2 06/11/2020    BILIDIR <0.2 12/02/2019    IBILI see below 12/02/2019    LABALBU 3.7 06/11/2020       PT/INR:    Lab Results   Component Value Date    PROTIME 13.7 06/12/2020    INR 1.2 06/12/2020       TSH:    Lab Results   Component Value Date    TSH 3.910 06/12/2020       U/A:    Lab Results   Component Value Date    COLORU Yellow 06/11/2020    PHUR >=9.0 06/11/2020    WBCUA 5-10 06/11/2020    RBCUA 10-20 06/11/2020    BACTERIA MANY 06/11/2020    CLARITYU CLOUDY 06/11/2020    SPECGRAV 1.010 06/11/2020    LEUKOCYTESUR LARGE 06/11/2020    UROBILINOGEN 1.0 06/11/2020    BILIRUBINUR Negative 06/11/2020    BLOODU LARGE 06/11/2020    GLUCOSEU Negative 06/11/2020       ABG:  No results found for: ATB7ARS, BEART, T8XZAWEY, PHART, THGBART, BDJ4DFB, PO2ART, GAO1BTT    MICROBIOLOGY:      Urine Culture - GNR       Radiology :    CTA chest -  1. No acute pulmonary emboli. 2. Minimal ground-glass opacities within the upper and mid lung zones, possibly   secondary to pneumonitis, although hydrostatic/cardiogenic pulmonary edema   could produce this appearance. IMPRESSION:    1. COVID 19 infection ( mild )   2. Hx of ESBL E coli bacteriuria ( chronic indwelling Elizondo catheter )  - chronic colonization         RECOMMENDATIONS:      1. Wean off oxygen as tolerated  2. Droplet plus isolation   3. No abx for Urine cx   4. OK to discharge from ID POV

## 2020-06-15 LAB
CA 15-3: 24 U/ML (ref 0–31)
CA 27.29: 30.5 U/ML (ref 0–40)

## 2020-06-16 LAB
ORGANISM: ABNORMAL
ORGANISM: ABNORMAL
URINE CULTURE, ROUTINE: ABNORMAL
URINE CULTURE, ROUTINE: ABNORMAL

## 2020-06-29 ENCOUNTER — HOSPITAL ENCOUNTER (OUTPATIENT)
Age: 65
Discharge: HOME | End: 2020-07-01
Payer: MEDICAID

## 2020-06-29 PROCEDURE — U0003 INFECTIOUS AGENT DETECTION BY NUCLEIC ACID (DNA OR RNA); SEVERE ACUTE RESPIRATORY SYNDROME CORONAVIRUS 2 (SARS-COV-2) (CORONAVIRUS DISEASE [COVID-19]), AMPLIFIED PROBE TECHNIQUE, MAKING USE OF HIGH THROUGHPUT TECHNOLOGIES AS DESCRIBED BY CMS-2020-01-R: HCPCS

## 2020-06-30 LAB
SARS-COV-2: NOT DETECTED
SOURCE: NORMAL

## 2020-07-14 ENCOUNTER — HOSPITAL ENCOUNTER (OUTPATIENT)
Age: 65
Discharge: HOME | End: 2020-07-16
Payer: MEDICAID

## 2020-07-14 PROCEDURE — 36415 COLL VENOUS BLD VENIPUNCTURE: CPT

## 2020-07-14 PROCEDURE — 80048 BASIC METABOLIC PNL TOTAL CA: CPT

## 2020-07-15 ENCOUNTER — APPOINTMENT (OUTPATIENT)
Dept: GENERAL RADIOLOGY | Age: 65
End: 2020-07-15
Payer: MEDICAID

## 2020-07-15 ENCOUNTER — HOSPITAL ENCOUNTER (EMERGENCY)
Age: 65
Discharge: HOME | End: 2020-07-15
Attending: EMERGENCY MEDICINE
Payer: MEDICAID

## 2020-07-15 VITALS
WEIGHT: 173 LBS | BODY MASS INDEX: 28.82 KG/M2 | DIASTOLIC BLOOD PRESSURE: 74 MMHG | TEMPERATURE: 97.5 F | OXYGEN SATURATION: 97 % | RESPIRATION RATE: 14 BRPM | SYSTOLIC BLOOD PRESSURE: 117 MMHG | HEIGHT: 65 IN | HEART RATE: 80 BPM

## 2020-07-15 PROCEDURE — 74018 RADEX ABDOMEN 1 VIEW: CPT

## 2020-07-15 PROCEDURE — 6360000004 HC RX CONTRAST MEDICATION: Performed by: EMERGENCY MEDICINE

## 2020-07-15 PROCEDURE — 99283 EMERGENCY DEPT VISIT LOW MDM: CPT

## 2020-07-15 RX ADMIN — DIATRIZOATE MEGLUMINE AND DIATRIZOATE SODIUM 30 ML: 600; 100 SOLUTION ORAL; RECTAL at 10:33

## 2020-07-15 ASSESSMENT — PAIN DESCRIPTION - DESCRIPTORS: DESCRIPTORS: TENDER

## 2020-07-15 ASSESSMENT — PAIN SCALES - WONG BAKER: WONGBAKER_NUMERICALRESPONSE: 2

## 2020-07-15 ASSESSMENT — PAIN DESCRIPTION - LOCATION: LOCATION: ABDOMEN

## 2020-07-15 ASSESSMENT — PAIN SCALES - GENERAL: PAINLEVEL_OUTOF10: 3

## 2020-07-15 NOTE — ED NOTES
Bed: 06  Expected date:   Expected time:   Means of arrival:   Comments:  EMS     Tracey Navarro RN  07/15/20 0906

## 2020-07-15 NOTE — ED PROVIDER NOTES
Department of Emergency Medicine   ED  Provider Note  Admit Date/RoomTime: 7/15/2020  9:30 AM  ED Room: 06/06          History of Present Illness:  7/15/20, Time: 9:37 AM EDT  Chief Complaint   Patient presents with    G Tube Complications     states she is bleeding around PEG tube insertion site                Len Perez is a 60 y.o. female presenting to the ED for bleeding around PEG tube, beginning this morning. Patient resides at a nursing home, staff stated that they noted bleeding from her PEG tube and was brought to ED for evaluation. Patient denies any pain. She states that she has had bleeding around the site in the past.  She is unsure of when the feeding tube was last replaced. She states that she is taking most food by mouth at this time at the nursing home, but when the PEG tube is used there is no difficulty. Review of Systems:   Pertinent positives and negatives are stated within HPI, all other systems reviewed and are negative.        --------------------------------------------- PAST HISTORY ---------------------------------------------  Past Medical History:  has a past medical history of Anxiety, Breast cancer ([facility] [address] CAD (coronary artery disease), Cystitis, Depression, Elizondo catheter status, History of blood transfusion, Hyperlipidemia, Hypertension, Hypothyroidism, MS (multiple sclerosis) ([facility] [address] Neurogenic bladder, Sepsis ([facility] [address] Total self-care deficit, Unspecified cerebral artery occlusion with cerebral infarction, and Wheelchair dependent. Past Surgical History:  has a past surgical history that includes Hysterectomy (2001); Dental surgery; Colon surgery (2001); Coronary angioplasty with stent (06/28/2019); Upper gastrointestinal endoscopy (N/A, 12/12/2019); Colonoscopy (N/A, 2/4/2020); and Mastectomy, modified radical (Right, 3/3/2020). Social History:  reports that she quit smoking about 20 years ago. Her smoking use included cigarettes.  She started smoking about 25 years ago. She has a 5.00 pack-year smoking history. She has never used smokeless tobacco. She reports that she does not drink alcohol or use drugs. Family History: family history includes Cancer in her mother; Diabetes in her father; Heart Disease in her father. . Unless otherwise noted, family history is non contributory    The patients home medications have been reviewed. Allergies: Pcn [penicillins] and Watermelon [citrullus vulgaris]        ---------------------------------------------------PHYSICAL EXAM--------------------------------------    Constitutional/General: Alert and oriented x3  Head: Normocephalic and atraumatic  Eyes: PERRL, EOMI, sclera non icteric  Mouth: Oropharynx clear, handling secretions, no trismus, no asymmetry of the posterior oropharynx or uvular edema  Neck: Supple, full ROM, no stridor, no meningeal signs  Respiratory: Lungs clear to auscultation bilaterally, no wheezes, rales, or rhonchi. Not in respiratory distress  Cardiovascular:  Regular rate. Regular rhythm. No murmurs, no aortic murmurs, no gallops, or rubs. 2+ distal pulses. Equal extremity pulses. Chest: No chest wall tenderness  GI:  Abdomen Soft, Non tender, Non distended. No rebound, guarding, or rigidity. No pulsatile masses. Granulation tissue surrounding the PEG tube at the ostomy, minimal excoriation and some bleeding. No blood to the PEG tube itself. Musculoskeletal: Moves all extremities x 4. Warm and well perfused, no clubbing, cyanosis, or edema. Capillary refill <3 seconds  Integument: skin warm and dry. No rashes. Neurologic: GCS 15, no focal deficits, symmetric strength 5/5 in the upper and lower extremities bilaterally  Psychiatric: Normal Affect          -------------------------------------------------- RESULTS -------------------------------------------------  I have personally reviewed all laboratory and imaging results for this patient. Results are listed below.      LABS: (Lab results interpreted by me)  No results found for this visit on 07/15/20.,       RADIOLOGY:  Interpreted by Radiologist unless otherwise specified  XR ABDOMEN (KUB) (SINGLE AP VIEW)   Final Result   Nonspecific bowel gas pattern.                      ------------------------- NURSING NOTES AND VITALS REVIEWED ---------------------------   The nursing notes within the ED encounter and vital signs as below have been reviewed by myself  /74   Pulse 80   Temp 97.5 °F (36.4 °C)   Resp 14   Ht 5' 5\" (1.651 m)   Wt 173 lb (78.5 kg)   SpO2 97%   BMI 28.79 kg/m²     Oxygen Saturation Interpretation: Normal    The patients available past medical records and past encounters were reviewed. ------------------------------ ED COURSE/MEDICAL DECISION MAKING----------------------  Medications   diatrizoate meglumine-sodium (GASTROGRAFIN) 66-10 % solution 30 mL (30 mLs Per G Tube Given 7/15/20 1033)           Medical Decision Making:     I, Dr. Abner Suarez, am the primary provider of record    82-year-old female presenting with bleeding from the ostomy of her PEG tube. Review of her records shows that she has known granulation tissue surrounding her ostomy that bleeds frequently. It appears that this is the source again upon inspection. X-ray with Gastrografin shows a patent PEG tube. Patient is having no pain, otherwise is well-appearing and hemodynamically stable. She will be discharged back to nursing facility after feeding tube was re-bandaged. Instruction to return for any changes in condition or new symptoms. Re-Evaluations: This patient's ED course included: a personal history and physicial examination and re-evaluation prior to disposition    This patient has remained hemodynamically stable during their ED course. Counseling:    The emergency provider has spoken with the patient and discussed todays results, in addition to providing specific details for the plan of care and counseling

## 2020-07-28 ENCOUNTER — OFFICE VISIT (OUTPATIENT)
Dept: NEUROLOGY | Age: 65
End: 2020-07-28
Payer: MEDICAID

## 2020-07-28 VITALS
SYSTOLIC BLOOD PRESSURE: 110 MMHG | HEART RATE: 64 BPM | DIASTOLIC BLOOD PRESSURE: 75 MMHG | RESPIRATION RATE: 18 BRPM | OXYGEN SATURATION: 93 %

## 2020-07-28 PROCEDURE — 99215 OFFICE O/P EST HI 40 MIN: CPT | Performed by: PSYCHIATRY & NEUROLOGY

## 2020-07-28 RX ORDER — HYDROCODONE BITARTRATE AND ACETAMINOPHEN 5; 325 MG/1; MG/1
1 TABLET ORAL EVERY 6 HOURS PRN
COMMUNITY
End: 2020-12-14

## 2020-07-28 RX ORDER — ANASTROZOLE 1 MG/1
1 TABLET ORAL DAILY
COMMUNITY

## 2020-07-28 RX ORDER — DIAZEPAM 2 MG/1
2 TABLET ORAL 2 TIMES DAILY
COMMUNITY

## 2020-07-28 NOTE — PROGRESS NOTES
post right mastectomy  Heart: regular rate and rhythm, S1, S2 normal, no murmur, click, rub or gallop  Extremities: normal, atraumatic, no cyanosis or edema; bilateral pes planus deformities  Pulses: 1+ and symmetric  Skin: color, texture, turgor normal; no rashes or lesions     Mental Status: alert, oriented, thought content appropriate--- intact memories in all spheres; but again a questionable historian  Speech: no dysarthria  Language: laconic, without aphasias    Cranial Nerves:  I: smell NA   II: visual acuity  NA   II: visual fields Full to confrontation   II: pupils PHOENIX--no red desaturation or YONIS   III,VII: ptosis None   III,IV,VI: extraocular muscles  Full ROM   V: mastication Normal   V: facial light touch sensation  Normal   V,VII: corneal reflex  Present   VII: facial muscle function - upper  Normal   VII: facial muscle function - lower Normal   VIII: hearing Normal   IX: soft palate elevation  Normal   IX,X: gag reflex Minimal gag   XI: trapezius strength  5/5   XI: sternocleidomastoid strength 5/5   XI: neck extension strength  5/5   XII: tongue strength  Normal     Motor:  Right   5/5              Left   5/5               Right Bicep  5/5           Left Bicep  5/5              Right Triceps   5/5       Left Triceps  5/5          Right Deltoid  5/5     Left Deltoid  5/5         Right IPS  4/5            Left IPS  4/5               Right Quadriceps  5/5          Left Quadriceps    5/5           Right Gastrocnemius    5/5    Left Gastrocnemius   5/5  Right Ant Tibialis   4-/5  Left Ant Tibialis   0/5    Now moderate spasticity in both legs  No drift    Wrist extensors 5/5    Sensory:  Light touch and pinprick remained intact  Vibration was moderately decreased at both ankles  No spinal sensory level was found    Coordination:   Finger-to-nose and fine finger movements were decreased in both arms  Rapid alternating movements were decreased on the left  Heel-to-shin was decreased in both legs---more so on the left side    Gait:  She was not stood during this interview    DTR:   Right Brachioradialis reflex 0  Left Brachioradialis reflex 0  Right Biceps reflex 1+  Left Biceps reflex 1+  Right Triceps reflex 1+  Left Triceps reflex 1+  Right Quadriceps reflex 2+  Left Quadriceps reflex 2+  Right Achilles reflex 0  Left Achilles reflex 0     No pathological reflexes    Her neurological examination remained unchanged. Laboratory/Radiology:     MRI brain---extensive lesion load with questionable enhancing lesions    MRI C-spine: moderate plaques with questionable enhancing lesions as well. Hepatitis screening was negative. A recent blood sugar was 160, with unremarkable CBC with differential.    Assessment: This individual suffers from chronic progressive multiple sclerosis with no progression since her last visit. Her EDSS remains 7.5. Fortunately, she recovered well from her breast cancer and COVID-19 infection. We will hold off on Ocrevus for now. She is otherwise stable medically. Plan:     She will hold on Ocrevus for now; she report back in several months. She will call at any time if problems arise. She will return in 6 months. I spent 40 minutes with the patient with over 50 % spent in counseling and disease management. All patient issues were addressed and all questions were answered.     Yudi Darling  8:40 AM  7/28/2020

## 2020-08-19 ENCOUNTER — HOSPITAL ENCOUNTER (OUTPATIENT)
Dept: INFUSION THERAPY | Age: 65
Setting detail: INFUSION SERIES
Discharge: HOME | End: 2020-08-19
Payer: MEDICAID

## 2020-08-19 VITALS
TEMPERATURE: 98.1 F | RESPIRATION RATE: 18 BRPM | DIASTOLIC BLOOD PRESSURE: 78 MMHG | HEART RATE: 81 BPM | SYSTOLIC BLOOD PRESSURE: 126 MMHG

## 2020-08-19 DIAGNOSIS — G35 MULTIPLE SCLEROSIS (HCC): Primary | ICD-10-CM

## 2020-08-19 PROCEDURE — 96365 THER/PROPH/DIAG IV INF INIT: CPT

## 2020-08-19 PROCEDURE — 96366 THER/PROPH/DIAG IV INF ADDON: CPT

## 2020-08-19 PROCEDURE — 2580000003 HC RX 258: Performed by: PSYCHIATRY & NEUROLOGY

## 2020-08-19 PROCEDURE — 6370000000 HC RX 637 (ALT 250 FOR IP): Performed by: PSYCHIATRY & NEUROLOGY

## 2020-08-19 PROCEDURE — 96375 TX/PRO/DX INJ NEW DRUG ADDON: CPT

## 2020-08-19 PROCEDURE — 96374 THER/PROPH/DIAG INJ IV PUSH: CPT

## 2020-08-19 PROCEDURE — 6360000002 HC RX W HCPCS: Performed by: PSYCHIATRY & NEUROLOGY

## 2020-08-19 RX ORDER — SODIUM CHLORIDE 9 MG/ML
INJECTION, SOLUTION INTRAVENOUS CONTINUOUS
Status: CANCELLED | OUTPATIENT
Start: 2021-02-03

## 2020-08-19 RX ORDER — DIPHENHYDRAMINE HYDROCHLORIDE 50 MG/ML
50 INJECTION INTRAMUSCULAR; INTRAVENOUS ONCE
Status: CANCELLED | OUTPATIENT
Start: 2021-02-03

## 2020-08-19 RX ORDER — DIPHENHYDRAMINE HYDROCHLORIDE 50 MG/ML
25 INJECTION INTRAMUSCULAR; INTRAVENOUS ONCE
Status: COMPLETED | OUTPATIENT
Start: 2020-08-19 | End: 2020-08-19

## 2020-08-19 RX ORDER — DIPHENHYDRAMINE HYDROCHLORIDE 50 MG/ML
25 INJECTION INTRAMUSCULAR; INTRAVENOUS ONCE
Status: CANCELLED
Start: 2021-02-03

## 2020-08-19 RX ORDER — SODIUM CHLORIDE 0.9 % (FLUSH) 0.9 %
10 SYRINGE (ML) INJECTION PRN
Status: CANCELLED | OUTPATIENT
Start: 2021-02-03

## 2020-08-19 RX ORDER — EPINEPHRINE 1 MG/ML
0.3 INJECTION, SOLUTION, CONCENTRATE INTRAVENOUS PRN
Status: CANCELLED | OUTPATIENT
Start: 2021-02-03

## 2020-08-19 RX ORDER — SODIUM CHLORIDE 0.9 % (FLUSH) 0.9 %
10 SYRINGE (ML) INJECTION PRN
Status: DISCONTINUED | OUTPATIENT
Start: 2020-08-19 | End: 2020-08-20 | Stop reason: HOSPADM

## 2020-08-19 RX ORDER — METHYLPREDNISOLONE SODIUM SUCCINATE 125 MG/2ML
100 INJECTION, POWDER, LYOPHILIZED, FOR SOLUTION INTRAMUSCULAR; INTRAVENOUS ONCE
Status: CANCELLED | OUTPATIENT
Start: 2021-02-03

## 2020-08-19 RX ORDER — DIPHENHYDRAMINE HYDROCHLORIDE 50 MG/ML
25 INJECTION INTRAMUSCULAR; INTRAVENOUS ONCE
Status: CANCELLED | OUTPATIENT
Start: 2021-02-03

## 2020-08-19 RX ORDER — METHYLPREDNISOLONE SODIUM SUCCINATE 125 MG/2ML
100 INJECTION, POWDER, LYOPHILIZED, FOR SOLUTION INTRAMUSCULAR; INTRAVENOUS ONCE
Status: COMPLETED | OUTPATIENT
Start: 2020-08-19 | End: 2020-08-19

## 2020-08-19 RX ORDER — ACETAMINOPHEN 325 MG/1
650 TABLET ORAL ONCE
Status: COMPLETED | OUTPATIENT
Start: 2020-08-19 | End: 2020-08-19

## 2020-08-19 RX ORDER — METHYLPREDNISOLONE SODIUM SUCCINATE 125 MG/2ML
125 INJECTION, POWDER, LYOPHILIZED, FOR SOLUTION INTRAMUSCULAR; INTRAVENOUS ONCE
Status: CANCELLED | OUTPATIENT
Start: 2021-02-03

## 2020-08-19 RX ORDER — ACETAMINOPHEN 325 MG/1
650 TABLET ORAL ONCE
Status: CANCELLED | OUTPATIENT
Start: 2021-02-03

## 2020-08-19 RX ORDER — METHYLPREDNISOLONE SODIUM SUCCINATE 125 MG/2ML
50 INJECTION, POWDER, LYOPHILIZED, FOR SOLUTION INTRAMUSCULAR; INTRAVENOUS ONCE
Status: CANCELLED
Start: 2021-02-03

## 2020-08-19 RX ADMIN — METHYLPREDNISOLONE SODIUM SUCCINATE 100 MG: 125 INJECTION, POWDER, FOR SOLUTION INTRAMUSCULAR; INTRAVENOUS at 08:55

## 2020-08-19 RX ADMIN — DIPHENHYDRAMINE HYDROCHLORIDE 25 MG: 50 INJECTION, SOLUTION INTRAMUSCULAR; INTRAVENOUS at 08:57

## 2020-08-19 RX ADMIN — SODIUM CHLORIDE, PRESERVATIVE FREE 10 ML: 5 INJECTION INTRAVENOUS at 09:01

## 2020-08-19 RX ADMIN — ACETAMINOPHEN 650 MG: 325 TABLET, FILM COATED ORAL at 08:27

## 2020-08-19 RX ADMIN — OCRELIZUMAB 600 MG: 300 INJECTION INTRAVENOUS at 09:22

## 2020-08-19 ASSESSMENT — PAIN SCALES - GENERAL: PAINLEVEL_OUTOF10: 0

## 2020-09-01 ENCOUNTER — HOSPITAL ENCOUNTER (OUTPATIENT)
Age: 65
Discharge: HOME | End: 2020-09-03
Payer: MEDICAID

## 2020-09-01 PROCEDURE — U0003 INFECTIOUS AGENT DETECTION BY NUCLEIC ACID (DNA OR RNA); SEVERE ACUTE RESPIRATORY SYNDROME CORONAVIRUS 2 (SARS-COV-2) (CORONAVIRUS DISEASE [COVID-19]), AMPLIFIED PROBE TECHNIQUE, MAKING USE OF HIGH THROUGHPUT TECHNOLOGIES AS DESCRIBED BY CMS-2020-01-R: HCPCS

## 2020-09-08 ENCOUNTER — HOSPITAL ENCOUNTER (OUTPATIENT)
Age: 65
Discharge: HOME | End: 2020-09-10
Payer: MEDICAID

## 2020-09-08 PROCEDURE — U0003 INFECTIOUS AGENT DETECTION BY NUCLEIC ACID (DNA OR RNA); SEVERE ACUTE RESPIRATORY SYNDROME CORONAVIRUS 2 (SARS-COV-2) (CORONAVIRUS DISEASE [COVID-19]), AMPLIFIED PROBE TECHNIQUE, MAKING USE OF HIGH THROUGHPUT TECHNOLOGIES AS DESCRIBED BY CMS-2020-01-R: HCPCS

## 2020-09-09 LAB
SARS-COV-2: NOT DETECTED
SOURCE: NORMAL

## 2020-09-10 LAB
SARS-COV-2: NOT DETECTED
SOURCE: NORMAL

## 2020-09-15 ENCOUNTER — TELEPHONE (OUTPATIENT)
Dept: NEUROLOGY | Age: 65
End: 2020-09-15

## 2020-09-15 NOTE — TELEPHONE ENCOUNTER
I never advised the patient not to take the flu vaccine or pneumonia vaccine. On her last visit several months ago, we were only holding on Ocrevus administration because of her recent COVID-19 infection. In fact, multiple sclerosis patients require flu vaccines and pneumonia vaccines. She may also return to [facility] as well.   Thank you

## 2020-09-15 NOTE — TELEPHONE ENCOUNTER
MA informed [facility] of Dr. Jolly Hernandez' recommendations and that pt should have flu and pneumonia vaccines. Copy of this encounter also faxed to E[facility].   Electronically signed by Cash Domínguez on 9/15/20 at 3:01 PM EDT

## 2020-09-15 NOTE — TELEPHONE ENCOUNTER
Chris Toth from [facility] called stating pt informed them she is to not get the flu or pneumonia vaccines due to her MS as advised by Dr. Roger Johansen. Chris Toth was calling to verify this information. Please return Valeria's call at [phone]. Chris Toth also requesting response be faxed to [phone].   Electronically signed by Casper Cain on 9/15/20 at 11:09 AM EDT

## 2020-10-13 ENCOUNTER — HOSPITAL ENCOUNTER (OUTPATIENT)
Age: 65
Discharge: HOME | End: 2020-10-15
Payer: MEDICAID

## 2020-10-13 LAB
ALBUMIN SERPL-MCNC: 3.4 G/DL (ref 3.5–5.2)
ALP BLD-CCNC: 105 U/L (ref 35–104)
ALT SERPL-CCNC: 13 U/L (ref 0–32)
ANION GAP SERPL CALCULATED.3IONS-SCNC: 15 MMOL/L (ref 7–16)
AST SERPL-CCNC: 13 U/L (ref 0–31)
BASOPHILS ABSOLUTE: 0.05 E9/L (ref 0–0.2)
BASOPHILS RELATIVE PERCENT: 0.7 % (ref 0–2)
BILIRUB SERPL-MCNC: 0.3 MG/DL (ref 0–1.2)
BILIRUBIN DIRECT: <0.2 MG/DL (ref 0–0.3)
BILIRUBIN, INDIRECT: ABNORMAL MG/DL (ref 0–1)
BUN BLDV-MCNC: 10 MG/DL (ref 8–23)
CALCIUM SERPL-MCNC: 9.1 MG/DL (ref 8.6–10.2)
CHLORIDE BLD-SCNC: 103 MMOL/L (ref 98–107)
CO2: 22 MMOL/L (ref 22–29)
CREAT SERPL-MCNC: 0.7 MG/DL (ref 0.5–1)
EOSINOPHILS ABSOLUTE: 0.18 E9/L (ref 0.05–0.5)
EOSINOPHILS RELATIVE PERCENT: 2.5 % (ref 0–6)
GFR AFRICAN AMERICAN: >60
GFR NON-AFRICAN AMERICAN: >60 ML/MIN/1.73
GLUCOSE BLD-MCNC: 80 MG/DL (ref 74–99)
HCT VFR BLD CALC: 37.2 % (ref 34–48)
HEMOGLOBIN: 11.7 G/DL (ref 11.5–15.5)
IMMATURE GRANULOCYTES #: 0.02 E9/L
IMMATURE GRANULOCYTES %: 0.3 % (ref 0–5)
LYMPHOCYTES ABSOLUTE: 1.22 E9/L (ref 1.5–4)
LYMPHOCYTES RELATIVE PERCENT: 17.1 % (ref 20–42)
MCH RBC QN AUTO: 29.5 PG (ref 26–35)
MCHC RBC AUTO-ENTMCNC: 31.5 % (ref 32–34.5)
MCV RBC AUTO: 93.9 FL (ref 80–99.9)
MONOCYTES ABSOLUTE: 0.69 E9/L (ref 0.1–0.95)
MONOCYTES RELATIVE PERCENT: 9.7 % (ref 2–12)
NEUTROPHILS ABSOLUTE: 4.97 E9/L (ref 1.8–7.3)
NEUTROPHILS RELATIVE PERCENT: 69.7 % (ref 43–80)
PDW BLD-RTO: 14.5 FL (ref 11.5–15)
PLATELET # BLD: 345 E9/L (ref 130–450)
PMV BLD AUTO: 10.6 FL (ref 7–12)
POTASSIUM SERPL-SCNC: 3.8 MMOL/L (ref 3.5–5)
RBC # BLD: 3.96 E12/L (ref 3.5–5.5)
SODIUM BLD-SCNC: 140 MMOL/L (ref 132–146)
TOTAL PROTEIN: 5.8 G/DL (ref 6.4–8.3)
TSH SERPL DL<=0.05 MIU/L-ACNC: 8.92 UIU/ML (ref 0.27–4.2)
VITAMIN D 25-HYDROXY: 30 NG/ML (ref 30–100)
WBC # BLD: 7.1 E9/L (ref 4.5–11.5)

## 2020-10-13 PROCEDURE — 84443 ASSAY THYROID STIM HORMONE: CPT

## 2020-10-13 PROCEDURE — 36415 COLL VENOUS BLD VENIPUNCTURE: CPT

## 2020-10-13 PROCEDURE — 80048 BASIC METABOLIC PNL TOTAL CA: CPT

## 2020-10-13 PROCEDURE — 85025 COMPLETE CBC W/AUTO DIFF WBC: CPT

## 2020-10-13 PROCEDURE — 82306 VITAMIN D 25 HYDROXY: CPT

## 2020-10-13 PROCEDURE — 80076 HEPATIC FUNCTION PANEL: CPT

## 2020-10-27 ENCOUNTER — HOSPITAL ENCOUNTER (OUTPATIENT)
Age: 65
Discharge: HOME | End: 2020-10-29
Payer: MEDICAID

## 2020-10-27 PROCEDURE — U0003 INFECTIOUS AGENT DETECTION BY NUCLEIC ACID (DNA OR RNA); SEVERE ACUTE RESPIRATORY SYNDROME CORONAVIRUS 2 (SARS-COV-2) (CORONAVIRUS DISEASE [COVID-19]), AMPLIFIED PROBE TECHNIQUE, MAKING USE OF HIGH THROUGHPUT TECHNOLOGIES AS DESCRIBED BY CMS-2020-01-R: HCPCS

## 2020-10-29 LAB
SARS-COV-2: NOT DETECTED
SOURCE: NORMAL

## 2020-10-30 ENCOUNTER — HOSPITAL ENCOUNTER (OUTPATIENT)
Age: 65
Discharge: HOME | End: 2020-11-01
Payer: MEDICAID

## 2020-10-30 PROCEDURE — U0003 INFECTIOUS AGENT DETECTION BY NUCLEIC ACID (DNA OR RNA); SEVERE ACUTE RESPIRATORY SYNDROME CORONAVIRUS 2 (SARS-COV-2) (CORONAVIRUS DISEASE [COVID-19]), AMPLIFIED PROBE TECHNIQUE, MAKING USE OF HIGH THROUGHPUT TECHNOLOGIES AS DESCRIBED BY CMS-2020-01-R: HCPCS

## 2020-11-01 LAB
SARS-COV-2: NOT DETECTED
SOURCE: NORMAL

## 2020-11-08 LAB
SARS-COV-2: NOT DETECTED
SOURCE: NORMAL

## 2020-11-09 ENCOUNTER — HOSPITAL ENCOUNTER (OUTPATIENT)
Dept: CT IMAGING | Age: 65
Discharge: HOME | End: 2020-11-11
Payer: MEDICAID

## 2020-11-09 PROCEDURE — 6360000004 HC RX CONTRAST MEDICATION: Performed by: RADIOLOGY

## 2020-11-09 PROCEDURE — 74177 CT ABD & PELVIS W/CONTRAST: CPT

## 2020-11-09 RX ORDER — SODIUM CHLORIDE 0.9 % (FLUSH) 0.9 %
10 SYRINGE (ML) INJECTION ONCE
Status: DISCONTINUED | OUTPATIENT
Start: 2020-11-09 | End: 2020-11-12 | Stop reason: HOSPADM

## 2020-11-09 RX ADMIN — IOHEXOL 50 ML: 240 INJECTION, SOLUTION INTRATHECAL; INTRAVASCULAR; INTRAVENOUS; ORAL at 15:53

## 2020-11-11 LAB — SOURCE: NORMAL

## 2020-11-15 LAB
SARS-COV-2: NOT DETECTED
SOURCE: NORMAL

## 2020-11-19 LAB
SARS-COV-2: NOT DETECTED
SOURCE: NORMAL

## 2020-11-29 LAB
SARS-COV-2: NOT DETECTED
SOURCE: NORMAL

## 2020-12-03 LAB
SARS-COV-2: NOT DETECTED
SOURCE: 1

## 2020-12-06 LAB
SARS-COV-2: NOT DETECTED
SOURCE: NORMAL

## 2020-12-09 LAB
SARS-COV-2: NOT DETECTED
SOURCE: NORMAL

## 2020-12-12 LAB
SARS-COV-2: NOT DETECTED
SOURCE: NORMAL

## 2020-12-14 ENCOUNTER — APPOINTMENT (OUTPATIENT)
Dept: CT IMAGING | Age: 65
DRG: 466 | End: 2020-12-14
Payer: MEDICAID

## 2020-12-14 ENCOUNTER — APPOINTMENT (OUTPATIENT)
Dept: GENERAL RADIOLOGY | Age: 65
DRG: 466 | End: 2020-12-14
Payer: MEDICAID

## 2020-12-14 ENCOUNTER — HOSPITAL ENCOUNTER (INPATIENT)
Age: 65
LOS: 2 days | Discharge: NURSING, REHAB OR LONG-TERM CARE | DRG: 466 | End: 2020-12-16
Attending: EMERGENCY MEDICINE | Admitting: INTERNAL MEDICINE
Payer: MEDICAID

## 2020-12-14 PROBLEM — R33.9 URINE RETENTION: Status: ACTIVE | Noted: 2020-12-14

## 2020-12-14 PROBLEM — I25.10 CORONARY DISEASE: Status: ACTIVE | Noted: 2020-12-14

## 2020-12-14 PROBLEM — N12 PYELONEPHRITIS: Status: ACTIVE | Noted: 2020-12-14

## 2020-12-14 PROBLEM — T83.011A MALFUNCTION OF FOLEY CATHETER (HCC): Status: ACTIVE | Noted: 2020-12-14

## 2020-12-14 LAB
ALBUMIN SERPL-MCNC: 4 G/DL (ref 3.5–5.2)
ALBUMIN SERPL-MCNC: 4 G/DL (ref 3.5–5.2)
ALP BLD-CCNC: 137 U/L (ref 35–104)
ALP BLD-CCNC: 148 U/L (ref 35–104)
ALT SERPL-CCNC: 17 U/L (ref 0–32)
ALT SERPL-CCNC: 18 U/L (ref 0–32)
ANION GAP SERPL CALCULATED.3IONS-SCNC: 14 MMOL/L (ref 7–16)
ANION GAP SERPL CALCULATED.3IONS-SCNC: 17 MMOL/L (ref 7–16)
AST SERPL-CCNC: 30 U/L (ref 0–31)
AST SERPL-CCNC: 35 U/L (ref 0–31)
BACTERIA: ABNORMAL /HPF
BASOPHILS ABSOLUTE: 0.03 E9/L (ref 0–0.2)
BASOPHILS RELATIVE PERCENT: 0.1 % (ref 0–2)
BILIRUB SERPL-MCNC: 0.4 MG/DL (ref 0–1.2)
BILIRUB SERPL-MCNC: 0.4 MG/DL (ref 0–1.2)
BILIRUBIN URINE: NEGATIVE
BLOOD, URINE: ABNORMAL
BUN BLDV-MCNC: 10 MG/DL (ref 8–23)
BUN BLDV-MCNC: 9 MG/DL (ref 8–23)
CALCIUM SERPL-MCNC: 9.7 MG/DL (ref 8.6–10.2)
CALCIUM SERPL-MCNC: 9.9 MG/DL (ref 8.6–10.2)
CHLORIDE BLD-SCNC: 100 MMOL/L (ref 98–107)
CHLORIDE BLD-SCNC: 98 MMOL/L (ref 98–107)
CLARITY: ABNORMAL
CO2: 20 MMOL/L (ref 22–29)
CO2: 22 MMOL/L (ref 22–29)
COLOR: YELLOW
CREAT SERPL-MCNC: 0.5 MG/DL (ref 0.5–1)
CREAT SERPL-MCNC: 0.6 MG/DL (ref 0.5–1)
CRYSTALS, UA: ABNORMAL /HPF
D DIMER: 234 NG/ML DDU
EKG ATRIAL RATE: 112 BPM
EKG P AXIS: 70 DEGREES
EKG P-R INTERVAL: 184 MS
EKG Q-T INTERVAL: 344 MS
EKG QRS DURATION: 82 MS
EKG QTC CALCULATION (BAZETT): 469 MS
EKG R AXIS: -76 DEGREES
EKG T AXIS: 40 DEGREES
EKG VENTRICULAR RATE: 112 BPM
EOSINOPHILS ABSOLUTE: 0 E9/L (ref 0.05–0.5)
EOSINOPHILS RELATIVE PERCENT: 0 % (ref 0–6)
GFR AFRICAN AMERICAN: >60
GFR AFRICAN AMERICAN: >60
GFR NON-AFRICAN AMERICAN: >60 ML/MIN/1.73
GFR NON-AFRICAN AMERICAN: >60 ML/MIN/1.73
GLUCOSE BLD-MCNC: 149 MG/DL (ref 74–99)
GLUCOSE BLD-MCNC: 167 MG/DL (ref 74–99)
GLUCOSE URINE: NEGATIVE MG/DL
HCT VFR BLD CALC: 48.4 % (ref 34–48)
HEMOGLOBIN: 16.3 G/DL (ref 11.5–15.5)
IMMATURE GRANULOCYTES #: 0.12 E9/L
IMMATURE GRANULOCYTES %: 0.6 % (ref 0–5)
KETONES, URINE: NEGATIVE MG/DL
LACTIC ACID: 2.1 MMOL/L (ref 0.5–2.2)
LACTIC ACID: 4.4 MMOL/L (ref 0.5–2.2)
LEUKOCYTE ESTERASE, URINE: ABNORMAL
LIPASE: 14 U/L (ref 13–60)
LYMPHOCYTES ABSOLUTE: 0.98 E9/L (ref 1.5–4)
LYMPHOCYTES RELATIVE PERCENT: 4.7 % (ref 20–42)
MCH RBC QN AUTO: 30 PG (ref 26–35)
MCHC RBC AUTO-ENTMCNC: 33.7 % (ref 32–34.5)
MCV RBC AUTO: 89.1 FL (ref 80–99.9)
MONOCYTES ABSOLUTE: 0.67 E9/L (ref 0.1–0.95)
MONOCYTES RELATIVE PERCENT: 3.2 % (ref 2–12)
NEUTROPHILS ABSOLUTE: 19.2 E9/L (ref 1.8–7.3)
NEUTROPHILS RELATIVE PERCENT: 91.4 % (ref 43–80)
NITRITE, URINE: NEGATIVE
PDW BLD-RTO: 13.7 FL (ref 11.5–15)
PH UA: >=9 (ref 5–9)
PLATELET # BLD: 583 E9/L (ref 130–450)
PMV BLD AUTO: 9.8 FL (ref 7–12)
POTASSIUM REFLEX MAGNESIUM: 4.8 MMOL/L (ref 3.5–5)
POTASSIUM REFLEX MAGNESIUM: 5.3 MMOL/L (ref 3.5–5)
PRO-BNP: 6348 PG/ML (ref 0–125)
PROTEIN UA: 30 MG/DL
RBC # BLD: 5.43 E12/L (ref 3.5–5.5)
RBC UA: ABNORMAL /HPF (ref 0–2)
REASON FOR REJECTION: NORMAL
REASON FOR REJECTION: NORMAL
REJECTED TEST: NORMAL
REJECTED TEST: NORMAL
SARS-COV-2, NAAT: NOT DETECTED
SODIUM BLD-SCNC: 135 MMOL/L (ref 132–146)
SODIUM BLD-SCNC: 136 MMOL/L (ref 132–146)
SPECIFIC GRAVITY UA: 1.01 (ref 1–1.03)
TOTAL PROTEIN: 6.9 G/DL (ref 6.4–8.3)
TOTAL PROTEIN: 7.2 G/DL (ref 6.4–8.3)
TROPONIN: <0.01 NG/ML (ref 0–0.03)
UROBILINOGEN, URINE: 0.2 E.U./DL
WBC # BLD: 21 E9/L (ref 4.5–11.5)
WBC UA: ABNORMAL /HPF (ref 0–5)

## 2020-12-14 PROCEDURE — 6370000000 HC RX 637 (ALT 250 FOR IP): Performed by: INTERNAL MEDICINE

## 2020-12-14 PROCEDURE — 80053 COMPREHEN METABOLIC PANEL: CPT

## 2020-12-14 PROCEDURE — 2580000003 HC RX 258: Performed by: STUDENT IN AN ORGANIZED HEALTH CARE EDUCATION/TRAINING PROGRAM

## 2020-12-14 PROCEDURE — 71045 X-RAY EXAM CHEST 1 VIEW: CPT

## 2020-12-14 PROCEDURE — 71275 CT ANGIOGRAPHY CHEST: CPT

## 2020-12-14 PROCEDURE — 83880 ASSAY OF NATRIURETIC PEPTIDE: CPT

## 2020-12-14 PROCEDURE — 87088 URINE BACTERIA CULTURE: CPT

## 2020-12-14 PROCEDURE — 99285 EMERGENCY DEPT VISIT HI MDM: CPT

## 2020-12-14 PROCEDURE — 2060000000 HC ICU INTERMEDIATE R&B

## 2020-12-14 PROCEDURE — 83605 ASSAY OF LACTIC ACID: CPT

## 2020-12-14 PROCEDURE — 74177 CT ABD & PELVIS W/CONTRAST: CPT

## 2020-12-14 PROCEDURE — 83690 ASSAY OF LIPASE: CPT

## 2020-12-14 PROCEDURE — 6360000004 HC RX CONTRAST MEDICATION: Performed by: RADIOLOGY

## 2020-12-14 PROCEDURE — 85025 COMPLETE CBC W/AUTO DIFF WBC: CPT

## 2020-12-14 PROCEDURE — 84484 ASSAY OF TROPONIN QUANT: CPT

## 2020-12-14 PROCEDURE — 2580000003 HC RX 258: Performed by: RADIOLOGY

## 2020-12-14 PROCEDURE — 81001 URINALYSIS AUTO W/SCOPE: CPT

## 2020-12-14 PROCEDURE — U0002 COVID-19 LAB TEST NON-CDC: HCPCS

## 2020-12-14 PROCEDURE — 6360000002 HC RX W HCPCS: Performed by: STUDENT IN AN ORGANIZED HEALTH CARE EDUCATION/TRAINING PROGRAM

## 2020-12-14 PROCEDURE — 93010 ELECTROCARDIOGRAM REPORT: CPT | Performed by: INTERNAL MEDICINE

## 2020-12-14 PROCEDURE — 93005 ELECTROCARDIOGRAM TRACING: CPT | Performed by: STUDENT IN AN ORGANIZED HEALTH CARE EDUCATION/TRAINING PROGRAM

## 2020-12-14 PROCEDURE — 85378 FIBRIN DEGRADE SEMIQUANT: CPT

## 2020-12-14 PROCEDURE — 96365 THER/PROPH/DIAG IV INF INIT: CPT

## 2020-12-14 PROCEDURE — 96360 HYDRATION IV INFUSION INIT: CPT

## 2020-12-14 PROCEDURE — 36415 COLL VENOUS BLD VENIPUNCTURE: CPT

## 2020-12-14 PROCEDURE — 2500000003 HC RX 250 WO HCPCS: Performed by: STUDENT IN AN ORGANIZED HEALTH CARE EDUCATION/TRAINING PROGRAM

## 2020-12-14 PROCEDURE — 87040 BLOOD CULTURE FOR BACTERIA: CPT

## 2020-12-14 PROCEDURE — 2580000003 HC RX 258: Performed by: INTERNAL MEDICINE

## 2020-12-14 RX ORDER — ATORVASTATIN CALCIUM 80 MG/1
80 TABLET, FILM COATED ORAL NIGHTLY
COMMUNITY
End: 2021-02-16 | Stop reason: SDUPTHER

## 2020-12-14 RX ORDER — SODIUM CHLORIDE 0.9 % (FLUSH) 0.9 %
10 SYRINGE (ML) INJECTION ONCE
Status: COMPLETED | OUTPATIENT
Start: 2020-12-14 | End: 2020-12-14

## 2020-12-14 RX ORDER — ALBUTEROL SULFATE 2.5 MG/3ML
2.5 SOLUTION RESPIRATORY (INHALATION) EVERY 4 HOURS PRN
COMMUNITY

## 2020-12-14 RX ORDER — FLUOXETINE HYDROCHLORIDE 20 MG/1
20 CAPSULE ORAL 2 TIMES DAILY
Status: DISCONTINUED | OUTPATIENT
Start: 2020-12-14 | End: 2020-12-16 | Stop reason: HOSPADM

## 2020-12-14 RX ORDER — HYDROCODONE BITARTRATE AND ACETAMINOPHEN 5; 325 MG/1; MG/1
1 TABLET ORAL EVERY 6 HOURS PRN
COMMUNITY
End: 2021-11-29

## 2020-12-14 RX ORDER — ATORVASTATIN CALCIUM 80 MG/1
80 TABLET, FILM COATED ORAL NIGHTLY
COMMUNITY

## 2020-12-14 RX ORDER — ATORVASTATIN CALCIUM 80 MG/1
80 TABLET, FILM COATED ORAL NIGHTLY
Status: DISCONTINUED | OUTPATIENT
Start: 2020-12-14 | End: 2020-12-16 | Stop reason: HOSPADM

## 2020-12-14 RX ORDER — FLUTICASONE PROPIONATE 50 MCG
1 SPRAY, SUSPENSION (ML) NASAL DAILY
Status: DISCONTINUED | OUTPATIENT
Start: 2020-12-15 | End: 2020-12-16 | Stop reason: HOSPADM

## 2020-12-14 RX ORDER — PROMETHAZINE HYDROCHLORIDE 25 MG/1
12.5 TABLET ORAL EVERY 6 HOURS PRN
Status: DISCONTINUED | OUTPATIENT
Start: 2020-12-14 | End: 2020-12-16 | Stop reason: HOSPADM

## 2020-12-14 RX ORDER — FLUTICASONE PROPIONATE 50 MCG
1 SPRAY, SUSPENSION (ML) NASAL DAILY
COMMUNITY

## 2020-12-14 RX ORDER — GABAPENTIN 300 MG/1
300 CAPSULE ORAL 3 TIMES DAILY
COMMUNITY

## 2020-12-14 RX ORDER — LEVOTHYROXINE SODIUM 0.12 MG/1
125 TABLET ORAL DAILY
Status: DISCONTINUED | OUTPATIENT
Start: 2020-12-15 | End: 2020-12-16 | Stop reason: HOSPADM

## 2020-12-14 RX ORDER — DIAZEPAM 2 MG/1
2 TABLET ORAL 2 TIMES DAILY
Status: DISCONTINUED | OUTPATIENT
Start: 2020-12-14 | End: 2020-12-16 | Stop reason: HOSPADM

## 2020-12-14 RX ORDER — LEVOTHYROXINE SODIUM 0.12 MG/1
125 TABLET ORAL DAILY
COMMUNITY

## 2020-12-14 RX ORDER — ACETAMINOPHEN 650 MG/1
650 SUPPOSITORY RECTAL EVERY 6 HOURS PRN
Status: DISCONTINUED | OUTPATIENT
Start: 2020-12-14 | End: 2020-12-16 | Stop reason: HOSPADM

## 2020-12-14 RX ORDER — PANTOPRAZOLE SODIUM 40 MG/1
40 TABLET, DELAYED RELEASE ORAL DAILY
COMMUNITY

## 2020-12-14 RX ORDER — HYDROCODONE BITARTRATE AND ACETAMINOPHEN 5; 325 MG/1; MG/1
1 TABLET ORAL EVERY 6 HOURS PRN
Status: DISCONTINUED | OUTPATIENT
Start: 2020-12-14 | End: 2020-12-16 | Stop reason: HOSPADM

## 2020-12-14 RX ORDER — ANASTROZOLE 1 MG/1
1 TABLET ORAL DAILY
Status: DISCONTINUED | OUTPATIENT
Start: 2020-12-15 | End: 2020-12-16 | Stop reason: HOSPADM

## 2020-12-14 RX ORDER — POLYETHYLENE GLYCOL 3350 17 G/17G
17 POWDER, FOR SOLUTION ORAL DAILY PRN
Status: DISCONTINUED | OUTPATIENT
Start: 2020-12-14 | End: 2020-12-16 | Stop reason: HOSPADM

## 2020-12-14 RX ORDER — ONDANSETRON 2 MG/ML
4 INJECTION INTRAMUSCULAR; INTRAVENOUS EVERY 6 HOURS PRN
Status: DISCONTINUED | OUTPATIENT
Start: 2020-12-14 | End: 2020-12-16 | Stop reason: HOSPADM

## 2020-12-14 RX ORDER — POTASSIUM BICARBONATE 25 MEQ/1
25 TABLET, EFFERVESCENT ORAL 2 TIMES DAILY
Status: ON HOLD | COMMUNITY
End: 2020-12-16 | Stop reason: HOSPADM

## 2020-12-14 RX ORDER — SODIUM CHLORIDE 0.9 % (FLUSH) 0.9 %
10 SYRINGE (ML) INJECTION EVERY 12 HOURS SCHEDULED
Status: DISCONTINUED | OUTPATIENT
Start: 2020-12-14 | End: 2020-12-16 | Stop reason: HOSPADM

## 2020-12-14 RX ORDER — ACETAMINOPHEN 325 MG/1
650 TABLET ORAL EVERY 6 HOURS PRN
Status: DISCONTINUED | OUTPATIENT
Start: 2020-12-14 | End: 2020-12-16 | Stop reason: HOSPADM

## 2020-12-14 RX ORDER — ALBUTEROL SULFATE 2.5 MG/3ML
2.5 SOLUTION RESPIRATORY (INHALATION) EVERY 4 HOURS PRN
Status: DISCONTINUED | OUTPATIENT
Start: 2020-12-14 | End: 2020-12-16 | Stop reason: HOSPADM

## 2020-12-14 RX ORDER — GABAPENTIN 300 MG/1
300 CAPSULE ORAL 3 TIMES DAILY
Status: DISCONTINUED | OUTPATIENT
Start: 2020-12-14 | End: 2020-12-16 | Stop reason: HOSPADM

## 2020-12-14 RX ORDER — PANTOPRAZOLE SODIUM 40 MG/1
40 TABLET, DELAYED RELEASE ORAL DAILY
Status: DISCONTINUED | OUTPATIENT
Start: 2020-12-15 | End: 2020-12-16 | Stop reason: HOSPADM

## 2020-12-14 RX ORDER — SODIUM CHLORIDE/ALOE VERA
1 GEL (GRAM) NASAL EVERY 12 HOURS PRN
COMMUNITY

## 2020-12-14 RX ORDER — 0.9 % SODIUM CHLORIDE 0.9 %
500 INTRAVENOUS SOLUTION INTRAVENOUS ONCE
Status: COMPLETED | OUTPATIENT
Start: 2020-12-14 | End: 2020-12-14

## 2020-12-14 RX ORDER — SODIUM CHLORIDE 9 MG/ML
INJECTION, SOLUTION INTRAVENOUS CONTINUOUS
Status: DISCONTINUED | OUTPATIENT
Start: 2020-12-14 | End: 2020-12-16 | Stop reason: HOSPADM

## 2020-12-14 RX ORDER — PYRIDOXINE HCL (VITAMIN B6) 100 MG
500 TABLET ORAL DAILY
Status: DISCONTINUED | OUTPATIENT
Start: 2020-12-14 | End: 2020-12-14

## 2020-12-14 RX ORDER — SODIUM CHLORIDE 0.9 % (FLUSH) 0.9 %
10 SYRINGE (ML) INJECTION PRN
Status: DISCONTINUED | OUTPATIENT
Start: 2020-12-14 | End: 2020-12-16 | Stop reason: HOSPADM

## 2020-12-14 RX ORDER — NITROGLYCERIN 0.4 MG/1
0.4 TABLET SUBLINGUAL EVERY 5 MIN PRN
COMMUNITY

## 2020-12-14 RX ORDER — FLUOXETINE HYDROCHLORIDE 20 MG/1
20 CAPSULE ORAL 2 TIMES DAILY
COMMUNITY

## 2020-12-14 RX ADMIN — SODIUM CHLORIDE 500 ML: 9 INJECTION, SOLUTION INTRAVENOUS at 14:47

## 2020-12-14 RX ADMIN — SODIUM CHLORIDE, PRESERVATIVE FREE 10 ML: 5 INJECTION INTRAVENOUS at 23:53

## 2020-12-14 RX ADMIN — ATORVASTATIN CALCIUM 80 MG: 80 TABLET, FILM COATED ORAL at 23:46

## 2020-12-14 RX ADMIN — FLUOXETINE HYDROCHLORIDE 20 MG: 20 CAPSULE ORAL at 23:46

## 2020-12-14 RX ADMIN — SODIUM CHLORIDE: 9 INJECTION, SOLUTION INTRAVENOUS at 23:58

## 2020-12-14 RX ADMIN — Medication 10 ML: at 12:11

## 2020-12-14 RX ADMIN — IOPAMIDOL 75 ML: 755 INJECTION, SOLUTION INTRAVENOUS at 16:23

## 2020-12-14 RX ADMIN — APIXABAN 5 MG: 5 TABLET, FILM COATED ORAL at 23:50

## 2020-12-14 RX ADMIN — Medication 10 ML: at 16:23

## 2020-12-14 RX ADMIN — GABAPENTIN 300 MG: 300 CAPSULE ORAL at 23:51

## 2020-12-14 RX ADMIN — METOPROLOL TARTRATE 12.5 MG: 25 TABLET, FILM COATED ORAL at 23:51

## 2020-12-14 RX ADMIN — IOPAMIDOL 90 ML: 755 INJECTION, SOLUTION INTRAVENOUS at 12:11

## 2020-12-14 RX ADMIN — HYDROCODONE BITARTRATE AND ACETAMINOPHEN 1 TABLET: 5; 325 TABLET ORAL at 19:43

## 2020-12-14 RX ADMIN — DOXYCYCLINE 100 MG: 100 INJECTION, POWDER, LYOPHILIZED, FOR SOLUTION INTRAVENOUS at 17:17

## 2020-12-14 RX ADMIN — GENTAMICIN SULFATE 390 MG: 40 INJECTION, SOLUTION INTRAMUSCULAR; INTRAVENOUS at 14:47

## 2020-12-14 ASSESSMENT — ENCOUNTER SYMPTOMS
SHORTNESS OF BREATH: 0
VOMITING: 0
DIARRHEA: 0
ABDOMINAL PAIN: 1
PHOTOPHOBIA: 0
NAUSEA: 1
BACK PAIN: 0
COUGH: 0
TROUBLE SWALLOWING: 0

## 2020-12-14 ASSESSMENT — PAIN DESCRIPTION - LOCATION
LOCATION: ABDOMEN
LOCATION: NECK

## 2020-12-14 ASSESSMENT — PAIN DESCRIPTION - DESCRIPTORS
DESCRIPTORS: ACHING;SHARP
DESCRIPTORS: ACHING

## 2020-12-14 ASSESSMENT — PAIN SCALES - GENERAL
PAINLEVEL_OUTOF10: 9
PAINLEVEL_OUTOF10: 8
PAINLEVEL_OUTOF10: 10

## 2020-12-14 ASSESSMENT — PAIN DESCRIPTION - FREQUENCY
FREQUENCY: INTERMITTENT
FREQUENCY: CONTINUOUS

## 2020-12-14 NOTE — H&P
History and Physical      CHIEF COMPLAINT: abdominal pain        HISTORY OF PRESENT ILLNESS:      The patient is a 63 y.o. female patient of Dr. Genevieve Gudino transferred from a SNF d/t abdominal pain and HTN. The patient is a poor historian with history obtained from the ER physician and medical records. She presently denies abdominal pain. She does acknowledge fever and rigors prior to the ER visit. Abdominal pain in the SNF of sudden onset. No prior history of renal disorders with pacheco cath placed d/t a neurogenic bladder from long standing MS. She declined recommended supra pubic catheter placement in the past. G-tube placed in the past d/t dysphagia with dark secretions reported by the patient and not used for tube feedings for the past several months. Prior history of DVT on Eliquis anticoagulation and Brilinta for remote MI.     CT ABDOMEN PELVIS W IV CONTRAST Additional Contrast? None  Final Result  1. Moderately distended urinary bladder despite the presence of a Pacheco  catheter. Please assess for catheter dysfunction. 2. Mild bilateral hydroureteronephrosis likely due to distended urinary  bladder. 3. 3.0 x 2.0 cm enhancing mass within the subcutaneous fat of the right  buttock. It is stable as of the earliest available CT from 12/14/2011. It  appears to be contiguous with the anus. It may represent a large hemorrhoid  or polyp. Clinical correlation is needed. 4. Of the 3 known liver hemangioma, 2 are visualized and are stable. The 3rd  along the dome of the liver is excluded from the field of view.           Past Medical History:    Past Medical History:   Diagnosis Date    Anxiety     Breast cancer ([facility] [address] 02/2020    right    CAD (coronary artery disease)     Cystitis     Depression     Pacheco catheter status     History of blood transfusion     Hyperlipidemia     Hypertension 08/27/2018    Hypothyroidism     MS (multiple sclerosis) (HCC)     wheelchair bound    Neurogenic bladder 08/09/2018    Sepsis ([address]     uti hospitalized 11-12/2019    Total self-care deficit     Unspecified cerebral artery occlusion with cerebral infarction 2011    left her incontinent    Wheelchair dependent        Past Surgical History:    Past Surgical History:   Procedure Laterality Date    COLON SURGERY  2001    exp lap, lysis of adhesions, release of SBO. [facility]. Dr. Darrell Causey 2/4/2020    COLONOSCOPY DIAGNOSTIC performed by Jose De Jesus Gaines MD at [place] [address]  06/28/2019    Dr. Serina See- Rt Wrist- 3.0/300mm Resolute-RCA, 2.75/22mm-Circumflex,     DENTAL SURGERY      all removed    HYSTERECTOMY  2001    [facility]. Dr. Sowmya Fontenot, MODIFIED RADICAL Right 3/3/2020    RIGHT BREAST MASTECTOMY WITH SENTINEL NODE DISSECTION WITH BLUE DYE performed by Jose De Jesus Gaines MD at [address] N/A 12/12/2019    EGD PEG INSERTION performed by Jose De Jesus Gaines MD at [facility]       Medications Prior to Admission:    Not in a hospital admission. Allergies:    Pcn [penicillins] and Watermelon [citrullus vulgaris]    Social History:    reports that she quit smoking about 20 years ago. Her smoking use included cigarettes. She started smoking about 25 years ago. She has a 5.00 pack-year smoking history. She has never used smokeless tobacco. She reports that she does not drink alcohol or use drugs. Family History:   family history includes Cancer in her mother; Diabetes in her father; Heart Disease in her father. REVIEW OF SYSTEMS:  As above in the HPI, otherwise negative    PHYSICAL EXAM:    Vitals:  BP (!) 148/102   Pulse 89   Temp 98 °F (36.7 °C) (Tympanic)   Resp 19   Ht 5' 6.5\" (1.689 m)   Wt 170 lb (77.1 kg)   SpO2 98%   BMI 27.03 kg/m²     General:   Awake, alert, oriented X 3. No acute distress. HEENT:    Normocephalic, atraumatic. Pupils equal, round, reactive to light. No scleral icterus.   No conjunctival injection. Oropharynx clear. No nasal discharge. Neck:       Supple. No bruits, adenopathy, masses, neck vein distention. Trachea in the midline. Heart:      RRR, no murmurs, gallops, rubs  Lungs:     CTA bilaterally, bilat symmetrical expansion, no wheeze, rales, or rhonchi  Abdomen: Bowel sounds present, soft, nontender, no masses, no organomegaly, no peritoneal signs, PEG tube with black secretions. Extremities:  No clubbing, cyanosis, or edema  Skin:         Warm and dry, no open lesions or rash  Neuro:     Cranial nerves 2-12 intact, no focal deficits  Breast:    Right breast removed  Rectal:    deferred  Genitalia:  deferred    LABS:    CBC with Differential:    Lab Results   Component Value Date    WBC 21.0 12/14/2020    RBC 5.43 12/14/2020    HGB 16.3 12/14/2020    HCT 48.4 12/14/2020     12/14/2020    MCV 89.1 12/14/2020    MCH 30.0 12/14/2020    MCHC 33.7 12/14/2020    RDW 13.7 12/14/2020    NRBC 0.9 03/08/2020    METASPCT 0.9 03/11/2020    LYMPHOPCT 4.7 12/14/2020    MONOPCT 3.2 12/14/2020    BASOPCT 0.1 12/14/2020    MONOSABS 0.67 12/14/2020    LYMPHSABS 0.98 12/14/2020    EOSABS 0.00 12/14/2020    BASOSABS 0.03 12/14/2020     CMP:    Lab Results   Component Value Date     12/14/2020    K 5.3 12/14/2020     12/14/2020    CO2 22 12/14/2020    BUN 10 12/14/2020    CREATININE 0.5 12/14/2020    GFRAA >60 12/14/2020    LABGLOM >60 12/14/2020    GLUCOSE 149 12/14/2020    PROT 6.9 12/14/2020    LABALBU 4.0 12/14/2020    CALCIUM 9.7 12/14/2020    BILITOT 0.4 12/14/2020    ALKPHOS 137 12/14/2020    AST 35 12/14/2020    ALT 18 12/14/2020     Results for Cesario Ahumada (MRN [id number]) as of 12/14/2020 17:07   Ref.  Range 12/14/2020 10:54   Color, UA Latest Ref Range: Straw/Yellow  Yellow   Clarity, UA Latest Ref Range: Clear  TURBID (A)   Glucose, UA Latest Ref Range: Negative mg/dL Negative   Bilirubin, Urine Latest Ref Range: Negative  Negative   Ketones, Urine Latest Ref Range: Negative mg/dL Negative   Specific Gravity, UA Latest Ref Range: 1.005 - 1.030  1.010   Blood, Urine Latest Ref Range: Negative  SMALL (A)   pH, UA Latest Ref Range: 5.0 - 9.0  >=9.0   Protein, UA Latest Ref Range: Negative mg/dL 30 (A)   Urobilinogen, Urine Latest Ref Range: <2.0 E.U./dL 0.2   Nitrite, Urine Latest Ref Range: Negative  Negative   Leukocyte Esterase, Urine Latest Ref Range: Negative  TRACE (A)   WBC, UA Latest Ref Range: 0 - 5 /HPF 1-3   RBC, UA Latest Ref Range: 0 - 2 /HPF 1-3   Bacteria, UA Latest Ref Range: None Seen /HPF MANY (A)   Crystals, UA Latest Ref Range: None Seen /HPF Moderate (A)     Results for Livia Sutherland (MRN [id number]) as of 12/14/2020 17:07   Ref.  Range 12/14/2020 10:50   Lactic Acid Latest Ref Range: 0.5 - 2.2 mmol/L 4.4 [facility] TERM ACUTE C[facility])     ASSESSMENT:      Active Hospital Problems    Diagnosis Date Noted    Pyelonephritis [N12] 12/14/2020    Coronary disease [I25.10] 12/14/2020    Urine retention [R33.9] 12/14/2020    Malfunction of Elizondo catheter ([address] Alexis Dempsey 12/14/2020    Lactic acidosis [E87.2] 08/12/2018        PLAN:  Admit to a medical floor               AT pending cultures               Elizondo changed in the ER               IVF               Monitor hemogram               Gastroccult secretions--r/o GIB               Urology and general surgery consults               Cardiac consult for ongoing Brilinta need               Follow up lactic acid          Thelma White DO  5:31 PM  12/14/2020

## 2020-12-14 NOTE — ED PROVIDER NOTES
The patient is a 62-year-old female with a history of CHF, CAD, MS, hyperlipidemia, hypothyroidism who presents to the emergency department via EMS from [address] [place] [address] for abdominal pain and hypertension. She is a poor historian and history is limited. There is a component of dementia present. Patient review of systems is extremely limited. She states that her abdominal pain started last night. It was sudden in onset. It has been constant. She describes it as moderate in severity. He has been experiencing mild intermittent fevers and fatigue and weakness. Patient states that yesterday evening she developed nausea and one episode of vomiting last night. Her facility states that it is a chronic issue. She has a history of a GI bleed and had a chronic PEG tube and indwelling Elizondo catheter. However, the patient denies any diarrhea, hematochezia, melena, chest pain, shortness of breath, or other acute symptoms or concerns. The history is provided by the patient. Review of Systems   Constitutional: Positive for fatigue. Negative for chills and fever. HENT: Negative for congestion and trouble swallowing. Eyes: Negative for photophobia and visual disturbance. Respiratory: Negative for cough and shortness of breath. Cardiovascular: Negative for chest pain and leg swelling. Gastrointestinal: Positive for abdominal pain and nausea. Negative for diarrhea and vomiting. Genitourinary: Negative for dysuria, flank pain, frequency and hematuria. Musculoskeletal: Negative for back pain and neck pain. Skin: Negative for rash and wound. Neurological: Positive for weakness. Negative for dizziness, syncope, light-headedness, numbness and headaches. All other systems reviewed and are negative. Physical Exam  Vitals signs and nursing note reviewed. Exam conducted with a chaperone present. Constitutional:       General: She is not in acute distress.      Appearance: She is well-developed. She is morbidly obese. She is ill-appearing (obese and chronically ill appearing, in no acute distress). She is not toxic-appearing or diaphoretic. HENT:      Head: Normocephalic and atraumatic. Nose: Nose normal.      Mouth/Throat:      Lips: Pink. No lesions. Mouth: Mucous membranes are moist.   Eyes:      General: Lids are normal.   Neck:      Musculoskeletal: Normal range of motion and neck supple. Cardiovascular:      Rate and Rhythm: Regular rhythm. Tachycardia present. Heart sounds: Normal heart sounds, S1 normal and S2 normal. No murmur. Pulmonary:      Effort: Pulmonary effort is normal. No respiratory distress. Breath sounds: Normal breath sounds and air entry. No decreased breath sounds, wheezing, rhonchi or rales. Abdominal:      General: Bowel sounds are normal.      Palpations: Abdomen is soft. Tenderness: There is generalized abdominal tenderness (moderate diffuse tenderness to palpation, no rigidity, no rebound tenderness, no guarding). There is no guarding or rebound. Genitourinary:     Comments: No rash/wound/tendernss in the perineum. There is a small palpable area in the subcutaneous tissue in the right perineum. No erythema, no warmth, non-tender to palpation. Skin:     General: Skin is warm and dry. Neurological:      Mental Status: She is alert and oriented to person, place, and time. Motor: No tremor or abnormal muscle tone. Coordination: Coordination normal.          Procedures     MDM  Number of Diagnoses or Management Options  Acute electrocardiogram changes  Acute pyelonephritis  Lactic acidosis  Leukocytosis, unspecified type  Diagnosis management comments: The patient is a 63year-old-female who presents to the Emergency Department for HTN and abdominal pain. She is tachycardic on arrival but otherwise hemodynamically stable, chronically ill in appearance but in no acute distress.  EKG evident of nonspecific changes throughout, changed from previous. She denies any chest pain. Troponin negative. CXR evident of questionable pneumonia, CT abdomen shows distended urinary bladder and hydroureteronephrosis. There is also a 3x2 cm mass in the subcutaneous area of the right buttock. There are no overlying skin changes and this appearing chronic. Lactic acidosis of 4.4 and leukocytosis of 21k, kidney function within normal limits. Dimer was borderline, did obtain CTA, which di dnot show PE but did not vague opacity in the lower lobes. Patient is anaphylactic to PCN. Treated with Gentamycin and Doxycyline to cover for possible underlying pneumonia. Consulted with hospitalist who accepted the patient for admission. Also treated with small amount of IVF. She tolerated well. Discussed results and plan with patient. She agreed to plan and admission. ED Course as of Dec 14 2126   Mon Dec 14, 2020   1707 Consulted with Dr. Zhen Rios, hospitalist, who accepted the patient for admission. [KG]      ED Course User Index  [KG] Beatriz Herbertmin, DO          EKG: This EKG is signed and interpreted by me. Rate: 112  Rhythm: Sinus  Interpretation: Sinus tachycardia, left axis deviation, low voltage QRS, nonspecific ST elevations in the anterior and inferior leads. There is artifact present at baseline. QT is prolonged, QTC is 469, intervals otherwise within normal limits  Comparison: changes compared to previous EKG     ED Course as of Dec 14 2126   Mon Dec 14, 2020   1707 Consulted with Dr. Zhen Rios, hospitalist, who accepted the patient for admission.      [KG]      ED Course User Index  [KG] Migueln Piedadmin, DO       --------------------------------------------- PAST HISTORY ---------------------------------------------  Past Medical History:  has a past medical history of Anxiety, Breast cancer ([facility] [address] CAD (coronary artery disease), Cystitis, Depression, Elizondo catheter status, History of blood transfusion, Hyperlipidemia, Hypertension, Hypothyroidism, MS (multiple sclerosis) ([facility] [address] Neurogenic bladder, Sepsis ([facility] [address] Total self-care deficit, Unspecified cerebral artery occlusion with cerebral infarction, and Wheelchair dependent. Past Surgical History:  has a past surgical history that includes Hysterectomy (2001); Dental surgery; Colon surgery (2001); Coronary angioplasty with stent (06/28/2019); Upper gastrointestinal endoscopy (N/A, 12/12/2019); Colonoscopy (N/A, 2/4/2020); and Mastectomy, modified radical (Right, 3/3/2020). Social History:  reports that she quit smoking about 20 years ago. Her smoking use included cigarettes. She started smoking about 25 years ago. She has a 5.00 pack-year smoking history. She has never used smokeless tobacco. She reports that she does not drink alcohol or use drugs. Family History: family history includes Cancer in her mother; Diabetes in her father; Heart Disease in her father. The patients home medications have been reviewed.     Allergies: Pcn [penicillins] and Watermelon [citrullus vulgaris]    -------------------------------------------------- RESULTS -------------------------------------------------    LABS:  Results for orders placed or performed during the hospital encounter of 12/14/20   CBC auto differential   Result Value Ref Range    WBC 21.0 (H) 4.5 - 11.5 E9/L    RBC 5.43 3.50 - 5.50 E12/L    Hemoglobin 16.3 (H) 11.5 - 15.5 g/dL    Hematocrit 48.4 (H) 34.0 - 48.0 %    MCV 89.1 80.0 - 99.9 fL    MCH 30.0 26.0 - 35.0 pg    MCHC 33.7 32.0 - 34.5 %    RDW 13.7 11.5 - 15.0 fL    Platelets 825 (H) 764 - 450 E9/L    MPV 9.8 7.0 - 12.0 fL    Neutrophils % 91.4 (H) 43.0 - 80.0 %    Immature Granulocytes % 0.6 0.0 - 5.0 %    Lymphocytes % 4.7 (L) 20.0 - 42.0 %    Monocytes % 3.2 2.0 - 12.0 %    Eosinophils % 0.0 0.0 - 6.0 %    Basophils % 0.1 0.0 - 2.0 %    Neutrophils Absolute 19.20 (H) 1.80 - 7.30 E9/L    Immature Granulocytes # 0.12 E9/L    Lymphocytes Absolute 0.98 (L) 1.50 - 4.00 E9/L    Monocytes Absolute 0.67 0.10 - 0.95 E9/L    Eosinophils Absolute 0.00 (L) 0.05 - 0.50 E9/L    Basophils Absolute 0.03 0.00 - 0.20 E9/L   Comprehensive Metabolic Panel w/ Reflex to MG   Result Value Ref Range    Sodium 135 132 - 146 mmol/L    Potassium reflex Magnesium 4.8 3.5 - 5.0 mmol/L    Chloride 98 98 - 107 mmol/L    CO2 20 (L) 22 - 29 mmol/L    Anion Gap 17 (H) 7 - 16 mmol/L    Glucose 167 (H) 74 - 99 mg/dL    BUN 9 8 - 23 mg/dL    CREATININE 0.6 0.5 - 1.0 mg/dL    GFR Non-African American >60 >=60 mL/min/1.73    GFR African American >60     Calcium 9.9 8.6 - 10.2 mg/dL    Total Protein 7.2 6.4 - 8.3 g/dL    Alb 4.0 3.5 - 5.2 g/dL    Total Bilirubin 0.4 0.0 - 1.2 mg/dL    Alkaline Phosphatase 148 (H) 35 - 104 U/L    ALT 17 0 - 32 U/L    AST 30 0 - 31 U/L   Lipase   Result Value Ref Range    Lipase 14 13 - 60 U/L   LACTIC ACID, PLASMA   Result Value Ref Range    Lactic Acid 4.4 (HH) 0.5 - 2.2 mmol/L   Troponin   Result Value Ref Range    Troponin <0.01 0.00 - 0.03 ng/mL   COVID-19   Result Value Ref Range    SARS-CoV-2, NAAT Not Detected Not Detected   Urinalysis with Microscopic   Result Value Ref Range    Color, UA Yellow Straw/Yellow    Clarity, UA TURBID (A) Clear    Glucose, Ur Negative Negative mg/dL    Bilirubin Urine Negative Negative    Ketones, Urine Negative Negative mg/dL    Specific Gravity, UA 1.010 1.005 - 1.030    Blood, Urine SMALL (A) Negative    pH, UA >=9.0 5.0 - 9.0    Protein, UA 30 (A) Negative mg/dL    Urobilinogen, Urine 0.2 <2.0 E.U./dL    Nitrite, Urine Negative Negative    Leukocyte Esterase, Urine TRACE (A) Negative    WBC, UA 1-3 0 - 5 /HPF    RBC, UA 1-3 0 - 2 /HPF    Bacteria, UA MANY (A) None Seen /HPF    Crystals, UA Moderate (A) None Seen /HPF   Brain Natriuretic Peptide   Result Value Ref Range    Pro-BNP 6,348 (H) 0 - 125 pg/mL   D-dimer, quantitative   Result Value Ref Range    D-Dimer, Quant 234 ng/mL DDU   SPECIMEN REJECTION   Result Value Ref Range Rejected Test DIMER     Reason for Rejection see below    Comprehensive Metabolic Panel w/ Reflex to MG   Result Value Ref Range    Sodium 136 132 - 146 mmol/L    Potassium reflex Magnesium 5.3 (H) 3.5 - 5.0 mmol/L    Chloride 100 98 - 107 mmol/L    CO2 22 22 - 29 mmol/L    Anion Gap 14 7 - 16 mmol/L    Glucose 149 (H) 74 - 99 mg/dL    BUN 10 8 - 23 mg/dL    CREATININE 0.5 0.5 - 1.0 mg/dL    GFR Non-African American >60 >=60 mL/min/1.73    GFR African American >60     Calcium 9.7 8.6 - 10.2 mg/dL    Total Protein 6.9 6.4 - 8.3 g/dL    Alb 4.0 3.5 - 5.2 g/dL    Total Bilirubin 0.4 0.0 - 1.2 mg/dL    Alkaline Phosphatase 137 (H) 35 - 104 U/L    ALT 18 0 - 32 U/L    AST 35 (H) 0 - 31 U/L   SPECIMEN REJECTION   Result Value Ref Range    Rejected Test CMPX     Reason for Rejection see below    EKG 12 Lead   Result Value Ref Range    Ventricular Rate 112 BPM    Atrial Rate 112 BPM    P-R Interval 184 ms    QRS Duration 82 ms    Q-T Interval 344 ms    QTc Calculation (Bazett) 469 ms    P Axis 70 degrees    R Axis -76 degrees    T Axis 40 degrees       RADIOLOGY:  CTA CHEST W CONTRAST   Final Result   1. There is no evidence of a pulmonary embolus. 2. Very vague ground-glass infiltrate seen within the upper lobes. Please   correlate with the patient's rapid COVID test.      CT ABDOMEN PELVIS W IV CONTRAST Additional Contrast? None   Final Result   1. Moderately distended urinary bladder despite the presence of a Elizondo   catheter. Please assess for catheter dysfunction. 2. Mild bilateral hydroureteronephrosis likely due to distended urinary   bladder. 3. 3.0 x 2.0 cm enhancing mass within the subcutaneous fat of the right   buttock. It is stable as of the earliest available CT from 12/14/2011. It   appears to be contiguous with the anus. It may represent a large hemorrhoid   or polyp. Clinical correlation is needed. 4. Of the 3 known liver hemangioma, 2 are visualized and are stable.   The 3rd   along the dome multiple bedside re-evaluations, IV medications, cardiac monitoring, continuous pulse oximetry and complex medical decision making and emergency management    This patient has remained hemodynamically stable during their ED course. Diagnosis:  1. Acute pyelonephritis    2. Lactic acidosis    3. Leukocytosis, unspecified type    4. Acute electrocardiogram changes    5. Pneumonia due to organism        Disposition:  Patient's disposition: Admit to telemetry  Patient's condition is serious.             Alexa Mendoza,   Resident  12/14/20 [id number]

## 2020-12-14 NOTE — ED NOTES
PT Gerrie Schaumann was notified that the patient was being admitted and updated on pt condition.       Naomi Rascon RN  12/14/20 [id number]

## 2020-12-14 NOTE — ED NOTES
Pt urinary catheter was replaced. Urine was dark tea colored and cloudy.      Benito Sheridan RN  12/14/20 [id number]

## 2020-12-15 PROBLEM — N31.9 NEUROGENIC BLADDER: Status: ACTIVE | Noted: 2020-12-15

## 2020-12-15 LAB
ANION GAP SERPL CALCULATED.3IONS-SCNC: 10 MMOL/L (ref 7–16)
BASOPHILS ABSOLUTE: 0.05 E9/L (ref 0–0.2)
BASOPHILS RELATIVE PERCENT: 0.4 % (ref 0–2)
BUN BLDV-MCNC: 12 MG/DL (ref 8–23)
BURR CELLS: ABNORMAL
CALCIUM SERPL-MCNC: 9.3 MG/DL (ref 8.6–10.2)
CHLORIDE BLD-SCNC: 102 MMOL/L (ref 98–107)
CO2: 25 MMOL/L (ref 22–29)
CREAT SERPL-MCNC: 0.7 MG/DL (ref 0.5–1)
EOSINOPHILS ABSOLUTE: 0.07 E9/L (ref 0.05–0.5)
EOSINOPHILS RELATIVE PERCENT: 0.5 % (ref 0–6)
GFR AFRICAN AMERICAN: >60
GFR NON-AFRICAN AMERICAN: >60 ML/MIN/1.73
GLUCOSE BLD-MCNC: 109 MG/DL (ref 74–99)
HCT VFR BLD CALC: 43.6 % (ref 34–48)
HEMOCCULT STL QL: NEGATIVE
HEMOCCULT STL QL: NORMAL
HEMOCCULT STL QL: NORMAL
HEMOGLOBIN: 13.7 G/DL (ref 11.5–15.5)
IMMATURE GRANULOCYTES #: 0.06 E9/L
IMMATURE GRANULOCYTES %: 0.4 % (ref 0–5)
LACTIC ACID: 2.2 MMOL/L (ref 0.5–2.2)
LACTIC ACID: 2.3 MMOL/L (ref 0.5–2.2)
LACTIC ACID: 2.9 MMOL/L (ref 0.5–2.2)
LACTIC ACID: 3 MMOL/L (ref 0.5–2.2)
LYMPHOCYTES ABSOLUTE: 1.59 E9/L (ref 1.5–4)
LYMPHOCYTES RELATIVE PERCENT: 11.8 % (ref 20–42)
MCH RBC QN AUTO: 29.7 PG (ref 26–35)
MCHC RBC AUTO-ENTMCNC: 31.4 % (ref 32–34.5)
MCV RBC AUTO: 94.4 FL (ref 80–99.9)
MONOCYTES ABSOLUTE: 1.54 E9/L (ref 0.1–0.95)
MONOCYTES RELATIVE PERCENT: 11.4 % (ref 2–12)
NEUTROPHILS ABSOLUTE: 10.19 E9/L (ref 1.8–7.3)
NEUTROPHILS RELATIVE PERCENT: 75.5 % (ref 43–80)
OCCULT BLOOD, OTHER: NEGATIVE
OVALOCYTES: ABNORMAL
PDW BLD-RTO: 14.4 FL (ref 11.5–15)
PLATELET # BLD: 404 E9/L (ref 130–450)
PMV BLD AUTO: 10 FL (ref 7–12)
POIKILOCYTES: ABNORMAL
POLYCHROMASIA: ABNORMAL
POTASSIUM REFLEX MAGNESIUM: 3.8 MMOL/L (ref 3.5–5)
RBC # BLD: 4.62 E12/L (ref 3.5–5.5)
SODIUM BLD-SCNC: 137 MMOL/L (ref 132–146)
URINE CULTURE, ROUTINE: NORMAL
WBC # BLD: 13.5 E9/L (ref 4.5–11.5)

## 2020-12-15 PROCEDURE — 6370000000 HC RX 637 (ALT 250 FOR IP): Performed by: NURSE PRACTITIONER

## 2020-12-15 PROCEDURE — 80048 BASIC METABOLIC PNL TOTAL CA: CPT

## 2020-12-15 PROCEDURE — 85025 COMPLETE CBC W/AUTO DIFF WBC: CPT

## 2020-12-15 PROCEDURE — 99255 IP/OBS CONSLTJ NEW/EST HI 80: CPT | Performed by: INTERNAL MEDICINE

## 2020-12-15 PROCEDURE — 83605 ASSAY OF LACTIC ACID: CPT

## 2020-12-15 PROCEDURE — 2060000000 HC ICU INTERMEDIATE R&B

## 2020-12-15 PROCEDURE — 2580000003 HC RX 258: Performed by: INTERNAL MEDICINE

## 2020-12-15 PROCEDURE — 36415 COLL VENOUS BLD VENIPUNCTURE: CPT

## 2020-12-15 PROCEDURE — 2500000003 HC RX 250 WO HCPCS: Performed by: INTERNAL MEDICINE

## 2020-12-15 PROCEDURE — 6370000000 HC RX 637 (ALT 250 FOR IP): Performed by: INTERNAL MEDICINE

## 2020-12-15 RX ADMIN — FLUOXETINE HYDROCHLORIDE 20 MG: 20 CAPSULE ORAL at 22:16

## 2020-12-15 RX ADMIN — TICAGRELOR 90 MG: 90 TABLET ORAL at 00:28

## 2020-12-15 RX ADMIN — DIAZEPAM 2 MG: 2 TABLET ORAL at 22:16

## 2020-12-15 RX ADMIN — GABAPENTIN 300 MG: 300 CAPSULE ORAL at 14:38

## 2020-12-15 RX ADMIN — ATORVASTATIN CALCIUM 80 MG: 80 TABLET, FILM COATED ORAL at 22:16

## 2020-12-15 RX ADMIN — PSYLLIUM HUSK 1 PACKET: 3.4 GRANULE ORAL at 08:38

## 2020-12-15 RX ADMIN — DIAZEPAM 2 MG: 2 TABLET ORAL at 01:19

## 2020-12-15 RX ADMIN — SODIUM CHLORIDE, PRESERVATIVE FREE 10 ML: 5 INJECTION INTRAVENOUS at 22:18

## 2020-12-15 RX ADMIN — FLUTICASONE PROPIONATE 1 SPRAY: 50 SPRAY, METERED NASAL at 08:38

## 2020-12-15 RX ADMIN — PANTOPRAZOLE SODIUM 40 MG: 40 TABLET, DELAYED RELEASE ORAL at 08:38

## 2020-12-15 RX ADMIN — APIXABAN 5 MG: 5 TABLET, FILM COATED ORAL at 08:38

## 2020-12-15 RX ADMIN — SODIUM CHLORIDE: 9 INJECTION, SOLUTION INTRAVENOUS at 14:23

## 2020-12-15 RX ADMIN — FLUOXETINE HYDROCHLORIDE 20 MG: 20 CAPSULE ORAL at 08:38

## 2020-12-15 RX ADMIN — ANASTROZOLE 1 MG: 1 TABLET, COATED ORAL at 08:39

## 2020-12-15 RX ADMIN — METOPROLOL TARTRATE 12.5 MG: 25 TABLET, FILM COATED ORAL at 08:39

## 2020-12-15 RX ADMIN — GABAPENTIN 300 MG: 300 CAPSULE ORAL at 08:39

## 2020-12-15 RX ADMIN — LEVOTHYROXINE SODIUM 125 MCG: 0.12 TABLET ORAL at 06:50

## 2020-12-15 RX ADMIN — GABAPENTIN 300 MG: 300 CAPSULE ORAL at 22:16

## 2020-12-15 RX ADMIN — TICAGRELOR 90 MG: 90 TABLET ORAL at 08:38

## 2020-12-15 RX ADMIN — SODIUM CHLORIDE, PRESERVATIVE FREE 10 ML: 5 INJECTION INTRAVENOUS at 08:39

## 2020-12-15 RX ADMIN — TICAGRELOR 60 MG: 60 TABLET ORAL at 22:20

## 2020-12-15 RX ADMIN — DOXYCYCLINE 100 MG: 100 INJECTION, POWDER, LYOPHILIZED, FOR SOLUTION INTRAVENOUS at 17:38

## 2020-12-15 RX ADMIN — DOXYCYCLINE 100 MG: 100 INJECTION, POWDER, LYOPHILIZED, FOR SOLUTION INTRAVENOUS at 07:27

## 2020-12-15 RX ADMIN — DIAZEPAM 2 MG: 2 TABLET ORAL at 08:38

## 2020-12-15 ASSESSMENT — PAIN SCALES - GENERAL: PAINLEVEL_OUTOF10: 0

## 2020-12-15 NOTE — PROGRESS NOTES
Dr iSlvestre Hull notified of consult personally. Pt added to census.   Dr Misael Vuong) notified of consult

## 2020-12-15 NOTE — ED NOTES
[facility] faxed and floor called to inform patient was being put in transport.      Amee Aburto RN  12/14/20 2034

## 2020-12-15 NOTE — PROGRESS NOTES
Clarified with [facility] wing that patient is on mechanical soft diet with nectar thick liquids. Peg tube is clamped and not being used.

## 2020-12-15 NOTE — CONSULTS
Inpatient Cardiology Consultation      Reason for Consult:  CAD and ongoing need for Brilinta    Consulting Physician: Dr Carley Jones    Requesting Physician:  Dr Cris Prado    Date of Consultation: 12/15/2020    HISTORY OF PRESENT ILLNESS: 64 yo  female known to Dr Bobby Callaway  [facility]; HTN, HLD, hypothyroidism on replacement therapy, Multiple sclerosis, CVA, pivots to wheelchair, dysphagia on modified diet, PEG (not using), ICMP (imporved EF), CAD s/p ELHAM to RCA/LCX in 6/2019 (unable to stent LAD), LLE DVT 2/2019, [address] with Eliquis, 3/9/2020 Influenza A, chronic pacheco secondary to neurogenic bladder with recurrent UTI's (declined supra-pubic catheter), R breast carcinoma s/p mastectomy 3/3/2020 on Arimidex, COVID-19 4/25/2020, and morbid obesity  Presents from NH with generalized complaints \"not feeling well\" had some brown emesis a couple days prior to admission. Some dark colored drainage in PEG tube. [facility] 12/14/2020 /104, CT chest shows diffuse infiltrates. COVID-19 NEGATIVE. Na 136, K+ 5.3-->3.8, Bun/Cr 10/0.5, p-BNP 6348, troponin <0.01, Alk phos 137, AST 35, WBC 21-->13.5, Hgb 16.3-->13.7, D-dimer 234, + UTI, BC x 2 drawn. CT Abdomen/pelvis showed bilateral hydronephrosis,. Please note: past medical records were reviewed per electronic medical record (EMR) - see detailed reports under Past Medical/ Surgical History. Past Medical/Surgical History:    1. 5 pack years quit in 2000  2. Hx HTN  3. HLD  4. Father with premature CAD in his 60's  6. Hypothyroidism on replacement therapy  6. Anxiety/Depression  7. Multiple Sclerosis  8. Wheelchair bound  9. Bilateral foot droop  10. Neurogenic bladder s/p chonic indwelling pacheco  11. \"CVA\" (no reported residual per patient)  12. Recurrent UTI's (ESBL and E Coli)  13. 5/6/2019 TTE Dr Bobby Callaway:  Normal left ventricle size and systolic function. 14. EF visually estimated at 60%. No regional wall motion abnormalities seen except for hypokinesis of the apex. Negron. To remain of Brilinta  26. 3/3/2020 R breast mastectomy and axillary node dissection (infiltrating ductal carcinoma/mucinous carcinoma)  27. On Arimidex   28. 3/9/2020 Influenza A  29. 4/25/2020 + COVID-19  30. 6/2020 PEG leaking, coffee ground emesis reported from NH  31. No longer using PEG. On modified diet and thickened liquids      Past Surgical History:    1. 2001 SBO s/p exploratory laparotomy and lysis of adhesions, release of SBO  2. Hysterectomy   3. 3/3/2020R breast mastectomy with sentinel node dissection      Medications Prior to admit:  Prior to Admission medications    Medication Sig Start Date End Date Taking? Authorizing Provider   fluticasone (FLONASE) 50 MCG/ACT nasal spray 1 spray by Nasal route daily   Yes Historical Provider, MD   levothyroxine (SYNTHROID) 125 MCG tablet Take 125 mcg by mouth Daily   Yes Historical Provider, MD   atorvastatin (LIPITOR) 80 MG tablet 80 mg by Per G Tube route nightly   Yes Historical Provider, MD   psyllium (KONSYL) 28.3 % PACK 1 packet by Per G Tube route daily   Yes Historical Provider, MD   pantoprazole (PROTONIX) 40 MG tablet Take 40 mg by mouth daily   Yes Historical Provider, MD   ticagrelor (BRILINTA) 90 MG TABS tablet 90 mg by Per G Tube route 2 times daily   Yes Historical Provider, MD   apixaban (ELIQUIS) 5 MG TABS tablet 5 mg by Per G Tube route 2 times daily   Yes Historical Provider, MD   FLUoxetine (PROZAC) 20 MG capsule Take 20 mg by mouth 2 times daily   Yes Historical Provider, MD   metoprolol tartrate (LOPRESSOR) 25 MG tablet 12.5 mg by Per G Tube route 2 times daily   Yes Historical Provider, MD   potassium bicarbonate (K-LYTE) 25 MEQ disintegrating tablet 25 mEq by Per G Tube route 2 times daily   Yes Historical Provider, MD   gabapentin (NEURONTIN) 300 MG capsule Take 300 mg by mouth 3 times daily.    Yes Historical Provider, MD   HYDROcodone-acetaminophen (NORCO) 5-325 MG per tablet Take 1 tablet by mouth every 6 hours as needed for Pain.   Yes Historical Provider, MD   albuterol (PROVENTIL) (2.5 MG/3ML) 0.083% nebulizer solution Take 2.5 mg by nebulization every 4 hours as needed for Wheezing   Yes Historical Provider, MD   ARTIFICIAL TEAR OINTMENT OP Place 1 drop into both eyes every 6 hours as needed (dry eyes)   Yes Historical Provider, MD   saline nasal gel (AYR) GEL 1 each by Nasal route every 12 hours as needed for Congestion   Yes Historical Provider, MD   magnesium hydroxide (MILK OF MAGNESIA) 400 MG/5ML suspension Take 30 mLs by mouth daily as needed for Constipation   Yes Historical Provider, MD   nitroGLYCERIN (NITROSTAT) 0.4 MG SL tablet Place 0.4 mg under the tongue every 5 minutes as needed for Chest pain   Yes Historical Provider, MD   OCRELIZUMAB IV Infuse 600 mg intravenously every 6 months At the infusion center   Yes Historical Provider, MD   ticagrelor (BRILINTA) 90 MG TABS tablet Take 90 mg by mouth 2 times daily   Yes Historical Provider, MD   atorvastatin (LIPITOR) 80 MG tablet Take 80 mg by mouth nightly   Yes Historical Provider, MD   anastrozole (ARIMIDEX) 1 MG tablet Take 1 mg by mouth daily   Yes Historical Provider, MD   diazePAM (VALIUM) 2 MG tablet 2 mg by Per G Tube route 2 times daily.     Yes Historical Provider, MD   acetaminophen (TYLENOL) 325 MG tablet 650 mg by Per G Tube route every 4 hours as needed for Pain or Fever    Yes Historical Provider, MD   Cranberry 500 MG CAPS 1 capsule by PEG Tube route daily 12/16/19  Yes Alejandro Mckinley MD       Current Medications:    Current Facility-Administered Medications: ticagrelor (BRILINTA) tablet 60 mg, 60 mg, Per G Tube, BID  acetaminophen (TYLENOL) tablet 650 mg, 650 mg, Per G Tube, Q6H PRN  albuterol (PROVENTIL) nebulizer solution 2.5 mg, 2.5 mg, Nebulization, Q4H PRN  anastrozole (ARIMIDEX) tablet 1 mg, 1 mg, Oral, Daily  apixaban (ELIQUIS) tablet 5 mg, 5 mg, Per G Tube, BID  atorvastatin (LIPITOR) tablet 80 mg, 80 mg, Per G Tube, Nightly  diazePAM (VALIUM) tablet 2 mg, 2 mg, Per G Tube, BID  FLUoxetine (PROZAC) capsule 20 mg, 20 mg, Oral, BID  fluticasone (FLONASE) 50 MCG/ACT nasal spray 1 spray, 1 spray, Nasal, Daily  gabapentin (NEURONTIN) capsule 300 mg, 300 mg, Oral, TID  HYDROcodone-acetaminophen (NORCO) 5-325 MG per tablet 1 tablet, 1 tablet, Oral, Q6H PRN  levothyroxine (SYNTHROID) tablet 125 mcg, 125 mcg, Oral, Daily  magnesium hydroxide (MILK OF MAGNESIA) 400 MG/5ML suspension 30 mL, 30 mL, Oral, Daily PRN  metoprolol tartrate (LOPRESSOR) tablet 12.5 mg, 12.5 mg, Per G Tube, BID  pantoprazole (PROTONIX) tablet 40 mg, 40 mg, Oral, Daily  psyllium (KONSYL) 28.3 % packet 1 packet, 1 packet, Per G Tube, Daily  sodium chloride (OCEAN, BABY AYR) 0.65 % nasal spray 1 spray, 1 spray, Nasal, Q12H PRN  sodium chloride flush 0.9 % injection 10 mL, 10 mL, Intravenous, 2 times per day  sodium chloride flush 0.9 % injection 10 mL, 10 mL, Intravenous, PRN  promethazine (PHENERGAN) tablet 12.5 mg, 12.5 mg, Oral, Q6H PRN **OR** ondansetron (ZOFRAN) injection 4 mg, 4 mg, Intravenous, Q6H PRN  polyethylene glycol (GLYCOLAX) packet 17 g, 17 g, Oral, Daily PRN  acetaminophen (TYLENOL) tablet 650 mg, 650 mg, Oral, Q6H PRN **OR** acetaminophen (TYLENOL) suppository 650 mg, 650 mg, Rectal, Q6H PRN  0.9 % sodium chloride infusion, , Intravenous, Continuous  doxycycline (VIBRAMYCIN) 100 mg in dextrose 5 % 100 mL IVPB, 100 mg, Intravenous, Q12H    Allergies:  Pcn [penicillins] and Watermelon [citrullus vulgaris]    Social History:    5 pack years quit in   Denies ETOH/Illicit Drugs  Caffeine; Pepsi and coffee  Activity: resides in NH. Wheelchair bound (bilateral foot droop). Code Status: Full Code      Family History:   Father  in his 63's from heart disease  Mother  from carcinoma      REVIEW OF SYSTEMS:     · Constitutional: Denies fatigue, fevers, chills or night sweats  · Eyes: Denies visual changes or drainage  · ENT: Denies headaches or hearing loss.  No mouth sores or sore throat. No epistaxis   · Cardiovascular: Denies chest pain, pressure or palpitations. No lower extremity swelling. · Respiratory: Denies BRIGGS, cough, orthopnea or PND. No hemoptysis   · Gastrointestinal: + brown emesis. Denies hematemesis or anorexia. No melena    · Genitourinary: Denies urgency, dysuria or hematuria. · Musculoskeletal: + generalized body aches. Denies gait disturbance, weakness or joint complaints  · Integumentary: Denies rash, hives or pruritis   · Neurological: Denies dizziness, headaches or seizures. No numbness or tingling  · Psychiatric: Denies anxiety or depression. · Endocrine: Denies temperature intolerance. No recent weight change. .  · Hematologic/Lymphatic: Denies abnormal bruising or bleeding. No swollen lymph nodes    PHYSICAL EXAM:   /74   Pulse 75   Temp 97 °F (36.1 °C)   Resp 18   Ht 5' 6.5\" (1.689 m)   Wt 186 lb 11.2 oz (84.7 kg)   SpO2 94%   BMI 29.68 kg/m²   CONST:  Well developed, obese  female who appears of stated age. Awake, alert and cooperative. No apparent distress. HEENT:   Head- Normocephalic, atraumatic   Eyes- Conjunctivae pink, anicteric  Throat- Oral mucosa pink and moist  Neck-  No stridor, trachea midline, no jugular venous distention. No carotid bruit. CHEST: Chest symmetrical and non-tender to palpation. No accessory muscle use or intercostal retractions  RESPIRATORY: Lung sounds - clear throughout fields   CARDIOVASCULAR:     Heart Inspection- shows no noted pulsations  Heart Palpation- no heaves or thrills; PMI is non-displaced   Heart Ausculation- Regular rate and rhythm, no murmur. No s3, s4 or rub   PV: No lower extremity edema. No varicosities. Pedal pulses palpable, no clubbing or cyanosis   ABDOMEN: Soft, non-tender to light palpation. Bowel sounds present. No palpable masses no organomegaly; no abdominal bruit  MS: Good muscle strength and tone. No atrophy or abnormal movements.    : Deferred  SKIN: Warm and dry no statis dermatitis or ulcers   NEURO / PSYCH: Oriented to person, place and time. Speech clear and appropriate. Follows all commands. Pleasant affect. Veruy slow to respond. DATA:    ECG 12/14/2020 ST LAD Low voltage QRS. Old anterolateral infract pattern    Tele strips: SR 70's    Diagnostic:    CXR 12/15/2020: Bilateral apical opacity may represent pneumonia versus atelectasis in the proper clinical setting. CT Abdomen/Pelvis W 12/14/2020: Moderately distended urinary bladder despite the presence of a Elizondo catheter.  Please assess for catheter dysfunction. Mild bilateral hydroureteronephrosis likely due to distended urinary bladder. 3.0 x 2.0 cm enhancing mass within the subcutaneous fat of the right buttock.  It is stable as of the earliest available CT from 12/14/2011.  It appears to be contiguous with the anus.  It may represent a large hemorrhoid or polyp.  Clinical correlation is needed. Of the 3 known liver hemangioma, 2 are visualized and are stable.  The 3rd along the dome of the liver is excluded from the field of view. CT CHEST W 12/14/2020: There is no evidence of a pulmonary embolus.   Very vague ground-glass infiltrate seen within the upper lobes.  Please correlate with the patient's rapid COVID test      Intake/Output Summary (Last 24 hours) at 12/15/2020 1218  Last data filed at 12/15/2020 0600  Gross per 24 hour   Intake 360 ml   Output 1500 ml   Net -1140 ml       Labs:   CBC:   Recent Labs     12/14/20  1050 12/15/20  0639   WBC 21.0* 13.5*   HGB 16.3* 13.7   HCT 48.4* 43.6   * 404     BMP:   Recent Labs     12/14/20  1450 12/15/20  0639    137   K 5.3* 3.8   CO2 22 25   BUN 10 12   CREATININE 0.5 0.7   LABGLOM >60 >60   CALCIUM 9.7 9.3     proBNP:   Recent Labs     12/14/20  1050   PROBNP 6,348*     CARDIAC ENZYMES:  Recent Labs     12/14/20  1050   TROPONINI <0.01     FASTING LIPID PANEL:  Lab Results   Component Value Date    CHOL 98 02/18/2020    HDL 28 02/18/2020 LDLCALC 39 02/18/2020    TRIG 153 02/18/2020     LIVER PROFILE:  Recent Labs     12/14/20  1050 12/14/20  1450   AST 30 35*   ALT 17 18   LABALBU 4.0 4.0   Electronically signed by Martin Koch. CLAUS Herrera on 12/15/2020 at 12:18 PM     I personally and independently saw and examined patient and reviewed all done pertinent laboratory data, imaging studies, ECGs and rhythm strips. I have reviewed and agree with the APN history and physical exam as documented in the above note. Electronically signed by Shmuel Clement MD on 12/17/2020 at 3:54 PM      Consulted for need for Brilinta     IMPRESSION:  1. Patient needs maintenance antiplatelet therapy. She is not on ASA (stopped 2/2020). Her coronary interventions were in 6/2019.   2. Hx Ischemic cardiomyopathy with normalization of EF on f/u TTE's. 3. LLE DVT 2/2019, has been on Eliquis since  4. HLD  5. HTN  6. Obesity  7. Hypothyroidism on replacement therapy  8. R breast carcinoma s/p mastectomy on Arimedex  9. Multiple sclerosis   10. Bilateral foot drop  11. Wheelchair bound  12. Hx dysphagia s/p PEG. Currently on modified diet  13. Neurogenic bladder with indwelling pacheco catheter with recurrent UTI's (including this admission)  14. Hx Influenza A in 3/2020  15. COVID-19 infection in 4/2020  16. NH resident  17. Leukocytosis  18. Lactic acidosis     PLAN:   1. Decrease Brilinta to 60 mg BID  2. Consider discontinuing Eliquis: patient had received for greater that 6 months and no history of recurrent DVT  3. Rest of home cardiac medications the same  4. Rest as per primary service and other consultants  5.  Cardiology to sign off, please call if needed     Electronically signed by Shmuel Clement MD on 12/15/2020 at 12:36 PM

## 2020-12-15 NOTE — CONSULTS
12/15/2020 10:33 AM  Service: Urology  Group: [facility] ([place])    Jessica Snyder  [id number]     Chief Complaint:    Pacheco catheter management     History of Present Illness: The patient is a 63 y.o. female patient who presented to the hospital 1 day ago from a nursing facility with complaints of abdominal pain. She had a CT abdomen pelvis performed in the emergency department that showed bilateral hydronephrosis and bladder distention with indwelling Pacheco catheter. Her Pacheco catheter was then changed in the emergency department. No immediate urine output documented, but had 1200cc of output in less than 8 hours after pacheco was placed. She has a history of MS and neurogenic bladder. She has had a chronic pacheco catheter she states for a \"long time. \" She states she has seen Dr. Ankush Cage in the past. She is currently comfortably. Confused at times but is able to answer all of my questions appropriately. Past Medical History:   Diagnosis Date    Anxiety     Breast cancer ([facility] [address] 02/2020    right    CAD (coronary artery disease)     Cystitis     Depression     Pacheco catheter status     History of blood transfusion     Hyperlipidemia     Hypertension 08/27/2018    Hypothyroidism     MS (multiple sclerosis) ([facility] [address]     wheelchair bound    Neurogenic bladder 08/09/2018    Sepsis ([facility] [address]     uti hospitalized 11-12/2019    Total self-care deficit     Unspecified cerebral artery occlusion with cerebral infarction 2011    left her incontinent    Wheelchair dependent          Past Surgical History:   Procedure Laterality Date    COLON SURGERY  2001    exp lap, lysis of adhesions, release of SBO. [facility].  Dr. Reshma Espino 2/4/2020    COLONOSCOPY DIAGNOSTIC performed by Melisa Polo MD at [place] [address]  06/28/2019    Dr. Moses Bearden- Rt Wrist- 3.0/300mm Resolute-RCA, 2.75/22mm-Circumflex,     DENTAL SURGERY      all removed    months At the infusion center  ticagrelor (BRILINTA) 90 MG TABS tablet, Take 90 mg by mouth 2 times daily  atorvastatin (LIPITOR) 80 MG tablet, Take 80 mg by mouth nightly  anastrozole (ARIMIDEX) 1 MG tablet, Take 1 mg by mouth daily  diazePAM (VALIUM) 2 MG tablet, 2 mg by Per G Tube route 2 times daily. acetaminophen (TYLENOL) 325 MG tablet, 650 mg by Per G Tube route every 4 hours as needed for Pain or Fever   Cranberry 500 MG CAPS, 1 capsule by PEG Tube route daily    Allergies:    Pcn [penicillins] and Watermelon [citrullus vulgaris]    Social History:    reports that she quit smoking about 20 years ago. Her smoking use included cigarettes. She started smoking about 25 years ago. She has a 5.00 pack-year smoking history. She has never used smokeless tobacco. She reports that she does not drink alcohol or use drugs. Family History:   Non-contributory to this Urological problem  family history includes Cancer in her mother; Diabetes in her father; Heart Disease in her father. Review of Systems:  Constitutional: No fever or chills   Respiratory: negative for cough and hemoptysis  Cardiovascular: negative for chest pain and dyspnea  Gastrointestinal: negative for abdominal pain, diarrhea, nausea and vomiting   Derm: negative for rash and skin lesion(s)  Neurological: negative for seizures and tremors. MS   Musculoskeletal: Non-ambulatory    Psychiatric: Negative   : As above in the HPI, otherwise negative  All other reviews are negative    Physical Exam:     Vitals:  /74   Pulse 75   Temp 97 °F (36.1 °C)   Resp 18   Ht 5' 6.5\" (1.689 m)   Wt 186 lb 11.2 oz (84.7 kg)   SpO2 94%   BMI 29.68 kg/m²     General:  Awake and alert, no distress   HEENT:  Normocephalic, atraumatic. Lungs:  Respirations symmetric and non-labored. Abdomen:  soft, nontender, no masses. PEG tube present   Extremities:  No clubbing, cyanosis, or edema  Skin:  Warm and dry, no open lesions or rashes  Neuro:  There are no motor or sensory deficits in the 4 quadrant extremities   Rectal: deferred  Genitourinary: Elizondo catheter intact draining kaushik urine. Labs:     Recent Labs     12/14/20  1050 12/15/20  0639   WBC 21.0* 13.5*   RBC 5.43 4.62   HGB 16.3* 13.7   HCT 48.4* 43.6   MCV 89.1 94.4   MCH 30.0 29.7   MCHC 33.7 31.4*   RDW 13.7 14.4   * 404   MPV 9.8 10.0         Recent Labs     12/14/20  1050 12/14/20  1450 12/15/20  0639   CREATININE 0.6 0.5 0.7       Imaging:   Narrative   EXAMINATION:   CT OF THE ABDOMEN AND PELVIS WITH CONTRAST 12/14/2020 12:07 pm   TECHNIQUE:   CT of the abdomen and pelvis was performed with the administration of   intravenous contrast. Multiplanar reformatted images are provided for review. Dose modulation, iterative reconstruction, and/or weight based adjustment of   the mA/kV was utilized to reduce the radiation dose to as low as reasonably   achievable. COMPARISON:   CT abdomen and pelvis, 11/09/2020. HISTORY:   ORDERING SYSTEM PROVIDED HISTORY: abd pain   TECHNOLOGIST PROVIDED HISTORY:   Additional Contrast?->None   Reason for exam:->abd pain   What reading provider will be dictating this exam?->CRC   FINDINGS:   Lower Chest: Minimal subpleural scarring in the left lower lobe.  Otherwise   the lung bases are clear.  The heart is normal in size. Organs:   Liver: Of the 3 known stable lesions in the liver likely represent   hemangioma, two are visualized on this study and are stable.  The 1 along the   dome of the bladder is excluded from the field of view of this study.  No   worrisome hepatic lesions seen. Gallbladder: In mildly distended, measuring 11 x 4 cm in the maximal axial   plane.  No cholelithiasis is evident by CT.  No biliary ductal dilatation. Pancreas: Moderately atrophied.  Otherwise, unremarkable. Spleen:  Unremarkable. Adrenals: Unremarkable.    Kidneys: Mild fullness of the left renal collecting systems and ureters,   which may be due to moderately distended urinary bladder.  Pacheco catheter in   situ. GI/Bowel: Gastrostomy tube in situ.  No bowel wall thickening or obstruction   is seen.  The appendix is normal.   Pelvis: Moderate distention of the urinary bladder despite presence of a   Pacheco catheter.  The uterus is surgically absent.  Within the subcutaneous   fat of the right buttock, along the intergluteal cleft, there is a 3.0 x 2.0   cm enhancing mass, which is grossly stable as of the earliest available CT   from 12/14/2011. Peritoneum/Retroperitoneum: No lymphadenopathy.  No free air.  Trace free   fluid in the dependent recesses of the abdomen and pelvis.  Prominent   atherosclerosis in the abdominal aorta and pelvic arteries.  No aneurysm or   significant stenosis appreciated. Bones/Soft Tissues: The visualized bones are intact without fracture or focal   lesion. Trace ascites. Mild anasarca.       Impression   1. Moderately distended urinary bladder despite the presence of a Pacheco   catheter.  Please assess for catheter dysfunction. 2. Mild bilateral hydroureteronephrosis likely due to distended urinary   bladder. 3. 3.0 x 2.0 cm enhancing mass within the subcutaneous fat of the right   buttock.  It is stable as of the earliest available CT from 12/14/2011.  It   appears to be contiguous with the anus.  It may represent a large hemorrhoid   or polyp.  Clinical correlation is needed.    4. Of the 3 known liver hemangioma, 2 are visualized and are stable.  The 3rd   along the dome of the liver is excluded from the field of view.                 Assessment/plan:  Bilateral Hydronephrosis secondary to bladder distension related to malfunctioning pacheco catheter  NGB     Creatinine stable   Urine Cx mixed christian  Blood Cx pending  Antibiotics per medical team   Cont the pacheco catheter  She states we have talked to her in the past about SPT placement   She is comfortable with the pacheco catheter at this time   This needs to be changed every 4

## 2020-12-15 NOTE — CONSULTS
GENERAL SURGERY  CONSULT NOTE  12/15/2020    Physician Consulted: Dr. Jon Ferris  Reason for Consult: PEG tube evaluation  Referring Physician: Dr. Valorie Coppola is a 63 y.o. female who presents from the nursing facility for elevated blood pressure. He has a history of MS and neurogenic bladder. History of DVT on Brilinta and Eliquis, MI in 2019. She has a PEG tube that was placed by Dr. Jon Ferris in 2019. Abdominal CT demonstrates bilateral hydronephrosis with distended bladder. PEG tube is properly in place on abdominal CT without any signs of surrounding infection. Patient was admitted to the hospital for UTI and possible pyelonephritis. Her PEG tube has some dark discoloration within the tubing. There was some concerned that there might be an upper GI bleed in the dark material within the PEG tube and being on Brilinta and Eliquis. However the patient is gastrooccult and fecal occult negative. PEG tube is structurally intact and functional.  The patient states that they are not using it at the nursing home because she is able to tolerate mechanically puréed diet as well as thickened liquids. Patient has no nausea or vomiting. She does have diffuse abdominal pain. Past Medical History:   Diagnosis Date    Anxiety     Breast cancer ([address] 02/2020    right    CAD (coronary artery disease)     Cystitis     Depression     Elizondo catheter status     History of blood transfusion     Hyperlipidemia     Hypertension 08/27/2018    Hypothyroidism     MS (multiple sclerosis) ([facility]     wheelchair bound    Neurogenic bladder 08/09/2018    Sepsis ([facility]     uti hospitalized 11-12/2019    Total self-care deficit     Unspecified cerebral artery occlusion with cerebral infarction 2011    left her incontinent    Wheelchair dependent        Past Surgical History:   Procedure Laterality Date    COLON SURGERY  2001    exp lap, lysis of adhesions, release of SBO. [facility].  Dr. Jun Mauricio N/A 2/4/2020    COLONOSCOPY DIAGNOSTIC performed by Alpesh Cordova MD at [place] [address]  06/28/2019    Dr. Luna Gamez- Rt Wrist- 3.0/300mm Resolute-RCA, 2.75/22mm-Circumflex,     DENTAL SURGERY      all removed    HYSTERECTOMY  2001    [facility]. Dr. Kiet Vidales, MODIFIED RADICAL Right 3/3/2020    RIGHT BREAST MASTECTOMY WITH SENTINEL NODE DISSECTION WITH BLUE DYE performed by Alpesh Cordova MD at [address] [place] [address] N/A 12/12/2019    EGD PEG INSERTION performed by Alpesh Cordova MD at [facility]       Medications Prior to Admission:    Prior to Admission medications    Medication Sig Start Date End Date Taking? Authorizing Provider   fluticasone (FLONASE) 50 MCG/ACT nasal spray 1 spray by Nasal route daily   Yes Historical Provider, MD   levothyroxine (SYNTHROID) 125 MCG tablet Take 125 mcg by mouth Daily   Yes Historical Provider, MD   atorvastatin (LIPITOR) 80 MG tablet 80 mg by Per G Tube route nightly   Yes Historical Provider, MD   psyllium (KONSYL) 28.3 % PACK 1 packet by Per G Tube route daily   Yes Historical Provider, MD   pantoprazole (PROTONIX) 40 MG tablet Take 40 mg by mouth daily   Yes Historical Provider, MD   ticagrelor (BRILINTA) 90 MG TABS tablet 90 mg by Per G Tube route 2 times daily   Yes Historical Provider, MD   apixaban (ELIQUIS) 5 MG TABS tablet 5 mg by Per G Tube route 2 times daily   Yes Historical Provider, MD   FLUoxetine (PROZAC) 20 MG capsule Take 20 mg by mouth 2 times daily   Yes Historical Provider, MD   metoprolol tartrate (LOPRESSOR) 25 MG tablet 12.5 mg by Per G Tube route 2 times daily   Yes Historical Provider, MD   potassium bicarbonate (K-LYTE) 25 MEQ disintegrating tablet 25 mEq by Per G Tube route 2 times daily   Yes Historical Provider, MD   gabapentin (NEURONTIN) 300 MG capsule Take 300 mg by mouth 3 times daily.    Yes Historical Provider, MD   HYDROcodone-acetaminophen (NORCO) 5-325 MG per tablet Take 1 tablet by mouth every 6 hours as needed for Pain. Yes Historical Provider, MD   albuterol (PROVENTIL) (2.5 MG/3ML) 0.083% nebulizer solution Take 2.5 mg by nebulization every 4 hours as needed for Wheezing   Yes Historical Provider, MD   ARTIFICIAL TEAR OINTMENT OP Place 1 drop into both eyes every 6 hours as needed (dry eyes)   Yes Historical Provider, MD   saline nasal gel (AYR) GEL 1 each by Nasal route every 12 hours as needed for Congestion   Yes Historical Provider, MD   magnesium hydroxide (MILK OF MAGNESIA) 400 MG/5ML suspension Take 30 mLs by mouth daily as needed for Constipation   Yes Historical Provider, MD   nitroGLYCERIN (NITROSTAT) 0.4 MG SL tablet Place 0.4 mg under the tongue every 5 minutes as needed for Chest pain   Yes Historical Provider, MD   OCRELIZUMAB IV Infuse 600 mg intravenously every 6 months At the infusion center   Yes Historical Provider, MD   ticagrelor (BRILINTA) 90 MG TABS tablet Take 90 mg by mouth 2 times daily   Yes Historical Provider, MD   atorvastatin (LIPITOR) 80 MG tablet Take 80 mg by mouth nightly   Yes Historical Provider, MD   anastrozole (ARIMIDEX) 1 MG tablet Take 1 mg by mouth daily   Yes Historical Provider, MD   diazePAM (VALIUM) 2 MG tablet 2 mg by Per G Tube route 2 times daily.     Yes Historical Provider, MD   acetaminophen (TYLENOL) 325 MG tablet 650 mg by Per G Tube route every 4 hours as needed for Pain or Fever    Yes Historical Provider, MD   Cranberry 500 MG CAPS 1 capsule by PEG Tube route daily 12/16/19  Yes Yvonne Schneider MD       Allergies   Allergen Reactions    Pcn [Penicillins] Anaphylaxis    Watermelon [Citrullus Vulgaris]        Family History   Problem Relation Age of Onset    Cancer Mother     Diabetes Father     Heart Disease Father        Social History     Tobacco Use    Smoking status: Former Smoker     Packs/day: 1.00     Years: 5.00     Pack years: 5.00     Types: Cigarettes     Start date: 1995     Quit date: 2000     Years since quittin.9    Smokeless tobacco: Never Used   Substance Use Topics    Alcohol use: No    Drug use: No         Review of Systems   General ROS: positive for  - chills  negative for - fever  Hematological and Lymphatic ROS: On Brilinta Eliquis for history of DVT  Respiratory ROS: no cough, shortness of breath, or wheezing  Cardiovascular ROS: no chest pain or dyspnea on exertion  Gastrointestinal ROS: see HPI  Genito-Urinary ROS: Neurogenic bladder secondary to MS  Musculoskeletal ROS: No lower extremity edema      PHYSICAL EXAM:    Vitals:    12/15/20 1135   BP: (!) 100/54   Pulse: 73   Resp: 18   Temp: 97.5 °F (36.4 °C)   SpO2:        General Appearance:  awake, alert, oriented, in no acute distress  Skin:  Skin color, texture, turgor normal. No rashes or lesions. Head/face:  NCAT  Eyes:  No gross abnormalities. and Sclera nonicteric  Lungs:  Breathing Pattern: regular, no distress  Heart:  Heart regular rate   Abdomen: PEG tube in place. Skin orifice with fibrinous exudate present. No surrounding erythema or induration. Tubing with black coating from unknown source. Abdomen diffusely tender to palpation with greatest pain over flanks bilaterally. Soft, nondistended. No guarding or rigidity. Extremities: Lateral foot drop. Extremities warm to touch, pink, with no edema              LABS:    CBC  Recent Labs     12/15/20  0639   WBC 13.5*   HGB 13.7   HCT 43.6        BMP  Recent Labs     12/15/20  0639      K 3.8      CO2 25   BUN 12   CREATININE 0.7   CALCIUM 9.3     Liver Function  Recent Labs     20  1050 20  1450   LIPASE 14  --    BILITOT 0.4 0.4   AST 30 35*   ALT 17 18   ALKPHOS 148* 137*   PROT 7.2 6.9   LABALBU 4.0 4.0     No results for input(s): LACTATE in the last 72 hours. No results for input(s): INR, PTT in the last 72 hours.     Invalid input(s): PT    RADIOLOGY    Ct Abdomen Pelvis W Iv Contrast Additional Contrast? None    Result Date: 12/14/2020  EXAMINATION: CT OF THE ABDOMEN AND PELVIS WITH CONTRAST 12/14/2020 12:07 pm TECHNIQUE: CT of the abdomen and pelvis was performed with the administration of intravenous contrast. Multiplanar reformatted images are provided for review. Dose modulation, iterative reconstruction, and/or weight based adjustment of the mA/kV was utilized to reduce the radiation dose to as low as reasonably achievable. COMPARISON: CT abdomen and pelvis, 11/09/2020. HISTORY: ORDERING SYSTEM PROVIDED HISTORY: abd pain TECHNOLOGIST PROVIDED HISTORY: Additional Contrast?->None Reason for exam:->abd pain What reading provider will be dictating this exam?->CRC FINDINGS: Lower Chest: Minimal subpleural scarring in the left lower lobe. Otherwise the lung bases are clear. The heart is normal in size. Organs: Liver: Of the 3 known stable lesions in the liver likely represent hemangioma, two are visualized on this study and are stable. The 1 along the dome of the bladder is excluded from the field of view of this study. No worrisome hepatic lesions seen. Gallbladder: In mildly distended, measuring 11 x 4 cm in the maximal axial plane. No cholelithiasis is evident by CT. No biliary ductal dilatation. Pancreas: Moderately atrophied. Otherwise, unremarkable. Spleen:  Unremarkable. Adrenals: Unremarkable. Kidneys: Mild fullness of the left renal collecting systems and ureters, which may be due to moderately distended urinary bladder. Elizondo catheter in situ. GI/Bowel: Gastrostomy tube in situ. No bowel wall thickening or obstruction is seen. The appendix is normal. Pelvis: Moderate distention of the urinary bladder despite presence of a Elizondo catheter. The uterus is surgically absent. Within the subcutaneous fat of the right buttock, along the intergluteal cleft, there is a 3.0 x 2.0 cm enhancing mass, which is grossly stable as of the earliest available CT from 12/14/2011. Peritoneum/Retroperitoneum: No lymphadenopathy.   No free air.  Trace free fluid in the dependent recesses of the abdomen and pelvis. Prominent atherosclerosis in the abdominal aorta and pelvic arteries. No aneurysm or significant stenosis appreciated. Bones/Soft Tissues: The visualized bones are intact without fracture or focal lesion. Trace ascites. Mild anasarca. 1. Moderately distended urinary bladder despite the presence of a Elizondo catheter. Please assess for catheter dysfunction. 2. Mild bilateral hydroureteronephrosis likely due to distended urinary bladder. 3. 3.0 x 2.0 cm enhancing mass within the subcutaneous fat of the right buttock. It is stable as of the earliest available CT from 12/14/2011. It appears to be contiguous with the anus. It may represent a large hemorrhoid or polyp. Clinical correlation is needed. 4. Of the 3 known liver hemangioma, 2 are visualized and are stable. The 3rd along the dome of the liver is excluded from the field of view. Xr Chest Portable    Result Date: 12/14/2020  EXAMINATION: ONE XRAY VIEW OF THE CHEST 12/14/2020 11:03 am COMPARISON: CT chest June 12, 2020 Chest radiograph June 11, 2020 HISTORY: ORDERING SYSTEM PROVIDED HISTORY: Shortness of breath TECHNOLOGIST PROVIDED HISTORY: Reason for exam:->Shortness of breath What reading provider will be dictating this exam?->CRC FINDINGS: Trachea is midline. Cardiomediastinal silhouette is stable in size. There are bilateral upper lung airspace opacities. No pneumothorax. No sizable pleural effusions. Bilateral apical opacity may represent pneumonia versus atelectasis in the proper clinical setting. Cta Chest W Contrast    Result Date: 12/14/2020  EXAMINATION: CTA OF THE CHEST 12/14/2020 4:14 pm TECHNIQUE: CTA of the chest was performed after the administration of intravenous contrast.  Multiplanar reformatted images are provided for review. MIP images are provided for review.  Dose modulation, iterative reconstruction, and/or weight based adjustment of the mA/kV was utilized to reduce the radiation dose to as low as reasonably achievable. COMPARISON: None. HISTORY: ORDERING SYSTEM PROVIDED HISTORY: elevated dimer, r/o pe TECHNOLOGIST PROVIDED HISTORY: Reason for exam:->elevated dimer, r/o pe What reading provider will be dictating this exam?->CRC FINDINGS: Pulmonary Arteries: Pulmonary arteries are adequately opacified for evaluation. No evidence of intraluminal filling defect to suggest pulmonary embolism. Main pulmonary artery is normal in caliber. Mediastinum: No evidence of mediastinal lymphadenopathy. The heart and pericardium demonstrate no acute abnormality. There is no acute abnormality of the thoracic aorta. Lungs/pleura: There are very vague ground-glass infiltrate seen within the upper lobes. The right middle lobe and lower lobes are clear. Upper Abdomen: Limited images of the upper abdomen are unremarkable. Soft Tissues/Bones: No acute bone or soft tissue abnormality. 1. There is no evidence of a pulmonary embolus. 2. Very vague ground-glass infiltrate seen within the upper lobes.   Please correlate with the patient's rapid COVID test.        ASSESSMENT:  63 y.o. female with MS, neurogenic bladder and urosepsis, consulted for PEG evaluation    PLAN:    -PEG tube appears to be in proper position on CT of the abdomen without any signs of infection  -PEG tube is functional  -Patient is able to tolerate mechanically puréed diet and thickened liquids  -General surgery would be happy to remove the PEG tube, however the PEG tube is functional and patient would likely benefit from having it in place in case her dysphagia increases in the future   -Minimal concern for upper GI bleed at this time given negative Gastroccult and negative FOBT  -Do not know why the tubing has been discolored black, but doubt it is from blood  -Will discuss with her son Tai Cornejo who is her POA    Plan discussed with Dr. Kaleigh Kincaid    Electronically signed by Isabel Irby DO on 12/15/20 at 1:16 PM EST

## 2020-12-15 NOTE — PROGRESS NOTES
ELENA Mariee notified of consult for urology per perfectserve/answering service.   Pt added to census

## 2020-12-15 NOTE — CARE COORDINATION
Patient admitted from [facility]. Covid was negative on 12/14/20. Have spoken with Marni Mast at [facility], patient is a long term care bedhold and can return on discharge. No precert is needed.

## 2020-12-16 VITALS
DIASTOLIC BLOOD PRESSURE: 63 MMHG | WEIGHT: 186.7 LBS | SYSTOLIC BLOOD PRESSURE: 121 MMHG | HEIGHT: 67 IN | BODY MASS INDEX: 29.3 KG/M2 | TEMPERATURE: 97.9 F | RESPIRATION RATE: 20 BRPM | HEART RATE: 71 BPM | OXYGEN SATURATION: 96 %

## 2020-12-16 LAB
BASOPHILS ABSOLUTE: 0.04 E9/L (ref 0–0.2)
BASOPHILS RELATIVE PERCENT: 0.4 % (ref 0–2)
EOSINOPHILS ABSOLUTE: 0.14 E9/L (ref 0.05–0.5)
EOSINOPHILS RELATIVE PERCENT: 1.3 % (ref 0–6)
HCT VFR BLD CALC: 36.7 % (ref 34–48)
HEMOGLOBIN: 11.2 G/DL (ref 11.5–15.5)
IMMATURE GRANULOCYTES #: 0.05 E9/L
IMMATURE GRANULOCYTES %: 0.5 % (ref 0–5)
LACTIC ACID: 1.8 MMOL/L (ref 0.5–2.2)
LYMPHOCYTES ABSOLUTE: 1.19 E9/L (ref 1.5–4)
LYMPHOCYTES RELATIVE PERCENT: 11.2 % (ref 20–42)
MCH RBC QN AUTO: 29.6 PG (ref 26–35)
MCHC RBC AUTO-ENTMCNC: 30.5 % (ref 32–34.5)
MCV RBC AUTO: 97.1 FL (ref 80–99.9)
MONOCYTES ABSOLUTE: 1.35 E9/L (ref 0.1–0.95)
MONOCYTES RELATIVE PERCENT: 12.7 % (ref 2–12)
NEUTROPHILS ABSOLUTE: 7.83 E9/L (ref 1.8–7.3)
NEUTROPHILS RELATIVE PERCENT: 73.9 % (ref 43–80)
PDW BLD-RTO: 14.4 FL (ref 11.5–15)
PLATELET # BLD: 293 E9/L (ref 130–450)
PMV BLD AUTO: 10.1 FL (ref 7–12)
RBC # BLD: 3.78 E12/L (ref 3.5–5.5)
WBC # BLD: 10.6 E9/L (ref 4.5–11.5)

## 2020-12-16 PROCEDURE — 36415 COLL VENOUS BLD VENIPUNCTURE: CPT

## 2020-12-16 PROCEDURE — 6370000000 HC RX 637 (ALT 250 FOR IP): Performed by: INTERNAL MEDICINE

## 2020-12-16 PROCEDURE — 6370000000 HC RX 637 (ALT 250 FOR IP): Performed by: NURSE PRACTITIONER

## 2020-12-16 PROCEDURE — 2500000003 HC RX 250 WO HCPCS: Performed by: INTERNAL MEDICINE

## 2020-12-16 PROCEDURE — 83605 ASSAY OF LACTIC ACID: CPT

## 2020-12-16 PROCEDURE — 2580000003 HC RX 258: Performed by: INTERNAL MEDICINE

## 2020-12-16 PROCEDURE — 85025 COMPLETE CBC W/AUTO DIFF WBC: CPT

## 2020-12-16 RX ORDER — DOXYCYCLINE HYCLATE 100 MG
100 TABLET ORAL 2 TIMES DAILY
Qty: 22 TABLET | Refills: 0 | Status: SHIPPED | OUTPATIENT
Start: 2020-12-16 | End: 2020-12-27

## 2020-12-16 RX ADMIN — ANASTROZOLE 1 MG: 1 TABLET, COATED ORAL at 09:23

## 2020-12-16 RX ADMIN — DIAZEPAM 2 MG: 2 TABLET ORAL at 09:23

## 2020-12-16 RX ADMIN — DOXYCYCLINE 100 MG: 100 INJECTION, POWDER, LYOPHILIZED, FOR SOLUTION INTRAVENOUS at 06:27

## 2020-12-16 RX ADMIN — GABAPENTIN 300 MG: 300 CAPSULE ORAL at 09:23

## 2020-12-16 RX ADMIN — GABAPENTIN 300 MG: 300 CAPSULE ORAL at 13:10

## 2020-12-16 RX ADMIN — FLUOXETINE HYDROCHLORIDE 20 MG: 20 CAPSULE ORAL at 09:23

## 2020-12-16 RX ADMIN — TICAGRELOR 60 MG: 60 TABLET ORAL at 09:23

## 2020-12-16 RX ADMIN — PANTOPRAZOLE SODIUM 40 MG: 40 TABLET, DELAYED RELEASE ORAL at 09:24

## 2020-12-16 RX ADMIN — LEVOTHYROXINE SODIUM 125 MCG: 0.12 TABLET ORAL at 06:38

## 2020-12-16 RX ADMIN — PSYLLIUM HUSK 1 PACKET: 3.4 GRANULE ORAL at 09:24

## 2020-12-16 RX ADMIN — FLUTICASONE PROPIONATE 1 SPRAY: 50 SPRAY, METERED NASAL at 09:25

## 2020-12-16 RX ADMIN — METOPROLOL TARTRATE 12.5 MG: 25 TABLET, FILM COATED ORAL at 09:24

## 2020-12-16 NOTE — DISCHARGE INSTR - COC
patient.     Active Problems:  Patient Active Problem List   Diagnosis Code    Multiple sclerosis ([facility] G35    Bilateral foot-drop M21.371, M21.372    Lactic acidosis E87.2    Heart failure (HCC) I50.9    Myocardial infarction, anteroseptal (HCC) I21.09    NSTEMI (non-ST elevated myocardial infarction) ([facility] I21.4    Carcinoma of upper-outer quadrant of female breast, right ([facility] C50.411    Carcinoma of upper-outer quadrant of right female breast ([facility] C50.411    Hypotension I95.9    GI bleed K92.2    Pyelonephritis N12    Coronary disease I25.10    Urine retention R33.9    Malfunction of El[facility]ondo catheter ([facility] T83.011A    Neurogenic bladder N31.9       Isolation/Infection:   Isolation            No Isolation          Patient Infection Status       Infection Onset Added Last Indicated Last Indicated By Review Planned Expiration Resolved Resolved By    None active    Resolved    COVID-19 Rule Out 20 COVID-19 (Ordered)   20 Rule-Out Test Resulted    COVID-19 Rule Out 10/30/20 10/30/20 10/30/20 Covid-19 Ambulatory (Ordered)   20 Rule-Out Test Resulted    COVID-19 Rule Out 10/27/20 10/28/20 10/27/20 Covid-19 Ambulatory (Ordered)   10/29/20 Rule-Out Test Resulted    COVID-19 Rule Out 20 Covid-19 Ambulatory (Ordered)   20 Rule-Out Test Resulted    COVID-19 Rule Out 20 Covid-19 Ambulatory (Ordered)   20 Rule-Out Test Resulted    COVID-19 Rule Out 20 Covid-19 Ambulatory (Ordered)   20 Rule-Out Test Resulted    COVID-19 Rule Out 20 COVID-19 (Ordered)   20 Rule-Out Test Resulted    COVID-19 Rule Out 20 COVID-19 (Ordered)   20 Rule-Out Test Resulted    COVID-19 Rule Out 20 Covid-19 Ambulatory (Ordered)   20 Rule-Out Test Resulted    COVID-19 20 COVID-19   20  Detected 20    COVID-19 Rule Out 20 COVID-19 (Ordered)   20 Rule-Out Test Resulted    ESBL (Extended Spectrum Beta Lactamase) 03/08/20 03/10/20 04/25/20 Culture, Urine   06/15/20 Lorena Cifuentes RN    Ecoli urine 3/8/20, 20    INFLUENZA 20 Rapid influenza A/B antigens   20     MRSA 20 Culture, MRSA, Screening   20 Nain Garza RN    MRSA 19 MRSA SCREENING CULTURE ONLY   19 Lorena Cifuentes RN    ESBL (Extended Spectrum Beta Lactamase)  18 Lorena Cifuentes RN   19 Lorena Cifuentes RN    E Coli Urine 18            Nurse Assessment:  Last Vital Signs: /60   Pulse 75   Temp 96.7 °F (35.9 °C) (Temporal)   Resp 16   Ht 5' 6.5\" (1.689 m)   Wt 186 lb 11.2 oz (84.7 kg)   SpO2 97%   BMI 29.68 kg/m²     Last documented pain score (0-10 scale): Pain Level: 0  Last Weight:   Wt Readings from Last 1 Encounters:   20 186 lb 11.2 oz (84.7 kg)     Mental Status:  oriented, alert, coherent and able to concentrate and follow conversation    IV Access:  - None    Nursing Mobility/ADLs:  Walking   Dependent  Transfer  Dependent  Bathing  Dependent  Dressing  Dependent  Toileting  Dependent  Feeding  Independent  Med Admin  Independent  Med Delivery   whole and prefers mixed with applesauce    Wound Care Documentation and Therapy:        Elimination:  Continence:   · Bowel: Yes  · Bladder: No  Urinary Catheter: Insertion Date: 2020 and Indication for Use of Catheter: Urology/Urologist seeing this patient or inserted indwelling catheter   Colostomy/Ileostomy/Ileal Conduit: No       Date of Last BM: 2020    Intake/Output Summary (Last 24 hours) at 2020 0938  Last data filed at 2020 0600  Gross per 24 hour   Intake 120 ml   Output 650 ml   Net -530 ml     I/O last 3 completed shifts:   In: 120 [P.O.:120]  Out: 650 [Urine:650]    Safety Concerns: At Risk for Falls    Impairments/Disabilities:      Right foot drop, Bilateral leg weakness     Nutrition Therapy:  Current Nutrition Therapy:   - Oral Diet:  Dysphagia 2 mechanically altered and Cardiac    Routes of Feeding: Oral  Liquids: Nectar Thick Liquids  Daily Fluid Restriction: no  Last Modified Barium Swallow with Video (Video Swallowing Test): not done    Treatments at the Time of Hospital Discharge:   Respiratory Treatments: none  Oxygen Therapy:  is not on home oxygen therapy.   Ventilator:    - No ventilator support    Rehab Therapies: Physical Therapy, Occupational Therapy and Speech/Language Therapy  Weight Bearing Status/Restrictions: No weight bearing restirctions  Other Medical Equipment (for information only, NOT a DME order):  ***  Other Treatments: ***    Patient's personal belongings (please select all that are sent with patient):  [employer], cell phone    RN SIGNATURE:  Electronically signed by Lesli Davis RN on 12/16/20 at 11:51 AM EST    CASE MANAGEMENT/SOCIAL WORK SECTION    Inpatient Status Date: ***    Readmission Risk Assessment Score:  Readmission Risk              Risk of Unplanned Readmission:        25           Discharging to Facility/ Agency   · Name:   · Address:  · Phone:  · Fax:    Dialysis Facility (if applicable)   · Name:  · Address:  · Dialysis Schedule:  · Phone:  · Fax:    / signature: {Esignature:[id number]:::0}    PHYSICIAN SECTION    Prognosis: {Prognosis:3005979284:::0}    Condition at Discharge: Miranda Glover Patient Condition:[id number]:::0}    Rehab Potential (if transferring to Rehab): {Prognosis:[id number]:::0}    Recommended Labs or Other Treatments After Discharge: ***    Physician Certification: I certify the above information and transfer of Benito Shah  is necessary for the continuing treatment of the diagnosis listed and that she requires {Admit to Appropriate Level of Care:86403:::0} for {GREATER/LESS:742846651} 30 days.      Update Admission H&P: {CHP DME Changes in HandP:[id number]:::0}    PHYSICIAN SIGNATURE:  Electronically signed by Vanna Jackson DO on 12/16/20 at 9:38 AM EST

## 2020-12-16 NOTE — DISCHARGE SUMMARY
Physician Discharge Summary     Patient ID:  Jovi Kelley  [id number]  50 y.o.  1955    Admit date: 12/14/2020    Discharge date and time: 12/16/2020     Admission Diagnoses: Pyelonephritis [N12]    Discharge Diagnoses: Active Hospital Problems    Diagnosis    Neurogenic bladder [N31.9]    Pyelonephritis [N12]    Coronary disease [I25.10]    Urine retention [R33.9]    Malfunction of Elizondo catheter ([address] [T83.011A]    Lactic acidosis [E87.2]    Multiple sclerosis (HCC) [G35]       Consults: cardiology(Lizeth), general surgery(Valeria) and urology(Sayda)    Procedures:  CT ABDOMEN PELVIS W IV CONTRAST Additional Contrast? None  Final Result  1. Moderately distended urinary bladder despite the presence of a Elizondo catheter. Please assess for catheter dysfunction. 2. Mild bilateral hydroureteronephrosis likely due to distended urinary bladder. 3. 3.0 x 2.0 cm enhancing mass within the subcutaneous fat of the right buttock. It is stable as of the earliest available CT from 12/14/2011. It appears to be contiguous with the anus. It may represent a large hemorrhoid or polyp. Clinical correlation is needed. 4. Of the 3 known liver hemangioma, 2 are visualized and are stable. The 3rd along the dome of the liver is excluded from the field of view. Hospital Course: The patient is a 63 y.o. female patient of Dr. Paul Alva transferred from a SNF d/t abdominal pain and HTN. Finding confirmed urinary catheter malfunction with urine retention and b/l hydroureters. Marked leukocytosis on admission felt c/w UTI sepsis. Bacterial pathogens not identified with blood sterile and urine mixed christian. Empiric ATB prescribed on admission continued to completion on discharge. Lactic acid nl on discharge. No evidence to suggest GIB confirmed with stool and Gastroccult negative. A drop in Hgb related to hydration. The patient is discharged today in improved and stable condition with OP f/u as directed.  Follow up CBC and BMP recommended in 72 hrs. Need for SP catheter not recommended per urology consultant with pacheco changed every 4 weeks. Above findings d/w son(Zac). CBC with Differential:    Lab Results   Component Value Date    WBC 10.6 12/16/2020    RBC 3.78 12/16/2020    HGB 11.2 12/16/2020    HCT 36.7 12/16/2020     12/16/2020    MCV 97.1 12/16/2020    MCH 29.6 12/16/2020    MCHC 30.5 12/16/2020    RDW 14.4 12/16/2020    NRBC 0.9 03/08/2020    METASPCT 0.9 03/11/2020    LYMPHOPCT 11.2 12/16/2020    MONOPCT 12.7 12/16/2020    BASOPCT 0.4 12/16/2020    MONOSABS 1.35 12/16/2020    LYMPHSABS 1.19 12/16/2020    EOSABS 0.14 12/16/2020    BASOSABS 0.04 12/16/2020     CMP:    Lab Results   Component Value Date     12/15/2020    K 3.8 12/15/2020     12/15/2020    CO2 25 12/15/2020    BUN 12 12/15/2020    CREATININE 0.7 12/15/2020    GFRAA >60 12/15/2020    LABGLOM >60 12/15/2020    GLUCOSE 109 12/15/2020    PROT 6.9 12/14/2020    LABALBU 4.0 12/14/2020    CALCIUM 9.3 12/15/2020    BILITOT 0.4 12/14/2020    ALKPHOS 137 12/14/2020    AST 35 12/14/2020    ALT 18 12/14/2020     BMP:    Lab Results   Component Value Date     12/15/2020    K 3.8 12/15/2020     12/15/2020    CO2 25 12/15/2020    BUN 12 12/15/2020    LABALBU 4.0 12/14/2020    CREATININE 0.7 12/15/2020    CALCIUM 9.3 12/15/2020    GFRAA >60 12/15/2020    LABGLOM >60 12/15/2020    GLUCOSE 109 12/15/2020       Disposition: SNF    Patient Instructions:     Medication List      START taking these medications    doxycycline hyclate 100 MG tablet  Commonly known as: VIBRA-TABS  Take 1 tablet by mouth 2 times daily for 11 days        CHANGE how you take these medications    ticagrelor 60 MG Tabs tablet  Commonly known as: BRILINTA  1 tablet by Per G Tube route 2 times daily  What changed:   · medication strength  · how much to take  · Another medication with the same name was removed.  Continue taking this medication, and follow the directions you see here. CONTINUE taking these medications    acetaminophen 325 MG tablet  Commonly known as: TYLENOL     albuterol (2.5 MG/3ML) 0.083% nebulizer solution  Commonly known as: PROVENTIL     anastrozole 1 MG tablet  Commonly known as: ARIMIDEX     ARTIFICIAL TEAR OINTMENT OP     * atorvastatin 80 MG tablet  Commonly known as: LIPITOR     * atorvastatin 80 MG tablet  Commonly known as: LIPITOR     Cranberry 500 MG Caps  1 capsule by PEG Tube route daily     diazePAM 2 MG tablet  Commonly known as: VALIUM     FLUoxetine 20 MG capsule  Commonly known as: PROZAC     fluticasone 50 MCG/ACT nasal spray  Commonly known as: FLONASE     gabapentin 300 MG capsule  Commonly known as: NEURONTIN     HYDROcodone-acetaminophen 5-325 MG per tablet  Commonly known as: NORCO     levothyroxine 125 MCG tablet  Commonly known as: SYNTHROID     magnesium hydroxide 400 MG/5ML suspension  Commonly known as: MILK OF MAGNESIA     metoprolol tartrate 25 MG tablet  Commonly known as: LOPRESSOR     nitroGLYCERIN 0.4 MG SL tablet  Commonly known as: NITROSTAT     OCRELIZUMAB IV     pantoprazole 40 MG tablet  Commonly known as: PROTONIX     psyllium 28.3 % Pack  Commonly known as: KONSYL     saline nasal gel Gel         * This list has 2 medication(s) that are the same as other medications prescribed for you. Read the directions carefully, and ask your doctor or other care provider to review them with you. STOP taking these medications    Eliquis 5 MG Tabs tablet  Generic drug: apixaban     potassium bicarbonate 25 MEQ disintegrating tablet  Commonly known as: K-LYTE           Where to Get Your Medications      You can get these medications from any pharmacy    Bring a paper prescription for each of these medications  · doxycycline hyclate 100 MG tablet  · ticagrelor 60 MG Tabs tablet          Activity: activity as tolerated   Diet: cardiac diet   Follow-up with Dr. Pierre Vizcarra in [facility].     Note that over 30 minutes was spent in preparing discharge papers, discussing discharge with patient, medication review, etc.    Signed:  Chris Merchant DO  12/16/2020  10:11 AM

## 2020-12-16 NOTE — PROGRESS NOTES
Chief Complaint:  UTI sepsis    Subjective: The patient is alert. No problems overnight. Denies chest pain & SOB . Denies abdominal pain. Tolerating diet. No nausea or vomiting. Objective:    Vitals:    12/16/20 0400   BP: (!) 119/59   Pulse: 68   Resp: 16   Temp: 96.7 °F (35.9 °C)   SpO2: 97%     A&O x's 3  Heart:  RRR, no murmurs, gallops, or rubs. Lungs:  CTA bilaterally, no wheeze, rales or rhonchi  Abd: bowel sounds present, nontender, nondistended, no masses  Extrem:  No clubbing, cyanosis, or edema  Tele: NSR    Lab Results   Component Value Date     12/15/2020    K 3.8 12/15/2020     12/15/2020    CO2 25 12/15/2020    BUN 12 12/15/2020    CREATININE 0.7 12/15/2020    CALCIUM 9.3 12/15/2020      Lab Results   Component Value Date    WBC 10.6 12/16/2020    RBC 3.78 12/16/2020    HGB 11.2 12/16/2020    HCT 36.7 12/16/2020    MCV 97.1 12/16/2020    MCH 29.6 12/16/2020    MCHC 30.5 12/16/2020    RDW 14.4 12/16/2020     12/16/2020    MPV 10.1 12/16/2020     PT/INR:    Lab Results   Component Value Date    PROTIME 13.7 06/12/2020    INR 1.2 06/12/2020     No results for input(s): POCGLU in the last 72 hours. Recent Labs     12/14/20  1050 12/14/20  1450 12/15/20  0639    136 137   K 4.8 5.3* 3.8   CL 98 100 102   CO2 20* 22 25   BUN 9 10 12   LABALBU 4.0 4.0  --    CREATININE 0.6 0.5 0.7   CALCIUM 9.9 9.7 9.3   GFRAA >60 >60 >60   LABGLOM >60 >60 >60   GLUCOSE 167* 149* 109*       Ct Abdomen Pelvis W Iv Contrast Additional Contrast? None    Result Date: 12/14/2020  EXAMINATION: CT OF THE ABDOMEN AND PELVIS WITH CONTRAST 12/14/2020 12:07 pm TECHNIQUE: CT of the abdomen and pelvis was performed with the administration of intravenous contrast. Multiplanar reformatted images are provided for review. Dose modulation, iterative reconstruction, and/or weight based adjustment of the mA/kV was utilized to reduce the radiation dose to as low as reasonably achievable.  COMPARISON: CT abdomen and pelvis, 11/09/2020. HISTORY: ORDERING SYSTEM PROVIDED HISTORY: abd pain TECHNOLOGIST PROVIDED HISTORY: Additional Contrast?->None Reason for exam:->abd pain What reading provider will be dictating this exam?->CRC FINDINGS: Lower Chest: Minimal subpleural scarring in the left lower lobe. Otherwise the lung bases are clear. The heart is normal in size. Organs: Liver: Of the 3 known stable lesions in the liver likely represent hemangioma, two are visualized on this study and are stable. The 1 along the dome of the bladder is excluded from the field of view of this study. No worrisome hepatic lesions seen. Gallbladder: In mildly distended, measuring 11 x 4 cm in the maximal axial plane. No cholelithiasis is evident by CT. No biliary ductal dilatation. Pancreas: Moderately atrophied. Otherwise, unremarkable. Spleen:  Unremarkable. Adrenals: Unremarkable. Kidneys: Mild fullness of the left renal collecting systems and ureters, which may be due to moderately distended urinary bladder. Elizondo catheter in situ. GI/Bowel: Gastrostomy tube in situ. No bowel wall thickening or obstruction is seen. The appendix is normal. Pelvis: Moderate distention of the urinary bladder despite presence of a Elizondo catheter. The uterus is surgically absent. Within the subcutaneous fat of the right buttock, along the intergluteal cleft, there is a 3.0 x 2.0 cm enhancing mass, which is grossly stable as of the earliest available CT from 12/14/2011. Peritoneum/Retroperitoneum: No lymphadenopathy. No free air. Trace free fluid in the dependent recesses of the abdomen and pelvis. Prominent atherosclerosis in the abdominal aorta and pelvic arteries. No aneurysm or significant stenosis appreciated. Bones/Soft Tissues: The visualized bones are intact without fracture or focal lesion. Trace ascites. Mild anasarca. 1. Moderately distended urinary bladder despite the presence of a Elizondo catheter.   Please assess for catheter dysfunction. 2. Mild bilateral hydroureteronephrosis likely due to distended urinary bladder. 3. 3.0 x 2.0 cm enhancing mass within the subcutaneous fat of the right buttock. It is stable as of the earliest available CT from 12/14/2011. It appears to be contiguous with the anus. It may represent a large hemorrhoid or polyp. Clinical correlation is needed. 4. Of the 3 known liver hemangioma, 2 are visualized and are stable. The 3rd along the dome of the liver is excluded from the field of view. Xr Chest Portable    Result Date: 12/14/2020  EXAMINATION: ONE XRAY VIEW OF THE CHEST 12/14/2020 11:03 am COMPARISON: CT chest June 12, 2020 Chest radiograph June 11, 2020 HISTORY: ORDERING SYSTEM PROVIDED HISTORY: Shortness of breath TECHNOLOGIST PROVIDED HISTORY: Reason for exam:->Shortness of breath What reading provider will be dictating this exam?->CRC FINDINGS: Trachea is midline. Cardiomediastinal silhouette is stable in size. There are bilateral upper lung airspace opacities. No pneumothorax. No sizable pleural effusions. Bilateral apical opacity may represent pneumonia versus atelectasis in the proper clinical setting. Cta Chest W Contrast    Result Date: 12/14/2020  EXAMINATION: CTA OF THE CHEST 12/14/2020 4:14 pm TECHNIQUE: CTA of the chest was performed after the administration of intravenous contrast.  Multiplanar reformatted images are provided for review. MIP images are provided for review. Dose modulation, iterative reconstruction, and/or weight based adjustment of the mA/kV was utilized to reduce the radiation dose to as low as reasonably achievable. COMPARISON: None. HISTORY: ORDERING SYSTEM PROVIDED HISTORY: elevated dimer, r/o pe TECHNOLOGIST PROVIDED HISTORY: Reason for exam:->elevated dimer, r/o pe What reading provider will be dictating this exam?->CRC FINDINGS: Pulmonary Arteries: Pulmonary arteries are adequately opacified for evaluation.   No evidence of intraluminal filling defect to suggest pulmonary embolism. Main pulmonary artery is normal in caliber. Mediastinum: No evidence of mediastinal lymphadenopathy. The heart and pericardium demonstrate no acute abnormality. There is no acute abnormality of the thoracic aorta. Lungs/pleura: There are very vague ground-glass infiltrate seen within the upper lobes. The right middle lobe and lower lobes are clear. Upper Abdomen: Limited images of the upper abdomen are unremarkable. Soft Tissues/Bones: No acute bone or soft tissue abnormality. 1. There is no evidence of a pulmonary embolus. 2. Very vague ground-glass infiltrate seen within the upper lobes. Please correlate with the patient's rapid COVID test.      Scheduled Meds:   ticagrelor  60 mg Per G Tube BID    anastrozole  1 mg Oral Daily    atorvastatin  80 mg Per G Tube Nightly    diazePAM  2 mg Per G Tube BID    FLUoxetine  20 mg Oral BID    fluticasone  1 spray Nasal Daily    gabapentin  300 mg Oral TID    levothyroxine  125 mcg Oral Daily    metoprolol tartrate  12.5 mg Per G Tube BID    pantoprazole  40 mg Oral Daily    psyllium  1 packet Per G Tube Daily    sodium chloride flush  10 mL Intravenous 2 times per day    doxycycline (VIBRAMYCIN) IV  100 mg Intravenous Q12H     Continuous Infusions:   sodium chloride 75 mL/hr at 12/15/20 1423     PRN Meds:.acetaminophen, albuterol, HYDROcodone-acetaminophen, magnesium hydroxide, sodium chloride, sodium chloride flush, promethazine **OR** ondansetron, polyethylene glycol, acetaminophen **OR** acetaminophen    I/O last 3 completed shifts: In: 120 [P.O.:120]  Out: 650 [Urine:650]  No intake/output data recorded.     Intake/Output Summary (Last 24 hours) at 12/16/2020 0815  Last data filed at 12/16/2020 0600  Gross per 24 hour   Intake 120 ml   Output 650 ml   Net -530 ml       Assessment:    Active Hospital Problems    Diagnosis    Neurogenic bladder [N31.9]    Pyelonephritis [N12]    Coronary disease [I25.10]    Urine retention [R33.9]    Malfunction of Elizondo catheter ([facility] [address] [T83.011A]    Lactic acidosis [E87.2]    Multiple sclerosis ([address] Dewey [place]       Plan:  Consult notes reviewed             No evidence of GIB--Gastroccult and stool negative for blood             Continue Briinta at reduced dose per cardiology             No plans for SP catheter at this time             Blood sterile             Urine culture mixed christian             WBC and lactic acid nl             Drop in Hgb d/t IVF--recheck as OP in 72 hrs             Discharge today                        Azra Dominguez DO  8:15 AM  12/16/2020

## 2020-12-16 NOTE — PROGRESS NOTES
GENERAL SURGERY  DAILY PROGRESS NOTE  12/16/2020    Subjective:  No acute events overnight  Slightly altered this morning  Small amount of leakage around PEG tube because bumper was loose    Objective:  BP (!) 119/59   Pulse 68   Temp 96.7 °F (35.9 °C) (Temporal)   Resp 16   Ht 5' 6.5\" (1.689 m)   Wt 186 lb 11.2 oz (84.7 kg)   SpO2 97%   BMI 29.68 kg/m²     GENERAL:  Laying in bed, awake, no apparent distress  HEAD: Normocephalic  EYES: No sclera icterus  LUNGS:  No increased work of breathing  CARDIOVASCULAR:  RR  ABDOMEN:  Soft, non-tender, non-distended.  Leakage around PEG due to loose bumper  EXTREMITIES: No edema or swelling      Assessment/Plan:  63 y.o. female with MS, neurogenic bladder and urosepsis, consulted for PEG evaluation    PEG tube is functional  Would recommend keeping PEG to help with frequent altered mental status and MS- high likelihood that she would need it in the future  Minimal concern for upper GI bleed at this time given negative Gastroccult and negative FOBT    Electronically signed by June Guerrero DO on 12/16/2020 at 6:06 AM

## 2020-12-19 LAB
BLOOD CULTURE, ROUTINE: NORMAL
CULTURE, BLOOD 2: NORMAL

## 2020-12-20 LAB
SARS-COV-2: NOT DETECTED
SOURCE: NORMAL

## 2020-12-27 LAB
SARS-COV-2: NOT DETECTED
SOURCE: NORMAL

## 2021-01-01 LAB
SARS-COV-2: NOT DETECTED
SOURCE: NORMAL

## 2021-01-06 LAB
SARS-COV-2: NOT DETECTED
SOURCE: NORMAL

## 2021-01-11 LAB
SARS-COV-2: NOT DETECTED
SOURCE: NORMAL

## 2021-01-13 LAB
SARS-COV-2: NOT DETECTED
SOURCE: NORMAL

## 2021-01-15 LAB
SARS-COV-2: NOT DETECTED
SOURCE: NORMAL

## 2021-01-19 LAB
SARS-COV-2: NOT DETECTED
SOURCE: NORMAL

## 2021-01-27 LAB
SARS-COV-2: NOT DETECTED
SOURCE: NORMAL

## 2021-02-16 ENCOUNTER — OFFICE VISIT (OUTPATIENT)
Dept: NEUROLOGY | Age: 66
End: 2021-02-16
Payer: MEDICAID

## 2021-02-16 VITALS
BODY MASS INDEX: 27.8 KG/M2 | WEIGHT: 173 LBS | HEART RATE: 75 BPM | HEIGHT: 66 IN | OXYGEN SATURATION: 94 % | DIASTOLIC BLOOD PRESSURE: 83 MMHG | RESPIRATION RATE: 18 BRPM | SYSTOLIC BLOOD PRESSURE: 130 MMHG

## 2021-02-16 DIAGNOSIS — G35 MULTIPLE SCLEROSIS (HCC): Primary | ICD-10-CM

## 2021-02-16 PROCEDURE — 99215 OFFICE O/P EST HI 40 MIN: CPT | Performed by: PSYCHIATRY & NEUROLOGY

## 2021-02-16 NOTE — PROGRESS NOTES
Otis Middleton is a 63 y.o. right handed woman who was followed for years for longstanding, severe multiple sclerosis. She remained a poor historian. She was alone for this appointment. Her medications were now Brilinta, diazepam, Norco, inhalers, gabapentin, fluoxetine, atorvastatin, clonidine, lisinopril, metoprolol, furosemide, aspirin and anastrozole. She had returned to [place], only last week. She was off apixaban, taking Brilinta instead    She was diagnosed with multiple sclerosis 24 years ago-----previously maintained on Tecfidera; that drug was discontinued due to constant diarrhea. She had received Ocrevus for several years, but those infusions were held following her COVID-19 infection and diagnosis of breast cancer. She underwent right mastectomy, without radiation or chemotherapy. There is no return of the cancer. She recuperated from COVID-19 infection, not requiring hospitalization. She received her COVID-19 vaccine over 1 month ago, since she was living in a nursing home. Fortunately, she again denied additional weakness, loss of vision, speech abnormalities or cognitive issues. She reported no swallowing difficulty. She denied headaches, other pains additional clumsiness or sensory loss. She had returned to the nursing home, continue well there. She claimed to walk on a daily basis, but required supportive to do so. She was using her Rollator at times. She had not fallen. She was no longer receiving physical therapy. She denied any medical issues. She continued eating and sleeping well. Review of systems was otherwise unremarkable.      Objective:     /83   Pulse 75   Resp 18   Ht 5' 6\" (1.676 m)   Wt 173 lb (78.5 kg)   SpO2 94%   BMI 27.92 kg/m²    Afebrile     General appearance: alert, appears stated age and cooperative--in no distress; she remained in a wheelchair----and in good spirits  Neck: no adenopathy, no carotid bruit, no JVD, supple, symmetrical, trachea midline and thyroid not enlarged, symmetric, no tenderness/mass/nodules  Lungs: clear to auscultation bilaterally; she was status post right mastectomy  Heart: regular rate and rhythm, S1, S2 normal, no murmur, click, rub or gallop  Extremities: normal, atraumatic, no cyanosis or edema; bilateral pes planus deformities  Pulses: 1+ and symmetric  Skin: color, texture, turgor normal; no rashes or lesions     Mental Status: alert, oriented, thought content appropriate------ she remained a questionable historian---she reported walking without aid  Speech: no dysarthria  Language: laconic, without aphasias    Cranial Nerves:  I: smell NA   II: visual acuity  NA   II: visual fields Full to confrontation   II: pupils PHOENIX--no red desaturation or YONIS   III,VII: ptosis None   III,IV,VI: extraocular muscles  Full ROM   V: mastication Normal   V: facial light touch sensation  Normal   V,VII: corneal reflex  Present   VII: facial muscle function - upper  Normal   VII: facial muscle function - lower Normal   VIII: hearing Normal   IX: soft palate elevation  Normal   IX,X: gag reflex Minimal gag   XI: trapezius strength  5/5   XI: sternocleidomastoid strength 5/5   XI: neck extension strength  5/5   XII: tongue strength  Normal     Motor:  Right   5/5              Left   5/5               Right Bicep  5/5           Left Bicep  5/5              Right Triceps   5/5       Left Triceps  5/5          Right Deltoid  5/5     Left Deltoid  5/5         Right IPS -4/5            Left IPS  -4/5               Right Quadriceps  5/5          Left Quadriceps    5/5           Right Gastrocnemius    5/5    Left Gastrocnemius   5/5  Right Ant Tibialis   4-/5  Left Ant Tibialis   0/5    I found no significant spasticity in the legs  There was no arm drift    Sensory:  Light touch and pinprick remained intact  Vibration was moderately decreased at both ankles  No spinal sensory level was found    Coordination:   Both finger-to-nose and fine finger movements were decreased  Rapid alternating movements were decreased on both sides now  Heel-to-shin was decreased in both legs---more so on the left    Gait:  She was unable to stand even with maximal support of 2    DTR:   Right Brachioradialis reflex 0  Left Brachioradialis reflex 0  Right Biceps reflex 1+  Left Biceps reflex 1+  Right Triceps reflex 1+  Left Triceps reflex 1+  Right Quadriceps reflex 2+  Left Quadriceps reflex 2+  Right Achilles reflex 0  Left Achilles reflex 0     There were no pathological reflexes    Her neurological examination displayed increasing weakness in both legs    Laboratory/Radiology:     MRI brain---extensive lesion load with questionable enhancing lesions    MRI C-spine: moderate plaques with questionable enhancing lesions as well. Hepatitis screening was negative. A recent blood sugar was 160, with unremarkable CBC with differential and CMP. Assessment: This individual suffers from chronic progressive multiple sclerosis with no progression since her last visit. Her EDSS is now 8.0. She progressed since her last visit, off Ocrevus. I now question underlying dementia as well. Fortunately, she recovered well from her breast cancer and COVID-19 infection. She will continue with Ocrevus administration. She is otherwise stable medically. Plan:     She will continue with Ocrevus. She will call at any time if problems arise. She will return in 6 months. I spent 40 minutes with the patient with over 50 % spent in counseling and disease management. All patient issues were addressed and all questions were answered.     Kathleen Wilburn Jr  10:00 AM  2/16/2021

## 2021-02-19 ENCOUNTER — HOSPITAL ENCOUNTER (OUTPATIENT)
Dept: INFUSION THERAPY | Age: 66
Setting detail: INFUSION SERIES
Discharge: HOME | End: 2021-02-19
Payer: MEDICAID

## 2021-02-19 VITALS
OXYGEN SATURATION: 93 % | TEMPERATURE: 97.4 F | RESPIRATION RATE: 16 BRPM | SYSTOLIC BLOOD PRESSURE: 146 MMHG | DIASTOLIC BLOOD PRESSURE: 82 MMHG | HEART RATE: 70 BPM

## 2021-02-19 DIAGNOSIS — G35 MULTIPLE SCLEROSIS (HCC): Primary | ICD-10-CM

## 2021-02-19 PROCEDURE — 2580000003 HC RX 258: Performed by: PSYCHIATRY & NEUROLOGY

## 2021-02-19 PROCEDURE — 96366 THER/PROPH/DIAG IV INF ADDON: CPT

## 2021-02-19 PROCEDURE — 96375 TX/PRO/DX INJ NEW DRUG ADDON: CPT

## 2021-02-19 PROCEDURE — 2580000003 HC RX 258

## 2021-02-19 PROCEDURE — 6370000000 HC RX 637 (ALT 250 FOR IP): Performed by: PSYCHIATRY & NEUROLOGY

## 2021-02-19 PROCEDURE — 96365 THER/PROPH/DIAG IV INF INIT: CPT

## 2021-02-19 PROCEDURE — 6360000002 HC RX W HCPCS: Performed by: PSYCHIATRY & NEUROLOGY

## 2021-02-19 RX ORDER — SODIUM CHLORIDE 9 MG/ML
INJECTION, SOLUTION INTRAVENOUS CONTINUOUS
Status: CANCELLED | OUTPATIENT
Start: 2021-07-23

## 2021-02-19 RX ORDER — EPINEPHRINE 1 MG/ML
0.3 INJECTION, SOLUTION, CONCENTRATE INTRAVENOUS PRN
Status: CANCELLED | OUTPATIENT
Start: 2021-07-23

## 2021-02-19 RX ORDER — METHYLPREDNISOLONE SODIUM SUCCINATE 125 MG/2ML
100 INJECTION, POWDER, LYOPHILIZED, FOR SOLUTION INTRAMUSCULAR; INTRAVENOUS ONCE
Status: COMPLETED | OUTPATIENT
Start: 2021-02-19 | End: 2021-02-19

## 2021-02-19 RX ORDER — METHYLPREDNISOLONE SODIUM SUCCINATE 125 MG/2ML
50 INJECTION, POWDER, LYOPHILIZED, FOR SOLUTION INTRAMUSCULAR; INTRAVENOUS ONCE
Status: CANCELLED
Start: 2021-07-23 | End: 2021-07-23

## 2021-02-19 RX ORDER — DIPHENHYDRAMINE HYDROCHLORIDE 50 MG/ML
25 INJECTION INTRAMUSCULAR; INTRAVENOUS ONCE
Status: CANCELLED
Start: 2021-07-23 | End: 2021-07-23

## 2021-02-19 RX ORDER — ACETAMINOPHEN 325 MG/1
650 TABLET ORAL ONCE
Status: CANCELLED | OUTPATIENT
Start: 2021-07-23

## 2021-02-19 RX ORDER — DIPHENHYDRAMINE HYDROCHLORIDE 50 MG/ML
25 INJECTION INTRAMUSCULAR; INTRAVENOUS ONCE
Status: COMPLETED | OUTPATIENT
Start: 2021-02-19 | End: 2021-02-19

## 2021-02-19 RX ORDER — METHYLPREDNISOLONE SODIUM SUCCINATE 125 MG/2ML
100 INJECTION, POWDER, LYOPHILIZED, FOR SOLUTION INTRAMUSCULAR; INTRAVENOUS ONCE
Status: CANCELLED | OUTPATIENT
Start: 2021-07-23

## 2021-02-19 RX ORDER — METHYLPREDNISOLONE SODIUM SUCCINATE 125 MG/2ML
125 INJECTION, POWDER, LYOPHILIZED, FOR SOLUTION INTRAMUSCULAR; INTRAVENOUS ONCE
Status: CANCELLED | OUTPATIENT
Start: 2021-07-23 | End: 2021-07-23

## 2021-02-19 RX ORDER — DIPHENHYDRAMINE HYDROCHLORIDE 50 MG/ML
25 INJECTION INTRAMUSCULAR; INTRAVENOUS ONCE
Status: CANCELLED | OUTPATIENT
Start: 2021-07-23

## 2021-02-19 RX ORDER — SODIUM CHLORIDE 0.9 % (FLUSH) 0.9 %
10 SYRINGE (ML) INJECTION PRN
Status: DISCONTINUED | OUTPATIENT
Start: 2021-02-19 | End: 2021-02-20 | Stop reason: HOSPADM

## 2021-02-19 RX ORDER — DIPHENHYDRAMINE HYDROCHLORIDE 50 MG/ML
50 INJECTION INTRAMUSCULAR; INTRAVENOUS ONCE
Status: CANCELLED | OUTPATIENT
Start: 2021-07-23 | End: 2021-07-23

## 2021-02-19 RX ORDER — SODIUM CHLORIDE 0.9 % (FLUSH) 0.9 %
10 SYRINGE (ML) INJECTION PRN
Status: CANCELLED | OUTPATIENT
Start: 2021-07-23

## 2021-02-19 RX ORDER — ACETAMINOPHEN 325 MG/1
650 TABLET ORAL ONCE
Status: COMPLETED | OUTPATIENT
Start: 2021-02-19 | End: 2021-02-19

## 2021-02-19 RX ORDER — SODIUM CHLORIDE 0.9 % (FLUSH) 0.9 %
SYRINGE (ML) INJECTION
Status: COMPLETED
Start: 2021-02-19 | End: 2021-02-19

## 2021-02-19 RX ADMIN — METHYLPREDNISOLONE SODIUM SUCCINATE 100 MG: 125 INJECTION, POWDER, FOR SOLUTION INTRAMUSCULAR; INTRAVENOUS at 09:07

## 2021-02-19 RX ADMIN — SODIUM CHLORIDE, PRESERVATIVE FREE 10 ML: 5 INJECTION INTRAVENOUS at 09:08

## 2021-02-19 RX ADMIN — Medication 10 ML: at 09:08

## 2021-02-19 RX ADMIN — OCRELIZUMAB 600 MG: 300 INJECTION INTRAVENOUS at 09:45

## 2021-02-19 RX ADMIN — ACETAMINOPHEN 650 MG: 325 TABLET ORAL at 09:07

## 2021-02-19 RX ADMIN — DIPHENHYDRAMINE HYDROCHLORIDE 25 MG: 50 INJECTION, SOLUTION INTRAMUSCULAR; INTRAVENOUS at 09:07

## 2021-02-19 ASSESSMENT — PAIN SCALES - GENERAL: PAINLEVEL_OUTOF10: 0

## 2021-02-19 NOTE — PROGRESS NOTES
Patient tolerated infusion well and one hour observation period post Ocrevus. Vital signs stable. Patient assisted to wheelchair with lissette lift. Elizondo catheter emptied for 600 cc. Patient ate very little while here and had a ginger ale to drink. She is dry. Report called to [facility] and patient discharged with instructions with transport.

## 2021-02-23 LAB
SARS-COV-2: NOT DETECTED
SOURCE: NORMAL

## 2021-03-03 LAB
SARS-COV-2: NOT DETECTED
SOURCE: NORMAL

## 2021-03-10 LAB
SARS-COV-2: NOT DETECTED
SOURCE: NORMAL

## 2021-03-13 LAB
SARS-COV-2: NOT DETECTED
SOURCE: NORMAL

## 2021-03-17 LAB
BACTERIA: ABNORMAL /HPF
BILIRUBIN URINE: NEGATIVE
BLOOD, URINE: ABNORMAL
CLARITY: ABNORMAL
COLOR: YELLOW
GLUCOSE URINE: NEGATIVE MG/DL
KETONES, URINE: NEGATIVE MG/DL
LEUKOCYTE ESTERASE, URINE: ABNORMAL
NITRITE, URINE: POSITIVE
PH UA: 6.5 (ref 5–9)
PROTEIN UA: 30 MG/DL
RBC UA: ABNORMAL /HPF (ref 0–2)
SARS-COV-2: NOT DETECTED
SOURCE: NORMAL
SPECIFIC GRAVITY UA: 1.01 (ref 1–1.03)
UROBILINOGEN, URINE: 0.2 E.U./DL
WBC UA: ABNORMAL /HPF (ref 0–5)

## 2021-03-19 LAB — URINE CULTURE, ROUTINE: NORMAL

## 2021-04-09 ENCOUNTER — HOSPITAL ENCOUNTER (OUTPATIENT)
Dept: CT IMAGING | Age: 66
Discharge: HOME | End: 2021-04-11
Payer: MEDICAID

## 2021-04-09 ENCOUNTER — HOSPITAL ENCOUNTER (OUTPATIENT)
Age: 66
Discharge: HOME | End: 2021-04-09
Payer: MEDICAID

## 2021-04-09 DIAGNOSIS — C50.011 MALIGNANT NEOPLASM OF NIPPLE OF RIGHT BREAST IN FEMALE, UNSPECIFIED ESTROGEN RECEPTOR STATUS (HCC): ICD-10-CM

## 2021-04-09 LAB
BUN BLDV-MCNC: 7 MG/DL (ref 8–23)
CREAT SERPL-MCNC: 0.6 MG/DL (ref 0.5–1)
GFR AFRICAN AMERICAN: >60
GFR NON-AFRICAN AMERICAN: >60 ML/MIN/1.73

## 2021-04-09 PROCEDURE — 6360000004 HC RX CONTRAST MEDICATION: Performed by: RADIOLOGY

## 2021-04-09 PROCEDURE — 82565 ASSAY OF CREATININE: CPT

## 2021-04-09 PROCEDURE — 74177 CT ABD & PELVIS W/CONTRAST: CPT

## 2021-04-09 PROCEDURE — 2580000003 HC RX 258: Performed by: RADIOLOGY

## 2021-04-09 PROCEDURE — 84520 ASSAY OF UREA NITROGEN: CPT

## 2021-04-09 PROCEDURE — 36415 COLL VENOUS BLD VENIPUNCTURE: CPT

## 2021-04-09 RX ORDER — SODIUM CHLORIDE 0.9 % (FLUSH) 0.9 %
10 SYRINGE (ML) INJECTION ONCE
Status: COMPLETED | OUTPATIENT
Start: 2021-04-09 | End: 2021-04-09

## 2021-04-09 RX ADMIN — IOHEXOL 50 ML: 240 INJECTION, SOLUTION INTRATHECAL; INTRAVASCULAR; INTRAVENOUS; ORAL at 16:48

## 2021-04-09 RX ADMIN — Medication 10 ML: at 16:48

## 2021-04-09 RX ADMIN — IOPAMIDOL 90 ML: 755 INJECTION, SOLUTION INTRAVENOUS at 16:48

## 2021-04-13 LAB
ALBUMIN SERPL-MCNC: 3.2 G/DL (ref 3.5–5.2)
ALP BLD-CCNC: 95 U/L (ref 35–104)
ALT SERPL-CCNC: 12 U/L (ref 0–32)
ANION GAP SERPL CALCULATED.3IONS-SCNC: 10 MMOL/L (ref 7–16)
AST SERPL-CCNC: 13 U/L (ref 0–31)
BASOPHILS ABSOLUTE: 0.06 E9/L (ref 0–0.2)
BASOPHILS RELATIVE PERCENT: 0.9 % (ref 0–2)
BILIRUB SERPL-MCNC: 0.3 MG/DL (ref 0–1.2)
BILIRUBIN DIRECT: <0.2 MG/DL (ref 0–0.3)
BILIRUBIN, INDIRECT: NORMAL MG/DL (ref 0–1)
BUN BLDV-MCNC: 9 MG/DL (ref 8–23)
CALCIUM SERPL-MCNC: 8.7 MG/DL (ref 8.6–10.2)
CHLORIDE BLD-SCNC: 105 MMOL/L (ref 98–107)
CHOLESTEROL, TOTAL: 105 MG/DL (ref 0–199)
CO2: 25 MMOL/L (ref 22–29)
CREAT SERPL-MCNC: 0.6 MG/DL (ref 0.5–1)
EOSINOPHILS ABSOLUTE: 0.27 E9/L (ref 0.05–0.5)
EOSINOPHILS RELATIVE PERCENT: 3.8 % (ref 0–6)
GFR AFRICAN AMERICAN: >60
GFR NON-AFRICAN AMERICAN: >60 ML/MIN/1.73
GLUCOSE BLD-MCNC: 92 MG/DL (ref 74–99)
HCT VFR BLD CALC: 35 % (ref 34–48)
HDLC SERPL-MCNC: 34 MG/DL
HEMOGLOBIN: 10.9 G/DL (ref 11.5–15.5)
IMMATURE GRANULOCYTES #: 0.03 E9/L
IMMATURE GRANULOCYTES %: 0.4 % (ref 0–5)
LDL CHOLESTEROL CALCULATED: 50 MG/DL (ref 0–99)
LYMPHOCYTES ABSOLUTE: 1.41 E9/L (ref 1.5–4)
LYMPHOCYTES RELATIVE PERCENT: 20 % (ref 20–42)
MCH RBC QN AUTO: 28.8 PG (ref 26–35)
MCHC RBC AUTO-ENTMCNC: 31.1 % (ref 32–34.5)
MCV RBC AUTO: 92.6 FL (ref 80–99.9)
MONOCYTES ABSOLUTE: 0.83 E9/L (ref 0.1–0.95)
MONOCYTES RELATIVE PERCENT: 11.8 % (ref 2–12)
NEUTROPHILS ABSOLUTE: 4.44 E9/L (ref 1.8–7.3)
NEUTROPHILS RELATIVE PERCENT: 63.1 % (ref 43–80)
PDW BLD-RTO: 15.9 FL (ref 11.5–15)
PLATELET # BLD: 339 E9/L (ref 130–450)
PMV BLD AUTO: 10.4 FL (ref 7–12)
POTASSIUM SERPL-SCNC: 3.8 MMOL/L (ref 3.5–5)
RBC # BLD: 3.78 E12/L (ref 3.5–5.5)
SODIUM BLD-SCNC: 140 MMOL/L (ref 132–146)
TOTAL PROTEIN: 5.7 G/DL (ref 6.4–8.3)
TRIGL SERPL-MCNC: 107 MG/DL (ref 0–149)
TSH SERPL DL<=0.05 MIU/L-ACNC: 3.6 UIU/ML (ref 0.27–4.2)
VITAMIN D 25-HYDROXY: 28 NG/ML (ref 30–100)
VLDLC SERPL CALC-MCNC: 21 MG/DL
WBC # BLD: 7 E9/L (ref 4.5–11.5)

## 2021-06-01 ENCOUNTER — HOSPITAL ENCOUNTER (OUTPATIENT)
Dept: GENERAL RADIOLOGY | Age: 66
Discharge: HOME | End: 2021-06-03
Payer: MEDICAID

## 2021-06-01 DIAGNOSIS — Z12.31 ENCOUNTER FOR SCREENING MAMMOGRAM FOR MALIGNANT NEOPLASM OF BREAST: ICD-10-CM

## 2021-06-01 PROCEDURE — 77063 BREAST TOMOSYNTHESIS BI: CPT

## 2021-07-13 LAB
ALBUMIN SERPL-MCNC: 3.2 G/DL (ref 3.5–5.2)
ALP BLD-CCNC: 113 U/L (ref 35–104)
ALT SERPL-CCNC: 16 U/L (ref 0–32)
ANION GAP SERPL CALCULATED.3IONS-SCNC: 11 MMOL/L (ref 7–16)
AST SERPL-CCNC: 20 U/L (ref 0–31)
BILIRUB SERPL-MCNC: 0.3 MG/DL (ref 0–1.2)
BUN BLDV-MCNC: 9 MG/DL (ref 6–23)
CALCIUM SERPL-MCNC: 9 MG/DL (ref 8.6–10.2)
CHLORIDE BLD-SCNC: 106 MMOL/L (ref 98–107)
CO2: 25 MMOL/L (ref 22–29)
CREAT SERPL-MCNC: 0.6 MG/DL (ref 0.5–1)
GFR AFRICAN AMERICAN: >60
GFR NON-AFRICAN AMERICAN: >60 ML/MIN/1.73
GLUCOSE BLD-MCNC: 76 MG/DL (ref 74–99)
POTASSIUM SERPL-SCNC: 4.1 MMOL/L (ref 3.5–5)
SODIUM BLD-SCNC: 142 MMOL/L (ref 132–146)
TOTAL PROTEIN: 5.7 G/DL (ref 6.4–8.3)

## 2021-08-06 RX ORDER — OCRELIZUMAB 300 MG/10ML
600 INJECTION INTRAVENOUS
Qty: 20 ML | Refills: 1 | Status: SHIPPED | OUTPATIENT
Start: 2021-08-06 | End: 2022-02-28 | Stop reason: DRUGHIGH

## 2021-08-06 NOTE — TELEPHONE ENCOUNTER
[facility]icol Ro[facility] called needed new prescription for Ocrevus so they can authorize medication. Patient is scheduled to come to be infused on 8/20/21. Prescription will need printed at the office and faxed to Nicol Rojas.

## 2021-08-18 ENCOUNTER — OFFICE VISIT (OUTPATIENT)
Dept: NEUROLOGY | Age: 66
End: 2021-08-18
Payer: MEDICAID

## 2021-08-18 VITALS
SYSTOLIC BLOOD PRESSURE: 140 MMHG | TEMPERATURE: 97.3 F | OXYGEN SATURATION: 96 % | RESPIRATION RATE: 18 BRPM | HEART RATE: 63 BPM | DIASTOLIC BLOOD PRESSURE: 84 MMHG

## 2021-08-18 DIAGNOSIS — G35 MULTIPLE SCLEROSIS (HCC): Primary | ICD-10-CM

## 2021-08-18 PROCEDURE — 99215 OFFICE O/P EST HI 40 MIN: CPT | Performed by: PSYCHIATRY & NEUROLOGY

## 2021-08-18 RX ORDER — LORATADINE 10 MG/1
10 CAPSULE, LIQUID FILLED ORAL DAILY
COMMUNITY
End: 2021-11-29

## 2021-08-18 NOTE — PROGRESS NOTES
Ethan Benítez was a 63 y.o. right handed woman who was followed for years for longstanding, severe multiple sclerosis. She remained a poor historian. She was again alone. Her medications were now ocrelizumab, ticagrelor, diazepam, hydrocodone, inhalers, gabapentin, fluoxetine, atorvastatin, clonidine, lisinopril, metoprolol, furosemide, aspirin and anastrozole. She was diagnosed with multiple sclerosis over 24 years ago, previously maintained on dimethyl fumarate; that drug was discontinued due to constant diarrhea. She received ocrelizumab for several years, but those infusions were held following her COVID-19 infection and breast cancer diagnosis. She underwent right mastectomy, without radiation or chemotherapy. There was never return of the cancer. She recuperated from COVID-19 infection. She received her COVID-19 vaccine months ago; she was living in a nursing home. She returned to ocrelizumab administration. She was again tolerating those infusions well. She denied new neurological issues. She was no longer receiving physical therapy. She claimed to be exercising in her chair. She was unable to stand without assistance. She moves her arms well; she denied any cognitive issues. She received a PEG tube insertion. She denied any medical issues. She denied any pains. She continued eating and sleeping well. She received her COVID-19 vaccination. Review of systems was otherwise unremarkable.      Objective:     BP (!) 140/84 (Site: Right Lower Arm, Position: Sitting)   Pulse 63   Temp 97.3 °F (36.3 °C) (Infrared)   Resp 18   SpO2 96%    Afebrile     General appearance: alert, appearing stated age and cooperative in no distress; she remained in a wheelchair, and in good spirits  Neck: no adenopathy, no carotid bruit, no JVD, supple, symmetrical, trachea midline and thyroid not enlarged, symmetric, no tenderness/mass/nodules  Lungs: clear to auscultation bilaterally; she was status post right mastectomy  Heart: regular rate and rhythm, S1, S2 normal, no murmur, click, rub or gallop  Extremities: atraumatic, no cyanosis or edema; bilateral pes planus deformities  Pulses: 1+ and symmetric  Skin: color, texture, turgor normal; no rashes or lesions     Mental Status: alert, oriented, thought content appropriate------ she was a better historian today  Speech: no dysarthria  Language: laconic, without aphasias    Cranial Nerves:  I: smell NA   II: visual acuity  NA   II: visual fields Full to confrontation   II: pupils PHOENIX--no red desaturation or YONIS   III,VII: ptosis None   III,IV,VI: extraocular muscles  Full ROM   V: mastication Normal   V: facial light touch sensation  Normal   V,VII: corneal reflex  Present   VII: facial muscle function - upper  Normal   VII: facial muscle function - lower Normal   VIII: hearing Normal   IX: soft palate elevation  Normal   IX,X: gag reflex Minimal gag   XI: trapezius strength  5/5   XI: sternocleidomastoid strength 5/5   XI: neck extension strength  5/5   XII: tongue strength  Normal     Motor:  Right   5/5              Left   5/5               Right Bicep  5/5           Left Bicep  5/5              Right Triceps   5/5       Left Triceps  5/5          Right Deltoid  5/5     Left Deltoid  5/5         Right IPS -4/5            Left IPS  -4/5               Right Quadriceps  5/5          Left Quadriceps    5/5           Right Gastrocnemius    5/5    Left Gastrocnemius   5/5  Right Ant Tibialis   4-/5  Left Ant Tibialis   0/5    I found minimal spasticity in both thigh adductors  There was no arm drift    Sensory:  Light touch and pinprick remained intact  Vibration was moderately decreased at both ankles  No spinal sensory level was found    Coordination:   Both finger-to-nose and fine finger movements were decreased  Rapid alternating movements were decreased on both sides now  Heel-to-shin was decreased in both legs---more so on the left    Gait:  She was unable to stand even with the maximal support of 2    DTR:   Right Brachioradialis reflex 0  Left Brachioradialis reflex 0  Right Biceps reflex 1+  Left Biceps reflex 1+  Right Triceps reflex 1+  Left Triceps reflex 1+  Right Quadriceps reflex 2+  Left Quadriceps reflex 2+  Right Achilles reflex 0  Left Achilles reflex 0     There were no pathological reflexes    Her neurological examination was unchanged from her last visit    Laboratory/Radiology:     MRI brain---extensive lesion load with questionable enhancing lesions    MRI C-spine: moderate plaques with questionable enhancing lesions as well. Recent CMP was unremarkable. CBC with differential was unrevealing, with an absolute lymphocyte count of 1.41. Assessment: This individual suffers from chronic progressive, relapsing remitting, multiple sclerosis with no progression since her last visit. Her EDSS remains 8.0. However, her cognition appeared improved. Fortunately, she recovered well from her breast cancer and COVID-19 infection. She will continue with ocrelizumab administration. She is otherwise stable medically. I find no need for oxycodone. Plan:     She will continue with ocrelizumab. Oxycodone was discontinued. She will call at any time if problems arise. She will return in 6 months. I spent 40 minutes with the patient with over 50 % spent in counseling and disease management. All patient issues were addressed and all questions were answered.     Yokasta López MD  1:14 PM  8/18/2021

## 2021-08-26 ENCOUNTER — HOSPITAL ENCOUNTER (OUTPATIENT)
Dept: INFUSION THERAPY | Age: 66
Setting detail: INFUSION SERIES
Discharge: HOME | End: 2021-08-26
Payer: MEDICAID

## 2021-08-26 VITALS
SYSTOLIC BLOOD PRESSURE: 133 MMHG | RESPIRATION RATE: 18 BRPM | TEMPERATURE: 98.1 F | HEART RATE: 78 BPM | OXYGEN SATURATION: 94 % | DIASTOLIC BLOOD PRESSURE: 76 MMHG

## 2021-08-26 DIAGNOSIS — G35 MULTIPLE SCLEROSIS (HCC): Primary | ICD-10-CM

## 2021-08-26 PROCEDURE — 6370000000 HC RX 637 (ALT 250 FOR IP)

## 2021-08-26 PROCEDURE — 6360000002 HC RX W HCPCS: Performed by: NURSE PRACTITIONER

## 2021-08-26 PROCEDURE — 96365 THER/PROPH/DIAG IV INF INIT: CPT

## 2021-08-26 PROCEDURE — 96375 TX/PRO/DX INJ NEW DRUG ADDON: CPT

## 2021-08-26 PROCEDURE — 2580000003 HC RX 258: Performed by: NURSE PRACTITIONER

## 2021-08-26 PROCEDURE — 2580000003 HC RX 258: Performed by: PSYCHIATRY & NEUROLOGY

## 2021-08-26 PROCEDURE — 6360000002 HC RX W HCPCS

## 2021-08-26 PROCEDURE — 96366 THER/PROPH/DIAG IV INF ADDON: CPT

## 2021-08-26 RX ORDER — DIPHENHYDRAMINE HYDROCHLORIDE 50 MG/ML
25 INJECTION INTRAMUSCULAR; INTRAVENOUS ONCE
Status: CANCELLED | OUTPATIENT
Start: 2021-08-26

## 2021-08-26 RX ORDER — METHYLPREDNISOLONE SODIUM SUCCINATE 125 MG/2ML
INJECTION, POWDER, LYOPHILIZED, FOR SOLUTION INTRAMUSCULAR; INTRAVENOUS
Status: COMPLETED
Start: 2021-08-26 | End: 2021-08-26

## 2021-08-26 RX ORDER — DIPHENHYDRAMINE HYDROCHLORIDE 50 MG/ML
INJECTION INTRAMUSCULAR; INTRAVENOUS
Status: COMPLETED
Start: 2021-08-26 | End: 2021-08-26

## 2021-08-26 RX ORDER — ACETAMINOPHEN 325 MG/1
TABLET ORAL
Status: COMPLETED
Start: 2021-08-26 | End: 2021-08-26

## 2021-08-26 RX ORDER — METHYLPREDNISOLONE SODIUM SUCCINATE 125 MG/2ML
100 INJECTION, POWDER, LYOPHILIZED, FOR SOLUTION INTRAMUSCULAR; INTRAVENOUS ONCE
Status: CANCELLED | OUTPATIENT
Start: 2021-08-26

## 2021-08-26 RX ORDER — ACETAMINOPHEN 325 MG/1
650 TABLET ORAL ONCE
Status: CANCELLED | OUTPATIENT
Start: 2021-08-26

## 2021-08-26 RX ORDER — METHYLPREDNISOLONE SODIUM SUCCINATE 125 MG/2ML
100 INJECTION, POWDER, LYOPHILIZED, FOR SOLUTION INTRAMUSCULAR; INTRAVENOUS ONCE
Status: CANCELLED | OUTPATIENT
Start: 2022-02-10

## 2021-08-26 RX ORDER — ACETAMINOPHEN 325 MG/1
650 TABLET ORAL ONCE
Status: CANCELLED | OUTPATIENT
Start: 2022-02-10

## 2021-08-26 RX ORDER — DIPHENHYDRAMINE HYDROCHLORIDE 50 MG/ML
25 INJECTION INTRAMUSCULAR; INTRAVENOUS ONCE
Status: CANCELLED | OUTPATIENT
Start: 2022-02-10

## 2021-08-26 RX ORDER — SODIUM CHLORIDE 0.9 % (FLUSH) 0.9 %
10 SYRINGE (ML) INJECTION PRN
Status: DISCONTINUED | OUTPATIENT
Start: 2021-08-26 | End: 2021-08-27 | Stop reason: HOSPADM

## 2021-08-26 RX ADMIN — SODIUM CHLORIDE, PRESERVATIVE FREE 10 ML: 5 INJECTION INTRAVENOUS at 08:46

## 2021-08-26 RX ADMIN — OCRELIZUMAB 600 MG: 300 INJECTION INTRAVENOUS at 09:41

## 2021-08-26 RX ADMIN — ACETAMINOPHEN 650 MG: 325 TABLET ORAL at 08:45

## 2021-08-26 RX ADMIN — DIPHENHYDRAMINE HYDROCHLORIDE 25 MG: 50 INJECTION, SOLUTION INTRAMUSCULAR; INTRAVENOUS at 08:45

## 2021-08-26 RX ADMIN — METHYLPREDNISOLONE SODIUM SUCCINATE 100 MG: 125 INJECTION, POWDER, FOR SOLUTION INTRAMUSCULAR; INTRAVENOUS at 08:45

## 2021-08-26 ASSESSMENT — PAIN DESCRIPTION - PROGRESSION: CLINICAL_PROGRESSION: NOT CHANGED

## 2021-08-26 ASSESSMENT — PAIN DESCRIPTION - FREQUENCY: FREQUENCY: CONTINUOUS

## 2021-08-26 ASSESSMENT — PAIN DESCRIPTION - LOCATION: LOCATION: BACK;SHOULDER

## 2021-08-26 ASSESSMENT — PAIN DESCRIPTION - ONSET: ONSET: ON-GOING

## 2021-08-26 ASSESSMENT — PAIN SCALES - GENERAL
PAINLEVEL_OUTOF10: 7
PAINLEVEL_OUTOF10: 3
PAINLEVEL_OUTOF10: 7

## 2021-08-26 ASSESSMENT — PAIN DESCRIPTION - PAIN TYPE: TYPE: CHRONIC PAIN

## 2021-08-26 ASSESSMENT — PAIN - FUNCTIONAL ASSESSMENT: PAIN_FUNCTIONAL_ASSESSMENT: PREVENTS OR INTERFERES WITH ALL ACTIVE AND SOME PASSIVE ACTIVITIES

## 2021-08-26 ASSESSMENT — PAIN DESCRIPTION - DESCRIPTORS: DESCRIPTORS: DISCOMFORT;THROBBING

## 2021-08-26 NOTE — PROGRESS NOTES
Patient tolerated infusion well and stayed for one hour observation period. Elizondo cath emptied for 450 cc of kaushik urine. Patient transferred to wheelchair via lissette lift. Patient discharged to [facility] via [facility].

## 2021-10-12 LAB
ALBUMIN SERPL-MCNC: 3.4 G/DL (ref 3.5–5.2)
ALP BLD-CCNC: 118 U/L (ref 35–104)
ALT SERPL-CCNC: 10 U/L (ref 0–32)
ANION GAP SERPL CALCULATED.3IONS-SCNC: 10 MMOL/L (ref 7–16)
AST SERPL-CCNC: 15 U/L (ref 0–31)
BASOPHILS ABSOLUTE: 0.05 E9/L (ref 0–0.2)
BASOPHILS RELATIVE PERCENT: 0.6 % (ref 0–2)
BILIRUB SERPL-MCNC: 0.4 MG/DL (ref 0–1.2)
BILIRUBIN DIRECT: <0.2 MG/DL (ref 0–0.3)
BILIRUBIN, INDIRECT: NORMAL MG/DL (ref 0–1)
BUN BLDV-MCNC: 8 MG/DL (ref 6–23)
CALCIUM SERPL-MCNC: 8.9 MG/DL (ref 8.6–10.2)
CHLORIDE BLD-SCNC: 105 MMOL/L (ref 98–107)
CHOLESTEROL, TOTAL: 119 MG/DL (ref 0–199)
CO2: 23 MMOL/L (ref 22–29)
CREAT SERPL-MCNC: 0.6 MG/DL (ref 0.5–1)
EOSINOPHILS ABSOLUTE: 0.4 E9/L (ref 0.05–0.5)
EOSINOPHILS RELATIVE PERCENT: 4.9 % (ref 0–6)
GFR AFRICAN AMERICAN: >60
GFR NON-AFRICAN AMERICAN: >60 ML/MIN/1.73
GLUCOSE BLD-MCNC: 95 MG/DL (ref 74–99)
HCT VFR BLD CALC: 37 % (ref 34–48)
HDLC SERPL-MCNC: 36 MG/DL
HEMOGLOBIN: 11.7 G/DL (ref 11.5–15.5)
IMMATURE GRANULOCYTES #: 0.03 E9/L
IMMATURE GRANULOCYTES %: 0.4 % (ref 0–5)
LDL CHOLESTEROL CALCULATED: 61 MG/DL (ref 0–99)
LYMPHOCYTES ABSOLUTE: 1.14 E9/L (ref 1.5–4)
LYMPHOCYTES RELATIVE PERCENT: 13.9 % (ref 20–42)
MCH RBC QN AUTO: 28.5 PG (ref 26–35)
MCHC RBC AUTO-ENTMCNC: 31.6 % (ref 32–34.5)
MCV RBC AUTO: 90 FL (ref 80–99.9)
MONOCYTES ABSOLUTE: 0.77 E9/L (ref 0.1–0.95)
MONOCYTES RELATIVE PERCENT: 9.4 % (ref 2–12)
NEUTROPHILS ABSOLUTE: 5.81 E9/L (ref 1.8–7.3)
NEUTROPHILS RELATIVE PERCENT: 70.8 % (ref 43–80)
PDW BLD-RTO: 14.9 FL (ref 11.5–15)
PLATELET # BLD: 354 E9/L (ref 130–450)
PMV BLD AUTO: 10.6 FL (ref 7–12)
POTASSIUM SERPL-SCNC: 3.7 MMOL/L (ref 3.5–5)
RBC # BLD: 4.11 E12/L (ref 3.5–5.5)
SODIUM BLD-SCNC: 138 MMOL/L (ref 132–146)
TOTAL PROTEIN: 5.6 G/DL (ref 6.4–8.3)
TRIGL SERPL-MCNC: 109 MG/DL (ref 0–149)
TSH SERPL DL<=0.05 MIU/L-ACNC: 3.62 UIU/ML (ref 0.27–4.2)
VITAMIN D 25-HYDROXY: 32 NG/ML (ref 30–100)
VLDLC SERPL CALC-MCNC: 22 MG/DL
WBC # BLD: 8.2 E9/L (ref 4.5–11.5)

## 2021-11-29 RX ORDER — CETIRIZINE HYDROCHLORIDE 10 MG/1
10 TABLET ORAL DAILY
COMMUNITY

## 2021-11-29 RX ORDER — CHOLECALCIFEROL (VITAMIN D3) 1250 MCG
CAPSULE ORAL WEEKLY
COMMUNITY

## 2021-11-29 RX ORDER — GUAIFENESIN/DEXTROMETHORPHAN 100-10MG/5
5 SYRUP ORAL 3 TIMES DAILY PRN
COMMUNITY
End: 2022-02-28

## 2021-11-29 NOTE — PROGRESS NOTES
I reviewed medical and cardiac history of MS, CAD, and angioplasty with Dr Sherri Dinero. Last cardiology visit was 2-2020. No cardiac issues  per [facility] nurse. No new orders.

## 2021-11-29 NOTE — PROGRESS NOTES
Ryland Brothers from [facility] called and stated they are having transportation issues. Patient is coming now by [address], by wheelchair. They will have lissette pad under her. I updated Carmen Bunch in pre-op, and she stated that should be ok, they can change it out if needed. Arrival time adjusted to 1030 for this issue.

## 2021-11-30 ENCOUNTER — HOSPITAL ENCOUNTER (OUTPATIENT)
Age: 66
Setting detail: OUTPATIENT SURGERY
Discharge: HOME | End: 2021-11-30
Attending: SURGERY | Admitting: SURGERY
Payer: MEDICAID

## 2021-11-30 ENCOUNTER — ANESTHESIA EVENT (OUTPATIENT)
Dept: ENDOSCOPY | Age: 66
End: 2021-11-30
Payer: MEDICAID

## 2021-11-30 ENCOUNTER — ANESTHESIA (OUTPATIENT)
Dept: ENDOSCOPY | Age: 66
End: 2021-11-30
Payer: MEDICAID

## 2021-11-30 VITALS
WEIGHT: 188 LBS | RESPIRATION RATE: 16 BRPM | HEIGHT: 66 IN | BODY MASS INDEX: 30.22 KG/M2 | DIASTOLIC BLOOD PRESSURE: 68 MMHG | SYSTOLIC BLOOD PRESSURE: 115 MMHG | HEART RATE: 60 BPM | OXYGEN SATURATION: 96 % | TEMPERATURE: 96.7 F

## 2021-11-30 VITALS
SYSTOLIC BLOOD PRESSURE: 125 MMHG | OXYGEN SATURATION: 90 % | DIASTOLIC BLOOD PRESSURE: 76 MMHG | RESPIRATION RATE: 18 BRPM

## 2021-11-30 PROCEDURE — 2580000003 HC RX 258: Performed by: NURSE ANESTHETIST, CERTIFIED REGISTERED

## 2021-11-30 PROCEDURE — 2709999900 HC NON-CHARGEABLE SUPPLY: Performed by: SURGERY

## 2021-11-30 PROCEDURE — 7100000010 HC PHASE II RECOVERY - FIRST 15 MIN: Performed by: SURGERY

## 2021-11-30 PROCEDURE — 7100000011 HC PHASE II RECOVERY - ADDTL 15 MIN: Performed by: SURGERY

## 2021-11-30 PROCEDURE — 3700000001 HC ADD 15 MINUTES (ANESTHESIA): Performed by: SURGERY

## 2021-11-30 PROCEDURE — 3700000000 HC ANESTHESIA ATTENDED CARE: Performed by: SURGERY

## 2021-11-30 PROCEDURE — 6360000002 HC RX W HCPCS: Performed by: NURSE ANESTHETIST, CERTIFIED REGISTERED

## 2021-11-30 PROCEDURE — 3609013300 HC EGD TUBE PLACEMENT: Performed by: SURGERY

## 2021-11-30 RX ORDER — SODIUM CHLORIDE 9 MG/ML
INJECTION, SOLUTION INTRAVENOUS CONTINUOUS PRN
Status: DISCONTINUED | OUTPATIENT
Start: 2021-11-30 | End: 2021-11-30 | Stop reason: SDUPTHER

## 2021-11-30 RX ORDER — CEFAZOLIN SODIUM 2 G/50ML
SOLUTION INTRAVENOUS PRN
Status: DISCONTINUED | OUTPATIENT
Start: 2021-11-30 | End: 2021-11-30 | Stop reason: SDUPTHER

## 2021-11-30 RX ORDER — PROPOFOL 10 MG/ML
INJECTION, EMULSION INTRAVENOUS PRN
Status: DISCONTINUED | OUTPATIENT
Start: 2021-11-30 | End: 2021-11-30 | Stop reason: SDUPTHER

## 2021-11-30 RX ADMIN — PROPOFOL 150 MG: 10 INJECTION, EMULSION INTRAVENOUS at 12:59

## 2021-11-30 RX ADMIN — SODIUM CHLORIDE: 9 INJECTION, SOLUTION INTRAVENOUS at 12:57

## 2021-11-30 ASSESSMENT — PAIN SCALES - GENERAL
PAINLEVEL_OUTOF10: 0

## 2021-11-30 ASSESSMENT — LIFESTYLE VARIABLES: SMOKING_STATUS: 0

## 2021-11-30 ASSESSMENT — PAIN - FUNCTIONAL ASSESSMENT: PAIN_FUNCTIONAL_ASSESSMENT: 0-10

## 2021-11-30 NOTE — ANESTHESIA POSTPROCEDURE EVALUATION
Department of Anesthesiology  Postprocedure Note    Patient: Fiona Arroyo  MRN: [id number]  YOB: 1955  Date of evaluation: 11/30/2021  Time:  1:07 PM     Procedure Summary     Date: 11/30/21 Room / Location: [address] [place] [address]    Anesthesia Start: 5725 Anesthesia Stop: 7656    Procedure: PEG TUBE EXCHANGE (N/A ) Diagnosis: (GASTROSTOMY TUBE MALFUNCTION)    Surgeons: Penny Garcia MD Responsible Provider: June Aguilar MD    Anesthesia Type: MAC ASA Status: 4          Anesthesia Type: MAC    Thalia Phase I: Thalia Score: 10    Thalia Phase II:      Last vitals: Reviewed and per EMR flowsheets.        Anesthesia Post Evaluation    Patient location during evaluation: PACU  Patient participation: complete - patient participated  Level of consciousness: awake  Airway patency: patent  Nausea & Vomiting: no nausea and no vomiting  Complications: no  Cardiovascular status: hemodynamically stable  Respiratory status: acceptable

## 2021-11-30 NOTE — ANESTHESIA PRE PROCEDURE
Department of Anesthesiology  Preprocedure Note       Name:  Parvin Schreiber   Age:  61 y.o.  :  1955                                          MRN:  [id number]         Date:  2021      Surgeon: Antony Santillan):  Shelley Fatima MD    Procedure: PEG TUBE EXCHANGE (N/A )    Medications prior to admission:   Prior to Admission medications    Medication Sig Start Date End Date Taking? Authorizing Provider   cetirizine (ZYRTEC ALLERGY) 10 MG tablet Take 10 mg by mouth daily    Historical Provider, MD   guaiFENesin-dextromethorphan (ROBITUSSIN DM) 100-10 MG/5ML syrup Take 5 mLs by mouth 3 times daily as needed for Cough    Historical Provider, MD   Cholecalciferol (VITAMIN D3) 1.25 MG (23535 UT) CAPS Take by mouth once a week    Historical Provider, MD   ocrelizumab (OCREVUS) 300 MG/10ML SOLN injection Infuse 20 mLs intravenously every 6 months 21   VERENA Hewitt - CNP   ticagrelor (BRILINTA) 60 MG TABS tablet 1 tablet by Per G Tube route 2 times daily  Patient taking differently: Take 60 mg by mouth 2 times daily  20   Dago White DO   fluticasone (FLONASE) 50 MCG/ACT nasal spray 1 spray by Nasal route daily    Historical Provider, MD   levothyroxine (SYNTHROID) 125 MCG tablet Take 125 mcg by mouth Daily    Historical Provider, MD   atorvastatin (LIPITOR) 80 MG tablet Take 80 mg by mouth nightly     Historical Provider, MD   psyllium (KONSYL) 28.3 % PACK Take 1 packet by mouth daily     Historical Provider, MD   pantoprazole (PROTONIX) 40 MG tablet Take 40 mg by mouth daily    Historical Provider, MD   FLUoxetine (PROZAC) 20 MG capsule Take 20 mg by mouth 2 times daily    Historical Provider, MD   metoprolol tartrate (LOPRESSOR) 25 MG tablet Take 12.5 mg by mouth 2 times daily     Historical Provider, MD   gabapentin (NEURONTIN) 300 MG capsule Take 300 mg by mouth 3 times daily.     Historical Provider, MD   albuterol (PROVENTIL) (2.5 MG/3ML) 0.083% nebulizer solution Take 2.5 mg by nebulization every 4 hours as needed for Wheezing    Historical Provider, MD   ARTIFICIAL TEAR OINTMENT OP Place 1 drop into both eyes every 6 hours as needed (dry eyes)    Historical Provider, MD   saline nasal gel (AYR) GEL 1 each by Nasal route every 12 hours as needed for Congestion    Historical Provider, MD   magnesium hydroxide (MILK OF MAGNESIA) 400 MG/5ML suspension Take 30 mLs by mouth daily as needed for Constipation    Historical Provider, MD   nitroGLYCERIN (NITROSTAT) 0.4 MG SL tablet Place 0.4 mg under the tongue every 5 minutes as needed for Chest pain    Historical Provider, MD   anastrozole (ARIMIDEX) 1 MG tablet Take 1 mg by mouth daily    Historical Provider, MD   diazePAM (VALIUM) 2 MG tablet 2 mg by Per G Tube route 2 times daily. Historical Provider, MD   acetaminophen (TYLENOL) 325 MG tablet Take 650 mg by mouth every 4 hours as needed for Pain or Fever     Historical Provider, MD   Cranberry 500 MG CAPS 1 capsule by PEG Tube route daily  Patient taking differently: Take 500 mg by mouth daily  12/16/19   Virginia Jason MD       Current medications:    No current outpatient medications on file. No current facility-administered medications for this visit. Allergies:     Allergies   Allergen Reactions    Pcn [Penicillins] Anaphylaxis    Watermelon [Citrullus Vulgaris]        Problem List:    Patient Active Problem List   Diagnosis Code    Multiple sclerosis ([facility] G35    Bilateral foot-drop M21.371, M21.372    Lactic acidosis E87.2    Heart failure (HCC) I50.9    Myocardial infarction, anteroseptal (HCC) I21.09    NSTEMI (non-ST elevated myocardial infarction) ([facility] [facility] I21.4    Carcinoma of upper-outer quadrant of female breast, right ([facility] [address] [facility] C50.411    Carcinoma of upper-outer quadrant of right female breast ([address] C50.411    Hypotension I95.9    GI bleed K92.2    Pyelonephritis N12    Coronary disease I25.10    Urine retention R33.9    Malfunction of Elizondo catheter ([facility] [address] [facility] T83.011A    Neurogenic bladder N31.9       Past Medical History:        Diagnosis Date    Anxiety     Breast cancer ([facility] 2020    right    CAD (coronary artery disease)     Cystitis     Depression     Elizondo catheter status     Foot drop, bilateral     History of blood transfusion     History of renal calculi     Hx of blood clots     dvt    Hyperlipidemia     Hypertension 2018    Hypothyroidism     MS (multiple sclerosis) ([facility]     wheelchair bound    Neurogenic bladder 2018    NSTEMI (non-ST elevated myocardial infarction) ([facility] 2019    Total self-care deficit     Unspecified cerebral artery occlusion with cerebral infarction     left her incontinent    Wheelchair dependent        Past Surgical History:        Procedure Laterality Date    COLON SURGERY      exp lap, lysis of adhesions, release of SBO. SE. Dr. Preet Jansen 2020    COLONOSCOPY DIAGNOSTIC performed by Jj Martinez MD at [facility]      no stent-    DENTAL SURGERY      all removed    HYSTERECTOMY      [facility]. Dr. Anayeli Schwartz, MODIFIED RADICAL Right 3/3/2020    RIGHT BREAST MASTECTOMY WITH SENTINEL NODE DISSECTION WITH BLUE DYE performed by Jj Martinez MD at [place] [address] ENDOSCOPY N/A 2019    EGD PEG INSERTION performed by Jj Martinez MD at [facility] History:    Social History     Tobacco Use    Smoking status: Former Smoker     Packs/day: 1.00     Years: 5.00     Pack years: 5.00     Types: Cigarettes     Start date:      Quit date:      Years since quittin.9    Smokeless tobacco: Never Used   Substance Use Topics    Alcohol use: No                                Counseling given: Not Answered      Vital Signs (Current): There were no vitals filed for this visit.                                            BP Readings from Last 3 Encounters:   21 133/76   21 (!) 140/84   21 Kelvin Rajani Gibson 146/82       NPO Status:                                                                                 BMI:   Wt Readings from Last 3 Encounters:   11/29/21 188 lb (85.3 kg)   02/16/21 173 lb (78.5 kg)   12/14/20 186 lb 11.2 oz (84.7 kg)     There is no height or weight on file to calculate BMI.    CBC:   Lab Results   Component Value Date    WBC 8.2 10/12/2021    RBC 4.11 10/12/2021    HGB 11.7 10/12/2021    HCT 37.0 10/12/2021    MCV 90.0 10/12/2021    RDW 14.9 10/12/2021     10/12/2021       CMP:   Lab Results   Component Value Date     10/12/2021    K 3.7 10/12/2021    K 3.8 12/15/2020     10/12/2021    CO2 23 10/12/2021    BUN 8 10/12/2021    CREATININE 0.6 10/12/2021    GFRAA >60 10/12/2021    LABGLOM >60 10/12/2021    GLUCOSE 95 10/12/2021    PROT 5.6 10/12/2021    CALCIUM 8.9 10/12/2021    BILITOT 0.4 10/12/2021    ALKPHOS 118 10/12/2021    AST 15 10/12/2021    ALT 10 10/12/2021       POC Tests: No results for input(s): POCGLU, POCNA, POCK, POCCL, POCBUN, POCHEMO, POCHCT in the last 72 hours.     Coags:   Lab Results   Component Value Date    PROTIME 13.7 06/12/2020    INR 1.2 06/12/2020    APTT 32.8 03/08/2020       HCG (If Applicable): No results found for: PREGTESTUR, PREGSERUM, HCG, HCGQUANT     ABGs: No results found for: PHART, PO2ART, ULF3CVI, GLQ4WAM, BEART, C3LBYFPQ     Type & Screen (If Applicable):  No results found for: [facility]    Anesthesia Evaluation  Patient summary reviewed and Nursing notes reviewed no history of anesthetic complications:   Airway: Mallampati: III  TM distance: <3 FB     Mouth opening: > = 3 FB Dental:    (+) upper dentures, lower dentures and edentulous      Pulmonary: breath sounds clear to auscultation      (-) not a current smoker (former smoker)                          ROS comment: Former Smoker   Cardiovascular:  Exercise tolerance: poor (<4 METS),   (+) hypertension:, past MI: > 6 months, CAD:, CHF (LVEF 40%):,       ECG reviewed  Rhythm: regular  Rate: normal  Echocardiogram reviewed  Stress test reviewed  Cleared by cardiology     Beta Blocker:  Dose within 24 Hrs         Neuro/Psych:   (+) CVA:, neuromuscular disease: multiple sclerosis, psychiatric history:depression/anxiety              ROS comment: Multiple sclerosis GI/Hepatic/Renal:            ROS comment: GASTROSTOMY TUBE MALFUNCTION. Endo/Other:    (+) hypothyroidism, blood dyscrasia: anticoagulation therapy:., malignancy/cancer (Breast, Right). Abdominal:             Vascular: negative vascular ROS. Other Findings:               Anesthesia Plan      MAC     ASA 4       Induction: intravenous. Anesthetic plan and risks discussed with patient. Plan discussed with CRNA. Mark Duong MD   11/30/2021          Patient seen and chart reviewed. No interval change in history or exam.   Anesthesia plan discussed, risk/benefits addressed. Patient's concerns and questions answered.      VERENA Garcia - CRNA  November 30, 2021  11:43 AM

## 2021-11-30 NOTE — H&P
History and Physical      Chief Complaint: PEG dysfunction    HPI  61 y.o. female who has a chronic PEG; tube worn and not functioning properly      Past Medical History:   Diagnosis Date    Anxiety     Breast cancer ([facility] [address] 02/2020    right    CAD (coronary artery disease)     Cystitis     Depression     Elizondo catheter status     Foot drop, bilateral     History of blood transfusion     History of renal calculi     Hx of blood clots     dvt    Hyperlipidemia     Hypertension 08/27/2018    Hypothyroidism     MS (multiple sclerosis) ([facility] [address]     wheelchair bound    Neurogenic bladder 08/09/2018    NSTEMI (non-ST elevated myocardial infarction) ([facility] [address] 2019    Total self-care deficit     Unspecified cerebral artery occlusion with cerebral infarction 2011    left her incontinent    Wheelchair dependent        Past Surgical History:   Procedure Laterality Date    COLON SURGERY  2001    exp lap, lysis of adhesions, release of SBO. [facility]. Dr. Kellee Dawkins 2/4/2020    COLONOSCOPY DIAGNOSTIC performed by Chantale Jeffries MD at [address] [facility] [address]  2019    no stent-    DENTAL SURGERY      all removed    HYSTERECTOMY  2001    [facility]. Dr. [place] Sicard, MODIFIED RADICAL Right 3/3/2020    RIGHT BREAST MASTECTOMY WITH SENTINEL NODE DISSECTION WITH BLUE DYE performed by Chantale Jeffries MD at [address] [facility] N/A 12/12/2019    EGD PEG INSERTION performed by Chantale Jeffries MD at [address] [facility]       Medications Prior to Admission:    Prior to Admission medications    Medication Sig Start Date End Date Taking?  Authorizing Provider   cetirizine (ZYRTEC ALLERGY) 10 MG tablet Take 10 mg by mouth daily   Yes Historical Provider, MD   guaiFENesin-dextromethorphan (ROBITUSSIN DM) 100-10 MG/5ML syrup Take 5 mLs by mouth 3 times daily as needed for Cough   Yes Historical Provider, MD   Cholecalciferol (VITAMIN D3) 1.25 MG (27509 UT) CAPS Take by mouth once a week Yes Historical Provider, MD   ocrelizumab (OCREVUS) 300 MG/10ML SOLN injection Infuse 20 mLs intravenously every 6 months 8/6/21  Yes Johnathan Banks, VERENA - CNP   ticagrelor (BRILINTA) 60 MG TABS tablet 1 tablet by Per G Tube route 2 times daily  Patient taking differently: Take 60 mg by mouth 2 times daily  12/16/20  Yes Dago White DO   fluticasone (FLONASE) 50 MCG/ACT nasal spray 1 spray by Nasal route daily   Yes Historical Provider, MD   levothyroxine (SYNTHROID) 125 MCG tablet Take 125 mcg by mouth Daily   Yes Historical Provider, MD   atorvastatin (LIPITOR) 80 MG tablet Take 80 mg by mouth nightly    Yes Historical Provider, MD   psyllium (KONSYL) 28.3 % PACK Take 1 packet by mouth daily    Yes Historical Provider, MD   pantoprazole (PROTONIX) 40 MG tablet Take 40 mg by mouth daily   Yes Historical Provider, MD   FLUoxetine (PROZAC) 20 MG capsule Take 20 mg by mouth 2 times daily   Yes Historical Provider, MD   metoprolol tartrate (LOPRESSOR) 25 MG tablet Take 12.5 mg by mouth 2 times daily    Yes Historical Provider, MD   gabapentin (NEURONTIN) 300 MG capsule Take 300 mg by mouth 3 times daily.    Yes Historical Provider, MD   albuterol (PROVENTIL) (2.5 MG/3ML) 0.083% nebulizer solution Take 2.5 mg by nebulization every 4 hours as needed for Wheezing   Yes Historical Provider, MD   ARTIFICIAL TEAR OINTMENT OP Place 1 drop into both eyes every 6 hours as needed (dry eyes)   Yes Historical Provider, MD   saline nasal gel (AYR) GEL 1 each by Nasal route every 12 hours as needed for Congestion   Yes Historical Provider, MD   magnesium hydroxide (MILK OF MAGNESIA) 400 MG/5ML suspension Take 30 mLs by mouth daily as needed for Constipation   Yes Historical Provider, MD   nitroGLYCERIN (NITROSTAT) 0.4 MG SL tablet Place 0.4 mg under the tongue every 5 minutes as needed for Chest pain   Yes Historical Provider, MD   anastrozole (ARIMIDEX) 1 MG tablet Take 1 mg by mouth daily   Yes Historical Provider, MD diazePAM (VALIUM) 2 MG tablet 2 mg by Per G Tube route 2 times daily. Yes Historical Provider, MD   acetaminophen (TYLENOL) 325 MG tablet Take 650 mg by mouth every 4 hours as needed for Pain or Fever    Yes Historical Provider, MD   Cranberry 500 MG CAPS 1 capsule by PEG Tube route daily  Patient taking differently: Take 500 mg by mouth daily  19  Yes Bev Coombs MD       Allergies   Allergen Reactions    Pcn [Penicillins] Anaphylaxis    Watermelon [Citrullus Vulgaris]        Family History   Problem Relation Age of Onset    Breast Cancer Mother     Diabetes Father     Heart Disease Father     Breast Cancer Sister     Breast Cancer Maternal Aunt     Breast Cancer Maternal Aunt        Social History     Tobacco Use    Smoking status: Former Smoker     Packs/day: 1.00     Years: 5.00     Pack years: 5.00     Types: Cigarettes     Start date:      Quit date:      Years since quittin.    Smokeless tobacco: Never Used   Vaping Use    Vaping Use: Never used   Substance Use Topics    Alcohol use: No    Drug use: No         PHYSICAL EXAM:    There were no vitals filed for this visit. General Appearance:  awake, alert, oriented, in no acute distress  Skin:  Skin color, texture, turgor normal. No rashes or lesions. Head/face:  NCAT  Eyes:  No gross abnormalities. Lungs:  Normal expansion. Clear to auscultation. No rales, rhonchi, or wheezing. Heart:  Heart sounds are normal.  Regular rate and rhythm without murmur, gallop or rub. Abdomen:  Soft non-tender, PEG site ok tube worn  Extremities: Extremities warm to touch, pink, with no edema. LABS:    CBC  No results for input(s): WBC, HGB, HCT, PLT in the last 72 hours. BMP  No results for input(s): NA, K, CL, CO2, BUN, CREATININE, CALCIUM in the last 72 hours. Invalid input(s): GLU  Liver Function  No results for input(s): AMYLASE, LIPASE, BILITOT, BILIDIR, AST, ALT, ALKPHOS, PROT, LABALBU in the last 72 hours.   No results for input(s): LACTATE in the last 72 hours. No results for input(s): INR, PTT in the last 72 hours. Invalid input(s): PT    RADIOLOGY    No results found.       ASSESSMENT:  61 y.o. female with PEG dysfunction    PLAN:  EGD with replacement    Electronically signed by Melia Kothari MD on 11/30/21 at 6:45 AM EST

## 2021-11-30 NOTE — OP NOTE
Op Note    Perla Covington  YOB: 1955  [id number]    DATE OF PROCEDURE: 11/30/2021    SURGEON: RADHA Campos: None    PREOPERATIVE DIAGNOSIS: PEG dysfunction  . POSTOPERATIVE DIAGNOSIS: Same    OPERATION: EGD with PEG placement. ANESTHESIA: LMAC. ESTIMATED BLOOD LOSS: none    COMPLICATIONS: None. SPECIMENS: None. DESCRIPTION OF PROCEDURE: The patient was taken to the endoscopy suite and  placed on the endoscopy table in a supine position. LMAC anesthesia was  administered. A bite block was inserted. A lubricated gastroscope was inserted through the oropharynx and advanced  under direct vision to the esophagus and then the stomach. The stomach was  insufflated. The old PEG tube was seen and removed with traction. A snare forceps was inserted through the gastroscope into the gastric lumen. A wire was inserted through the gastrostomy site into the gastric lumen under direct vision. The wire was grasped with the snare. The gastroscope, snare, and wire were then retrieved. The wire was   connected to the gastrostomy tube and then pulled through the esophagus,   stomach, and out through the anterior abdominal wall. The gastroscope was   reintroduced into the stomach. The PEG tube was seen in good position without   evidence of bleeding. The gastroscope was removed. The PEG tube was fit with   a bumper and feeding port and connected to gravity. A binder was applied. The patient tolerated the procedure well.     Halina Osman MD  11/30/2021  1:05 PM

## 2022-01-11 LAB
ALBUMIN SERPL-MCNC: 3.3 G/DL (ref 3.5–5.2)
ALP BLD-CCNC: 115 U/L (ref 35–104)
ALT SERPL-CCNC: 9 U/L (ref 0–32)
ANION GAP SERPL CALCULATED.3IONS-SCNC: 14 MMOL/L (ref 7–16)
AST SERPL-CCNC: 15 U/L (ref 0–31)
BILIRUB SERPL-MCNC: 0.4 MG/DL (ref 0–1.2)
BUN BLDV-MCNC: 8 MG/DL (ref 6–23)
CALCIUM SERPL-MCNC: 9.3 MG/DL (ref 8.6–10.2)
CHLORIDE BLD-SCNC: 105 MMOL/L (ref 98–107)
CO2: 23 MMOL/L (ref 22–29)
CREAT SERPL-MCNC: 0.6 MG/DL (ref 0.5–1)
GFR AFRICAN AMERICAN: >60
GFR NON-AFRICAN AMERICAN: >60 ML/MIN/1.73
GLUCOSE BLD-MCNC: 99 MG/DL (ref 74–99)
POTASSIUM SERPL-SCNC: 4.4 MMOL/L (ref 3.5–5)
SODIUM BLD-SCNC: 142 MMOL/L (ref 132–146)
TOTAL PROTEIN: 6.2 G/DL (ref 6.4–8.3)

## 2022-02-21 RX ORDER — SODIUM CHLORIDE 9 MG/ML
INJECTION, SOLUTION INTRAVENOUS CONTINUOUS
Status: CANCELLED | OUTPATIENT
Start: 2022-02-21

## 2022-02-21 RX ORDER — METHYLPREDNISOLONE SODIUM SUCCINATE 125 MG/2ML
125 INJECTION, POWDER, LYOPHILIZED, FOR SOLUTION INTRAMUSCULAR; INTRAVENOUS ONCE
Status: CANCELLED | OUTPATIENT
Start: 2022-02-21 | End: 2022-02-21

## 2022-02-21 RX ORDER — DIPHENHYDRAMINE HYDROCHLORIDE 50 MG/ML
50 INJECTION INTRAMUSCULAR; INTRAVENOUS ONCE
Status: CANCELLED | OUTPATIENT
Start: 2022-02-21 | End: 2022-02-21

## 2022-02-21 RX ORDER — EPINEPHRINE 1 MG/ML
0.3 INJECTION, SOLUTION, CONCENTRATE INTRAVENOUS PRN
Status: CANCELLED | OUTPATIENT
Start: 2022-02-21

## 2022-02-28 ENCOUNTER — HOSPITAL ENCOUNTER (OUTPATIENT)
Dept: INFUSION THERAPY | Age: 67
Setting detail: INFUSION SERIES
Discharge: HOME | End: 2022-02-28
Payer: MEDICAID

## 2022-02-28 VITALS
TEMPERATURE: 99 F | HEART RATE: 75 BPM | DIASTOLIC BLOOD PRESSURE: 70 MMHG | RESPIRATION RATE: 16 BRPM | OXYGEN SATURATION: 93 % | SYSTOLIC BLOOD PRESSURE: 121 MMHG

## 2022-02-28 DIAGNOSIS — G35 MULTIPLE SCLEROSIS (HCC): Primary | ICD-10-CM

## 2022-02-28 PROCEDURE — 2580000003 HC RX 258: Performed by: PSYCHIATRY & NEUROLOGY

## 2022-02-28 PROCEDURE — 6370000000 HC RX 637 (ALT 250 FOR IP): Performed by: NURSE PRACTITIONER

## 2022-02-28 PROCEDURE — 2580000003 HC RX 258: Performed by: NURSE PRACTITIONER

## 2022-02-28 PROCEDURE — 96365 THER/PROPH/DIAG IV INF INIT: CPT

## 2022-02-28 PROCEDURE — 96366 THER/PROPH/DIAG IV INF ADDON: CPT

## 2022-02-28 PROCEDURE — 6360000002 HC RX W HCPCS: Performed by: NURSE PRACTITIONER

## 2022-02-28 PROCEDURE — 96375 TX/PRO/DX INJ NEW DRUG ADDON: CPT

## 2022-02-28 RX ORDER — ACETAMINOPHEN 500 MG
1000 TABLET ORAL EVERY 12 HOURS PRN
COMMUNITY
End: 2022-03-02 | Stop reason: DRUGHIGH

## 2022-02-28 RX ORDER — SODIUM CHLORIDE 0.9 % (FLUSH) 0.9 %
10 SYRINGE (ML) INJECTION PRN
Status: CANCELLED | OUTPATIENT
Start: 2022-07-25

## 2022-02-28 RX ORDER — METHYLPREDNISOLONE SODIUM SUCCINATE 125 MG/2ML
100 INJECTION, POWDER, LYOPHILIZED, FOR SOLUTION INTRAMUSCULAR; INTRAVENOUS ONCE
Status: CANCELLED | OUTPATIENT
Start: 2022-07-25

## 2022-02-28 RX ORDER — METHYLPREDNISOLONE SODIUM SUCCINATE 125 MG/2ML
125 INJECTION, POWDER, LYOPHILIZED, FOR SOLUTION INTRAMUSCULAR; INTRAVENOUS ONCE
Status: CANCELLED | OUTPATIENT
Start: 2022-07-25 | End: 2022-07-25

## 2022-02-28 RX ORDER — DIPHENHYDRAMINE HYDROCHLORIDE 50 MG/ML
25 INJECTION INTRAMUSCULAR; INTRAVENOUS ONCE
Status: COMPLETED | OUTPATIENT
Start: 2022-02-28 | End: 2022-02-28

## 2022-02-28 RX ORDER — ACETAMINOPHEN 325 MG/1
650 TABLET ORAL ONCE
Status: COMPLETED | OUTPATIENT
Start: 2022-02-28 | End: 2022-02-28

## 2022-02-28 RX ORDER — ACETAMINOPHEN 325 MG/1
650 TABLET ORAL ONCE
Status: CANCELLED | OUTPATIENT
Start: 2022-07-25

## 2022-02-28 RX ORDER — METHYLPREDNISOLONE SODIUM SUCCINATE 125 MG/2ML
100 INJECTION, POWDER, LYOPHILIZED, FOR SOLUTION INTRAMUSCULAR; INTRAVENOUS ONCE
Status: COMPLETED | OUTPATIENT
Start: 2022-02-28 | End: 2022-02-28

## 2022-02-28 RX ORDER — DIPHENHYDRAMINE HYDROCHLORIDE 50 MG/ML
25 INJECTION INTRAMUSCULAR; INTRAVENOUS ONCE
Status: CANCELLED | OUTPATIENT
Start: 2022-07-25

## 2022-02-28 RX ORDER — SODIUM CHLORIDE 9 MG/ML
INJECTION, SOLUTION INTRAVENOUS CONTINUOUS
OUTPATIENT
Start: 2022-07-25

## 2022-02-28 RX ORDER — EPINEPHRINE 1 MG/ML
0.3 INJECTION, SOLUTION, CONCENTRATE INTRAVENOUS PRN
OUTPATIENT
Start: 2022-07-25

## 2022-02-28 RX ORDER — DIPHENHYDRAMINE HYDROCHLORIDE 50 MG/ML
50 INJECTION INTRAMUSCULAR; INTRAVENOUS ONCE
OUTPATIENT
Start: 2022-07-25 | End: 2022-07-25

## 2022-02-28 RX ORDER — ACETAMINOPHEN 325 MG/1
650 TABLET ORAL EVERY 4 HOURS PRN
COMMUNITY

## 2022-02-28 RX ORDER — SODIUM CHLORIDE 0.9 % (FLUSH) 0.9 %
10 SYRINGE (ML) INJECTION PRN
Status: DISCONTINUED | OUTPATIENT
Start: 2022-02-28 | End: 2022-03-01 | Stop reason: HOSPADM

## 2022-02-28 RX ORDER — SODIUM CHLORIDE 9 MG/ML
INJECTION, SOLUTION INTRAVENOUS CONTINUOUS
Status: DISCONTINUED | OUTPATIENT
Start: 2022-02-28 | End: 2022-03-01 | Stop reason: HOSPADM

## 2022-02-28 RX ADMIN — OCRELIZUMAB 600 MG: 300 INJECTION INTRAVENOUS at 09:49

## 2022-02-28 RX ADMIN — METHYLPREDNISOLONE SODIUM SUCCINATE 100 MG: 125 INJECTION, POWDER, FOR SOLUTION INTRAMUSCULAR; INTRAVENOUS at 09:09

## 2022-02-28 RX ADMIN — ACETAMINOPHEN 650 MG: 325 TABLET ORAL at 09:09

## 2022-02-28 RX ADMIN — SODIUM CHLORIDE, PRESERVATIVE FREE 10 ML: 5 INJECTION INTRAVENOUS at 09:08

## 2022-02-28 RX ADMIN — DIPHENHYDRAMINE HYDROCHLORIDE 25 MG: 50 INJECTION, SOLUTION INTRAMUSCULAR; INTRAVENOUS at 09:09

## 2022-02-28 RX ADMIN — SODIUM CHLORIDE, PRESERVATIVE FREE 10 ML: 5 INJECTION INTRAVENOUS at 09:07

## 2022-02-28 RX ADMIN — SODIUM CHLORIDE, PRESERVATIVE FREE 10 ML: 5 INJECTION INTRAVENOUS at 09:10

## 2022-02-28 RX ADMIN — SODIUM CHLORIDE, PRESERVATIVE FREE 10 ML: 5 INJECTION INTRAVENOUS at 09:09

## 2022-02-28 RX ADMIN — SODIUM CHLORIDE 30 ML: 9 INJECTION, SOLUTION INTRAVENOUS at 13:52

## 2022-02-28 ASSESSMENT — PAIN DESCRIPTION - FREQUENCY: FREQUENCY: CONTINUOUS

## 2022-02-28 ASSESSMENT — PAIN DESCRIPTION - ONSET: ONSET: ON-GOING

## 2022-02-28 ASSESSMENT — PAIN DESCRIPTION - DESCRIPTORS: DESCRIPTORS: DISCOMFORT;ACHING

## 2022-02-28 ASSESSMENT — PAIN SCALES - GENERAL
PAINLEVEL_OUTOF10: 8
PAINLEVEL_OUTOF10: 8

## 2022-02-28 ASSESSMENT — PAIN DESCRIPTION - PROGRESSION: CLINICAL_PROGRESSION: NOT CHANGED

## 2022-02-28 ASSESSMENT — PAIN DESCRIPTION - ORIENTATION: ORIENTATION: LEFT

## 2022-02-28 ASSESSMENT — PAIN DESCRIPTION - PAIN TYPE: TYPE: CHRONIC PAIN

## 2022-02-28 ASSESSMENT — PAIN DESCRIPTION - LOCATION: LOCATION: HIP

## 2022-02-28 ASSESSMENT — PAIN - FUNCTIONAL ASSESSMENT: PAIN_FUNCTIONAL_ASSESSMENT: PREVENTS OR INTERFERES SOME ACTIVE ACTIVITIES AND ADLS

## 2022-02-28 NOTE — PROGRESS NOTES
Upon arriving to department, patient incontinent of stool. She was a 2 person assist to clean up. She also had another bowel movement about 30-45 minutes later in which was also incontinent, and took 2 people to clean up. Very difficult to move her.

## 2022-02-28 NOTE — PROGRESS NOTES
Patient remained on unit for 1 hour post ocrevus infusion. No complications, d/c in stable condition.

## 2022-03-02 ENCOUNTER — OFFICE VISIT (OUTPATIENT)
Dept: NEUROLOGY | Age: 67
End: 2022-03-02
Payer: MEDICAID

## 2022-03-02 VITALS
DIASTOLIC BLOOD PRESSURE: 70 MMHG | HEART RATE: 73 BPM | OXYGEN SATURATION: 98 % | WEIGHT: 188 LBS | TEMPERATURE: 97.4 F | HEIGHT: 66 IN | SYSTOLIC BLOOD PRESSURE: 118 MMHG | BODY MASS INDEX: 30.22 KG/M2

## 2022-03-02 DIAGNOSIS — G35 MULTIPLE SCLEROSIS (HCC): Primary | ICD-10-CM

## 2022-03-02 PROCEDURE — 99215 OFFICE O/P EST HI 40 MIN: CPT | Performed by: PSYCHIATRY & NEUROLOGY

## 2022-03-02 NOTE — PROGRESS NOTES
Ashkan Arthur was a 32-year-old right handed woman who was followed for years for longstanding, severe multiple sclerosis. She remained a poor historian. She was again alone. Her medications were now ocrelizumab, ticagrelor, diazepam, hydrocodone, inhalers, gabapentin, fluoxetine, atorvastatin, clonidine, lisinopril, metoprolol, furosemide, aspirin and anastrozole. She was diagnosed with multiple sclerosis 25 years ago; that drug was discontinued due to constant diarrhea. She received ocrelizumab for several years, but those infusions were held following her COVID-19 infection and breast cancer diagnosis. She underwent right mastectomy, without radiation or chemotherapy. There was never return of the cancer. She recuperated from COVID-19 infection. She received her COVID-19 vaccinations and booster months ago; she continued living in a nursing home. She returned to ocrelizumab administration. She was tolerating those infusions well. She denied additional neurological issues. She was no longer receiving physical therapy. She claimed to be exercising in her chair. She was unable to stand without assistance. She moves her arms well; she denied any cognitive issues. She received a PEG tube insertion. She denied any medical issues. She denied any pains. She continued eating and sleeping well. Review of systems was otherwise unremarkable.      Objective:     /70   Pulse 73   Temp 97.4 °F (36.3 °C) (Temporal)   Ht 5' 6\" (1.676 m)   Wt 188 lb (85.3 kg)   SpO2 98%   BMI 30.34 kg/m²      General appearance: alert, appearing stated age and cooperative in no distress; she remained in a wheelchair, and in good spirits  Neck: no adenopathy, no carotid bruit, no JVD, supple, symmetrical, trachea midline and thyroid not enlarged, symmetric, no tenderness/mass/nodules  Lungs: clear to auscultation bilaterally; she was status post right mastectomy  Heart: regular rate and rhythm, S1, S2 normal, no murmur, click, rub or gallop  Extremities: no cyanosis or edema; bilateral pes planus deformities  Pulses: 1+ and symmetric  Skin: color, texture, turgor normal; no rashes or lesions     Mental Status: alert, oriented, thought content appropriate; she remained a good historian  Speech: no dysarthria  Language: laconic, without aphasias    Cranial Nerves:  I: smell NA   II: visual acuity  NA   II: visual fields Full to confrontation   II: pupils PHOENIX--no red desaturation or YONIS   III,VII: ptosis None   III,IV,VI: extraocular muscles  Full ROM   V: mastication Normal   V: facial light touch sensation  Normal   V,VII: corneal reflex  Present   VII: facial muscle function - upper  Normal   VII: facial muscle function - lower Normal   VIII: hearing Normal   IX: soft palate elevation  Normal   IX,X: gag reflex Minimal gag   XI: trapezius strength  5/5   XI: sternocleidomastoid strength 5/5   XI: neck extension strength  5/5   XII: tongue strength  Normal     Motor:  Right   5/5              Left   5/5               Right Bicep  5/5           Left Bicep  5/5              Right Triceps   5/5       Left Triceps  5/5          Right Deltoid  5/5     Left Deltoid  5/5         Right IPS -4/5            Left IPS  -4/5               Right Quadriceps  5/5          Left Quadriceps    5/5           Right Gastrocnemius    5/5    Left Gastrocnemius   5/5  Right Ant Tibialis   4-/5  Left Ant Tibialis   0/5    I found minimal spasticity in both thigh adductors  There was no arm drift    Sensory:  Light touch and pinprick remained intact  Vibration was moderately decreased at both ankles  I question a spinal sensory level at the lower thoracic region.     Coordination:   Both finger-to-nose and fine finger movements were decreased  Rapid alternating movements were decreased on both sides now  Heel-to-shin was decreased in both legs---more so on the left    Gait:  She was unable to stand    DTR:   Right Brachioradialis reflex 0  Left Brachioradialis reflex 0  Right Biceps reflex 1+  Left Biceps reflex 1+  Right Triceps reflex 1+  Left Triceps reflex 1+  Right Quadriceps reflex 2+  Left Quadriceps reflex 2+  Right Achilles reflex 0  Left Achilles reflex 0     There were no pathological reflexes    Her neurological examination was unchanged    Laboratory/Radiology:     MRI brain---extensive lesion load with questionable enhancing lesions    MRI C-spine: moderate plaques with questionable enhancing lesions as well. Recent CMP was unremarkable; CBC with differential was unrevealing, with an absolute lymphocyte count of 1.41. Assessment: This individual suffers from chronic progressive, relapsing remitting, multiple sclerosis with no progression since her last visit. Her EDSS remains 8.0. We will continue with ocrelizumab for now. She was still recovering from her breast cancer and COVID-19 infection. She is otherwise stable medically. I find no need for oxycodone. Plan:     She will continue with ocrelizumab. She will call at any time if problems arise. New AFO bracing was arranged per [employer]. She will return in 6 months to see my nurse practitioners. I spent 40 minutes with the patient with over 50 % spent in counseling and disease management. All patient issues were addressed and all questions were answered.     Vashti Loza MD  1:59 PM  3/2/2022

## 2022-04-12 LAB
ALBUMIN SERPL-MCNC: 3.1 G/DL (ref 3.5–5.2)
ALP BLD-CCNC: 111 U/L (ref 35–104)
ALT SERPL-CCNC: 10 U/L (ref 0–32)
ANION GAP SERPL CALCULATED.3IONS-SCNC: 14 MMOL/L (ref 7–16)
AST SERPL-CCNC: 12 U/L (ref 0–31)
BASOPHILS ABSOLUTE: 0.05 E9/L (ref 0–0.2)
BASOPHILS RELATIVE PERCENT: 0.5 % (ref 0–2)
BILIRUB SERPL-MCNC: 0.3 MG/DL (ref 0–1.2)
BILIRUBIN DIRECT: <0.2 MG/DL (ref 0–0.3)
BILIRUBIN, INDIRECT: NORMAL MG/DL (ref 0–1)
BUN BLDV-MCNC: 9 MG/DL (ref 6–23)
CALCIUM SERPL-MCNC: 8.8 MG/DL (ref 8.6–10.2)
CHLORIDE BLD-SCNC: 107 MMOL/L (ref 98–107)
CHOLESTEROL, TOTAL: 118 MG/DL (ref 0–199)
CO2: 24 MMOL/L (ref 22–29)
CREAT SERPL-MCNC: 0.8 MG/DL (ref 0.5–1)
EOSINOPHILS ABSOLUTE: 0.23 E9/L (ref 0.05–0.5)
EOSINOPHILS RELATIVE PERCENT: 2.5 % (ref 0–6)
GFR AFRICAN AMERICAN: >60
GFR NON-AFRICAN AMERICAN: >60 ML/MIN/1.73
GLUCOSE BLD-MCNC: 95 MG/DL (ref 74–99)
HCT VFR BLD CALC: 34.7 % (ref 34–48)
HDLC SERPL-MCNC: 34 MG/DL
HEMOGLOBIN: 10.6 G/DL (ref 11.5–15.5)
IMMATURE GRANULOCYTES #: 0.04 E9/L
IMMATURE GRANULOCYTES %: 0.4 % (ref 0–5)
LDL CHOLESTEROL CALCULATED: 60 MG/DL (ref 0–99)
LYMPHOCYTES ABSOLUTE: 1.78 E9/L (ref 1.5–4)
LYMPHOCYTES RELATIVE PERCENT: 19 % (ref 20–42)
MCH RBC QN AUTO: 28.6 PG (ref 26–35)
MCHC RBC AUTO-ENTMCNC: 30.5 % (ref 32–34.5)
MCV RBC AUTO: 93.8 FL (ref 80–99.9)
MONOCYTES ABSOLUTE: 0.66 E9/L (ref 0.1–0.95)
MONOCYTES RELATIVE PERCENT: 7 % (ref 2–12)
NEUTROPHILS ABSOLUTE: 6.62 E9/L (ref 1.8–7.3)
NEUTROPHILS RELATIVE PERCENT: 70.6 % (ref 43–80)
PDW BLD-RTO: 15.4 FL (ref 11.5–15)
PLATELET # BLD: 420 E9/L (ref 130–450)
PMV BLD AUTO: 10.1 FL (ref 7–12)
POTASSIUM SERPL-SCNC: 3.3 MMOL/L (ref 3.5–5)
RBC # BLD: 3.7 E12/L (ref 3.5–5.5)
SODIUM BLD-SCNC: 145 MMOL/L (ref 132–146)
TOTAL PROTEIN: 5.7 G/DL (ref 6.4–8.3)
TRIGL SERPL-MCNC: 120 MG/DL (ref 0–149)
TSH SERPL DL<=0.05 MIU/L-ACNC: 1.93 UIU/ML (ref 0.27–4.2)
VITAMIN D 25-HYDROXY: 36 NG/ML (ref 30–100)
VLDLC SERPL CALC-MCNC: 24 MG/DL
WBC # BLD: 9.4 E9/L (ref 4.5–11.5)

## 2022-04-19 LAB
ANION GAP SERPL CALCULATED.3IONS-SCNC: 16 MMOL/L (ref 7–16)
BUN BLDV-MCNC: 7 MG/DL (ref 6–23)
CALCIUM SERPL-MCNC: 8.8 MG/DL (ref 8.6–10.2)
CHLORIDE BLD-SCNC: 109 MMOL/L (ref 98–107)
CO2: 22 MMOL/L (ref 22–29)
CREAT SERPL-MCNC: 0.6 MG/DL (ref 0.5–1)
GFR AFRICAN AMERICAN: >60
GFR NON-AFRICAN AMERICAN: >60 ML/MIN/1.73
GLUCOSE BLD-MCNC: 91 MG/DL (ref 74–99)
POTASSIUM SERPL-SCNC: 3.2 MMOL/L (ref 3.5–5)
SODIUM BLD-SCNC: 147 MMOL/L (ref 132–146)

## 2022-05-06 LAB
REASON FOR REJECTION: NORMAL
REJECTED TEST: NORMAL

## 2022-05-10 LAB
ANION GAP SERPL CALCULATED.3IONS-SCNC: 11 MMOL/L (ref 7–16)
BUN BLDV-MCNC: 13 MG/DL (ref 6–23)
CALCIUM SERPL-MCNC: 9 MG/DL (ref 8.6–10.2)
CHLORIDE BLD-SCNC: 103 MMOL/L (ref 98–107)
CO2: 23 MMOL/L (ref 22–29)
CREAT SERPL-MCNC: 0.8 MG/DL (ref 0.5–1)
GFR AFRICAN AMERICAN: >60
GFR NON-AFRICAN AMERICAN: >60 ML/MIN/1.73
GLUCOSE BLD-MCNC: 109 MG/DL (ref 74–99)
POTASSIUM SERPL-SCNC: 4.4 MMOL/L (ref 3.5–5)
SODIUM BLD-SCNC: 137 MMOL/L (ref 132–146)

## 2022-06-01 ENCOUNTER — TELEPHONE (OUTPATIENT)
Dept: NEUROLOGY | Age: 67
End: 2022-06-01

## 2022-07-06 ENCOUNTER — HOSPITAL ENCOUNTER (OUTPATIENT)
Dept: GENERAL RADIOLOGY | Age: 67
Discharge: HOME | End: 2022-07-08
Payer: MEDICAID

## 2022-07-06 DIAGNOSIS — Z12.31 VISIT FOR SCREENING MAMMOGRAM: ICD-10-CM

## 2022-07-06 PROCEDURE — 77063 BREAST TOMOSYNTHESIS BI: CPT

## 2022-07-12 LAB
ALBUMIN SERPL-MCNC: 3.3 G/DL (ref 3.5–5.2)
ALP BLD-CCNC: 107 U/L (ref 35–104)
ALT SERPL-CCNC: 11 U/L (ref 0–32)
ANION GAP SERPL CALCULATED.3IONS-SCNC: 12 MMOL/L (ref 7–16)
AST SERPL-CCNC: 14 U/L (ref 0–31)
BILIRUB SERPL-MCNC: 0.2 MG/DL (ref 0–1.2)
BUN BLDV-MCNC: 7 MG/DL (ref 6–23)
CALCIUM SERPL-MCNC: 8.7 MG/DL (ref 8.6–10.2)
CHLORIDE BLD-SCNC: 106 MMOL/L (ref 98–107)
CO2: 24 MMOL/L (ref 22–29)
CREAT SERPL-MCNC: 0.6 MG/DL (ref 0.5–1)
GFR AFRICAN AMERICAN: >60
GFR NON-AFRICAN AMERICAN: >60 ML/MIN/1.73
GLUCOSE BLD-MCNC: 85 MG/DL (ref 74–99)
POTASSIUM SERPL-SCNC: 3.3 MMOL/L (ref 3.5–5)
SODIUM BLD-SCNC: 142 MMOL/L (ref 132–146)
TOTAL PROTEIN: 5.6 G/DL (ref 6.4–8.3)

## 2022-07-20 RX ORDER — DIPHENHYDRAMINE HYDROCHLORIDE 50 MG/ML
25 INJECTION INTRAMUSCULAR; INTRAVENOUS ONCE
Status: CANCELLED | OUTPATIENT
Start: 2022-07-20

## 2022-07-20 RX ORDER — METHYLPREDNISOLONE SODIUM SUCCINATE 125 MG/2ML
100 INJECTION, POWDER, LYOPHILIZED, FOR SOLUTION INTRAMUSCULAR; INTRAVENOUS ONCE
Status: CANCELLED | OUTPATIENT
Start: 2022-07-20

## 2022-07-20 RX ORDER — ACETAMINOPHEN 325 MG/1
650 TABLET ORAL ONCE
Status: CANCELLED | OUTPATIENT
Start: 2022-07-20

## 2022-08-19 ENCOUNTER — OFFICE VISIT (OUTPATIENT)
Dept: NEUROLOGY | Age: 67
End: 2022-08-19
Payer: MEDICAID

## 2022-08-19 VITALS
BODY MASS INDEX: 30.22 KG/M2 | HEART RATE: 64 BPM | DIASTOLIC BLOOD PRESSURE: 78 MMHG | RESPIRATION RATE: 18 BRPM | TEMPERATURE: 97.5 F | SYSTOLIC BLOOD PRESSURE: 133 MMHG | WEIGHT: 188 LBS | OXYGEN SATURATION: 95 % | HEIGHT: 66 IN

## 2022-08-19 DIAGNOSIS — G35 MULTIPLE SCLEROSIS (HCC): Primary | ICD-10-CM

## 2022-08-19 PROCEDURE — 99215 OFFICE O/P EST HI 40 MIN: CPT | Performed by: CLINICAL NURSE SPECIALIST

## 2022-08-19 NOTE — PROGRESS NOTES
Salina Iqbal is a 61 y.o. right handed female     Patient has been following with  for many years and has recently been maintained on Ocrevus. Patient was diagnosed with multiple sclerosis 24 years ago previously tried on Tecfidera however this drug was discontinued due to recurrent diarrhea. She received Ocrevus several years ago. She has a history of breast cancer and underwent a right mastectomy without radiation or chemotherapy last year    We did discuss discontinuing disease modifying therapy at this time-remains hesitant does not want to change off Ocrevus. We discussed the risk for breast cancer as well as lack of need above the age of 63 for disease modifying therapy. Verbalized understanding of these risks-as did her  who was present in the room-did not want to discontinue disease modifying therapy at this time      Allergies as of 08/19/2022 - Fully Reviewed 03/02/2022   Allergen Reaction Noted    Pcn [penicillins] Anaphylaxis 06/18/2015    Watermelon [citrullus vulgaris]  01/07/2020       Objective: There were no vitals taken for this visit.      General appearance: alert, appears stated age and cooperative  Head: Normocephalic, without obvious abnormality, atraumatic  Extremities: no cyanosis or edema  Pulses: 2+ and symmetric  Skin: no rashes or lesions    Mental Status: Alert, oriented, thought content appropriate    Speech: clear  Language: appropriate    Cranial Nerves:  I: smell    II: visual acuity     II: visual fields Full   II: pupils PHOENIX   III,VII: ptosis None   III,IV,VI: extraocular muscles  EOMI without nystagmus    V: mastication Normal   V: facial light touch sensation  Normal   V,VII: corneal reflex  Present   VII: facial muscle function - upper     VII: facial muscle function - lower Normal   VIII: hearing Normal   IX: soft palate elevation  Normal   IX,X: gag reflex    XI: trapezius strength     XI: sternocleidomastoid strength    XI: neck extension strength     XII: tongue strength  Normal     Motor:  4/5 both arms  0/5 bilateral IPS  1/5 bilateral abductors and abductors  0/5 anterior tibs    Sensory:  Appreciates light touch and PP in all limbs  Appreciates tuning fork in all limbs    Coordination:   FN, FFM and SHU decreased but symmetrical    DTR:   Right Brachioradialis reflex 1+  Left Brachioradialis reflex 1+  Right Biceps reflex 2+  Left Biceps reflex 2+  Right Quadriceps reflex 2+  Left Quadriceps reflex 2+  Right Achilles reflex 0  Left Achilles reflex 0    No Carvalho's     Laboratory/Radiology:     CBC with Differential:    Lab Results   Component Value Date/Time    WBC 9.4 04/12/2022 04:05 AM    RBC 3.70 04/12/2022 04:05 AM    HGB 10.6 04/12/2022 04:05 AM    HCT 34.7 04/12/2022 04:05 AM     04/12/2022 04:05 AM    MCV 93.8 04/12/2022 04:05 AM    MCH 28.6 04/12/2022 04:05 AM    MCHC 30.5 04/12/2022 04:05 AM    RDW 15.4 04/12/2022 04:05 AM    NRBC 0.9 03/08/2020 12:16 PM    METASPCT 0.9 03/11/2020 06:41 AM    LYMPHOPCT 19.0 04/12/2022 04:05 AM    MONOPCT 7.0 04/12/2022 04:05 AM    BASOPCT 0.5 04/12/2022 04:05 AM    MONOSABS 0.66 04/12/2022 04:05 AM    LYMPHSABS 1.78 04/12/2022 04:05 AM    EOSABS 0.23 04/12/2022 04:05 AM    BASOSABS 0.05 04/12/2022 04:05 AM     CMP:    Lab Results   Component Value Date/Time     07/12/2022 04:58 AM    K 3.3 07/12/2022 04:58 AM    K 3.8 12/15/2020 06:39 AM     07/12/2022 04:58 AM    CO2 24 07/12/2022 04:58 AM    BUN 7 07/12/2022 04:58 AM    CREATININE 0.6 07/12/2022 04:58 AM    GFRAA >60 07/12/2022 04:58 AM    LABGLOM >60 07/12/2022 04:58 AM    GLUCOSE 85 07/12/2022 04:58 AM    PROT 5.6 07/12/2022 04:58 AM    LABALBU 3.3 07/12/2022 04:58 AM    CALCIUM 8.7 07/12/2022 04:58 AM    BILITOT 0.2 07/12/2022 04:58 AM    ALKPHOS 107 07/12/2022 04:58 AM    AST 14 07/12/2022 04:58 AM    ALT 11 07/12/2022 04:58 AM     MRI Brain: 2019  No significant changes since the previous examinations but  areas of gliosis are more prominent in the deep white matter of the  left frontoparietal regions over time. With extensive omental and white matter is difficult to determine the  presence of numerous small size or location. No disruption of blood/brain barrier. No restriction of the diffusion  process of the brain. MRI C-spine 2019  1. Persistent long-standing chronic findings related with multifocal  demyelinating process of the cervical spine cord extending in a  relative continuing pattern from upper cervical spine cord segments to  upper visualized upper thoracic spine is cord segments. 2. Presently, faint postcontrast enhancement is seen more towards the  lower cervical spine segments, meanwhile on the previous study where  seen mainly in the mid upper cervical spine segments. I personally reviewed the patient's lab and imaging studies at this time. Assessment:     Patient with a history of relapsing remitting multiple sclerosis with secondary progressive disease   Recent EDSS score remains 8    History of breast cancer which I conveyed is a risks related to ocrelizumab however she was hesitant to make changes at this time despite my urgency      Plan:      At this time I did discuss discontinuing disease modifying therapy for a multitude of reasons 1 including age and research showing disease stability as well as her recent history of breast cancer which may or may not be related to ocrelizumab   She refused to discontinue at this time but would consider down the road    Discussed she will be 67 in 1 month and clinical indication with her disease stability despite an EDSS score of 8 would be to hold disease modifying therapy    I will follow her after her next infusion she will call with other questions      VERENA Richard - CNS  7:39 AM  8/19/2022

## 2022-08-26 RX ORDER — SODIUM CHLORIDE 0.9 % (FLUSH) 0.9 %
5-40 SYRINGE (ML) INJECTION PRN
Status: CANCELLED | OUTPATIENT
Start: 2022-08-26

## 2022-08-26 RX ORDER — SODIUM CHLORIDE 9 MG/ML
INJECTION, SOLUTION INTRAVENOUS CONTINUOUS
Status: CANCELLED
Start: 2022-08-26

## 2022-08-26 RX ORDER — ONDANSETRON 2 MG/ML
8 INJECTION INTRAMUSCULAR; INTRAVENOUS ONCE
Start: 2022-08-26 | End: 2022-08-26

## 2022-08-26 RX ORDER — MEPERIDINE HYDROCHLORIDE 25 MG/ML
25 INJECTION INTRAMUSCULAR; INTRAVENOUS; SUBCUTANEOUS ONCE
Start: 2022-08-26 | End: 2022-08-26

## 2022-08-26 RX ORDER — ACETAMINOPHEN 325 MG/1
650 TABLET ORAL ONCE
Start: 2022-08-26 | End: 2022-08-26

## 2022-08-26 RX ORDER — HEPARIN SODIUM (PORCINE) LOCK FLUSH IV SOLN 100 UNIT/ML 100 UNIT/ML
500 SOLUTION INTRAVENOUS PRN
Status: CANCELLED | OUTPATIENT
Start: 2022-08-26

## 2022-08-26 RX ORDER — ALBUTEROL SULFATE 90 UG/1
4 AEROSOL, METERED RESPIRATORY (INHALATION) PRN
Start: 2022-08-26

## 2022-09-08 ENCOUNTER — HOSPITAL ENCOUNTER (OUTPATIENT)
Dept: INFUSION THERAPY | Age: 67
Setting detail: INFUSION SERIES
Discharge: HOME | End: 2022-09-08
Payer: MEDICARE

## 2022-09-08 VITALS
TEMPERATURE: 97.3 F | HEART RATE: 75 BPM | OXYGEN SATURATION: 96 % | SYSTOLIC BLOOD PRESSURE: 141 MMHG | DIASTOLIC BLOOD PRESSURE: 76 MMHG | RESPIRATION RATE: 16 BRPM

## 2022-09-08 DIAGNOSIS — G35 MULTIPLE SCLEROSIS (HCC): Primary | ICD-10-CM

## 2022-09-08 PROCEDURE — 2580000003 HC RX 258: Performed by: PSYCHIATRY & NEUROLOGY

## 2022-09-08 PROCEDURE — 96365 THER/PROPH/DIAG IV INF INIT: CPT

## 2022-09-08 PROCEDURE — 6360000002 HC RX W HCPCS: Performed by: PSYCHIATRY & NEUROLOGY

## 2022-09-08 PROCEDURE — 6370000000 HC RX 637 (ALT 250 FOR IP): Performed by: PSYCHIATRY & NEUROLOGY

## 2022-09-08 PROCEDURE — 96366 THER/PROPH/DIAG IV INF ADDON: CPT

## 2022-09-08 PROCEDURE — 96375 TX/PRO/DX INJ NEW DRUG ADDON: CPT

## 2022-09-08 RX ORDER — SODIUM CHLORIDE 9 MG/ML
INJECTION, SOLUTION INTRAVENOUS CONTINUOUS
Status: ACTIVE | OUTPATIENT
Start: 2022-09-08 | End: 2022-09-08

## 2022-09-08 RX ORDER — METHYLPREDNISOLONE SODIUM SUCCINATE 125 MG/2ML
100 INJECTION, POWDER, LYOPHILIZED, FOR SOLUTION INTRAMUSCULAR; INTRAVENOUS ONCE
Status: COMPLETED | OUTPATIENT
Start: 2022-09-08 | End: 2022-09-08

## 2022-09-08 RX ORDER — ACETAMINOPHEN 325 MG/1
650 TABLET ORAL ONCE
OUTPATIENT
Start: 2023-01-05

## 2022-09-08 RX ORDER — ALBUTEROL SULFATE 90 UG/1
4 AEROSOL, METERED RESPIRATORY (INHALATION) PRN
Start: 2023-01-05

## 2022-09-08 RX ORDER — EPINEPHRINE 1 MG/ML
0.3 INJECTION, SOLUTION, CONCENTRATE INTRAVENOUS PRN
OUTPATIENT
Start: 2023-01-05

## 2022-09-08 RX ORDER — DIPHENHYDRAMINE HYDROCHLORIDE 50 MG/ML
25 INJECTION INTRAMUSCULAR; INTRAVENOUS ONCE
OUTPATIENT
Start: 2023-01-05

## 2022-09-08 RX ORDER — DIPHENHYDRAMINE HYDROCHLORIDE 50 MG/ML
50 INJECTION INTRAMUSCULAR; INTRAVENOUS ONCE
OUTPATIENT
Start: 2023-01-05 | End: 2023-01-05

## 2022-09-08 RX ORDER — ACETAMINOPHEN 325 MG/1
650 TABLET ORAL ONCE
Start: 2023-01-05 | End: 2023-01-05

## 2022-09-08 RX ORDER — HEPARIN SODIUM (PORCINE) LOCK FLUSH IV SOLN 100 UNIT/ML 100 UNIT/ML
500 SOLUTION INTRAVENOUS PRN
OUTPATIENT
Start: 2023-01-05

## 2022-09-08 RX ORDER — SODIUM CHLORIDE 0.9 % (FLUSH) 0.9 %
5-40 SYRINGE (ML) INJECTION PRN
Status: DISCONTINUED | OUTPATIENT
Start: 2022-09-08 | End: 2022-09-09 | Stop reason: HOSPADM

## 2022-09-08 RX ORDER — SODIUM CHLORIDE 0.9 % (FLUSH) 0.9 %
5-40 SYRINGE (ML) INJECTION PRN
OUTPATIENT
Start: 2023-01-05

## 2022-09-08 RX ORDER — MEPERIDINE HYDROCHLORIDE 25 MG/ML
25 INJECTION INTRAMUSCULAR; INTRAVENOUS; SUBCUTANEOUS ONCE
Start: 2023-01-05 | End: 2023-01-05

## 2022-09-08 RX ORDER — METHYLPREDNISOLONE SODIUM SUCCINATE 125 MG/2ML
100 INJECTION, POWDER, LYOPHILIZED, FOR SOLUTION INTRAMUSCULAR; INTRAVENOUS ONCE
OUTPATIENT
Start: 2023-01-05

## 2022-09-08 RX ORDER — ONDANSETRON 2 MG/ML
8 INJECTION INTRAMUSCULAR; INTRAVENOUS ONCE
Start: 2023-01-05 | End: 2023-01-05

## 2022-09-08 RX ORDER — ACETAMINOPHEN 325 MG/1
650 TABLET ORAL ONCE
Status: COMPLETED | OUTPATIENT
Start: 2022-09-08 | End: 2022-09-08

## 2022-09-08 RX ORDER — HEPARIN SODIUM (PORCINE) LOCK FLUSH IV SOLN 100 UNIT/ML 100 UNIT/ML
500 SOLUTION INTRAVENOUS PRN
Status: DISCONTINUED | OUTPATIENT
Start: 2022-09-08 | End: 2022-09-09 | Stop reason: HOSPADM

## 2022-09-08 RX ORDER — SODIUM CHLORIDE 9 MG/ML
INJECTION, SOLUTION INTRAVENOUS CONTINUOUS
Start: 2023-01-05

## 2022-09-08 RX ORDER — DIPHENHYDRAMINE HYDROCHLORIDE 50 MG/ML
25 INJECTION INTRAMUSCULAR; INTRAVENOUS ONCE
Status: COMPLETED | OUTPATIENT
Start: 2022-09-08 | End: 2022-09-08

## 2022-09-08 RX ORDER — SODIUM CHLORIDE 9 MG/ML
INJECTION, SOLUTION INTRAVENOUS CONTINUOUS
OUTPATIENT
Start: 2023-01-05

## 2022-09-08 RX ADMIN — METHYLPREDNISOLONE SODIUM SUCCINATE 100 MG: 125 INJECTION, POWDER, FOR SOLUTION INTRAMUSCULAR; INTRAVENOUS at 09:01

## 2022-09-08 RX ADMIN — ACETAMINOPHEN 650 MG: 325 TABLET, FILM COATED ORAL at 08:50

## 2022-09-08 RX ADMIN — DIPHENHYDRAMINE HYDROCHLORIDE 25 MG: 50 INJECTION INTRAMUSCULAR; INTRAVENOUS at 09:03

## 2022-09-08 RX ADMIN — SODIUM CHLORIDE: 9 INJECTION, SOLUTION INTRAVENOUS at 13:24

## 2022-09-08 RX ADMIN — SODIUM CHLORIDE: 9 INJECTION, SOLUTION INTRAVENOUS at 09:01

## 2022-09-08 RX ADMIN — OCRELIZUMAB 600 MG: 300 INJECTION INTRAVENOUS at 09:31

## 2022-09-08 RX ADMIN — SODIUM CHLORIDE, PRESERVATIVE FREE 10 ML: 5 INJECTION INTRAVENOUS at 09:08

## 2022-09-08 RX ADMIN — SODIUM CHLORIDE, PRESERVATIVE FREE 10 ML: 5 INJECTION INTRAVENOUS at 08:47

## 2022-09-08 ASSESSMENT — PAIN DESCRIPTION - LOCATION: LOCATION: GENERALIZED

## 2022-09-08 ASSESSMENT — PAIN DESCRIPTION - PAIN TYPE: TYPE: CHRONIC PAIN

## 2022-09-08 ASSESSMENT — PAIN SCALES - GENERAL
PAINLEVEL_OUTOF10: 7
PAINLEVEL_OUTOF10: 7

## 2022-12-02 ENCOUNTER — HOSPITAL ENCOUNTER (INPATIENT)
Age: 67
LOS: 6 days | Discharge: NURSING, REHAB OR LONG-TERM CARE | End: 2022-12-08
Attending: EMERGENCY MEDICINE | Admitting: STUDENT IN AN ORGANIZED HEALTH CARE EDUCATION/TRAINING PROGRAM
Payer: MEDICARE

## 2022-12-02 ENCOUNTER — APPOINTMENT (OUTPATIENT)
Dept: CT IMAGING | Age: 67
End: 2022-12-02
Payer: MEDICARE

## 2022-12-02 DIAGNOSIS — L03.90 CELLULITIS, UNSPECIFIED CELLULITIS SITE: Primary | ICD-10-CM

## 2022-12-02 LAB
ALBUMIN SERPL-MCNC: 3.5 G/DL (ref 3.5–5.2)
ALBUMIN SERPL-MCNC: 3.7 G/DL (ref 3.5–5.2)
ALP BLD-CCNC: 101 U/L (ref 35–104)
ALP BLD-CCNC: 104 U/L (ref 35–104)
ALT SERPL-CCNC: 5 U/L (ref 0–32)
ALT SERPL-CCNC: 8 U/L (ref 0–32)
ANION GAP SERPL CALCULATED.3IONS-SCNC: 12 MMOL/L (ref 7–16)
ANION GAP SERPL CALCULATED.3IONS-SCNC: 13 MMOL/L (ref 7–16)
AST SERPL-CCNC: 10 U/L (ref 0–31)
AST SERPL-CCNC: 6 U/L (ref 0–31)
BASOPHILS ABSOLUTE: 0.05 E9/L (ref 0–0.2)
BASOPHILS RELATIVE PERCENT: 0.4 % (ref 0–2)
BILIRUB SERPL-MCNC: 0.4 MG/DL (ref 0–1.2)
BILIRUB SERPL-MCNC: 0.4 MG/DL (ref 0–1.2)
BILIRUBIN DIRECT: <0.2 MG/DL (ref 0–0.3)
BILIRUBIN, INDIRECT: ABNORMAL MG/DL (ref 0–1)
BUN BLDV-MCNC: 6 MG/DL (ref 6–23)
BUN BLDV-MCNC: 7 MG/DL (ref 6–23)
CALCIUM SERPL-MCNC: 8.9 MG/DL (ref 8.6–10.2)
CALCIUM SERPL-MCNC: 9.2 MG/DL (ref 8.6–10.2)
CHLORIDE BLD-SCNC: 102 MMOL/L (ref 98–107)
CHLORIDE BLD-SCNC: 102 MMOL/L (ref 98–107)
CO2: 28 MMOL/L (ref 22–29)
CO2: 28 MMOL/L (ref 22–29)
CREAT SERPL-MCNC: 0.6 MG/DL (ref 0.5–1)
CREAT SERPL-MCNC: 0.7 MG/DL (ref 0.5–1)
EOSINOPHILS ABSOLUTE: 0.22 E9/L (ref 0.05–0.5)
EOSINOPHILS RELATIVE PERCENT: 1.6 % (ref 0–6)
GFR SERPL CREATININE-BSD FRML MDRD: >60 ML/MIN/1.73
GFR SERPL CREATININE-BSD FRML MDRD: >60 ML/MIN/1.73
GLUCOSE BLD-MCNC: 101 MG/DL (ref 74–99)
GLUCOSE BLD-MCNC: 88 MG/DL (ref 74–99)
HCT VFR BLD CALC: 36.6 % (ref 34–48)
HCT VFR BLD CALC: 38.4 % (ref 34–48)
HEMOGLOBIN: 11.7 G/DL (ref 11.5–15.5)
HEMOGLOBIN: 11.8 G/DL (ref 11.5–15.5)
IMMATURE GRANULOCYTES #: 0.05 E9/L
IMMATURE GRANULOCYTES %: 0.4 % (ref 0–5)
INR BLD: 1.2
LACTIC ACID: 1.2 MMOL/L (ref 0.5–2.2)
LYMPHOCYTES ABSOLUTE: 2.55 E9/L (ref 1.5–4)
LYMPHOCYTES RELATIVE PERCENT: 19 % (ref 20–42)
MAGNESIUM: 2 MG/DL (ref 1.6–2.6)
MCH RBC QN AUTO: 28.4 PG (ref 26–35)
MCH RBC QN AUTO: 29 PG (ref 26–35)
MCHC RBC AUTO-ENTMCNC: 30.7 % (ref 32–34.5)
MCHC RBC AUTO-ENTMCNC: 32 % (ref 32–34.5)
MCV RBC AUTO: 90.6 FL (ref 80–99.9)
MCV RBC AUTO: 92.5 FL (ref 80–99.9)
MONOCYTES ABSOLUTE: 0.91 E9/L (ref 0.1–0.95)
MONOCYTES RELATIVE PERCENT: 6.8 % (ref 2–12)
NEUTROPHILS ABSOLUTE: 9.63 E9/L (ref 1.8–7.3)
NEUTROPHILS RELATIVE PERCENT: 71.8 % (ref 43–80)
PDW BLD-RTO: 14.6 FL (ref 11.5–15)
PDW BLD-RTO: 14.7 FL (ref 11.5–15)
PLATELET # BLD: 443 E9/L (ref 130–450)
PLATELET # BLD: 473 E9/L (ref 130–450)
PMV BLD AUTO: 10.1 FL (ref 7–12)
PMV BLD AUTO: 10.5 FL (ref 7–12)
POTASSIUM SERPL-SCNC: 2.8 MMOL/L (ref 3.5–5)
POTASSIUM SERPL-SCNC: 2.9 MMOL/L (ref 3.5–5)
PROCALCITONIN: 0.07 NG/ML (ref 0–0.08)
PROTHROMBIN TIME: 12.7 SEC (ref 9.3–12.4)
RBC # BLD: 4.04 E12/L (ref 3.5–5.5)
RBC # BLD: 4.15 E12/L (ref 3.5–5.5)
SEDIMENTATION RATE, ERYTHROCYTE: 30 MM/HR (ref 0–20)
SODIUM BLD-SCNC: 142 MMOL/L (ref 132–146)
SODIUM BLD-SCNC: 143 MMOL/L (ref 132–146)
TOTAL PROTEIN: 5.8 G/DL (ref 6.4–8.3)
TOTAL PROTEIN: 6.3 G/DL (ref 6.4–8.3)
TROPONIN, HIGH SENSITIVITY: 15 NG/L (ref 0–9)
WBC # BLD: 13.4 E9/L (ref 4.5–11.5)
WBC # BLD: 22.1 E9/L (ref 4.5–11.5)

## 2022-12-02 PROCEDURE — 85610 PROTHROMBIN TIME: CPT

## 2022-12-02 PROCEDURE — 83735 ASSAY OF MAGNESIUM: CPT

## 2022-12-02 PROCEDURE — 87186 SC STD MICRODIL/AGAR DIL: CPT

## 2022-12-02 PROCEDURE — 99285 EMERGENCY DEPT VISIT HI MDM: CPT

## 2022-12-02 PROCEDURE — 93005 ELECTROCARDIOGRAM TRACING: CPT | Performed by: EMERGENCY MEDICINE

## 2022-12-02 PROCEDURE — 6360000004 HC RX CONTRAST MEDICATION: Performed by: RADIOLOGY

## 2022-12-02 PROCEDURE — 6360000002 HC RX W HCPCS: Performed by: FAMILY MEDICINE

## 2022-12-02 PROCEDURE — 80048 BASIC METABOLIC PNL TOTAL CA: CPT

## 2022-12-02 PROCEDURE — 80076 HEPATIC FUNCTION PANEL: CPT

## 2022-12-02 PROCEDURE — 87077 CULTURE AEROBIC IDENTIFY: CPT

## 2022-12-02 PROCEDURE — 83605 ASSAY OF LACTIC ACID: CPT

## 2022-12-02 PROCEDURE — 6370000000 HC RX 637 (ALT 250 FOR IP): Performed by: FAMILY MEDICINE

## 2022-12-02 PROCEDURE — 85025 COMPLETE CBC W/AUTO DIFF WBC: CPT

## 2022-12-02 PROCEDURE — 74177 CT ABD & PELVIS W/CONTRAST: CPT

## 2022-12-02 PROCEDURE — 1200000000 HC SEMI PRIVATE

## 2022-12-02 PROCEDURE — 87040 BLOOD CULTURE FOR BACTERIA: CPT

## 2022-12-02 PROCEDURE — 84145 PROCALCITONIN (PCT): CPT

## 2022-12-02 PROCEDURE — 84484 ASSAY OF TROPONIN QUANT: CPT

## 2022-12-02 RX ORDER — CETIRIZINE HYDROCHLORIDE 10 MG/1
10 TABLET ORAL DAILY
Status: DISCONTINUED | OUTPATIENT
Start: 2022-12-03 | End: 2022-12-09 | Stop reason: HOSPADM

## 2022-12-02 RX ORDER — POTASSIUM CHLORIDE 7.45 MG/ML
10 INJECTION INTRAVENOUS PRN
Status: DISCONTINUED | OUTPATIENT
Start: 2022-12-02 | End: 2022-12-07

## 2022-12-02 RX ORDER — CLINDAMYCIN PHOSPHATE 600 MG/50ML
600 INJECTION INTRAVENOUS ONCE
Status: DISCONTINUED | OUTPATIENT
Start: 2022-12-02 | End: 2022-12-05

## 2022-12-02 RX ORDER — GABAPENTIN 300 MG/1
300 CAPSULE ORAL 3 TIMES DAILY
Status: DISCONTINUED | OUTPATIENT
Start: 2022-12-02 | End: 2022-12-09 | Stop reason: HOSPADM

## 2022-12-02 RX ORDER — SODIUM CHLORIDE 0.9 % (FLUSH) 0.9 %
10 SYRINGE (ML) INJECTION EVERY 12 HOURS SCHEDULED
Status: DISCONTINUED | OUTPATIENT
Start: 2022-12-02 | End: 2022-12-09 | Stop reason: HOSPADM

## 2022-12-02 RX ORDER — POTASSIUM CHLORIDE 20 MEQ/1
40 TABLET, EXTENDED RELEASE ORAL PRN
Status: DISCONTINUED | OUTPATIENT
Start: 2022-12-02 | End: 2022-12-07

## 2022-12-02 RX ORDER — ATORVASTATIN CALCIUM 40 MG/1
80 TABLET, FILM COATED ORAL NIGHTLY
Status: DISCONTINUED | OUTPATIENT
Start: 2022-12-02 | End: 2022-12-09 | Stop reason: HOSPADM

## 2022-12-02 RX ORDER — SODIUM CHLORIDE 9 MG/ML
INJECTION, SOLUTION INTRAVENOUS PRN
Status: DISCONTINUED | OUTPATIENT
Start: 2022-12-02 | End: 2022-12-09 | Stop reason: HOSPADM

## 2022-12-02 RX ORDER — FLUOXETINE HYDROCHLORIDE 20 MG/1
20 CAPSULE ORAL 2 TIMES DAILY
Status: DISCONTINUED | OUTPATIENT
Start: 2022-12-02 | End: 2022-12-09 | Stop reason: HOSPADM

## 2022-12-02 RX ORDER — POLYETHYLENE GLYCOL 3350 17 G/17G
17 POWDER, FOR SOLUTION ORAL DAILY PRN
Status: DISCONTINUED | OUTPATIENT
Start: 2022-12-02 | End: 2022-12-09 | Stop reason: HOSPADM

## 2022-12-02 RX ORDER — PROMETHAZINE HYDROCHLORIDE 12.5 MG/1
12.5 TABLET ORAL EVERY 6 HOURS PRN
Status: DISCONTINUED | OUTPATIENT
Start: 2022-12-02 | End: 2022-12-09 | Stop reason: HOSPADM

## 2022-12-02 RX ORDER — ACETAMINOPHEN 325 MG/1
650 TABLET ORAL EVERY 6 HOURS PRN
Status: DISCONTINUED | OUTPATIENT
Start: 2022-12-02 | End: 2022-12-09 | Stop reason: HOSPADM

## 2022-12-02 RX ORDER — SODIUM CHLORIDE 0.9 % (FLUSH) 0.9 %
10 SYRINGE (ML) INJECTION PRN
Status: DISCONTINUED | OUTPATIENT
Start: 2022-12-02 | End: 2022-12-09 | Stop reason: HOSPADM

## 2022-12-02 RX ORDER — ENOXAPARIN SODIUM 100 MG/ML
40 INJECTION SUBCUTANEOUS DAILY
Status: DISCONTINUED | OUTPATIENT
Start: 2022-12-03 | End: 2022-12-09 | Stop reason: HOSPADM

## 2022-12-02 RX ORDER — LEVOTHYROXINE SODIUM 0.12 MG/1
125 TABLET ORAL DAILY
Status: DISCONTINUED | OUTPATIENT
Start: 2022-12-03 | End: 2022-12-09 | Stop reason: HOSPADM

## 2022-12-02 RX ORDER — ACETAMINOPHEN 500 MG
1000 TABLET ORAL
Status: ACTIVE | OUTPATIENT
Start: 2022-12-02 | End: 2022-12-03

## 2022-12-02 RX ORDER — ALBUTEROL SULFATE 2.5 MG/3ML
2.5 SOLUTION RESPIRATORY (INHALATION) EVERY 4 HOURS PRN
Status: DISCONTINUED | OUTPATIENT
Start: 2022-12-02 | End: 2022-12-09 | Stop reason: HOSPADM

## 2022-12-02 RX ORDER — ONDANSETRON 2 MG/ML
4 INJECTION INTRAMUSCULAR; INTRAVENOUS EVERY 6 HOURS PRN
Status: DISCONTINUED | OUTPATIENT
Start: 2022-12-02 | End: 2022-12-09 | Stop reason: HOSPADM

## 2022-12-02 RX ORDER — PANTOPRAZOLE SODIUM 40 MG/1
40 TABLET, DELAYED RELEASE ORAL
Status: DISCONTINUED | OUTPATIENT
Start: 2022-12-03 | End: 2022-12-09 | Stop reason: HOSPADM

## 2022-12-02 RX ORDER — ANASTROZOLE 1 MG/1
1 TABLET ORAL DAILY
Status: DISCONTINUED | OUTPATIENT
Start: 2022-12-03 | End: 2022-12-09 | Stop reason: HOSPADM

## 2022-12-02 RX ORDER — ACETAMINOPHEN 650 MG/1
650 SUPPOSITORY RECTAL EVERY 6 HOURS PRN
Status: DISCONTINUED | OUTPATIENT
Start: 2022-12-02 | End: 2022-12-09 | Stop reason: HOSPADM

## 2022-12-02 RX ADMIN — POTASSIUM CHLORIDE 10 MEQ: 7.46 INJECTION, SOLUTION INTRAVENOUS at 22:34

## 2022-12-02 RX ADMIN — IOPAMIDOL 75 ML: 755 INJECTION, SOLUTION INTRAVENOUS at 19:25

## 2022-12-02 RX ADMIN — GABAPENTIN 300 MG: 300 CAPSULE ORAL at 23:05

## 2022-12-02 RX ADMIN — FLUOXETINE 20 MG: 20 CAPSULE ORAL at 23:32

## 2022-12-02 RX ADMIN — ATORVASTATIN CALCIUM 80 MG: 40 TABLET, FILM COATED ORAL at 23:05

## 2022-12-03 LAB
ALBUMIN SERPL-MCNC: 3.3 G/DL (ref 3.5–5.2)
ALP BLD-CCNC: 98 U/L (ref 35–104)
ALT SERPL-CCNC: 7 U/L (ref 0–32)
ANION GAP SERPL CALCULATED.3IONS-SCNC: 14 MMOL/L (ref 7–16)
AST SERPL-CCNC: 9 U/L (ref 0–31)
BASOPHILS ABSOLUTE: 0.05 E9/L (ref 0–0.2)
BASOPHILS RELATIVE PERCENT: 0.4 % (ref 0–2)
BILIRUB SERPL-MCNC: 0.4 MG/DL (ref 0–1.2)
BUN BLDV-MCNC: 8 MG/DL (ref 6–23)
CALCIUM SERPL-MCNC: 9.2 MG/DL (ref 8.6–10.2)
CHLORIDE BLD-SCNC: 103 MMOL/L (ref 98–107)
CO2: 25 MMOL/L (ref 22–29)
CREAT SERPL-MCNC: 0.6 MG/DL (ref 0.5–1)
EOSINOPHILS ABSOLUTE: 0.14 E9/L (ref 0.05–0.5)
EOSINOPHILS RELATIVE PERCENT: 1.1 % (ref 0–6)
GFR SERPL CREATININE-BSD FRML MDRD: >60 ML/MIN/1.73
GLUCOSE BLD-MCNC: 108 MG/DL (ref 74–99)
HCT VFR BLD CALC: 37.2 % (ref 34–48)
HEMOGLOBIN: 11.5 G/DL (ref 11.5–15.5)
IMMATURE GRANULOCYTES #: 0.08 E9/L
IMMATURE GRANULOCYTES %: 0.6 % (ref 0–5)
LYMPHOCYTES ABSOLUTE: 1.89 E9/L (ref 1.5–4)
LYMPHOCYTES RELATIVE PERCENT: 14.5 % (ref 20–42)
MCH RBC QN AUTO: 28 PG (ref 26–35)
MCHC RBC AUTO-ENTMCNC: 30.9 % (ref 32–34.5)
MCV RBC AUTO: 90.7 FL (ref 80–99.9)
MONOCYTES ABSOLUTE: 0.9 E9/L (ref 0.1–0.95)
MONOCYTES RELATIVE PERCENT: 6.9 % (ref 2–12)
NEUTROPHILS ABSOLUTE: 10 E9/L (ref 1.8–7.3)
NEUTROPHILS RELATIVE PERCENT: 76.5 % (ref 43–80)
PDW BLD-RTO: 14.6 FL (ref 11.5–15)
PLATELET # BLD: 451 E9/L (ref 130–450)
PMV BLD AUTO: 10.2 FL (ref 7–12)
POTASSIUM REFLEX MAGNESIUM: 4 MMOL/L (ref 3.5–5)
RBC # BLD: 4.1 E12/L (ref 3.5–5.5)
SODIUM BLD-SCNC: 142 MMOL/L (ref 132–146)
TOTAL PROTEIN: 6 G/DL (ref 6.4–8.3)
WBC # BLD: 13.1 E9/L (ref 4.5–11.5)

## 2022-12-03 PROCEDURE — 6360000002 HC RX W HCPCS: Performed by: FAMILY MEDICINE

## 2022-12-03 PROCEDURE — 0DP6XUZ REMOVAL OF FEEDING DEVICE FROM STOMACH, EXTERNAL APPROACH: ICD-10-PCS | Performed by: SURGERY

## 2022-12-03 PROCEDURE — 6370000000 HC RX 637 (ALT 250 FOR IP): Performed by: FAMILY MEDICINE

## 2022-12-03 PROCEDURE — 2580000003 HC RX 258: Performed by: FAMILY MEDICINE

## 2022-12-03 PROCEDURE — 85025 COMPLETE CBC W/AUTO DIFF WBC: CPT

## 2022-12-03 PROCEDURE — 1200000000 HC SEMI PRIVATE

## 2022-12-03 PROCEDURE — 80053 COMPREHEN METABOLIC PANEL: CPT

## 2022-12-03 PROCEDURE — 6370000000 HC RX 637 (ALT 250 FOR IP): Performed by: STUDENT IN AN ORGANIZED HEALTH CARE EDUCATION/TRAINING PROGRAM

## 2022-12-03 RX ORDER — BACITRACIN, NEOMYCIN, POLYMYXIN B 400; 3.5; 5 [USP'U]/G; MG/G; [USP'U]/G
OINTMENT TOPICAL DAILY
Status: DISCONTINUED | OUTPATIENT
Start: 2022-12-03 | End: 2022-12-06

## 2022-12-03 RX ADMIN — FLUOXETINE 20 MG: 20 CAPSULE ORAL at 20:42

## 2022-12-03 RX ADMIN — BACITRACIN ZINC, NEOMYCIN SULFATE, POLYMYXIN B SULFATE 1 G: 3.5; 5000; 4 OINTMENT TOPICAL at 15:13

## 2022-12-03 RX ADMIN — CEFEPIME 2000 MG: 2 INJECTION, POWDER, FOR SOLUTION INTRAVENOUS at 09:31

## 2022-12-03 RX ADMIN — GABAPENTIN 300 MG: 300 CAPSULE ORAL at 09:29

## 2022-12-03 RX ADMIN — CEFEPIME 2000 MG: 2 INJECTION, POWDER, FOR SOLUTION INTRAVENOUS at 03:18

## 2022-12-03 RX ADMIN — CEFEPIME 2000 MG: 2 INJECTION, POWDER, FOR SOLUTION INTRAVENOUS at 20:46

## 2022-12-03 RX ADMIN — GABAPENTIN 300 MG: 300 CAPSULE ORAL at 15:13

## 2022-12-03 RX ADMIN — FLUOXETINE 20 MG: 20 CAPSULE ORAL at 09:29

## 2022-12-03 RX ADMIN — VANCOMYCIN HYDROCHLORIDE 1750 MG: 10 INJECTION, POWDER, LYOPHILIZED, FOR SOLUTION INTRAVENOUS at 05:13

## 2022-12-03 RX ADMIN — VANCOMYCIN HYDROCHLORIDE 1000 MG: 1 INJECTION, POWDER, LYOPHILIZED, FOR SOLUTION INTRAVENOUS at 19:27

## 2022-12-03 RX ADMIN — ATORVASTATIN CALCIUM 80 MG: 40 TABLET, FILM COATED ORAL at 20:42

## 2022-12-03 RX ADMIN — TICAGRELOR 60 MG: 60 TABLET ORAL at 00:07

## 2022-12-03 RX ADMIN — CETIRIZINE HYDROCHLORIDE 10 MG: 10 TABLET, FILM COATED ORAL at 09:29

## 2022-12-03 RX ADMIN — SODIUM CHLORIDE, PRESERVATIVE FREE 10 ML: 5 INJECTION INTRAVENOUS at 09:29

## 2022-12-03 RX ADMIN — POTASSIUM CHLORIDE 10 MEQ: 7.46 INJECTION, SOLUTION INTRAVENOUS at 00:42

## 2022-12-03 RX ADMIN — TICAGRELOR 60 MG: 60 TABLET ORAL at 21:09

## 2022-12-03 RX ADMIN — POTASSIUM BICARBONATE 40 MEQ: 782 TABLET, EFFERVESCENT ORAL at 03:11

## 2022-12-03 RX ADMIN — GABAPENTIN 300 MG: 300 CAPSULE ORAL at 20:43

## 2022-12-03 RX ADMIN — PANTOPRAZOLE SODIUM 40 MG: 40 TABLET, DELAYED RELEASE ORAL at 09:29

## 2022-12-03 RX ADMIN — SODIUM CHLORIDE, PRESERVATIVE FREE 10 ML: 5 INJECTION INTRAVENOUS at 20:43

## 2022-12-03 RX ADMIN — ENOXAPARIN SODIUM 40 MG: 100 INJECTION SUBCUTANEOUS at 09:29

## 2022-12-03 ASSESSMENT — ENCOUNTER SYMPTOMS
NAUSEA: 0
FACIAL SWELLING: 0
COUGH: 0
ABDOMINAL DISTENTION: 0
DIARRHEA: 0
COLOR CHANGE: 0
SHORTNESS OF BREATH: 0
TROUBLE SWALLOWING: 0
VOMITING: 0
CONSTIPATION: 0
ABDOMINAL PAIN: 1

## 2022-12-03 NOTE — CONSULTS
GENERAL SURGERY  CONSULT NOTE  12/3/2022    Physician Consulted: Dr. Darrell Amador  Reason for Consult: PEG tube removal  Referring Physician: Dr. Yuliet Doaln is a 74 y.o. female who presents to the general surgery service for evaluation of her PEG tube. The patient had her PEG tube most recently replaced on 11/30/21. She has history of MS and CVA. She is no longer using her PEG tube and is currently tolerating oral intake. General surgery is consulted for PEG tube removal      EMR  reports that she quit smoking about 22 years ago. Her smoking use included cigarettes. She started smoking about 27 years ago. She has a 5.00 pack-year smoking history. She has never used smokeless tobacco. She reports that she does not drink alcohol and does not use drugs. Past Medical History:   Diagnosis Date    Anxiety     Breast cancer ([facility] [place] [address] 02/2020    right    CAD (coronary artery disease)     Cystitis     Depression     Elizondo catheter status     Foot drop, bilateral     History of blood transfusion     History of renal calculi     Hx of blood clots     dvt    Hyperlipidemia     Hypertension 08/27/2018    Hypothyroidism     MS (multiple sclerosis) ([facility] [place] [address]     wheelchair bound    Neurogenic bladder 08/09/2018    NSTEMI (non-ST elevated myocardial infarction) ([facility] [place] [address] 2019    Total self-care deficit     Unspecified cerebral artery occlusion with cerebral infarction 2011    left her incontinent    Wheelchair dependent        Past Surgical History:   Procedure Laterality Date    COLON SURGERY  2001    exp lap, lysis of adhesions, release of SBO. [facility]. Dr. David Tsai 2/4/2020    COLONOSCOPY DIAGNOSTIC performed by Reta Walden MD at [facility]  2019    no stent-    DENTAL SURGERY      all removed    GASTROSTOMY TUBE PLACEMENT N/A 11/30/2021    PEG TUBE EXCHANGE performed by Reta Walden MD at [address] [facility] [address] ([facility])  2001    [facility].  Dr. Elvia Padilla MASTECTOMY      MASTECTOMY, MODIFIED RADICAL Right 3/3/2020    RIGHT BREAST MASTECTOMY WITH SENTINEL NODE DISSECTION WITH BLUE DYE performed by Jyotsna Lu MD at [address] [facility] N/A 12/12/2019    EGD PEG INSERTION performed by Jyotsna Lu MD at [facility]       Medications Prior to Admission:    Prior to Admission medications    Medication Sig Start Date End Date Taking? Authorizing Provider   acetaminophen (TYLENOL) 325 MG tablet Take 650 mg by mouth every 4 hours as needed for Pain or Fever    Historical Provider, MD   Ocrelizumab (OCREVUS IV) Infuse 600 mg intravenously every 6 months    Historical Provider, MD   cetirizine (ZYRTEC) 10 MG tablet Take 10 mg by mouth daily    Historical Provider, MD   Cholecalciferol (VITAMIN D3) 1.25 MG (18322 UT) CAPS Take by mouth once a week Thursdays    Historical Provider, MD   ticagrelor (BRILINTA) 60 MG TABS tablet 1 tablet by Per G Tube route 2 times daily  Patient taking differently: Take 60 mg by mouth 2 times daily 12/16/20   Dago White DO   fluticasone (FLONASE) 50 MCG/ACT nasal spray 1 spray by Nasal route daily    Historical Provider, MD   levothyroxine (SYNTHROID) 125 MCG tablet Take 125 mcg by mouth Daily    Historical Provider, MD   atorvastatin (LIPITOR) 80 MG tablet Take 80 mg by mouth nightly     Historical Provider, MD   psyllium (KONSYL) 28.3 % PACK Take 1 packet by mouth daily     Historical Provider, MD   pantoprazole (PROTONIX) 40 MG tablet Take 40 mg by mouth daily    Historical Provider, MD   FLUoxetine (PROZAC) 20 MG capsule Take 20 mg by mouth 2 times daily    Historical Provider, MD   metoprolol tartrate (LOPRESSOR) 25 MG tablet Take 12.5 mg by mouth 2 times daily     Historical Provider, MD   gabapentin (NEURONTIN) 300 MG capsule Take 300 mg by mouth 3 times daily.     Historical Provider, MD   albuterol (PROVENTIL) (2.5 MG/3ML) 0.083% nebulizer solution Take 2.5 mg by nebulization every 4 hours as needed for Wheezing    Historical Provider, MD   ARTIFICIAL TEAR OINTMENT OP Place 1 drop into both eyes every 6 hours as needed (dry eyes)    Historical Provider, MD   saline nasal gel (AYR) GEL 1 each by Nasal route every 12 hours as needed for Congestion    Historical Provider, MD   magnesium hydroxide (MILK OF MAGNESIA) 400 MG/5ML suspension Take 30 mLs by mouth daily as needed for Constipation    Historical Provider, MD   nitroGLYCERIN (NITROSTAT) 0.4 MG SL tablet Place 0.4 mg under the tongue every 5 minutes as needed for Chest pain    Historical Provider, MD   anastrozole (ARIMIDEX) 1 MG tablet Take 1 mg by mouth daily    Historical Provider, MD   diazePAM (VALIUM) 2 MG tablet 2 mg by Per G Tube route 2 times daily. Historical Provider, MD   Cranberry 500 MG CAPS 1 capsule by PEG Tube route daily  Patient taking differently: Take 500 mg by mouth daily 12/16/19   Sundra Lesch, MD       Allergies   Allergen Reactions    Pcn [Penicillins] Anaphylaxis    Watermelon [Citrullus Vulgaris]        Family History   Problem Relation Age of Onset    Breast Cancer Mother     Diabetes Father     Heart Disease Father     Breast Cancer Sister     Breast Cancer Maternal Aunt     Breast Cancer Maternal Aunt              Review of Systems   Constitutional:  Negative for appetite change, chills, fatigue and fever. HENT:  Negative for facial swelling and trouble swallowing. Respiratory:  Negative for cough and shortness of breath. Cardiovascular:  Negative for chest pain and palpitations. Gastrointestinal:  Positive for abdominal pain. Negative for abdominal distention, constipation, diarrhea, nausea and vomiting. Endocrine: Negative for polydipsia and polyphagia. Genitourinary:  Negative for difficulty urinating and dysuria. Skin:  Negative for color change and rash. Neurological:  Positive for weakness. Negative for dizziness. Psychiatric/Behavioral:  Positive for confusion.  Negative for agitation and behavioral problems. PHYSICAL EXAM:    Vitals:    12/03/22 0839   BP: 113/66   Pulse: 76   Resp: 22   Temp: 97.9 °F (36.6 °C)   SpO2: 91%       General Appearance:  awake, alert, pleasantly confused, in no acute distress  Skin:  Skin color, texture, turgor normal.  Head/face:  NCAT  Eyes:  No gross abnormalities. Sclera nonicteric  Lungs/Chest:  Normal expansion. No respiratory distress. On room air. No chest wall tenderness  Heart: Warm throughout. Regular rate   Abdomen:  Soft, no tenderness to palpation, non distended. Gastrostomy tube present with serous discharge around the tube. See post-removal picture below with fistulous tract  Extremities: Extremities warm to touch, pink            LABS:    CBC  Recent Labs     12/03/22 0527   WBC 13.1*   HGB 11.5   HCT 37.2   *     BMP  Recent Labs     12/03/22 0527      K 4.0      CO2 25   BUN 8   CREATININE 0.6   CALCIUM 9.2     Liver Function  Recent Labs     12/02/22  1624 12/03/22  0527   BILITOT 0.4 0.4   BILIDIR <0.2  --    AST 10 9   ALT 8 7   ALKPHOS 104 98   PROT 6.3* 6.0*   LABALBU 3.7 3.3*     No results for input(s): LACTATE in the last 72 hours. Recent Labs     12/02/22  1624   INR 1.2         RADIOLOGY: I reviewed all relevant imaging from this admission as well as the past studies available in the EMR including: CT abdomen/pelvis from 12/2/22    My assessment of the reviewed imaging is gastrostomy tube without evidence of surrounding inflammation or organized fluid collection      ASSESSMENT:  74 y.o. female with gastrostomy tube, which is no longer being used. Most recently replaced on 11/30/21. Tolerating sufficient oral intake    PLAN:  Gastrostomy tube removed without issue at bedside  Local wound care to previous tube site with application of bacitracin and dry gauze.  Change dressing daily  Diet as tolerated  Please call with any further questions or concerns     Electronically signed by Zuleima Galvan DO on 12/3/22 at 9:27 AM EST

## 2022-12-03 NOTE — PLAN OF CARE
Problem: Skin/Tissue Integrity  Goal: Absence of new skin breakdown  Description: 1. Monitor for areas of redness and/or skin breakdown  2. Assess vascular access sites hourly  3. Every 4-6 hours minimum:  Change oxygen saturation probe site  4. Every 4-6 hours:  If on nasal continuous positive airway pressure, respiratory therapy assess nares and determine need for appliance change or resting period.   12/3/2022 1438 by Adrianna Chavarria  Outcome: Progressing  12/3/2022 1435 by Simone Zamudio RN  Outcome: Progressing  12/3/2022 1434 by Simone Zamudio RN  Outcome: Progressing  12/3/2022 0912 by Stephan Palacios RN  Outcome: Progressing     Problem: Safety - Adult  Goal: Free from fall injury  12/3/2022 1438 by Adrianna Chavarria  Outcome: Progressing  12/3/2022 1435 by Simone Zamudio RN  Outcome: Progressing  12/3/2022 1434 by Simone Zamudio RN  Outcome: Progressing  12/3/2022 0912 by Stephan Palacios RN  Outcome: Progressing

## 2022-12-03 NOTE — H&P
Hospitalist History & Physical      PCP: Malorie Mireles DO    Date of Service: Pt seen/examined on 12/2/2022     Chief Complaint:  had concerns including Feeding Tube Problem (Infected peg tube site). History Of Present Illness:    Ms. John Lynne, a 74y.o. year old female  who  has a past medical history of Anxiety, Breast cancer ([address] CAD (coronary artery disease), Cystitis, Depression, Elizondo catheter status, Foot drop, bilateral, History of blood transfusion, History of renal calculi, Hx of blood clots, Hyperlipidemia, Hypertension, Hypothyroidism, MS (multiple sclerosis) ([address] Neurogenic bladder, NSTEMI (non-ST elevated myocardial infarction) ([address] Total self-care deficit, Unspecified cerebral artery occlusion with cerebral infarction, and Wheelchair dependent. Patient presented to the emergency department with complaint but abdominal pain and infection of PEG tube site. Patient is no longer using PEG tube and is currently tolerating oral intake. Vital signs are within normal limits and stable. Laboratory studies demonstrate potassium 2.8 and WBC 13.4. This is down from 22.110 hours earlier. CT abdomen pelvis was unremarkable. Patient wants PEG tube out. Medicine consulted for admission.       Past Medical History:   Diagnosis Date    Anxiety     Breast cancer ([address] 02/2020    right    CAD (coronary artery disease)     Cystitis     Depression     Elizondo catheter status     Foot drop, bilateral     History of blood transfusion     History of renal calculi     Hx of blood clots     dvt    Hyperlipidemia     Hypertension 08/27/2018    Hypothyroidism     MS (multiple sclerosis) ([address]     wheelchair bound    Neurogenic bladder 08/09/2018    NSTEMI (non-ST elevated myocardial infarction) ([facility] 2019    Total self-care deficit     Unspecified cerebral artery occlusion with cerebral infarction 2011    left her incontinent    Wheelchair dependent        Past Surgical History:   Procedure FLUoxetine (PROZAC) 20 MG capsule Take 20 mg by mouth 2 times daily    Historical Provider, MD   metoprolol tartrate (LOPRESSOR) 25 MG tablet Take 12.5 mg by mouth 2 times daily     Historical Provider, MD   gabapentin (NEURONTIN) 300 MG capsule Take 300 mg by mouth 3 times daily. Historical Provider, MD   albuterol (PROVENTIL) (2.5 MG/3ML) 0.083% nebulizer solution Take 2.5 mg by nebulization every 4 hours as needed for Wheezing    Historical Provider, MD   ARTIFICIAL TEAR OINTMENT OP Place 1 drop into both eyes every 6 hours as needed (dry eyes)    Historical Provider, MD   saline nasal gel (AYR) GEL 1 each by Nasal route every 12 hours as needed for Congestion    Historical Provider, MD   magnesium hydroxide (MILK OF MAGNESIA) 400 MG/5ML suspension Take 30 mLs by mouth daily as needed for Constipation    Historical Provider, MD   nitroGLYCERIN (NITROSTAT) 0.4 MG SL tablet Place 0.4 mg under the tongue every 5 minutes as needed for Chest pain    Historical Provider, MD   anastrozole (ARIMIDEX) 1 MG tablet Take 1 mg by mouth daily    Historical Provider, MD   diazePAM (VALIUM) 2 MG tablet 2 mg by Per G Tube route 2 times daily. Historical Provider, MD   Cranberry 500 MG CAPS 1 capsule by PEG Tube route daily  Patient taking differently: Take 500 mg by mouth daily 12/16/19   Natanael Olvera MD         Allergies:  Pcn [penicillins] and Watermelon [citrullus vulgaris]    Social History:    TOBACCO:   reports that she quit smoking about 22 years ago. Her smoking use included cigarettes. She started smoking about 27 years ago. She has a 5.00 pack-year smoking history. She has never used smokeless tobacco.  ETOH:   reports no history of alcohol use. Family History:    Reviewed in detail and negative for DM, CAD, Cancer, CVA.  Positive as follows\"      Problem Relation Age of Onset    Breast Cancer Mother     Diabetes Father     Heart Disease Father     Breast Cancer Sister     Breast Cancer Maternal Aunt Breast Cancer Maternal Aunt        REVIEW OF SYSTEMS:   Pertinent positives as noted in the HPI. All other systems reviewed and negative. PHYSICAL EXAM:  BP (!) 96/59   Pulse 64   Temp 98.2 °F (36.8 °C)   Resp 18   Wt 188 lb (85.3 kg)   SpO2 94%   BMI 30.34 kg/m²   General appearance: No apparent distress, appears stated age and cooperative. HEENT: Normal cephalic, atraumatic without obvious deformity. Pupils equal, round, and reactive to light. Extra ocular muscles intact. Conjunctivae/corneas clear. Neck: Supple, with full range of motion. No jugular venous distention. Trachea midline. Respiratory: CTA  Cardiovascular: RRR  Abdomen: S/NT/ND, erythema around PEG tube site  Musculoskeletal: No clubbing, cyanosis, edema of bilateral lower extremities. Brisk capillary refill. Skin: Normal skin color. No rashes or lesions. Neurologic:  Neurovascularly intact without any focal sensory/motor deficits. Cranial nerves: II-XII intact, grossly non-focal.  Psychiatric: Alert and oriented, thought content appropriate, normal insight    Reviewed EKG and CXR personally      CBC:   Recent Labs     12/02/22  0557 12/02/22  1624   WBC 22.1* 13.4*   RBC 4.04 4.15   HGB 11.7 11.8   HCT 36.6 38.4   MCV 90.6 92.5   RDW 14.7 14.6   * 443     BMP:   Recent Labs     12/02/22  0557 12/02/22  1624    143   K 2.9* 2.8*    102   CO2 28 28   BUN 6 7   CREATININE 0.6 0.7   MG  --  2.0     LFT:  Recent Labs     12/02/22  0557 12/02/22  1624   PROT 5.8* 6.3*   ALKPHOS 101 104   ALT 5 8   AST 6 10   BILITOT 0.4 0.4     CE:  No results for input(s): Masood Merida in the last 72 hours. PT/INR:   Recent Labs     12/02/22  1624   INR 1.2     BNP: No results for input(s): BNP in the last 72 hours.   ESR:   Lab Results   Component Value Date    SEDRATE 30 (H) 12/02/2022     CRP: No results found for: CRP  D Dimer:   Lab Results   Component Value Date    DDIMER 234 12/14/2020      Folate and B12:   Lab Results Component Value Date    [id number] 415 03/09/2020   ,   Lab Results   Component Value Date    FOLATE >20.0 03/09/2020     Lactic Acid:   Lab Results   Component Value Date    LACTA 1.2 12/02/2022     Thyroid Studies:   Lab Results   Component Value Date    TSH 1.920 10/11/2022       Oupatient labs:  Lab Results   Component Value Date    CHOL 102 10/11/2022    TRIG 119 10/11/2022    HDL 32 10/11/2022    LDLCALC 46 10/11/2022    TSH 1.920 10/11/2022    INR 1.2 12/02/2022       Urinalysis:    Lab Results   Component Value Date/Time    NITRU POSITIVE 03/17/2021 03:00 AM    WBCUA PACKED 03/17/2021 03:00 AM    BACTERIA MANY 03/17/2021 03:00 AM    RBCUA 5-10 03/17/2021 03:00 AM    BLOODU MODERATE 03/17/2021 03:00 AM    SPECGRAV 1.015 03/17/2021 03:00 AM    GLUCOSEU Negative 03/17/2021 03:00 AM       Imaging:  CT ABDOMEN PELVIS W IV CONTRAST Additional Contrast? None    Result Date: 12/2/2022  EXAMINATION: CT OF THE ABDOMEN AND PELVIS WITH CONTRAST 12/2/2022 7:19 pm TECHNIQUE: CT of the abdomen and pelvis was performed with the administration of intravenous contrast. Multiplanar reformatted images are provided for review. Automated exposure control, iterative reconstruction, and/or weight based adjustment of the mA/kV was utilized to reduce the radiation dose to as low as reasonably achievable. COMPARISON: None. HISTORY: ORDERING SYSTEM PROVIDED HISTORY: peg tube with pain and surrounding erythema TECHNOLOGIST PROVIDED HISTORY: Additional Contrast?->None Reason for exam:->peg tube with pain and surrounding erythema Decision Support Exception - unselect if not a suspected or confirmed emergency medical condition->Emergency Medical Condition (MA) What reading provider will be dictating this exam?->CRC FINDINGS: Lower Chest:   Left lower lobe atelectasis. Organs: Liver, spleen, adrenal glands, pancreas and gallbladder are normal. Mild renal atrophy. GI/Bowel:   Peg tube tip terminates in the 2nd portion of the duodenum.   No evidence for adjacent cellulitis or abscess. Pelvis: Elizondo catheter within a decompressed urinary bladder. Peritoneum/Retroperitoneum: No free fluid or free air. Bones/Soft Tissues:   Unremarkable. Peg tube balloon tip within the 2nd portion of the duodenum. No adjacent cellulitis, abscess or inflammatory stranding. ASSESSMENT:  -PEG tube site infection  -Leukocytosis  -Hypokalemia  -Coronary artery disease  -Hypertension  -Hyperlipidemia  -GERD  -Anxiety disorder      PLAN:  -Admit to medicine  -Consult general surgery  -Pharmacy to dose vancomycin  -Zosyn 3.375 mg every 8 hours  -Blood cultures x2  -Monitor serum electrolytes replete as needed  -Continue home medications      Diet: No diet orders on file  Code Status: Prior  Surrogate decision maker confirmed with patient:   Extended Emergency Contact Information  Primary Emergency Contact: Jr Phone: [phone]  Relation: Child  Secondary Emergency Contact: Arnaldo Sol  [facility] [address] Phone: [phone]  Relation: Child    DVT Prophylaxis: []Lovenox []Heparin []PCD [] [address] []Encouraged ambulation  Disposition: []Med/Surg [] Intermediate [] ICU/CCU  Admit status: [] Observation [] Inpatient     +++++++++++++++++++++++++++++++++++++++++++++++++  Venda Slain, DO  +++++++++++++++++++++++++++++++++++++++++++++++++  NOTE: This report was transcribed using voice recognition software. Every effort was made to ensure accuracy; however, inadvertent computerized transcription errors may be present.

## 2022-12-03 NOTE — PROGRESS NOTES
Pharmacy Consultation Note  (Antibiotic Dosing and Monitoring)    Initial consult date: 12/2/22  Consulting physician/provider: Dr Cordelia Belle  Drug: Vancomycin  Indication: SSTI of PEG Tube    Age/  Gender Height Weight IBW  Allergy Information   67 y.o./female 167.6 cm 85.3 kg     Ideal body weight: 59.3 kg (130 lb 11.7 oz)  Adjusted ideal body weight: 69.7 kg (153 lb 10.2 oz)   Pcn [penicillins] and Watermelon [citrullus vulgaris]      Renal Function:  Recent Labs     12/02/22  0557 12/02/22  1624   BUN 6 7   CREATININE 0.6 0.7     No intake or output data in the 24 hours ending 12/02/22 2112    Vancomycin Monitoring:  Trough:  No results for input(s): VANCOTROUGH in the last 72 hours. Random:  No results for input(s): VANCORANDOM in the last 72 hours. No results for input(s): [place] in the last 72 hours. Historical Cultures:  Organism   Date Value Ref Range Status   02/03/2021 Escherichia coli (A)  Final     No results for input(s): BC in the last 72 hours. Vancomycin Administration Times:  Recent vancomycin administrations        No vancomycin IV orders with administrations found. Orders not given:            vancomycin (VANCOCIN) 1,750 mg in dextrose 5 % 500 mL IVPB    vancomycin (VANCOCIN) 1,000 mg in dextrose 5 % 250 mL IVPB ([id number])                    Assessment:  Patient is a 74 y.o. female who has been initiated on vancomycin  Estimated Creatinine Clearance: 88 mL/min (based on SCr of 0.7 mg/dL).   To dose vancomycin, pharmacy will be utilizing [employer] calculation software for goal AUC/JANE 400-600 mg/L-hr    Plan:  Vancomycin 1750mg x1 load  Will continue vancomycin 1000 mg IV every 12 hours  Will check vancomycin levels when appropriate  Will continue to monitor renal function   Pharmacy to follow      James Crabtree, [facility] 12/2/2022 9:12 PM

## 2022-12-03 NOTE — PLAN OF CARE
Problem: Skin/Tissue Integrity  Goal: Absence of new skin breakdown  Description: 1. Monitor for areas of redness and/or skin breakdown  2. Assess vascular access sites hourly  3. Every 4-6 hours minimum:  Change oxygen saturation probe site  4. Every 4-6 hours:  If on nasal continuous positive airway pressure, respiratory therapy assess nares and determine need for appliance change or resting period.   12/3/2022 1434 by Shelby Barrios RN  Outcome: Progressing  12/3/2022 0912 by Kel Byrd RN  Outcome: Progressing     Problem: Safety - Adult  Goal: Free from fall injury  12/3/2022 1434 by Shelby Barrios RN  Outcome: Progressing  12/3/2022 0912 by Kel Byrd RN  Outcome: Progressing

## 2022-12-03 NOTE — PLAN OF CARE
Problem: Skin/Tissue Integrity  Goal: Absence of new skin breakdown  Description: 1. Monitor for areas of redness and/or skin breakdown  2. Assess vascular access sites hourly  3. Every 4-6 hours minimum:  Change oxygen saturation probe site  4. Every 4-6 hours:  If on nasal continuous positive airway pressure, respiratory therapy assess nares and determine need for appliance change or resting period.   12/3/2022 1435 by Nigel Hammond RN  Outcome: Progressing  12/3/2022 1434 by Nigel Hammond RN  Outcome: Progressing  12/3/2022 0912 by Joaquin Medley RN  Outcome: Progressing     Problem: Safety - Adult  Goal: Free from fall injury  12/3/2022 1435 by Nigel Hammond RN  Outcome: Progressing  12/3/2022 1434 by Nigel Hammond RN  Outcome: Progressing  12/3/2022 0912 by Joaquin Medley RN  Outcome: Progressing

## 2022-12-03 NOTE — ED PROVIDER NOTES
Department of Emergency Medicine   ED  Provider Note  Admit Date/RoomTime: 12/2/2022  2:52 PM  ED Room: 6605/1928-B              12/2/22  8:45 PM EST    History of Present Illness:   Pam Dorman is a 74 y.o. female presenting to the ED for abd pain  Provoking factors - peg tube, no longer using  Quality - patient states her PEG tube is not working, causing pain. There is drainage and surrounding area of erythema. Region - peg tube  Severity - severe  Timing - ongoing, worsening with time  Associated symptoms - patient denies fever and chills, no nausea or vomiting      Review of Systems:   A complete review of systems was performed and pertinent positives and negatives are stated within HPI, all other systems reviewed and are negative.          --------------------------------------------- PAST HISTORY ---------------------------------------------  Past Medical History:  has a past medical history of Anxiety, Breast cancer ([facility] [address] CAD (coronary artery disease), Cystitis, Depression, Elizondo catheter status, Foot drop, bilateral, History of blood transfusion, History of renal calculi, Hx of blood clots, Hyperlipidemia, Hypertension, Hypothyroidism, MS (multiple sclerosis) ([facility] [address] Neurogenic bladder, NSTEMI (non-ST elevated myocardial infarction) ([facility] [address] Total self-care deficit, Unspecified cerebral artery occlusion with cerebral infarction, and Wheelchair dependent. Past Surgical History:  has a past surgical history that includes Hysterectomy (2001); Dental surgery; Colon surgery (2001); Upper gastrointestinal endoscopy (N/A, 12/12/2019); Colonoscopy (N/A, 2/4/2020); Mastectomy, modified radical (Right, 3/3/2020); Coronary angioplasty (2019); Gastrostomy tube placement (N/A, 11/30/2021); and Mastectomy. Social History:  reports that she quit smoking about 22 years ago. Her smoking use included cigarettes. She started smoking about 27 years ago. She has a 5.00 pack-year smoking history.  She has never used smokeless tobacco. She reports that she does not drink alcohol and does not use drugs. Family History: family history includes Breast Cancer in her maternal aunt, maternal aunt, mother, and sister; Diabetes in her father; Heart Disease in her father. Unless otherwise noted, family history is non contributory    The patients home medications have been reviewed.     Allergies: Pcn [penicillins] and Watermelon [citrullus vulgaris]    -------------------------------------------------- RESULTS -------------------------------------------------  All laboratory and radiology results have been personally reviewed by myself   LABS:  Results for orders placed or performed during the hospital encounter of 12/02/22   Blood Culture 1    Specimen: Blood   Result Value Ref Range    Blood Culture, Routine 24 Hours no growth    BMP   Result Value Ref Range    Sodium 143 132 - 146 mmol/L    Potassium 2.8 (L) 3.5 - 5.0 mmol/L    Chloride 102 98 - 107 mmol/L    CO2 28 22 - 29 mmol/L    Anion Gap 13 7 - 16 mmol/L    Glucose 88 74 - 99 mg/dL    BUN 7 6 - 23 mg/dL    Creatinine 0.7 0.5 - 1.0 mg/dL    Est, Glom Filt Rate >60 >=60 mL/min/1.73    Calcium 9.2 8.6 - 10.2 mg/dL   CBC with Auto Differential   Result Value Ref Range    WBC 13.4 (H) 4.5 - 11.5 E9/L    RBC 4.15 3.50 - 5.50 E12/L    Hemoglobin 11.8 11.5 - 15.5 g/dL    Hematocrit 38.4 34.0 - 48.0 %    MCV 92.5 80.0 - 99.9 fL    MCH 28.4 26.0 - 35.0 pg    MCHC 30.7 (L) 32.0 - 34.5 %    RDW 14.6 11.5 - 15.0 fL    Platelets 528 313 - 471 E9/L    MPV 10.1 7.0 - 12.0 fL    Neutrophils % 71.8 43.0 - 80.0 %    Immature Granulocytes % 0.4 0.0 - 5.0 %    Lymphocytes % 19.0 (L) 20.0 - 42.0 %    Monocytes % 6.8 2.0 - 12.0 %    Eosinophils % 1.6 0.0 - 6.0 %    Basophils % 0.4 0.0 - 2.0 %    Neutrophils Absolute 9.63 (H) 1.80 - 7.30 E9/L    Immature Granulocytes # 0.05 E9/L    Lymphocytes Absolute 2.55 1.50 - 4.00 E9/L    Monocytes Absolute 0.91 0.10 - 0.95 E9/L    Eosinophils Absolute 0.22 0.05 - 0.50 E9/L    Basophils Absolute 0.05 0.00 - 0.20 E9/L   Protime-INR   Result Value Ref Range    Protime 12.7 (H) 9.3 - 12.4 sec    INR 1.2    Hepatic Function Panel   Result Value Ref Range    Total Protein 6.3 (L) 6.4 - 8.3 g/dL    Albumin 3.7 3.5 - 5.2 g/dL    Alkaline Phosphatase 104 35 - 104 U/L    ALT 8 0 - 32 U/L    AST 10 0 - 31 U/L    Total Bilirubin 0.4 0.0 - 1.2 mg/dL    Bilirubin, Direct <0.2 0.0 - 0.3 mg/dL    Bilirubin, Indirect see below 0.0 - 1.0 mg/dL   Lactic Acid   Result Value Ref Range    Lactic Acid 1.2 0.5 - 2.2 mmol/L   Magnesium   Result Value Ref Range    Magnesium 2.0 1.6 - 2.6 mg/dL   Troponin   Result Value Ref Range    Troponin, High Sensitivity 15 (H) 0 - 9 ng/L   Procalcitonin   Result Value Ref Range    Procalcitonin 0.07 0.00 - 0.08 ng/mL   Comprehensive Metabolic Panel w/ Reflex to MG   Result Value Ref Range    Sodium 142 132 - 146 mmol/L    Potassium reflex Magnesium 4.0 3.5 - 5.0 mmol/L    Chloride 103 98 - 107 mmol/L    CO2 25 22 - 29 mmol/L    Anion Gap 14 7 - 16 mmol/L    Glucose 108 (H) 74 - 99 mg/dL    BUN 8 6 - 23 mg/dL    Creatinine 0.6 0.5 - 1.0 mg/dL    Est, Glom Filt Rate >60 >=60 mL/min/1.73    Calcium 9.2 8.6 - 10.2 mg/dL    Total Protein 6.0 (L) 6.4 - 8.3 g/dL    Albumin 3.3 (L) 3.5 - 5.2 g/dL    Total Bilirubin 0.4 0.0 - 1.2 mg/dL    Alkaline Phosphatase 98 35 - 104 U/L    ALT 7 0 - 32 U/L    AST 9 0 - 31 U/L   CBC with Auto Differential   Result Value Ref Range    WBC 13.1 (H) 4.5 - 11.5 E9/L    RBC 4.10 3.50 - 5.50 E12/L    Hemoglobin 11.5 11.5 - 15.5 g/dL    Hematocrit 37.2 34.0 - 48.0 %    MCV 90.7 80.0 - 99.9 fL    MCH 28.0 26.0 - 35.0 pg    MCHC 30.9 (L) 32.0 - 34.5 %    RDW 14.6 11.5 - 15.0 fL    Platelets 129 (H) 786 - 450 E9/L    MPV 10.2 7.0 - 12.0 fL    Neutrophils % 76.5 43.0 - 80.0 %    Immature Granulocytes % 0.6 0.0 - 5.0 %    Lymphocytes % 14.5 (L) 20.0 - 42.0 %    Monocytes % 6.9 2.0 - 12.0 %    Eosinophils % 1.1 0.0 - 6.0 %    Basophils % 0.4 0.0 - 2.0 %    Neutrophils Absolute 10.00 (H) 1.80 - 7.30 E9/L    Immature Granulocytes # 0.08 E9/L    Lymphocytes Absolute 1.89 1.50 - 4.00 E9/L    Monocytes Absolute 0.90 0.10 - 0.95 E9/L    Eosinophils Absolute 0.14 0.05 - 0.50 E9/L    Basophils Absolute 0.05 0.00 - 0.20 E9/L   EKG 12 Lead   Result Value Ref Range    Ventricular Rate 64 BPM    Atrial Rate 64 BPM    P-R Interval 220 ms    QRS Duration 74 ms    Q-T Interval 456 ms    QTc Calculation (Bazett) 470 ms    P Axis 56 degrees    R Axis -23 degrees    T Axis 58 degrees       RADIOLOGY:  Interpreted by Radiologist.  CT ABDOMEN PELVIS W IV CONTRAST Additional Contrast? None   Final Result   Peg tube balloon tip within the 2nd portion of the duodenum. No adjacent   cellulitis, abscess or inflammatory stranding.             ------------------------- NURSING NOTES AND VITALS REVIEWED ---------------------------   The nursing notes within the ED encounter and vital signs as below have been reviewed. /77   Pulse 81   Temp 96.9 °F (36.1 °C) (Temporal)   Resp 16   Ht 5' 6\" (1.676 m)   Wt 188 lb (85.3 kg)   SpO2 93%   BMI 30.34 kg/m²   Oxygen Saturation Interpretation: Normal      ---------------------------------------------------PHYSICAL EXAM--------------------------------------    Constitutional/General: Alert and oriented x3, well appearing, non toxic in NAD  Head: Normocephalic and atraumatic  Eyes: PERRL, EOMI, conjunctiva normal, sclera non icteric  Mouth: Oropharynx clear, handling secretions, no trismus, no asymmetry of the posterior oropharynx or uvular edema  Neck: Supple, full ROM, non tender to palpation in the midline, no stridor, no crepitus, no meningeal signs  Respiratory: Lungs clear to auscultation bilaterally, no wheezes, rales, or rhonchi. Not in respiratory distress  Cardiovascular:  Regular rate. Regular rhythm. No murmurs, gallops, or rubs.  2+ distal pulses  Chest: No chest wall tenderness  GI:  Abdomen Soft, Non tender, Non distended. +BS. No organomegaly, no palpable masses,  No rebound, guarding, or rigidity. Patient with PEG tube to epigastrium, area with erythema and macerated tissue surrounding  Musculoskeletal: Moves all extremities x 4. Warm and well perfused, no clubbing, cyanosis, or edema. Capillary refill <3 seconds  Integument: skin warm and dry. No rashes.    Lymphatic: no lymphadenopathy noted  Neurologic: GCS 15,   Psychiatric: Normal Affect      ------------------------------ ED COURSE/MEDICAL DECISION MAKING----------------------  Medications   acetaminophen (TYLENOL) tablet 1,000 mg (has no administration in time range)   cefepime (MAXIPIME) 2,000 mg in sodium chloride 0.9 % 50 mL IVPB ([id number]) (2,000 mg IntraVENous New Bag 12/4/22 1030)   potassium chloride (KLOR-CON M) extended release tablet 40 mEq ( Oral See Alternative 12/3/22 0311)     Or   potassium bicarb-citric acid (EFFER-K) effervescent tablet 40 mEq (40 mEq Oral Given 12/3/22 0311)     Or   potassium chloride 10 mEq/100 mL IVPB (Peripheral Line) ( IntraVENous See Alternative 12/3/22 0311)   albuterol (PROVENTIL) nebulizer solution 2.5 mg (has no administration in time range)   anastrozole (ARIMIDEX) tablet 1 mg (1 mg Oral Given 12/4/22 1033)   atorvastatin (LIPITOR) tablet 80 mg (80 mg Oral Given 12/3/22 2042)   cetirizine (ZYRTEC) tablet 10 mg (10 mg Oral Given 12/4/22 1034)   FLUoxetine (PROZAC) capsule 20 mg (20 mg Oral Given 12/4/22 1034)   gabapentin (NEURONTIN) capsule 300 mg (300 mg Oral Given 12/4/22 1033)   levothyroxine (SYNTHROID) tablet 125 mcg (125 mcg Oral Given 12/4/22 0554)   pantoprazole (PROTONIX) tablet 40 mg (40 mg Oral Given 12/4/22 0554)   ticagrelor (BRILINTA) tablet 60 mg (60 mg Oral Given 12/4/22 1033)   sodium chloride flush 0.9 % injection 10 mL (10 mLs IntraVENous Given 12/4/22 1034)   sodium chloride flush 0.9 % injection 10 mL (has no administration in time range)   0.9 % sodium chloride infusion (has no administration in time range)   enoxaparin (LOVENOX) injection 40 mg (40 mg SubCUTAneous Given 12/4/22 1034)   promethazine (PHENERGAN) tablet 12.5 mg (has no administration in time range)     Or   ondansetron (ZOFRAN) injection 4 mg (has no administration in time range)   polyethylene glycol (GLYCOLAX) packet 17 g (has no administration in time range)   acetaminophen (TYLENOL) tablet 650 mg (has no administration in time range)     Or   acetaminophen (TYLENOL) suppository 650 mg (has no administration in time range)   clindamycin (CLEOCIN) 600 mg in dextrose 5 % 50 mL IVPB (600 mg IntraVENous Not Given 12/3/22 0450)   vancomycin (VANCOCIN) 1,000 mg in dextrose 5 % 250 mL IVPB ([id number]) (0 mg IntraVENous Stopped 12/4/22 0754)   white petrolatum ointment (has no administration in time range)   neomycin-bacitracin-polymyxin (NEOSPORIN) ointment (1 g Topical Given 12/4/22 1037)   iopamidol (ISOVUE-370) 76 % injection 75 mL (75 mLs IntraVENous Given 12/2/22 1925)   vancomycin (VANCOCIN) 1,750 mg in dextrose 5 % 500 mL IVPB (0 mg IntraVENous Stopped 12/3/22 0755)         Medical Decision Making:    Patient was seen and examined, She would like PEG tube removed. Patient had IV placed, labs drawn and imaging of PEG. Patient was started on Clindamycin for cellulitis and she will be admitted to medicine. Counseling: The emergency provider has spoken with the patient and family member patient and son and discussed todays results, in addition to providing specific details for the plan of care and counseling regarding the diagnosis and prognosis. Questions are answered at this time and they are agreeable with the plan.      --------------------------------- IMPRESSION AND DISPOSITION ---------------------------------    IMPRESSION  1.  Cellulitis, unspecified cellulitis site        DISPOSITION  Disposition: Admit to med/surg floor  Patient condition is stable               Raphael Luu MD  12/04/22 9767

## 2022-12-03 NOTE — PROGRESS NOTES
Pharmacy Consultation Note  (Antibiotic Dosing and Monitoring)    Initial consult date: 12/2/22  Consulting physician/provider: Dr Babs Richards  Drug: Vancomycin  Indication: SSTI of PEG Tube    Age/  Gender Height Weight IBW  Allergy Information   67 y.o./female 167.6 cm 85.3 kg     Ideal body weight: 59.3 kg (130 lb 11.7 oz)  Adjusted ideal body weight: 69.7 kg (153 lb 10.2 oz)   Pcn [penicillins] and Watermelon [citrullus vulgaris]      Renal Function:  Recent Labs     12/02/22  0557 12/02/22  1624 12/03/22  0527   BUN 6 7 8   CREATININE 0.6 0.7 0.6       No intake or output data in the 24 hours ending 12/03/22 0619    Vancomycin Monitoring:  Trough:  No results for input(s): VANCOTROUGH in the last 72 hours. Random:  No results for input(s): VANCORANDOM in the last 72 hours. No results for input(s): Ladell Chicas in the last 72 hours. Historical Cultures:  Organism   Date Value Ref Range Status   02/03/2021 Escherichia coli (A)  Final     No results for input(s): BC in the last 72 hours. Vancomycin Administration Times:  Recent vancomycin administrations        No vancomycin IV orders with administrations found. Orders not given:            vancomycin (VANCOCIN) 1,750 mg in dextrose 5 % 500 mL IVPB    vancomycin (VANCOCIN) 1,000 mg in dextrose 5 % 250 mL IVPB ([id number])                    Assessment:  Patient is a 74 y.o. female who has been initiated on vancomycin  Estimated Creatinine Clearance: 103 mL/min (based on SCr of 0.6 mg/dL). To dose vancomycin, pharmacy will be utilizing [id number] calculation software for goal AUC/JANE 400-600 mg/L-hr    Plan:   Will continue vancomycin 1000 mg IV every 12 hours  Will check vancomycin levels when appropriate  Will continue to monitor renal function   Pharmacy to follow    Edilberto Brush PharmD, [facility], [facility] 12/3/2022 6:19 AM

## 2022-12-03 NOTE — ED NOTES
Patient's 2nd bag of potassium finished infusing. Patient refuses to receive the 2 bags of potassium left to infuse due to burning. Dr Enzo Oreilly notified.       Carmencita Mathew RN  12/03/22 6800

## 2022-12-04 LAB
ACINETOBACTER CALCOAC BAUMANNII COMPLEX BY PCR: NOT DETECTED
BACTEROIDES FRAGILIS BY PCR: NOT DETECTED
BOTTLE TYPE: ABNORMAL
CANDIDA ALBICANS BY PCR: NOT DETECTED
CANDIDA AURIS BY PCR: NOT DETECTED
CANDIDA GLABRATA BY PCR: NOT DETECTED
CANDIDA KRUSEI BY PCR: NOT DETECTED
CANDIDA PARAPSILOSIS BY PCR: NOT DETECTED
CANDIDA TROPICALIS BY PCR: NOT DETECTED
CRYPTOCOCCUS NEOFORMANS/GATTII BY PCR: NOT DETECTED
EKG ATRIAL RATE: 64 BPM
EKG P AXIS: 56 DEGREES
EKG P-R INTERVAL: 220 MS
EKG Q-T INTERVAL: 456 MS
EKG QRS DURATION: 74 MS
EKG QTC CALCULATION (BAZETT): 470 MS
EKG R AXIS: -23 DEGREES
EKG T AXIS: 58 DEGREES
EKG VENTRICULAR RATE: 64 BPM
ENTEROBACTER CLOACAE COMPLEX BY PCR: NOT DETECTED
ENTEROBACTERALES BY PCR: NOT DETECTED
ENTEROCOCCUS FAECALIS BY PCR: NOT DETECTED
ENTEROCOCCUS FAECIUM BY PCR: NOT DETECTED
ESCHERICHIA COLI BY PCR: NOT DETECTED
HAEMOPHILUS INFLUENZAE BY PCR: NOT DETECTED
KLEBSIELLA AEROGENES BY PCR: NOT DETECTED
KLEBSIELLA OXYTOCA BY PCR: NOT DETECTED
KLEBSIELLA PNEUMONIAE GROUP BY PCR: NOT DETECTED
LISTERIA MONOCYTOGENES BY PCR: NOT DETECTED
METHICILLIN RESISTANCE MECA/C  BY PCR: DETECTED
NEISSERIA MENINGITIDIS BY PCR: NOT DETECTED
ORDER NUMBER: ABNORMAL
PROTEUS SPECIES BY PCR: NOT DETECTED
PSEUDOMONAS AERUGINOSA BY PCR: NOT DETECTED
SALMONELLA SPECIES BY PCR: NOT DETECTED
SERRATIA MARCESCENS BY PCR: NOT DETECTED
SOURCE OF BLOOD CULTURE: ABNORMAL
STAPHYLOCOCCUS AUREUS BY PCR: NOT DETECTED
STAPHYLOCOCCUS EPIDERMIDIS BY PCR: DETECTED
STAPHYLOCOCCUS LUGDUNENSIS BY PCR: NOT DETECTED
STAPHYLOCOCCUS SPECIES BY PCR: DETECTED
STENOTROPHOMONAS MALTOPHILIA BY PCR: NOT DETECTED
STREPTOCOCCUS AGALACTIAE BY PCR: NOT DETECTED
STREPTOCOCCUS PNEUMONIAE BY PCR: NOT DETECTED
STREPTOCOCCUS PYOGENES  BY PCR: NOT DETECTED
STREPTOCOCCUS SPECIES BY PCR: NOT DETECTED

## 2022-12-04 PROCEDURE — 1200000000 HC SEMI PRIVATE

## 2022-12-04 PROCEDURE — 6370000000 HC RX 637 (ALT 250 FOR IP): Performed by: STUDENT IN AN ORGANIZED HEALTH CARE EDUCATION/TRAINING PROGRAM

## 2022-12-04 PROCEDURE — 2580000003 HC RX 258: Performed by: FAMILY MEDICINE

## 2022-12-04 PROCEDURE — 6360000002 HC RX W HCPCS: Performed by: FAMILY MEDICINE

## 2022-12-04 PROCEDURE — 6370000000 HC RX 637 (ALT 250 FOR IP): Performed by: FAMILY MEDICINE

## 2022-12-04 PROCEDURE — 93010 ELECTROCARDIOGRAM REPORT: CPT | Performed by: INTERNAL MEDICINE

## 2022-12-04 RX ADMIN — GABAPENTIN 300 MG: 300 CAPSULE ORAL at 14:12

## 2022-12-04 RX ADMIN — GABAPENTIN 300 MG: 300 CAPSULE ORAL at 20:48

## 2022-12-04 RX ADMIN — SODIUM CHLORIDE, PRESERVATIVE FREE 10 ML: 5 INJECTION INTRAVENOUS at 10:34

## 2022-12-04 RX ADMIN — SODIUM CHLORIDE, PRESERVATIVE FREE 10 ML: 5 INJECTION INTRAVENOUS at 19:44

## 2022-12-04 RX ADMIN — TICAGRELOR 60 MG: 60 TABLET ORAL at 10:33

## 2022-12-04 RX ADMIN — FLUOXETINE 20 MG: 20 CAPSULE ORAL at 10:34

## 2022-12-04 RX ADMIN — CEFEPIME 2000 MG: 2 INJECTION, POWDER, FOR SOLUTION INTRAVENOUS at 20:49

## 2022-12-04 RX ADMIN — ATORVASTATIN CALCIUM 80 MG: 40 TABLET, FILM COATED ORAL at 20:48

## 2022-12-04 RX ADMIN — CETIRIZINE HYDROCHLORIDE 10 MG: 10 TABLET, FILM COATED ORAL at 10:34

## 2022-12-04 RX ADMIN — GABAPENTIN 300 MG: 300 CAPSULE ORAL at 10:33

## 2022-12-04 RX ADMIN — ANASTROZOLE 1 MG: 1 TABLET ORAL at 10:33

## 2022-12-04 RX ADMIN — TICAGRELOR 60 MG: 60 TABLET ORAL at 23:39

## 2022-12-04 RX ADMIN — ENOXAPARIN SODIUM 40 MG: 100 INJECTION SUBCUTANEOUS at 10:34

## 2022-12-04 RX ADMIN — VANCOMYCIN HYDROCHLORIDE 1000 MG: 1 INJECTION, POWDER, LYOPHILIZED, FOR SOLUTION INTRAVENOUS at 19:43

## 2022-12-04 RX ADMIN — PANTOPRAZOLE SODIUM 40 MG: 40 TABLET, DELAYED RELEASE ORAL at 05:54

## 2022-12-04 RX ADMIN — CEFEPIME 2000 MG: 2 INJECTION, POWDER, FOR SOLUTION INTRAVENOUS at 10:30

## 2022-12-04 RX ADMIN — LEVOTHYROXINE SODIUM 125 MCG: 0.12 TABLET ORAL at 05:54

## 2022-12-04 RX ADMIN — FLUOXETINE 20 MG: 20 CAPSULE ORAL at 20:48

## 2022-12-04 RX ADMIN — BACITRACIN ZINC, NEOMYCIN SULFATE, POLYMYXIN B SULFATE 1 G: 3.5; 5000; 4 OINTMENT TOPICAL at 10:37

## 2022-12-04 RX ADMIN — VANCOMYCIN HYDROCHLORIDE 1000 MG: 1 INJECTION, POWDER, LYOPHILIZED, FOR SOLUTION INTRAVENOUS at 05:56

## 2022-12-04 RX ADMIN — ACETAMINOPHEN 650 MG: 325 TABLET, FILM COATED ORAL at 21:22

## 2022-12-04 ASSESSMENT — PAIN DESCRIPTION - LOCATION: LOCATION: BACK

## 2022-12-04 ASSESSMENT — PAIN DESCRIPTION - DESCRIPTORS: DESCRIPTORS: ACHING;DISCOMFORT

## 2022-12-04 ASSESSMENT — PAIN DESCRIPTION - ORIENTATION: ORIENTATION: LOWER

## 2022-12-04 ASSESSMENT — PAIN DESCRIPTION - PAIN TYPE: TYPE: CHRONIC PAIN

## 2022-12-04 ASSESSMENT — PAIN SCALES - GENERAL: PAINLEVEL_OUTOF10: 8

## 2022-12-04 NOTE — PROGRESS NOTES
Pharmacy Consultation Note  (Antibiotic Dosing and Monitoring)    Initial consult date: 12/2/22  Consulting physician/provider: Dr Dick Velazquez  Drug: Vancomycin  Indication: SSTI of PEG Tube    Age/  Gender Height Weight IBW  Allergy Information   67 y.o./female 167.6 cm 85.3 kg     Ideal body weight: 59.3 kg (130 lb 11.7 oz)  Adjusted ideal body weight: 69.7 kg (153 lb 10.2 oz)   Pcn [penicillins] and Watermelon [citrullus vulgaris]      Renal Function:  Recent Labs     12/02/22  0557 12/02/22  1624 12/03/22  0527   BUN 6 7 8   CREATININE 0.6 0.7 0.6         Intake/Output Summary (Last 24 hours) at 12/4/2022 0729  Last data filed at 12/4/2022 0703  Gross per 24 hour   Intake --   Output 1500 ml   Net -1500 ml       Vancomycin Monitoring:  Trough:  No results for input(s): VANCOTROUGH in the last 72 hours. Random:  No results for input(s): VANCORANDOM in the last 72 hours. No results for input(s): Vonita Abelardo in the last 72 hours. Historical Cultures:  Organism   Date Value Ref Range Status   02/03/2021 Escherichia coli (A)  Final     Recent Labs     12/02/22  1624   BC 24 Hours no growth       Vancomycin Administration Times:  Recent vancomycin administrations                     vancomycin (VANCOCIN) 1,000 mg in dextrose 5 % 250 mL IVPB ([id number]) (mg) 1,000 mg New Bag 12/04/22 0556     1,000 mg New Bag 12/03/22 1927    vancomycin (VANCOCIN) 1,750 mg in dextrose 5 % 500 mL IVPB (mg) 1,750 mg New Bag 12/03/22 0513          Assessment:  Patient is a 74 y.o. female who has been initiated on vancomycin  Estimated Creatinine Clearance: 103 mL/min (based on SCr of 0.6 mg/dL).   To dose vancomycin, pharmacy will be utilizing [employer] calculation software for goal AUC/JANE 400-600 mg/L-hr  Random level 12/5 with morning labs  Vancomycin 1000 mg q12h (Predicted AUC/JANE = 482, Tr = 15.2)    Plan:  Continue vancomycin 1000 mg IV every 12 hours  Will check vancomycin levels when appropriate  Will continue to monitor renal function   Pharmacy to follow    Thank you for this consult,    Tre Ramirez, PharmD 12/4/2022 7:30 AM

## 2022-12-04 NOTE — PROGRESS NOTES
General Surgery Progress Note    Surgeon:  Dr. Aakash Rene  Subjective:   No events overnight    /77   Pulse 81   Temp 96.9 °F (36.1 °C) (Temporal)   Resp 16   Ht 5' 6\" (1.676 m)   Wt 188 lb (85.3 kg)   SpO2 93%   BMI 30.34 kg/m²      General:  no acute distress  Lungs:  non-labored breathing  Heart:   Pulse is regular  Abdomen:  soft, previous PEG site dressing dry, ontender, nondistended, no guarding/rebound tenderness    ASSESSMENT / PLAN:   Continue daily dressing changes  Will sign off, available as needed    Dalton Pulliam MD  12/4/2022  11:10 AM

## 2022-12-04 NOTE — PROGRESS NOTES
Hospitalist Progress Note      SYNOPSIS: Patient admitted on 2022 for Cellulitis  Ms. Hugh Craig, a 74y.o. year old female  who  has a past medical history of Anxiety, Breast cancer ([facility] [address] CAD (coronary artery disease), Cystitis, Depression, Elizondo catheter status, Foot drop, bilateral, History of blood transfusion, History of renal calculi, Hx of blood clots, Hyperlipidemia, Hypertension, Hypothyroidism, MS (multiple sclerosis) ([facility] [address] Neurogenic bladder, NSTEMI (non-ST elevated myocardial infarction) ([facility] [address] Total self-care deficit, Unspecified cerebral artery occlusion with cerebral infarction, and Wheelchair dependent. Patient presented to the emergency department with complaint of abdominal pain and infection of PEG tube site. Patient is no longer using PEG tube and is currently tolerating oral intake. SUBJECTIVE:    Patient seen and examined  Records reviewed. Stable overnight. No other overnight issues reported. Temp (24hrs), Av.6 °F (36.4 °C), Min:96.9 °F (36.1 °C), Max:98.2 °F (36.8 °C)    DIET: ADULT DIET; Regular  CODE: Full Code    Intake/Output Summary (Last 24 hours) at 2022 1622  Last data filed at 2022 0703  Gross per 24 hour   Intake --   Output 1500 ml   Net -1500 ml         OBJECTIVE:    /77   Pulse 81   Temp 96.9 °F (36.1 °C) (Temporal)   Resp 16   Ht 5' 6\" (1.676 m)   Wt 188 lb (85.3 kg)   SpO2 93%   BMI 30.34 kg/m²     General appearance: No apparent distress, appears stated age and cooperative. HEENT:  Conjunctivae/corneas clear. Neck: Supple. No jugular venous distention. Respiratory: Clear to auscultation bilaterally, normal respiratory effort  Cardiovascular: Regular rate rhythm, normal S1-S2  Abdomen: Soft, nontender, non-distended, former PEG tube incision wound site looks clean,  Musculoskeletal: No clubbing, cyanosis, no bilateral lower extremity edema. Brisk capillary refill.    Skin:  No rashes  on visible skin  Neurologic: awake, alert and following commands     ASSESSMENT:  -PEG tube site infection  - Bacteremia.    -Hypokalemia  -Coronary artery disease  -Hypertension  -Hyperlipidemia  -GERD  -Anxiety disorder  Hypothyroidism       PLAN:  Blood cultures are growing gram-positive cocci in clusters x2. Will consult infectious disease. Patient is currently on IV vancomycin and cefepime. General surgeon has signed off as no surgical intervention is recommended at this time.          DISPOSITION: home    Medications:  REVIEWED DAILY    Infusion Medications    sodium chloride       Scheduled Medications    neomycin-bacitracin-polymyxin   Topical Daily    cefepime  2,000 mg IntraVENous Q12H    anastrozole  1 mg Oral Daily    atorvastatin  80 mg Oral Nightly    cetirizine  10 mg Oral Daily    FLUoxetine  20 mg Oral BID    gabapentin  300 mg Oral TID    levothyroxine  125 mcg Oral Daily    pantoprazole  40 mg Oral QAM AC    ticagrelor  60 mg Oral BID    sodium chloride flush  10 mL IntraVENous 2 times per day    enoxaparin  40 mg SubCUTAneous Daily    clindamycin (CLEOCIN) IV  600 mg IntraVENous Once    vancomycin  1,000 mg IntraVENous Q12H     PRN Meds: white petrolatum, potassium chloride **OR** potassium alternative oral replacement **OR** potassium chloride, albuterol, sodium chloride flush, sodium chloride, promethazine **OR** ondansetron, polyethylene glycol, acetaminophen **OR** acetaminophen    Labs:     Recent Labs     12/02/22  0557 12/02/22  1624 12/03/22  0527   WBC 22.1* 13.4* 13.1*   HGB 11.7 11.8 11.5   HCT 36.6 38.4 37.2   * 443 451*         Recent Labs     12/02/22  0557 12/02/22  1624 12/03/22  0527    143 142   K 2.9* 2.8* 4.0    102 103   CO2 28 28 25   BUN 6 7 8   CREATININE 0.6 0.7 0.6   CALCIUM 8.9 9.2 9.2         Recent Labs     12/02/22  0557 12/02/22  1624 12/03/22  0527   PROT 5.8* 6.3* 6.0*   ALKPHOS 101 104 98   ALT 5 8 7   AST 6 10 9   BILITOT 0.4 0.4 0.4         Recent Labs 12/02/22  1624   INR 1.2         No results for input(s): CKTOTAL, TROPONINI in the last 72 hours. Chronic labs:    Lab Results   Component Value Date    CHOL 102 10/11/2022    TRIG 119 10/11/2022    HDL 32 10/11/2022    LDLCALC 46 10/11/2022    TSH 1.920 10/11/2022    INR 1.2 12/02/2022       Radiology: REVIEWED DAILY    +++++++++++++++++++++++++++++++++++++++++++++++++  Truman Jimenez MD  [facility], [place] [facility] - [address] [place] [address] Rd, [address]  +++++++++++++++++++++++++++++++++++++++++++++++++  NOTE: This report was transcribed using voice recognition software. Every effort was made to ensure accuracy; however, inadvertent computerized transcription errors may be present.

## 2022-12-04 NOTE — PROGRESS NOTES
Hospitalist Progress Note      SYNOPSIS: Patient admitted on 2022 for Cellulitis  Ms. Sean Gamboa, a 74y.o. year old female  who  has a past medical history of Anxiety, Breast cancer ([facility] [address] CAD (coronary artery disease), Cystitis, Depression, Elizondo catheter status, Foot drop, bilateral, History of blood transfusion, History of renal calculi, Hx of blood clots, Hyperlipidemia, Hypertension, Hypothyroidism, MS (multiple sclerosis) ([facility] [address] Neurogenic bladder, NSTEMI (non-ST elevated myocardial infarction) ([facility] [address] Total self-care deficit, Unspecified cerebral artery occlusion with cerebral infarction, and Wheelchair dependent. Patient presented to the emergency department with complaint of abdominal pain and infection of PEG tube site. Patient is no longer using PEG tube and is currently tolerating oral intake. SUBJECTIVE:    Patient seen and examined  Records reviewed. Stable overnight. No other overnight issues reported. Temp (24hrs), Av.9 °F (36.6 °C), Min:97.7 °F (36.5 °C), Max:98.1 °F (36.7 °C)    DIET: ADULT DIET; Regular  CODE: Full Code    Intake/Output Summary (Last 24 hours) at 12/3/2022 1909  Last data filed at 12/3/2022 0706  Gross per 24 hour   Intake --   Output 700 ml   Net -700 ml       OBJECTIVE:    /63   Pulse 84   Temp 97.7 °F (36.5 °C) (Oral)   Resp 18   Ht 5' 6\" (1.676 m)   Wt 188 lb (85.3 kg)   SpO2 93%   BMI 30.34 kg/m²     General appearance: No apparent distress, appears stated age and cooperative. HEENT:  Conjunctivae/corneas clear. Neck: Supple. No jugular venous distention. Respiratory: Clear to auscultation bilaterally, normal respiratory effort  Cardiovascular: Regular rate rhythm, normal S1-S2  Abdomen: Soft, nontender, non-distended, former PEG tube incision wound site looks clean,  Musculoskeletal: No clubbing, cyanosis, no bilateral lower extremity edema. Brisk capillary refill.    Skin:  No rashes  on visible skin  Neurologic: awake, alert and following commands     ASSESSMENT:  -PEG tube site infection  -Leukocytosis  -Hypokalemia  -Coronary artery disease  -Hypertension  -Hyperlipidemia  -GERD  -Anxiety disorder        PLAN:  -Seen by general surgeon, wound cleaned and dressed. Continue IV antibiotics- Vanc and Cefepime. Follow blood cultures  Anticipate discharge in next 24 to 48 hours. DISPOSITION: home    Medications:  REVIEWED DAILY    Infusion Medications    sodium chloride       Scheduled Medications    neomycin-bacitracin-polymyxin   Topical Daily    cefepime  2,000 mg IntraVENous Q12H    anastrozole  1 mg Oral Daily    atorvastatin  80 mg Oral Nightly    cetirizine  10 mg Oral Daily    FLUoxetine  20 mg Oral BID    gabapentin  300 mg Oral TID    levothyroxine  125 mcg Oral Daily    pantoprazole  40 mg Oral QAM AC    ticagrelor  60 mg Oral BID    sodium chloride flush  10 mL IntraVENous 2 times per day    enoxaparin  40 mg SubCUTAneous Daily    clindamycin (CLEOCIN) IV  600 mg IntraVENous Once    vancomycin  1,000 mg IntraVENous Q12H     PRN Meds: white petrolatum, potassium chloride **OR** potassium alternative oral replacement **OR** potassium chloride, albuterol, sodium chloride flush, sodium chloride, promethazine **OR** ondansetron, polyethylene glycol, acetaminophen **OR** acetaminophen    Labs:     Recent Labs     12/02/22  0557 12/02/22  1624 12/03/22  0527   WBC 22.1* 13.4* 13.1*   HGB 11.7 11.8 11.5   HCT 36.6 38.4 37.2   * 443 451*       Recent Labs     12/02/22  0557 12/02/22  1624 12/03/22  0527    143 142   K 2.9* 2.8* 4.0    102 103   CO2 28 28 25   BUN 6 7 8   CREATININE 0.6 0.7 0.6   CALCIUM 8.9 9.2 9.2       Recent Labs     12/02/22  0557 12/02/22  1624 12/03/22  0527   PROT 5.8* 6.3* 6.0*   ALKPHOS 101 104 98   ALT 5 8 7   AST 6 10 9   BILITOT 0.4 0.4 0.4       Recent Labs     12/02/22  1624   INR 1.2       No results for input(s): Edwin Gubler in the last 72 hours.     Chronic labs:    Lab Results   Component Value Date    CHOL 102 10/11/2022    TRIG 119 10/11/2022    HDL 32 10/11/2022    LDLCALC 46 10/11/2022    TSH 1.920 10/11/2022    INR 1.2 12/02/2022       Radiology: REVIEWED DAILY    +++++++++++++++++++++++++++++++++++++++++++++++++  Patricia Sumner MD  [facility] Physician - [address] [place] [address]  +++++++++++++++++++++++++++++++++++++++++++++++++  NOTE: This report was transcribed using voice recognition software. Every effort was made to ensure accuracy; however, inadvertent computerized transcription errors may be present.

## 2022-12-05 LAB
ANION GAP SERPL CALCULATED.3IONS-SCNC: 13 MMOL/L (ref 7–16)
BASOPHILS ABSOLUTE: 0.05 E9/L (ref 0–0.2)
BASOPHILS RELATIVE PERCENT: 0.6 % (ref 0–2)
BUN BLDV-MCNC: 9 MG/DL (ref 6–23)
CALCIUM SERPL-MCNC: 8.7 MG/DL (ref 8.6–10.2)
CHLORIDE BLD-SCNC: 110 MMOL/L (ref 98–107)
CO2: 22 MMOL/L (ref 22–29)
CREAT SERPL-MCNC: 0.7 MG/DL (ref 0.5–1)
EOSINOPHILS ABSOLUTE: 0.25 E9/L (ref 0.05–0.5)
EOSINOPHILS RELATIVE PERCENT: 3 % (ref 0–6)
GFR SERPL CREATININE-BSD FRML MDRD: >60 ML/MIN/1.73
GLUCOSE BLD-MCNC: 100 MG/DL (ref 74–99)
HCT VFR BLD CALC: 35.8 % (ref 34–48)
HEMOGLOBIN: 11.1 G/DL (ref 11.5–15.5)
IMMATURE GRANULOCYTES #: 0.03 E9/L
IMMATURE GRANULOCYTES %: 0.4 % (ref 0–5)
LYMPHOCYTES ABSOLUTE: 1.65 E9/L (ref 1.5–4)
LYMPHOCYTES RELATIVE PERCENT: 19.6 % (ref 20–42)
MCH RBC QN AUTO: 28.5 PG (ref 26–35)
MCHC RBC AUTO-ENTMCNC: 31 % (ref 32–34.5)
MCV RBC AUTO: 92 FL (ref 80–99.9)
MONOCYTES ABSOLUTE: 0.71 E9/L (ref 0.1–0.95)
MONOCYTES RELATIVE PERCENT: 8.4 % (ref 2–12)
NEUTROPHILS ABSOLUTE: 5.74 E9/L (ref 1.8–7.3)
NEUTROPHILS RELATIVE PERCENT: 68 % (ref 43–80)
PDW BLD-RTO: 14.6 FL (ref 11.5–15)
PLATELET # BLD: 422 E9/L (ref 130–450)
PMV BLD AUTO: 10.2 FL (ref 7–12)
POTASSIUM SERPL-SCNC: 3.1 MMOL/L (ref 3.5–5)
RBC # BLD: 3.89 E12/L (ref 3.5–5.5)
SODIUM BLD-SCNC: 145 MMOL/L (ref 132–146)
VANCOMYCIN RANDOM: 15.6 MCG/ML (ref 5–40)
WBC # BLD: 8.4 E9/L (ref 4.5–11.5)

## 2022-12-05 PROCEDURE — 87070 CULTURE OTHR SPECIMN AEROBIC: CPT

## 2022-12-05 PROCEDURE — 87186 SC STD MICRODIL/AGAR DIL: CPT

## 2022-12-05 PROCEDURE — 2580000003 HC RX 258: Performed by: FAMILY MEDICINE

## 2022-12-05 PROCEDURE — 6370000000 HC RX 637 (ALT 250 FOR IP): Performed by: STUDENT IN AN ORGANIZED HEALTH CARE EDUCATION/TRAINING PROGRAM

## 2022-12-05 PROCEDURE — 1200000000 HC SEMI PRIVATE

## 2022-12-05 PROCEDURE — 80048 BASIC METABOLIC PNL TOTAL CA: CPT

## 2022-12-05 PROCEDURE — 85025 COMPLETE CBC W/AUTO DIFF WBC: CPT

## 2022-12-05 PROCEDURE — 87106 FUNGI IDENTIFICATION YEAST: CPT

## 2022-12-05 PROCEDURE — 6370000000 HC RX 637 (ALT 250 FOR IP): Performed by: FAMILY MEDICINE

## 2022-12-05 PROCEDURE — 36415 COLL VENOUS BLD VENIPUNCTURE: CPT

## 2022-12-05 PROCEDURE — 87077 CULTURE AEROBIC IDENTIFY: CPT

## 2022-12-05 PROCEDURE — 6360000002 HC RX W HCPCS: Performed by: FAMILY MEDICINE

## 2022-12-05 PROCEDURE — 87040 BLOOD CULTURE FOR BACTERIA: CPT

## 2022-12-05 PROCEDURE — 80202 ASSAY OF VANCOMYCIN: CPT

## 2022-12-05 RX ORDER — POTASSIUM CHLORIDE 7.45 MG/ML
10 INJECTION INTRAVENOUS
Status: ACTIVE | OUTPATIENT
Start: 2022-12-05 | End: 2022-12-06

## 2022-12-05 RX ADMIN — ACETAMINOPHEN 650 MG: 325 TABLET, FILM COATED ORAL at 10:15

## 2022-12-05 RX ADMIN — FLUOXETINE 20 MG: 20 CAPSULE ORAL at 22:56

## 2022-12-05 RX ADMIN — POTASSIUM CHLORIDE 40 MEQ: 1500 TABLET, EXTENDED RELEASE ORAL at 10:16

## 2022-12-05 RX ADMIN — CETIRIZINE HYDROCHLORIDE 10 MG: 10 TABLET, FILM COATED ORAL at 10:16

## 2022-12-05 RX ADMIN — FLUOXETINE 20 MG: 20 CAPSULE ORAL at 10:15

## 2022-12-05 RX ADMIN — BACITRACIN ZINC, NEOMYCIN SULFATE, POLYMYXIN B SULFATE 1 G: 3.5; 5000; 4 OINTMENT TOPICAL at 10:24

## 2022-12-05 RX ADMIN — GABAPENTIN 300 MG: 300 CAPSULE ORAL at 22:55

## 2022-12-05 RX ADMIN — VANCOMYCIN HYDROCHLORIDE 1000 MG: 1 INJECTION, POWDER, LYOPHILIZED, FOR SOLUTION INTRAVENOUS at 19:12

## 2022-12-05 RX ADMIN — VANCOMYCIN HYDROCHLORIDE 1000 MG: 1 INJECTION, POWDER, LYOPHILIZED, FOR SOLUTION INTRAVENOUS at 05:50

## 2022-12-05 RX ADMIN — GABAPENTIN 300 MG: 300 CAPSULE ORAL at 10:16

## 2022-12-05 RX ADMIN — TICAGRELOR 60 MG: 60 TABLET ORAL at 10:21

## 2022-12-05 RX ADMIN — ANASTROZOLE 1 MG: 1 TABLET ORAL at 10:21

## 2022-12-05 RX ADMIN — ATORVASTATIN CALCIUM 80 MG: 40 TABLET, FILM COATED ORAL at 22:56

## 2022-12-05 RX ADMIN — ENOXAPARIN SODIUM 40 MG: 100 INJECTION SUBCUTANEOUS at 10:15

## 2022-12-05 RX ADMIN — SODIUM CHLORIDE, PRESERVATIVE FREE 10 ML: 5 INJECTION INTRAVENOUS at 22:56

## 2022-12-05 RX ADMIN — TICAGRELOR 60 MG: 60 TABLET ORAL at 22:55

## 2022-12-05 RX ADMIN — GABAPENTIN 300 MG: 300 CAPSULE ORAL at 13:57

## 2022-12-05 RX ADMIN — LEVOTHYROXINE SODIUM 125 MCG: 0.12 TABLET ORAL at 05:44

## 2022-12-05 RX ADMIN — PANTOPRAZOLE SODIUM 40 MG: 40 TABLET, DELAYED RELEASE ORAL at 05:44

## 2022-12-05 RX ADMIN — SODIUM CHLORIDE, PRESERVATIVE FREE 10 ML: 5 INJECTION INTRAVENOUS at 10:23

## 2022-12-05 RX ADMIN — CEFEPIME 2000 MG: 2 INJECTION, POWDER, FOR SOLUTION INTRAVENOUS at 10:27

## 2022-12-05 ASSESSMENT — PAIN DESCRIPTION - DESCRIPTORS: DESCRIPTORS: ACHING

## 2022-12-05 ASSESSMENT — PAIN SCALES - GENERAL: PAINLEVEL_OUTOF10: 0

## 2022-12-05 ASSESSMENT — PAIN DESCRIPTION - PAIN TYPE: TYPE: CHRONIC PAIN

## 2022-12-05 ASSESSMENT — PAIN DESCRIPTION - ORIENTATION: ORIENTATION: LOWER

## 2022-12-05 ASSESSMENT — PAIN DESCRIPTION - LOCATION: LOCATION: BACK

## 2022-12-05 NOTE — CONSULTS
Department of Internal Medicine  Infectious Diseases   Consult Note      Reason for Consult:  Bacteremia       Requesting Physician:  Dr Kearns Standing:         This is a 74 yrs old female with hx of breast cancer, MS ,s/p PEG tube insertion, presented to the ER from nursing home with drainage from the PEG tube site and mild pain . She denied fever or chills. PEG tube was removed,  WBC was 22 K - pt was given cefepime, clindamycin, vancomycin   Blood cx growing Staph epidermidis     Past Medical History:      Past Medical History:   Diagnosis Date    Anxiety     Breast cancer ([facility] [address] 02/2020    right    CAD (coronary artery disease)     Cystitis     Depression     Elizondo catheter status     Foot drop, bilateral     History of blood transfusion     History of renal calculi     Hx of blood clots     dvt    Hyperlipidemia     Hypertension 08/27/2018    Hypothyroidism     MS (multiple sclerosis) ([facility] [address]     wheelchair bound    Neurogenic bladder 08/09/2018    NSTEMI (non-ST elevated myocardial infarction) ([facility] [address] 2019    Total self-care deficit     Unspecified cerebral artery occlusion with cerebral infarction 2011    left her incontinent    Wheelchair dependent          Past Surgical History:      Past Surgical History:   Procedure Laterality Date    COLON SURGERY  2001    exp lap, lysis of adhesions, release of SBO. [facility]. Dr. Rosaline Martinez 2/4/2020    COLONOSCOPY DIAGNOSTIC performed by Lynette Rodriguez MD at [facility]  2019    no stent-    DENTAL SURGERY      all removed    GASTROSTOMY TUBE PLACEMENT N/A 11/30/2021    PEG TUBE EXCHANGE performed by Lynette Rodriguez MD at [facility] ([address] [place] [address])  [address] [place] [address].  Dr. Екатерина Kiran, MODIFIED RADICAL Right 3/3/2020    RIGHT BREAST MASTECTOMY WITH SENTINEL NODE DISSECTION WITH BLUE DYE performed by Lynette Rodriguez MD at [address] [place] [address] 12/12/2019    EGD PEG INSERTION performed by Fabricio Escoto MD at [facility]       Current Medications:      Current Facility-Administered Medications   Medication Dose Route Frequency Provider Last Rate Last Admin    white petrolatum ointment   Topical BID PRN Aletha Bauman MD        neomycin-bacitracin-polymyxin (NEOSPORIN) ointment   Topical Daily Aquilino Abts, DO   1 g at 12/05/22 1024    cefepime (MAXIPIME) 2,000 mg in sodium chloride 0.9 % 50 mL IVPB ([id number])  2,000 mg IntraVENous Q12H Leretha Niece, DO   Stopped at 12/05/22 1058    potassium chloride (KLOR-CON M) extended release tablet 40 mEq  40 mEq Oral PRN Leretha Niece, DO   40 mEq at 12/05/22 1016    Or    potassium bicarb-citric acid (EFFER-K) effervescent tablet 40 mEq  40 mEq Oral PRN Leretha Niece, DO   40 mEq at 12/03/22 9927    Or    potassium chloride 10 mEq/100 mL IVPB (Peripheral Line)  10 mEq IntraVENous PRN Leretha Niece, DO   Stopped at 12/03/22 0247    albuterol (PROVENTIL) nebulizer solution 2.5 mg  2.5 mg Nebulization Q4H PRN Leretha Niece, DO        anastrozole (ARIMIDEX) tablet 1 mg  1 mg Oral Daily Leretha Niece, DO   1 mg at 12/05/22 1021    atorvastatin (LIPITOR) tablet 80 mg  80 mg Oral Nightly Leretha Niece, DO   80 mg at 12/04/22 2048    cetirizine (ZYRTEC) tablet 10 mg  10 mg Oral Daily Leretha Niece, DO   10 mg at 12/05/22 1016    FLUoxetine (PROZAC) capsule 20 mg  20 mg Oral BID Leretha Niece, DO   20 mg at 12/05/22 1015    gabapentin (NEURONTIN) capsule 300 mg  300 mg Oral TID Leretha Niece, DO   300 mg at 12/05/22 1016    levothyroxine (SYNTHROID) tablet 125 mcg  125 mcg Oral Daily Leretha Niece, DO   125 mcg at 12/05/22 0544    pantoprazole (PROTONIX) tablet 40 mg  40 mg Oral QAM AC Leretha Niece, DO   40 mg at 12/05/22 0544    ticagrelor (BRILINTA) tablet 60 mg  60 mg Oral BID Ammy Najera DO   60 mg at 12/05/22 1021    sodium chloride flush 0.9 % injection 10 mL  10 mL IntraVENous 2 times per day Sol Hoy[place] Kriss Bruno, DO   10 mL at 22 1023    sodium chloride flush 0.9 % injection 10 mL  10 mL IntraVENous PRN [facility], DO        0.9 % sodium chloride infusion   IntraVENous PRN [facility], DO        enoxaparin (LOVENOX) injection 40 mg  40 mg SubCUTAneous Daily [facility], DO   40 mg at 22 1015    promethazine (PHENERGAN) tablet 12.5 mg  12.5 mg Oral Q6H PRN [facility], DO        Or    ondansetron [facility]) injection 4 mg  4 mg IntraVENous Q6H PRN [facility], DO        polyethylene glycol (GLYCOLAX) packet 17 g  17 g Oral Daily PRN [facility], DO        acetaminophen (TYLENOL) tablet 650 mg  650 mg Oral Q6H PRN [facility], DO   650 mg at 22 1015    Or    acetaminophen (TYLENOL) suppository 650 mg  650 mg Rectal Q6H PRN [facility], DO        clindamycin (CLEOCIN) 600 mg in dextrose 5 % 50 mL IVPB  600 mg IntraVENous Once Rickie Carrizales MD        vancomycin (VANCOCIN) 1,000 mg in dextrose 5 % 250 mL IVPB ([id number])  1,000 mg IntraVENous Q12H [facility], DO   Stopped at 22 0700       Allergies:  Pcn [penicillins] and Watermelon [citrullus vulgaris]    Social History:      Social History     Socioeconomic History    Marital status:      Spouse name: Not on file    Number of children: 3    Years of education: Not on file    Highest education level: Not on file   Occupational History    Not on file   Tobacco Use    Smoking status: Former     Packs/day: 1.00     Years: 5.00     Pack years: 5.00     Types: Cigarettes     Start date:      Quit date: 2000     Years since quittin.9    Smokeless tobacco: Never   Vaping Use    Vaping Use: Never used   Substance and Sexual Activity    Alcohol use: No    Drug use: No    Sexual activity: Not Currently   Other Topics Concern    Not on file   Social History Narrative    Not on file     Social Determinants of Health     Financial Resource Strain: Not on file   Food Insecurity: Not on file   Transportation Needs: Not on file Physical Activity: Not on file   Stress: Not on file   Social Connections: Not on file   Intimate Partner Violence: Not on file   Housing Stability: Not on file         Family History:     Family History   Problem Relation Age of Onset    Breast Cancer Mother     Diabetes Father     Heart Disease Father     Breast Cancer Sister     Breast Cancer Maternal Aunt     Breast Cancer Maternal Aunt        REVIEW OF SYSTEMS:    CONSTITUTIONAL:  Denies fever, chill or rigors. HEENT: denies blurring of vision or double vision, denies hearing problem  RESPIRATORY: denies cough, shortness of breath, sputum expectoration. CARDIOVASCULAR:  Denies palpitation or chest pain   GASTROINTESTINAL:  drainage from the PEG wound, pain   GENITOURINARY:  Denies burning urination or frequency of urination  INTEGUMENT: denies wound , rash  HEMATOLOGIC/LYMPHATIC:  Denies lymph node swelling, gum bleeding or easy bruising. MUSCULOSKELETAL:  Denies leg pain , joint pain , joint swelling  NEUROLOGICAL:  Denies light headed, dizziness, loss of consciousness, weakness of lower extremities, bowel or bladder incontinence. PHYSICAL EXAM:      Vitals:     Vitals:    12/05/22 1000   BP: 112/63   Pulse: 74   Resp: 18   Temp: 98.1 °F (36.7 °C)   SpO2: 95%       General Appearance:    Awake, alert , no acute distress. Head:    Normocephalic, atraumatic   Eyes:    No pallor, no icterus,   Ears:    No obvious deformity or drainage.    Nose:   No nasal drainage   Throat:   Mucosa moist, no oral thrush   Neck:   Supple, no lymphadenopathy   Back:     no CVA tenderness   Lungs:     Clear to auscultation bilaterally,    Heart:    Regular rate and rhythm, no murmur   Abdomen:     Soft, bowel sounds present , PEG site - mild erythema, drainage    Extremities:   No edema, no cyanosis    Pulses:   Dorsalis pedis palpable    Skin:   no rashes or lesions     CBC with Differential:      Lab Results   Component Value Date/Time    WBC 8.4 12/05/2022 04:31 AM RBC 3.89 12/05/2022 04:31 AM    HGB 11.1 12/05/2022 04:31 AM    HCT 35.8 12/05/2022 04:31 AM     12/05/2022 04:31 AM    MCV 92.0 12/05/2022 04:31 AM    MCH 28.5 12/05/2022 04:31 AM    MCHC 31.0 12/05/2022 04:31 AM    RDW 14.6 12/05/2022 04:31 AM    NRBC 0.9 03/08/2020 12:16 PM    METASPCT 0.9 03/11/2020 06:41 AM    LYMPHOPCT 19.6 12/05/2022 04:31 AM    MONOPCT 8.4 12/05/2022 04:31 AM    BASOPCT 0.6 12/05/2022 04:31 AM    MONOSABS 0.71 12/05/2022 04:31 AM    LYMPHSABS 1.65 12/05/2022 04:31 AM    EOSABS 0.25 12/05/2022 04:31 AM    BASOSABS 0.05 12/05/2022 04:31 AM       CMP     Lab Results   Component Value Date/Time     12/05/2022 04:31 AM    K 3.1 12/05/2022 04:31 AM    K 4.0 12/03/2022 05:27 AM     12/05/2022 04:31 AM    CO2 22 12/05/2022 04:31 AM    BUN 9 12/05/2022 04:31 AM    CREATININE 0.7 12/05/2022 04:31 AM    GFRAA >60 10/11/2022 04:31 AM    LABGLOM >60 12/05/2022 04:31 AM    GLUCOSE 100 12/05/2022 04:31 AM    PROT 6.0 12/03/2022 05:27 AM    LABALBU 3.3 12/03/2022 05:27 AM    CALCIUM 8.7 12/05/2022 04:31 AM    BILITOT 0.4 12/03/2022 05:27 AM    ALKPHOS 98 12/03/2022 05:27 AM    AST 9 12/03/2022 05:27 AM    ALT 7 12/03/2022 05:27 AM         Hepatic Function Panel:    Lab Results   Component Value Date/Time    ALKPHOS 98 12/03/2022 05:27 AM    ALT 7 12/03/2022 05:27 AM    AST 9 12/03/2022 05:27 AM    PROT 6.0 12/03/2022 05:27 AM    BILITOT 0.4 12/03/2022 05:27 AM    BILIDIR <0.2 12/02/2022 04:24 PM    IBILI see below 12/02/2022 04:24 PM    LABALBU 3.3 12/03/2022 05:27 AM       PT/INR:    Lab Results   Component Value Date/Time    PROTIME 12.7 12/02/2022 04:24 PM    INR 1.2 12/02/2022 04:24 PM       TSH:    Lab Results   Component Value Date/Time    TSH 1.920 10/11/2022 04:31 AM       U/A:    Lab Results   Component Value Date/Time    COLORU Yellow 03/17/2021 03:00 AM    PHUR 6.5 03/17/2021 03:00 AM    WBCUA PACKED 03/17/2021 03:00 AM    RBCUA 5-10 03/17/2021 03:00 AM    BACTERIA MANY 03/17/2021 03:00 AM    CLARITYU CLOUDY 03/17/2021 03:00 AM    SPECGRAV 1.015 03/17/2021 03:00 AM    LEUKOCYTESUR LARGE 03/17/2021 03:00 AM    UROBILINOGEN 0.2 03/17/2021 03:00 AM    BILIRUBINUR Negative 03/17/2021 03:00 AM    BLOODU MODERATE 03/17/2021 03:00 AM    GLUCOSEU Negative 03/17/2021 03:00 AM    AMORPHOUS MANY 02/03/2021 12:15 PM       ABG:  No results found for: [id number], BEART, [id number], PHART, THGBART, [id number], PO2ART, GYA3DEC    MICROBIOLOGY:    Blood culture -    Staphylococcus epidermidis by PCR DETECTED Panic       Staphylococcus lugdunensis by PCR Not Detected    Staphylococcus species by PCR DETECTED Panic      Serratia marcescens by PCR Not Detected    Streptococcus pneumoniae by PCR Not Detected    Streptococcus pyogenes  by PCR Not Detected    Streptococcus species by PCR Not Detected    Stenotrophomonas maltophilia by PCR Not Detected    Candida albicans by PCR Not Detected    Candida auris by PCR Not Detected    Candida glabrata by PCR Not Detected    Candida krusei by PCR Not Detected    Candida parapsilosis by PCR Not Detected    Candida tropicalis by PCR Not Detected    Cryptococcus neoformans/gattii by PCR Not Detected    Methicillin Resistance mecA/C  by PCR DETECTED Panic         Radiology :    CT abdomen and pelvis -  Impression:        Peg tube balloon tip within the 2nd portion of the duodenum. No adjacent   cellulitis, abscess or inflammatory stranding. IMPRESSION:     CONS bacteremia   PEG wound infection, cellulitis   Leukocytosis - improved       RECOMMENDATIONS:         Vancomycin 1 gram IV q 12 hrs, stop cefepime and clindamycin   Wound cx, follow up blood cx     Thank you Dr Liliana Seaman for the consult.

## 2022-12-05 NOTE — DISCHARGE INSTR - COC
Continuity of Care Form    Patient Name: Kait Ni   :  1955  MRN:  [id number]    Admit date:  2022  Discharge date:  2022    Code Status Order: Full Code   Advance Directives:     Admitting Physician:  Katie Ruano DO  PCP: Kev White DO    Discharging Nurse: [facility]/Room#: 4027/4681-M  Discharging Unit Phone Number: [phone]    Emergency Contact:   Extended Emergency Contact Information  Primary Emergency Contact: Jr Phone: [phone]  Relation: Child  Secondary Emergency Contact: Arnaldo Sol  [facility] [address] Phone: [phone]  Relation: Child    Past Surgical History:  Past Surgical History:   Procedure Laterality Date    COLON SURGERY      exp lap, lysis of adhesions, release of SBO. [facility]. Dr. Lucero Magana 2020    COLONOSCOPY DIAGNOSTIC performed by Marline Madden MD at [place] [address]    no stent-    DENTAL SURGERY      all removed    GASTROSTOMY TUBE PLACEMENT N/A 2021    PEG TUBE EXCHANGE performed by Marline Madden MD at [address] [place] [address] ([address] [place] [address].  Dr. Melita Peabody, MODIFIED RADICAL Right 3/3/2020    RIGHT BREAST MASTECTOMY WITH SENTINEL NODE DISSECTION WITH BLUE DYE performed by Marline Madden MD at [address] [place] [address] ENDOSCOPY N/A 2019    EGD PEG INSERTION performed by Marline Madden MD at [facility] History:   Immunization History   Administered Date(s) Administered    COVID-19, PFIZER Bivalent BOOSTER, (age 12y+), IM, 30 mcg/0.3 mL dose 2022    COVID-19, PFIZER PURPLE top, DILUTE for use, (age 15 y+), 30mcg/0.3mL 2020, 2021, 10/14/2021       Active Problems:  Patient Active Problem List   Diagnosis Code    Multiple sclerosis ([facility] G35    Bilateral foot-drop M21.371, M21.372    Lactic acidosis E87.20    Heart failure (HCC) I50.9    Myocardial infarction, anteroseptal (HCC) I21.09    NSTEMI (non-ST elevated myocardial infarction) ([facility] I21.4    Carcinoma of upper-outer quadrant of female breast, right (HCC) C50.411    Carcin[facility] of upper-outer quadrant of right female breast ([facility] C50.411    Hypotension I95.9    GI bleed K92.2    Pyelonephritis N12    Coronary disease I25.10    Urine retention R33.9    Malfunction of Elizondo catheter ([facility] T83.011A    Neurogenic bladder N31.9    Cellulitis L03.90       Isolation/Infection:   Isolation            No Isolation          Patient Infection Status       Infection Onset Added Last Indicated Last Indicated By Review Planned Expiration Resolved Resolved By    None active    Resolved    COVID-19 (Rule Out) 20 COVID-19 (Ordered)   20 Rule-Out Test Resulted    COVID-19 (Rule Out) 10/30/20 10/30/20 10/30/20 Covid-19 Ambulatory (Ordered)   20 Rule-Out Test Resulted    COVID-19 (Rule Out) 10/27/20 10/28/20 10/27/20 Covid-19 Ambulatory (Ordered)   10/29/20 Rule-Out Test Resulted    COVID-19 (Rule Out) 20 Covid-19 Ambulatory (Ordered)   20 Rule-Out Test Resulted    COVID-19 (Rule Out) 20 Covid-19 Ambulatory (Ordered)   20 Rule-Out Test Resulted    COVID-19 (Rule Out) 20 Covid-19 Ambulatory (Ordered)   20 Rule-Out Test Resulted    COVID-19 (Rule Out) 20 COVID-19 (Ordered)   20 Rule-Out Test Resulted    COVID-19 (Rule Out) 20 COVID-19 (Ordered)   20 Rule-Out Test Resulted    COVID-19 (Rule Out) 20 Covid-19 Ambulatory (Ordered)   20 Rule-Out Test Resulted    COVID-19 20 COVID-19   20     Detected 20    COVID-19 (Rule Out) 20 COVID-19 (Ordered)   20 Rule-Out Test Resulted    ESBL (Extended Spectrum Beta Lactamase) 03/08/20 03/10/20 04/25/20 Culture, Urine   06/15/20 Christianne Lopes RN    Ecoli urine 3/8/20, 20    Influenza 20 Rapid influenza A/B antigens   20     MRSA 20 Culture, MRSA, Screening   20 [place] Comer RN    MRSA 19 MRSA SCREENING CULTURE ONLY   19 Christianne Lopes RN    ESBL (Extended Spectrum Beta Lactamase)  18 Christianne Lopes RN   19 Christianne Lopes RN    E Coli Urine 18            Nurse Assessment:  Last Vital Signs: /60   Pulse 91   Temp 98.2 °F (36.8 °C) (Temporal)   Resp 17   Ht 5' 6\" (1.676 m)   Wt 188 lb (85.3 kg)   SpO2 92%   BMI 30.34 kg/m²     Last documented pain score (0-10 scale): Pain Level: 8  Last Weight:   Wt Readings from Last 1 Encounters:   22 188 lb (85.3 kg)     Mental Status:  alert    IV Access:  - None    Nursing Mobility/ADLs:  Walking   Dependent  Transfer  Dependent  Bathing  Dependent  Dressing  Dependent  Toileting  Dependent  Feeding  Assisted  Med Admin  Assisted  Med Delivery   whole    Wound Care Documentation and Therapy:  Wound 22 Buttocks Left (Active)   Dressing Status Other (Comment) 22   Dressing/Treatment Pharmaceutical agent (see MAR) 22   Wound Length (cm) 2 cm 22   Wound Width (cm) 1 cm 22   Wound Depth (cm) 0.5 cm 22   Wound Surface Area (cm^2) 2 cm^2 22   Wound Volume (cm^3) 1 cm^3 22   Wound Assessment Pink/red 22   Drainage Amount Scant 22   Number of days: 1       Wound 22 Buttocks Right (Active)   Wound Cleansed Soap and water 22   Dressing/Treatment Pharmaceutical agent (see MAR) 22   Wound Length (cm) 3 cm 22   Wound Width (cm) 2.5 cm 22   Wound Depth (cm) 0.1 cm 22   Wound Surface Area (cm^2) 7.5 cm^2 12/03/22 0914   Wound Volume (cm^3) 0.75 cm^3 12/03/22 0914   Wound Assessment Pink/red 12/04/22 2000   Number of days: 1        Elimination:  Continence: Bowel: No  Bladder: Yes  Urinary Catheter: Indication for Use of Catheter: neurogenic bladder     Colostomy/Ileostomy/Ileal Conduit: No       Date of Last BM:     Intake/Output Summary (Last 24 hours) at 12/5/2022 0810  Last data filed at 12/5/2022 0601  Gross per 24 hour   Intake --   Output 1000 ml   Net -1000 ml     I/O last 3 completed shifts:  In: -   Out: 2500 [Urine:2500]    Safety Concerns:     None    Impairments/Disabilities:      None    Nutrition Therapy:  Current Nutrition Therapy:   - Oral Diet:  Low Sodium (2gm)    Routes of Feeding: Oral  Liquids: No Restrictions  Daily Fluid Restriction: no  Last Modified Barium Swallow with Video (Video Swallowing Test): not done    Treatments at the Time of Hospital Discharge:   Respiratory Treatments:   Oxygen Therapy:  is not on home oxygen therapy.   Ventilator:    - No ventilator support    Rehab Therapies: Physical Therapy and Occupational Therapy  Weight Bearing Status/Restrictions: No weight bearing restrictions  Other Medical Equipment (for information only, NOT a DME order):  wheelchair  Other Treatments:     Patient's personal belongings (please select all that are sent with patient):  Glasses    RN SIGNATURE:  Electronically signed by Kel Byrd RN on 12/8/22 at 3:20 PM EST    CASE MANAGEMENT/SOCIAL WORK SECTION    Inpatient Status Date: 12/2/2022    Readmission Risk Assessment Score:  Readmission Risk              Risk of Unplanned Readmission:  16           Discharging to Facility/ Agency   Name: [facility]  Address: [address] [place] [address] Way  Phone: [phone]  Fax:    Dialysis Facility (if applicable)   Name:  Address:  Dialysis Schedule:  Phone:  Fax:    / signature: Electronically signed by Niall Garcia RN on 12/5/22 at 8:10 AM EST    PHYSICIAN SECTION    Prognosis: {Prognosis:4253652302}    Condition at Discharge: [address] Patient Condition:[id number]}    Rehab Potential (if transferring to Rehab): {Prognosis:4647984280}    Recommended Labs or Other Treatments After Discharge: ***    Physician Certification: I certify the above information and transfer of Imani Perdomo  is necessary for the continuing treatment of the diagnosis listed and that she requires Intermediate Nursing Care for greater 30 days.      Update Admission H&P: {CHP DME Changes in LEXUY:568431208}    PHYSICIAN SIGNATURE:  Bruno Cho

## 2022-12-05 NOTE — PROGRESS NOTES
Pharmacy Consultation Note  (Antibiotic Dosing and Monitoring)    Initial consult date: 12/2/22  Consulting physician/provider: Dr Mony Montilla  Drug: Vancomycin  Indication: SSTI of PEG Tube    Age/  Gender Height Weight IBW  Allergy Information   67 y.o./female 167.6 cm 85.3 kg     Ideal body weight: 59.3 kg (130 lb 11.7 oz)  Adjusted ideal body weight: 69.7 kg (153 lb 10.2 oz)   Pcn [penicillins] and Watermelon [citrullus vulgaris]      Renal Function:  Recent Labs     12/02/22  1624 12/03/22  0527 12/05/22  0431   BUN 7 8 9   CREATININE 0.7 0.6 0.7         Intake/Output Summary (Last 24 hours) at 12/5/2022 0739  Last data filed at 12/5/2022 0601  Gross per 24 hour   Intake --   Output 1000 ml   Net -1000 ml         Vancomycin Monitoring:  Trough:  No results for input(s): VANCOTROUGH in the last 72 hours. Random:    Recent Labs     12/05/22  0431   VANCORANDOM 15.6       Recent Labs     12/02/22  2142   BLOODCULT2 Gram stain performed from blood culture bottle media  Gram positive cocci in clusters  *          Historical Cultures:  Organism   Date Value Ref Range Status   02/03/2021 Escherichia coli (A)  Final     Recent Labs     12/02/22 2142   BC Gram stain performed from blood culture bottle media  Gram positive cocci in clusters  Previously positive blood culture called  *       Vancomycin Administration Times:  Recent vancomycin administrations                     vancomycin (VANCOCIN) 1,000 mg in dextrose 5 % 250 mL IVPB (Onat0Hjr) (mg) 1,000 mg New Bag 12/05/22 0550     1,000 mg New Bag 12/04/22 1943     1,000 mg New Bag  0556     1,000 mg New Bag 12/03/22 1927    vancomycin (VANCOCIN) 1,750 mg in dextrose 5 % 500 mL IVPB (mg) 1,750 mg New Bag 12/03/22 0513                    Assessment:  Patient is a 74 y.o. female who has been initiated on vancomycin  Estimated Creatinine Clearance: 88 mL/min (based on SCr of 0.7 mg/dL).   To dose vancomycin, pharmacy will be utilizing [employer] calculation software for goal AUC/JANE 400-600 mg/L-hr  Random AM level 12/5 = 15.6 mcg/mL. BC growing staph species.  ID consulted     Plan:  Continue vancomycin 1000 mg IV every 12 hours (AUC/JANE 505, Tr 16.1 per [employer] software)  Will check vancomycin levels when appropriate  Will continue to monitor renal function   Pharmacy to follow    Thank you for this consult,    Laura Garrido, Pharm D 12/5/2022 7:41 AM

## 2022-12-05 NOTE — PLAN OF CARE
Problem: Skin/Tissue Integrity  Goal: Absence of new skin breakdown  Description: 1. Monitor for areas of redness and/or skin breakdown  2. Assess vascular access sites hourly  3. Every 4-6 hours minimum:  Change oxygen saturation probe site  4. Every 4-6 hours:  If on nasal continuous positive airway pressure, respiratory therapy assess nares and determine need for appliance change or resting period.   12/5/2022 0500 by Paul Hardy RN  Outcome: Progressing  12/4/2022 1758 by Anila Major RN  Outcome: Progressing     Problem: Safety - Adult  Goal: Free from fall injury  12/5/2022 0500 by Paul Hardy RN  Outcome: Progressing  12/4/2022 1758 by Anila Major RN  Outcome: Progressing     Problem: Pain  Goal: Verbalizes/displays adequate comfort level or baseline comfort level  Outcome: Progressing

## 2022-12-05 NOTE — PLAN OF CARE
Problem: Skin/Tissue Integrity  Goal: Absence of new skin breakdown  Description: 1. Monitor for areas of redness and/or skin breakdown  2. Assess vascular access sites hourly  3. Every 4-6 hours minimum:  Change oxygen saturation probe site  4. Every 4-6 hours:  If on nasal continuous positive airway pressure, respiratory therapy assess nares and determine need for appliance change or resting period.   12/5/2022 1223 by Sidney Soulier, RN  Outcome: Progressing     Problem: Safety - Adult  Goal: Free from fall injury  12/5/2022 1223 by Sidney Soulier, RN  Outcome: Progressing     Problem: Pain  Goal: Verbalizes/displays adequate comfort level or baseline comfort level  12/5/2022 1223 by Sidney Soulier, RN  Outcome: Progressing     Problem: Chronic Conditions and Co-morbidities  Goal: Patient's chronic conditions and co-morbidity symptoms are monitored and maintained or improved  Outcome: Progressing

## 2022-12-05 NOTE — CARE COORDINATION
12/5 Care Coordination. Pt was admit from ED on 12/2 for abdominal pain around PEG tube site. [address] [facility] [address] consulted for PEG tube removal. Removal was completed at bedside on 12/3. Daily wound care to be completed until skin is closed. Pt has been tolerating regular diet. Pt had positive blood cx on 12/2 for which ID was consulted. Currently receiving Vancomycin IV Q12H. K this AM-3.1 (supplementation ordered). After chart review, pt is a LTC bed hold at [facility]. Confirmed with Raiza Ford, pt can return once medically stable. DICK/destination complete. Ambulance form in soft chart. Will need completed and signed on day OF discharge. Spoke with pt at bedside who is in agreement with returning to [facility] on discharge.     MATTHEW DaltonN, RN  [facility]IC[facility] Case Management   Cell: [phone]

## 2022-12-06 LAB
ANION GAP SERPL CALCULATED.3IONS-SCNC: 11 MMOL/L (ref 7–16)
BASOPHILS ABSOLUTE: 0.06 E9/L (ref 0–0.2)
BASOPHILS RELATIVE PERCENT: 0.7 % (ref 0–2)
BUN BLDV-MCNC: 7 MG/DL (ref 6–23)
CALCIUM SERPL-MCNC: 8.5 MG/DL (ref 8.6–10.2)
CHLORIDE BLD-SCNC: 107 MMOL/L (ref 98–107)
CO2: 23 MMOL/L (ref 22–29)
CREAT SERPL-MCNC: 0.6 MG/DL (ref 0.5–1)
EOSINOPHILS ABSOLUTE: 0.26 E9/L (ref 0.05–0.5)
EOSINOPHILS RELATIVE PERCENT: 3.2 % (ref 0–6)
GFR SERPL CREATININE-BSD FRML MDRD: >60 ML/MIN/1.73
GLUCOSE BLD-MCNC: 135 MG/DL (ref 74–99)
HCT VFR BLD CALC: 33.2 % (ref 34–48)
HEMOGLOBIN: 10.4 G/DL (ref 11.5–15.5)
IMMATURE GRANULOCYTES #: 0.03 E9/L
IMMATURE GRANULOCYTES %: 0.4 % (ref 0–5)
LYMPHOCYTES ABSOLUTE: 1.61 E9/L (ref 1.5–4)
LYMPHOCYTES RELATIVE PERCENT: 20.1 % (ref 20–42)
MAGNESIUM: 1.8 MG/DL (ref 1.6–2.6)
MCH RBC QN AUTO: 29.1 PG (ref 26–35)
MCHC RBC AUTO-ENTMCNC: 31.3 % (ref 32–34.5)
MCV RBC AUTO: 93 FL (ref 80–99.9)
MONOCYTES ABSOLUTE: 0.73 E9/L (ref 0.1–0.95)
MONOCYTES RELATIVE PERCENT: 9.1 % (ref 2–12)
NEUTROPHILS ABSOLUTE: 5.33 E9/L (ref 1.8–7.3)
NEUTROPHILS RELATIVE PERCENT: 66.5 % (ref 43–80)
PDW BLD-RTO: 14.6 FL (ref 11.5–15)
PLATELET # BLD: 404 E9/L (ref 130–450)
PMV BLD AUTO: 10 FL (ref 7–12)
POTASSIUM SERPL-SCNC: 3.4 MMOL/L (ref 3.5–5)
RBC # BLD: 3.57 E12/L (ref 3.5–5.5)
SODIUM BLD-SCNC: 141 MMOL/L (ref 132–146)
WBC # BLD: 8 E9/L (ref 4.5–11.5)

## 2022-12-06 PROCEDURE — 6370000000 HC RX 637 (ALT 250 FOR IP): Performed by: FAMILY MEDICINE

## 2022-12-06 PROCEDURE — 83735 ASSAY OF MAGNESIUM: CPT

## 2022-12-06 PROCEDURE — 80048 BASIC METABOLIC PNL TOTAL CA: CPT

## 2022-12-06 PROCEDURE — 6360000002 HC RX W HCPCS: Performed by: FAMILY MEDICINE

## 2022-12-06 PROCEDURE — 85025 COMPLETE CBC W/AUTO DIFF WBC: CPT

## 2022-12-06 PROCEDURE — 2580000003 HC RX 258: Performed by: FAMILY MEDICINE

## 2022-12-06 PROCEDURE — 36415 COLL VENOUS BLD VENIPUNCTURE: CPT

## 2022-12-06 PROCEDURE — 6370000000 HC RX 637 (ALT 250 FOR IP): Performed by: STUDENT IN AN ORGANIZED HEALTH CARE EDUCATION/TRAINING PROGRAM

## 2022-12-06 PROCEDURE — 1200000000 HC SEMI PRIVATE

## 2022-12-06 RX ADMIN — VANCOMYCIN HYDROCHLORIDE 1000 MG: 1 INJECTION, POWDER, LYOPHILIZED, FOR SOLUTION INTRAVENOUS at 18:50

## 2022-12-06 RX ADMIN — TICAGRELOR 60 MG: 60 TABLET ORAL at 20:05

## 2022-12-06 RX ADMIN — BACITRACIN ZINC, NEOMYCIN SULFATE, POLYMYXIN B SULFATE 1 G: 3.5; 5000; 4 OINTMENT TOPICAL at 09:28

## 2022-12-06 RX ADMIN — SODIUM CHLORIDE, PRESERVATIVE FREE 10 ML: 5 INJECTION INTRAVENOUS at 20:06

## 2022-12-06 RX ADMIN — SODIUM CHLORIDE, PRESERVATIVE FREE 10 ML: 5 INJECTION INTRAVENOUS at 09:30

## 2022-12-06 RX ADMIN — FLUOXETINE 20 MG: 20 CAPSULE ORAL at 09:27

## 2022-12-06 RX ADMIN — TICAGRELOR 60 MG: 60 TABLET ORAL at 09:28

## 2022-12-06 RX ADMIN — PETROLATUM: 420 OINTMENT TOPICAL at 10:38

## 2022-12-06 RX ADMIN — FLUOXETINE 20 MG: 20 CAPSULE ORAL at 20:05

## 2022-12-06 RX ADMIN — LEVOTHYROXINE SODIUM 125 MCG: 0.12 TABLET ORAL at 05:23

## 2022-12-06 RX ADMIN — PANTOPRAZOLE SODIUM 40 MG: 40 TABLET, DELAYED RELEASE ORAL at 05:23

## 2022-12-06 RX ADMIN — GABAPENTIN 300 MG: 300 CAPSULE ORAL at 14:19

## 2022-12-06 RX ADMIN — GABAPENTIN 300 MG: 300 CAPSULE ORAL at 09:27

## 2022-12-06 RX ADMIN — ENOXAPARIN SODIUM 40 MG: 100 INJECTION SUBCUTANEOUS at 09:29

## 2022-12-06 RX ADMIN — GABAPENTIN 300 MG: 300 CAPSULE ORAL at 20:05

## 2022-12-06 RX ADMIN — ANASTROZOLE 1 MG: 1 TABLET ORAL at 09:28

## 2022-12-06 RX ADMIN — VANCOMYCIN HYDROCHLORIDE 1000 MG: 1 INJECTION, POWDER, LYOPHILIZED, FOR SOLUTION INTRAVENOUS at 05:23

## 2022-12-06 RX ADMIN — ATORVASTATIN CALCIUM 80 MG: 40 TABLET, FILM COATED ORAL at 20:05

## 2022-12-06 RX ADMIN — POTASSIUM CHLORIDE 40 MEQ: 1500 TABLET, EXTENDED RELEASE ORAL at 09:27

## 2022-12-06 RX ADMIN — CETIRIZINE HYDROCHLORIDE 10 MG: 10 TABLET, FILM COATED ORAL at 09:28

## 2022-12-06 RX ADMIN — PETROLATUM: 420 OINTMENT TOPICAL at 20:06

## 2022-12-06 ASSESSMENT — PAIN SCALES - GENERAL
PAINLEVEL_OUTOF10: 0
PAINLEVEL_OUTOF10: 0

## 2022-12-06 NOTE — PLAN OF CARE
Patient's chart updated to reflect:      . - HF care plan, HF education points and HF discharge instructions.  -Orders: 2 gram sodium diet, daily weights, I/O.  -PCP and/or Cardiologist appointment to be scheduled within 7 days of hospital discharge.  -History of HF, not primary admission Dx. Patient admitted for treatment of IMPRESSION  1.  Cellulitis, unspecified cellulitis site      Severiano Kerry, RN RN, BSN  Heart Failure Navigator

## 2022-12-06 NOTE — PROGRESS NOTES
Hospitalist Progress Note      SYNOPSIS: Patient admitted on 2022 for Cellulitis  Ms. Fernando Sun, a 74y.o. year old female  who  has a past medical history of Anxiety, Breast cancer ([facility] CAD (coronary artery disease), Cystitis, Depression, Elizondo catheter status, Foot drop, bilateral, History of blood transfusion, History of renal calculi, Hx of blood clots, Hyperlipidemia, Hypertension, Hypothyroidism, MS (multiple sclerosis) ([facility] Neurogenic bladder, NSTEMI (non-ST elevated myocardial infarction) ([facility] Total self-care deficit, Unspecified cerebral artery occlusion with cerebral infarction, and Wheelchair dependent. Patient presented to the emergency department with complaint of abdominal pain and infection of PEG tube site. Patient is no longer using PEG tube and is currently tolerating oral intake. SUBJECTIVE:    Patient seen and examined  Records reviewed. Stable overnight. No other overnight issues reported. Temp (24hrs), Av.2 °F (36.8 °C), Min:98.1 °F (36.7 °C), Max:98.2 °F (36.8 °C)    DIET: ADULT DIET; Regular  CODE: Full Code    Intake/Output Summary (Last 24 hours) at 2022  Last data filed at 2022  Gross per 24 hour   Intake --   Output 1000 ml   Net -1000 ml         OBJECTIVE:    /71   Pulse 88   Temp 98.1 °F (36.7 °C) (Oral)   Resp 20   Ht 5' 6\" (1.676 m)   Wt 188 lb (85.3 kg)   SpO2 93%   BMI 30.34 kg/m²     General appearance: No apparent distress, appears stated age and cooperative. HEENT:  Conjunctivae/corneas clear. Neck: Supple. No jugular venous distention. Respiratory: Clear to auscultation bilaterally, normal respiratory effort  Cardiovascular: Regular rate rhythm, normal S1-S2  Abdomen: Soft, nontender, non-distended, former PEG tube incision wound site looks clean,  Musculoskeletal: No clubbing, cyanosis, no bilateral lower extremity edema. Brisk capillary refill.    Skin:  No rashes  on visible skin  Neurologic: awake, alert and following commands     ASSESSMENT:  -PEG tube site infection  - Bacteremia.    -Hypokalemia  -Coronary artery disease  -Hypertension  -Hyperlipidemia  -GERD  -Anxiety disorder  Hypothyroidism       PLAN:  Blood cultures are growing gram-positive cocci in clusters x2. Infectious disease following, recommendations noted and appreciated. Continue on IV vancomycin  Follow wound and blood cultures  General surgeon has signed off as no surgical intervention is recommended at this time. DISPOSITION: home    Medications:  REVIEWED DAILY    Infusion Medications    sodium chloride       Scheduled Medications    neomycin-bacitracin-polymyxin   Topical Daily    anastrozole  1 mg Oral Daily    atorvastatin  80 mg Oral Nightly    cetirizine  10 mg Oral Daily    FLUoxetine  20 mg Oral BID    gabapentin  300 mg Oral TID    levothyroxine  125 mcg Oral Daily    pantoprazole  40 mg Oral QAM AC    ticagrelor  60 mg Oral BID    sodium chloride flush  10 mL IntraVENous 2 times per day    enoxaparin  40 mg SubCUTAneous Daily    vancomycin  1,000 mg IntraVENous Q12H     PRN Meds: white petrolatum, potassium chloride **OR** potassium alternative oral replacement **OR** potassium chloride, albuterol, sodium chloride flush, sodium chloride, promethazine **OR** ondansetron, polyethylene glycol, acetaminophen **OR** acetaminophen    Labs:     Recent Labs     12/03/22 0527 12/05/22 0431   WBC 13.1* 8.4   HGB 11.5 11.1*   HCT 37.2 35.8   * 422         Recent Labs     12/03/22 0527 12/05/22 0431    145   K 4.0 3.1*    110*   CO2 25 22   BUN 8 9   CREATININE 0.6 0.7   CALCIUM 9.2 8.7         Recent Labs     12/03/22 0527   PROT 6.0*   ALKPHOS 98   ALT 7   AST 9   BILITOT 0.4         No results for input(s): INR in the last 72 hours. No results for input(s): [place] Bounds in the last 72 hours.     Chronic labs:    Lab Results   Component Value Date    CHOL 102 10/11/2022    TRIG 119 10/11/2022    HDL 32 10/11/2022    LDLCALC 46 10/11/2022    TSH 1.920 10/11/2022    INR 1.2 12/02/2022       Radiology: REVIEWED DAILY    +++++++++++++++++++++++++++++++++++++++++++++++++  Elizabeth Tsai MD  [address] [place], [place]  +++++++++++++++++++++++++++++++++++++++++++++++++  NOTE: This report was transcribed using voice recognition software. Every effort was made to ensure accuracy; however, inadvertent computerized transcription errors may be present.

## 2022-12-06 NOTE — FLOWSHEET NOTE
Inpatient Wound Care (Initial consult) 8418A    Admit Date: 12/2/2022  2:52 PM    Reason for consult:  Buttock    Significant history: Per H&P    Chief Complaint:  had concerns including Feeding Tube Problem (Infected peg tube site). History Of Present Illness:    Ms. Jeremias Quintero, a 74y.o. year old female  who  has a past medical history of Anxiety, Breast cancer ([facility] CAD (coronary artery disease), Cystitis, Depression, Elizondo catheter status, Foot drop, bilateral, History of blood transfusion, History of renal calculi, Hx of blood clots, Hyperlipidemia, Hypertension, Hypothyroidism, MS (multiple sclerosis) ([facility] Neurogenic bladder, NSTEMI (non-ST elevated myocardial infarction) ([facility] Total self-care deficit, Unspecified cerebral artery occlusion with cerebral infarction, and Wheelchair dependent.      Findings:     12/06/22 0918   Skin Integumentary    Skin Integrity Ecchymosis   Location BUE   Skin Integrity Site 2   Skin Integrity Location 2   (dry flaky)   Location 2 Bilateral feet   Skin Integrity Site 3   Skin Integrity Location 3 Excoriation    Location 3 Left breast fold   Skin Integrity Site 4   Skin Integrity Location 4   (chronic skin discoloration)   Location 4 bilateral buttocks   Wound 12/03/22 Buttocks Left   Date First Assessed/Time First Assessed: 12/03/22 0913   Primary Wound Type: Pressure Injury  Location: Buttocks  Wound Location Orientation: Left   Wound Image    Wound Etiology Pressure Stage 2   Dressing/Treatment Pharmaceutical agent (see MAR)   Wound Length (cm) 0.3 cm   Wound Width (cm) 1 cm   Wound Depth (cm) 0.1 cm   Wound Surface Area (cm^2) 0.3 cm^2   Change in Wound Size % (l*w) 85   Wound Volume (cm^3) 0.03 cm^3   Wound Healing % 97   Wound Assessment Pink/red   Drainage Amount None   Odor None   Gena-wound Assessment Dry/flaky   Wound 12/03/22 Abdomen   Date First Assessed: 12/03/22   Present on Hospital Admission: Yes  Location: Abdomen   Wound Image    Wound Etiology (old peg tube site)   Dressing/Treatment ABD;Dry dressing  (zinc paste)   Wound Length (cm) 1 cm   Wound Width (cm) 0.8 cm   Wound Depth (cm) 0.1 cm   Wound Surface Area (cm^2) 0.8 cm^2   Wound Volume (cm^3) 0.08 cm^3   Wound Assessment Pink/red   Drainage Amount Scant   Drainage Description Yellow   Odor None   Gena-wound Assessment   (redness, rash)     **Informed Consent**    The patient has given verbal consent to have photos taken of wounds and inserted into their chart as part of their permanent medical record for purposes of documentation, treatment management and/or medical review. All Images taken on 12/6/22 of patient name: Lito Mendoza were transmitted and stored on secured [employer] located within [facility] [employer] by a registered [employer] Mobile Application Device. Impression:  Left buttock: stage 2  Mid upper abdomen: Old peg tube site    Plan:   Aquaphor  Zinc paste  Interdry  TAPS  Medix heel boots   Patient will need continued preventative care.       Alex Garza RN 12/6/2022 9:28 AM

## 2022-12-06 NOTE — PROGRESS NOTES
Department of Internal Medicine  Infectious Diseases  Progress  Note      C/C : Staphylococcus epidermidis bacteremia , peg wound infection        Pt is awake and alert  Denies fever or chills  Mild abdomen pain   Afebrile       Current Facility-Administered Medications   Medication Dose Route Frequency Provider Last Rate Last Admin    white petrolatum ointment   Topical BID Cooper Juan MD   Given at 12/06/22 1038    white petrolatum ointment   Topical TID PRN Cooper Juan MD        potassium chloride (KLOR-CON M) extended release tablet 40 mEq  40 mEq Oral PRN Diania Shorts, DO   40 mEq at 12/06/22 6005    Or    potassium bicarb-citric acid (EFFER-K) effervescent tablet 40 mEq  40 mEq Oral PRN Diania Shorts, DO   40 mEq at 12/03/22 6418    Or    potassium chloride 10 mEq/100 mL IVPB (Peripheral Line)  10 mEq IntraVENous PRN Diania Shorts, DO   Stopped at 12/03/22 0247    albuterol (PROVENTIL) nebulizer solution 2.5 mg  2.5 mg Nebulization Q4H PRN Diania Shorts, DO        anastrozole (ARIMIDEX) tablet 1 mg  1 mg Oral Daily Diania Shorts, DO   1 mg at 12/06/22 0928    atorvastatin (LIPITOR) tablet 80 mg  80 mg Oral Nightly Diania Shorts, DO   80 mg at 12/05/22 2256    cetirizine (ZYRTEC) tablet 10 mg  10 mg Oral Daily Diania Shorts, DO   10 mg at 12/06/22 4559    FLUoxetine (PROZAC) capsule 20 mg  20 mg Oral BID Diania Shorts, DO   20 mg at 12/06/22 9650    gabapentin (NEURONTIN) capsule 300 mg  300 mg Oral TID Diania Shorts, DO   300 mg at 12/06/22 7075    levothyroxine (SYNTHROID) tablet 125 mcg  125 mcg Oral Daily Diania Shorts, DO   125 mcg at 12/06/22 0523    pantoprazole (PROTONIX) tablet 40 mg  40 mg Oral QAM AC Diania Shorts, DO   40 mg at 12/06/22 0523    ticagrelor (BRILINTA) tablet 60 mg  60 mg Oral BID Diania Shorts, DO   60 mg at 12/06/22 7922    sodium chloride flush 0.9 % injection 10 mL  10 mL IntraVENous 2 times per day Diania Shorts, DO   10 mL at 12/06/22 0930    sodium chloride flush 0.9 % injection 10 mL  10 mL IntraVENous PRN Navneet Sniff, DO        0.9 % sodium chloride infusion   IntraVENous PRN Navneet Sniff, DO        enoxaparin (LOVENOX) injection 40 mg  40 mg SubCUTAneous Daily Navneet Sniff, DO   40 mg at 12/06/22 0929    promethazine (PHENERGAN) tablet 12.5 mg  12.5 mg Oral Q6H PRN Navneet Sniff, DO        Or    [facility]ron [facility]) injection 4 mg  4 mg IntraVENous Q6H PRN Navneet Sniff, DO        polyethylene glycol (GLYCOLAX) packet 17 g  17 g Oral Daily PRN Navneet Sniff, DO        acetaminophen (TYLENOL) tablet 650 mg  650 mg Oral Q6H PRN Navneet Sniff, DO   650 mg at 12/05/22 1015    Or    acetaminophen (TYLENOL) suppository 650 mg  650 mg Rectal Q6H PRN Navneet Sniff, DO        vancomycin (VANCOCIN) 1,000 mg in dextrose 5 % 250 mL IVPB ([id number])  1,000 mg IntraVENous Q12H Navneet Sniff, DO   Stopped at 12/06/22 6884             REVIEW OF SYSTEMS:    CONSTITUTIONAL:  Denies fever, chill or rigors. HEENT: denies blurring of vision or double vision, denies hearing problem  RESPIRATORY: denies cough, shortness of breath, sputum expectoration. CARDIOVASCULAR:  Denies palpitation or chest pain   GASTROINTESTINAL:  drainage from the PEG wound, pain   GENITOURINARY:  Denies burning urination or frequency of urination  INTEGUMENT: denies wound , rash  HEMATOLOGIC/LYMPHATIC:  Denies lymph node swelling, gum bleeding or easy bruising. MUSCULOSKELETAL:  Denies leg pain , joint pain , joint swelling  NEUROLOGICAL:  Denies light headed, dizziness, loss of consciousness, weakness of lower extremities, bowel or bladder incontinence. PHYSICAL EXAM:      Vitals:     Vitals:    12/06/22 0800   BP: (!) 113/55   Pulse: 76   Resp: 18   Temp: 97.8 °F (36.6 °C)   SpO2: 93%       General Appearance:    Awake, alert , no acute distress. Head:    Normocephalic, atraumatic   Eyes:    No pallor, no icterus,   Ears:    No obvious deformity or drainage.    Nose:   No nasal drainage   Throat: Mucosa moist, no oral thrush   Neck:   Supple, no lymphadenopathy   Back:     no CVA tenderness   Lungs:     Clear to auscultation bilaterally,    Heart:    Regular rate and rhythm, no murmur   Abdomen:     Soft, bowel sounds present , PEG site - mild erythema, drainage    Extremities:   No edema, no cyanosis    Pulses:   Dorsalis pedis palpable    Skin:   no rashes or lesions     CBC with Differential:      Lab Results   Component Value Date/Time    WBC 8.0 12/06/2022 06:19 AM    RBC 3.57 12/06/2022 06:19 AM    HGB 10.4 12/06/2022 06:19 AM    HCT 33.2 12/06/2022 06:19 AM     12/06/2022 06:19 AM    MCV 93.0 12/06/2022 06:19 AM    MCH 29.1 12/06/2022 06:19 AM    MCHC 31.3 12/06/2022 06:19 AM    RDW 14.6 12/06/2022 06:19 AM    NRBC 0.9 03/08/2020 12:16 PM    METASPCT 0.9 03/11/2020 06:41 AM    LYMPHOPCT 20.1 12/06/2022 06:19 AM    MONOPCT 9.1 12/06/2022 06:19 AM    BASOPCT 0.7 12/06/2022 06:19 AM    MONOSABS 0.73 12/06/2022 06:19 AM    LYMPHSABS 1.61 12/06/2022 06:19 AM    EOSABS 0.26 12/06/2022 06:19 AM    BASOSABS 0.06 12/06/2022 06:19 AM       CMP     Lab Results   Component Value Date/Time     12/06/2022 06:19 AM    K 3.4 12/06/2022 06:19 AM    K 4.0 12/03/2022 05:27 AM     12/06/2022 06:19 AM    CO2 23 12/06/2022 06:19 AM    BUN 7 12/06/2022 06:19 AM    CREATININE 0.6 12/06/2022 06:19 AM    GFRAA >60 10/11/2022 04:31 AM    LABGLOM >60 12/06/2022 06:19 AM    GLUCOSE 135 12/06/2022 06:19 AM    PROT 6.0 12/03/2022 05:27 AM    LABALBU 3.3 12/03/2022 05:27 AM    CALCIUM 8.5 12/06/2022 06:19 AM    BILITOT 0.4 12/03/2022 05:27 AM    ALKPHOS 98 12/03/2022 05:27 AM    AST 9 12/03/2022 05:27 AM    ALT 7 12/03/2022 05:27 AM         Hepatic Function Panel:    Lab Results   Component Value Date/Time    ALKPHOS 98 12/03/2022 05:27 AM    ALT 7 12/03/2022 05:27 AM    AST 9 12/03/2022 05:27 AM    PROT 6.0 12/03/2022 05:27 AM    BILITOT 0.4 12/03/2022 05:27 AM    BILIDIR <0.2 12/02/2022 04:24 PM    IBILI see below 12/02/2022 04:24 PM    LABALBU 3.3 12/03/2022 05:27 AM       PT/INR:    Lab Results   Component Value Date/Time    PROTIME 12.7 12/02/2022 04:24 PM    INR 1.2 12/02/2022 04:24 PM       TSH:    Lab Results   Component Value Date/Time    TSH 1.920 10/11/2022 04:31 AM       U/A:    Lab Results   Component Value Date/Time    COLORU Yellow 03/17/2021 03:00 AM    PHUR 6.5 03/17/2021 03:00 AM    WBCUA PACKED 03/17/2021 03:00 AM    RBCUA 5-10 03/17/2021 03:00 AM    BACTERIA MANY 03/17/2021 03:00 AM    CLARITYU CLOUDY 03/17/2021 03:00 AM    SPECGRAV 1.015 03/17/2021 03:00 AM    LEUKOCYTESUR LARGE 03/17/2021 03:00 AM    UROBILINOGEN 0.2 03/17/2021 03:00 AM    BILIRUBINUR Negative 03/17/2021 03:00 AM    BLOODU MODERATE 03/17/2021 03:00 AM    GLUCOSEU Negative 03/17/2021 03:00 AM    AMORPHOUS MANY 02/03/2021 12:15 PM       ABG:  No results found for: KJM0QSG, BEART, O1IQLUWW, PHART, THGBART, VCI3IIZ, PO2ART, WBW0TFR    MICROBIOLOGY:    Blood culture -    Staphylococcus epidermidis by PCR DETECTED Panic       Staphylococcus lugdunensis by PCR Not Detected    Staphylococcus species by PCR DETECTED Panic      Serratia marcescens by PCR Not Detected    Streptococcus pneumoniae by PCR Not Detected    Streptococcus pyogenes  by PCR Not Detected    Streptococcus species by PCR Not Detected    Stenotrophomonas maltophilia by PCR Not Detected    Candida albicans by PCR Not Detected    Candida auris by PCR Not Detected    Candida glabrata by PCR Not Detected    Candida krusei by PCR Not Detected    Candida parapsilosis by PCR Not Detected    Candida tropicalis by PCR Not Detected    Cryptococcus neoformans/gattii by PCR Not Detected    Methicillin Resistance mecA/C  by PCR DETECTED Panic         Radiology :    CT abdomen and pelvis -  Impression:        Peg tube balloon tip within the 2nd portion of the duodenum. No adjacent   cellulitis, abscess or inflammatory stranding.           IMPRESSION:     CONS bacteremia   PEG wound infection, cellulitis   Leukocytosis - improved       RECOMMENDATIONS:         Vancomycin 1 gram IV q 12 hrs,

## 2022-12-06 NOTE — PROGRESS NOTES
Hospitalist Progress Note      SYNOPSIS: Patient admitted on 2022 for Cellulitis  Ms. Renata Ferris, a 74y.o. year old female  who  has a past medical history of Anxiety, Breast cancer ([facility] [address] CAD (coronary artery disease), Cystitis, Depression, Elizondo catheter status, Foot drop, bilateral, History of blood transfusion, History of renal calculi, Hx of blood clots, Hyperlipidemia, Hypertension, Hypothyroidism, MS (multiple sclerosis) ([facility] [address] Neurogenic bladder, NSTEMI (non-ST elevated myocardial infarction) ([facility] [address] Total self-care deficit, Unspecified cerebral artery occlusion with cerebral infarction, and Wheelchair dependent. Patient presented to the emergency department with complaint of abdominal pain and infection of PEG tube site. Patient is no longer using PEG tube and is currently tolerating oral intake. SUBJECTIVE:    Patient seen and examined  Records reviewed. Stable overnight. No other overnight issues reported. Temp (24hrs), Av.8 °F (36.6 °C), Min:97.8 °F (36.6 °C), Max:97.8 °F (36.6 °C)    DIET: ADULT DIET; Regular; Low Sodium (2 gm)  CODE: Full Code    Intake/Output Summary (Last 24 hours) at 2022 1446  Last data filed at 2022 4313  Gross per 24 hour   Intake 240 ml   Output 850 ml   Net -610 ml         OBJECTIVE:    BP (!) 113/55   Pulse 76   Temp 97.8 °F (36.6 °C) (Oral)   Resp 18   Ht 5' 6\" (1.676 m)   Wt 188 lb (85.3 kg)   SpO2 93%   BMI 30.34 kg/m²     General appearance: No apparent distress, appears stated age and cooperative. HEENT:  Conjunctivae/corneas clear. Neck: Supple. No jugular venous distention. Respiratory: Clear to auscultation bilaterally, normal respiratory effort  Cardiovascular: Regular rate rhythm, normal S1-S2  Abdomen: Soft, nontender, non-distended, former PEG tube incision wound site looks clean,  Musculoskeletal: No clubbing, cyanosis, no bilateral lower extremity edema. Brisk capillary refill.    Skin:  No rashes  on visible skin  Neurologic: awake, alert and following commands     ASSESSMENT:  -PEG tube site infection  - Bacteremia.    -Hypokalemia  -Coronary artery disease  -Hypertension  -Hyperlipidemia  -GERD  -Anxiety disorder  Hypothyroidism       PLAN:  Blood cultures are growing staph epidermidis x 2. Infectious disease following, recommendations noted and appreciated. Continue on IV vancomycin  Follow wound and blood cultures  General surgeon has signed off as no surgical intervention is recommended at this time. DISPOSITION: Tentative plan is to return home/SNF when final antibiotic recommendations are given by ID. Medications:  REVIEWED DAILY    Infusion Medications    sodium chloride       Scheduled Medications    white petrolatum   Topical BID    anastrozole  1 mg Oral Daily    atorvastatin  80 mg Oral Nightly    cetirizine  10 mg Oral Daily    FLUoxetine  20 mg Oral BID    gabapentin  300 mg Oral TID    levothyroxine  125 mcg Oral Daily    pantoprazole  40 mg Oral QAM AC    ticagrelor  60 mg Oral BID    sodium chloride flush  10 mL IntraVENous 2 times per day    enoxaparin  40 mg SubCUTAneous Daily    vancomycin  1,000 mg IntraVENous Q12H     PRN Meds: white petrolatum, potassium chloride **OR** potassium alternative oral replacement **OR** potassium chloride, albuterol, sodium chloride flush, sodium chloride, promethazine **OR** ondansetron, polyethylene glycol, acetaminophen **OR** acetaminophen    Labs:     Recent Labs     12/05/22  0431 12/06/22  0619   WBC 8.4 8.0   HGB 11.1* 10.4*   HCT 35.8 33.2*    404         Recent Labs     12/05/22  0431 12/06/22  0619    141   K 3.1* 3.4*   * 107   CO2 22 23   BUN 9 7   CREATININE 0.7 0.6   CALCIUM 8.7 8.5*         No results for input(s): PROT, ALB, ALKPHOS, ALT, AST, BILITOT, AMYLASE, LIPASE in the last 72 hours. No results for input(s): INR in the last 72 hours.       No results for input(s): Jose M Daigle in the last 72 hours.    Chronic labs:    Lab Results   Component Value Date    CHOL 102 10/11/2022    TRIG 119 10/11/2022    HDL 32 10/11/2022    LDLCALC 46 10/11/2022    TSH 1.920 10/11/2022    INR 1.2 12/02/2022       Radiology: REVIEWED DAILY    +++++++++++++++++++++++++++++++++++++++++++++++++  Aline Adams MD  [facility] Emergency Department Physician - [address] [place] [address]  +++++++++++++++++++++++++++++++++++++++++++++++++  NOTE: This report was transcribed using voice recognition software. Every effort was made to ensure accuracy; however, inadvertent computerized transcription errors may be present.

## 2022-12-06 NOTE — CARE COORDINATION
12/6 Update CM note. Vancomycin IV Q12H continues per ID. Repeat blood cx in process. Awaiting final ID plan. Pt is a LTC bed hold at [facility]. Pt can return once medically stable. DICK/destination complete. Ambulance form in soft chart. Will need completed and signed on day OF discharge. No COVID test required.      Teri Arriaga, MATTHEWN, RN  PHYSICIANS [facility] Case Management   Cell: [phone]

## 2022-12-06 NOTE — PROGRESS NOTES
Pharmacy Consultation Note  (Antibiotic Dosing and Monitoring)    Initial consult date: 12/2/22  Consulting physician/provider: Dr Bertha Burk  Drug: Vancomycin  Indication: SSTI of PEG Tube    Age/  Gender Height Weight IBW  Allergy Information   67 y.o./female 167.6 cm 85.3 kg     Ideal body weight: 59.3 kg (130 lb 11.7 oz)  Adjusted ideal body weight: 69.7 kg (153 lb 10.2 oz)   Pcn [penicillins] and Watermelon [citrullus vulgaris]      Renal Function:  Recent Labs     12/05/22  0431 12/06/22  0619   BUN 9 7   CREATININE 0.7 0.6         Intake/Output Summary (Last 24 hours) at 12/6/2022 0842  Last data filed at 12/6/2022 9239  Gross per 24 hour   Intake 240 ml   Output 850 ml   Net -610 ml         Vancomycin Monitoring:  Trough:  No results for input(s): VANCOTROUGH in the last 72 hours. Random:    Recent Labs     12/05/22  0431   VANCORANDOM 15.6         No results for input(s): Collette Flaming in the last 72 hours. Historical Cultures:  Organism   Date Value Ref Range Status   12/02/2022 Staphylococcus epidermidis (A)  Preliminary   12/02/2022 Staphylococcus epidermidis (A)  Preliminary     No results for input(s): BC in the last 72 hours. Vancomycin Administration Times:  Recent vancomycin administrations                     vancomycin (VANCOCIN) 1,000 mg in dextrose 5 % 250 mL IVPB ([id number]) (mg) 1,000 mg New Bag 12/06/22 0523     1,000 mg New Bag 12/05/22 1912     1,000 mg New Bag  0550     1,000 mg New Bag 12/04/22 1943     1,000 mg New Bag  0556     1,000 mg New Bag 12/03/22 1927                  Assessment:  Patient is a 74 y.o. female who has been initiated on vancomycin  Estimated Creatinine Clearance: 103 mL/min (based on SCr of 0.6 mg/dL).   To dose vancomycin, pharmacy will be utilizing [employer] calculation software for goal AUC/JANE 400-600 mg/L-hr  PEG tube removed  ID consulted- CONS bacteremia     Plan:  Continue vancomycin 1000 mg IV every 12 hours (AUC/JANE 508, Tr 16.2 per [employer] software)  Will check vancomycin levels when appropriate  Will continue to monitor renal function   Pharmacy to follow    Thank you for this consult,    Rocío Delgado, Pharm D 12/6/2022 8:42 AM

## 2022-12-06 NOTE — PROGRESS NOTES
Notified [facility] physician for patient already receiving 40 K PO this am for 3.1 and that they ordered another 40 K IV.

## 2022-12-07 LAB
ANION GAP SERPL CALCULATED.3IONS-SCNC: -2 MMOL/L (ref 7–16)
BASOPHILS ABSOLUTE: 0.06 E9/L (ref 0–0.2)
BASOPHILS RELATIVE PERCENT: 0.7 % (ref 0–2)
BLOOD CULTURE, ROUTINE: NORMAL
BUN BLDV-MCNC: 9 MG/DL (ref 6–23)
CALCIUM SERPL-MCNC: 8.8 MG/DL (ref 8.6–10.2)
CHLORIDE BLD-SCNC: 114 MMOL/L (ref 98–107)
CO2: 21 MMOL/L (ref 22–29)
CREAT SERPL-MCNC: 0.7 MG/DL (ref 0.5–1)
EOSINOPHILS ABSOLUTE: 0.2 E9/L (ref 0.05–0.5)
EOSINOPHILS RELATIVE PERCENT: 2.2 % (ref 0–6)
GFR SERPL CREATININE-BSD FRML MDRD: >60 ML/MIN/1.73
GLUCOSE BLD-MCNC: 90 MG/DL (ref 74–99)
HCT VFR BLD CALC: 34.7 % (ref 34–48)
HEMOGLOBIN: 10.5 G/DL (ref 11.5–15.5)
IMMATURE GRANULOCYTES #: 0.05 E9/L
IMMATURE GRANULOCYTES %: 0.5 % (ref 0–5)
LYMPHOCYTES ABSOLUTE: 2.2 E9/L (ref 1.5–4)
LYMPHOCYTES RELATIVE PERCENT: 23.9 % (ref 20–42)
MCH RBC QN AUTO: 28.3 PG (ref 26–35)
MCHC RBC AUTO-ENTMCNC: 30.3 % (ref 32–34.5)
MCV RBC AUTO: 93.5 FL (ref 80–99.9)
MONOCYTES ABSOLUTE: 0.87 E9/L (ref 0.1–0.95)
MONOCYTES RELATIVE PERCENT: 9.5 % (ref 2–12)
NEUTROPHILS ABSOLUTE: 5.81 E9/L (ref 1.8–7.3)
NEUTROPHILS RELATIVE PERCENT: 63.2 % (ref 43–80)
PDW BLD-RTO: 14.6 FL (ref 11.5–15)
PLATELET # BLD: 433 E9/L (ref 130–450)
PMV BLD AUTO: 10.2 FL (ref 7–12)
POTASSIUM SERPL-SCNC: 3.4 MMOL/L (ref 3.5–5)
RBC # BLD: 3.71 E12/L (ref 3.5–5.5)
SODIUM BLD-SCNC: 133 MMOL/L (ref 132–146)
WBC # BLD: 9.2 E9/L (ref 4.5–11.5)

## 2022-12-07 PROCEDURE — 80048 BASIC METABOLIC PNL TOTAL CA: CPT

## 2022-12-07 PROCEDURE — 6370000000 HC RX 637 (ALT 250 FOR IP): Performed by: INTERNAL MEDICINE

## 2022-12-07 PROCEDURE — 6360000002 HC RX W HCPCS: Performed by: FAMILY MEDICINE

## 2022-12-07 PROCEDURE — 2580000003 HC RX 258: Performed by: FAMILY MEDICINE

## 2022-12-07 PROCEDURE — 1200000000 HC SEMI PRIVATE

## 2022-12-07 PROCEDURE — 36415 COLL VENOUS BLD VENIPUNCTURE: CPT

## 2022-12-07 PROCEDURE — 6370000000 HC RX 637 (ALT 250 FOR IP): Performed by: FAMILY MEDICINE

## 2022-12-07 PROCEDURE — 85025 COMPLETE CBC W/AUTO DIFF WBC: CPT

## 2022-12-07 RX ORDER — CEFDINIR 300 MG/1
300 CAPSULE ORAL EVERY 12 HOURS SCHEDULED
Status: DISCONTINUED | OUTPATIENT
Start: 2022-12-07 | End: 2022-12-07

## 2022-12-07 RX ORDER — LEVOFLOXACIN 500 MG/1
500 TABLET, FILM COATED ORAL DAILY
Status: DISCONTINUED | OUTPATIENT
Start: 2022-12-07 | End: 2022-12-09 | Stop reason: HOSPADM

## 2022-12-07 RX ORDER — POTASSIUM CHLORIDE 20 MEQ/1
20 TABLET, EXTENDED RELEASE ORAL 2 TIMES DAILY
Status: DISCONTINUED | OUTPATIENT
Start: 2022-12-08 | End: 2022-12-09 | Stop reason: HOSPADM

## 2022-12-07 RX ORDER — POTASSIUM CHLORIDE 7.45 MG/ML
10 INJECTION INTRAVENOUS
Status: COMPLETED | OUTPATIENT
Start: 2022-12-07 | End: 2022-12-07

## 2022-12-07 RX ORDER — FLUCONAZOLE 100 MG/1
200 TABLET ORAL DAILY
Status: DISCONTINUED | OUTPATIENT
Start: 2022-12-07 | End: 2022-12-09 | Stop reason: HOSPADM

## 2022-12-07 RX ADMIN — VANCOMYCIN HYDROCHLORIDE 1000 MG: 1 INJECTION, POWDER, LYOPHILIZED, FOR SOLUTION INTRAVENOUS at 05:42

## 2022-12-07 RX ADMIN — CETIRIZINE HYDROCHLORIDE 10 MG: 10 TABLET, FILM COATED ORAL at 08:06

## 2022-12-07 RX ADMIN — Medication 10 MEQ: at 20:49

## 2022-12-07 RX ADMIN — FLUOXETINE 20 MG: 20 CAPSULE ORAL at 22:06

## 2022-12-07 RX ADMIN — GABAPENTIN 300 MG: 300 CAPSULE ORAL at 22:06

## 2022-12-07 RX ADMIN — Medication 10 MEQ: at 21:56

## 2022-12-07 RX ADMIN — ATORVASTATIN CALCIUM 80 MG: 40 TABLET, FILM COATED ORAL at 22:06

## 2022-12-07 RX ADMIN — PETROLATUM: 420 OINTMENT TOPICAL at 08:15

## 2022-12-07 RX ADMIN — Medication 10 MEQ: at 18:20

## 2022-12-07 RX ADMIN — PETROLATUM: 420 OINTMENT TOPICAL at 22:07

## 2022-12-07 RX ADMIN — FLUCONAZOLE 200 MG: 100 TABLET ORAL at 14:33

## 2022-12-07 RX ADMIN — LEVOFLOXACIN 500 MG: 500 TABLET, FILM COATED ORAL at 14:34

## 2022-12-07 RX ADMIN — POTASSIUM CHLORIDE 40 MEQ: 1500 TABLET, EXTENDED RELEASE ORAL at 08:10

## 2022-12-07 RX ADMIN — VANCOMYCIN HYDROCHLORIDE 1000 MG: 1 INJECTION, POWDER, LYOPHILIZED, FOR SOLUTION INTRAVENOUS at 23:58

## 2022-12-07 RX ADMIN — Medication 10 MEQ: at 19:20

## 2022-12-07 RX ADMIN — FLUOXETINE 20 MG: 20 CAPSULE ORAL at 08:06

## 2022-12-07 RX ADMIN — LEVOTHYROXINE SODIUM 125 MCG: 0.12 TABLET ORAL at 05:43

## 2022-12-07 RX ADMIN — GABAPENTIN 300 MG: 300 CAPSULE ORAL at 08:06

## 2022-12-07 RX ADMIN — TICAGRELOR 60 MG: 60 TABLET ORAL at 08:06

## 2022-12-07 RX ADMIN — ANASTROZOLE 1 MG: 1 TABLET ORAL at 08:06

## 2022-12-07 RX ADMIN — ENOXAPARIN SODIUM 40 MG: 100 INJECTION SUBCUTANEOUS at 08:06

## 2022-12-07 RX ADMIN — SODIUM CHLORIDE, PRESERVATIVE FREE 10 ML: 5 INJECTION INTRAVENOUS at 08:18

## 2022-12-07 RX ADMIN — PANTOPRAZOLE SODIUM 40 MG: 40 TABLET, DELAYED RELEASE ORAL at 05:43

## 2022-12-07 RX ADMIN — GABAPENTIN 300 MG: 300 CAPSULE ORAL at 13:38

## 2022-12-07 RX ADMIN — TICAGRELOR 60 MG: 60 TABLET ORAL at 22:06

## 2022-12-07 RX ADMIN — ACETAMINOPHEN 650 MG: 325 TABLET, FILM COATED ORAL at 08:10

## 2022-12-07 ASSESSMENT — PAIN SCALES - GENERAL
PAINLEVEL_OUTOF10: 0
PAINLEVEL_OUTOF10: 6

## 2022-12-07 ASSESSMENT — PAIN DESCRIPTION - LOCATION: LOCATION: BACK;SHOULDER

## 2022-12-07 ASSESSMENT — PAIN DESCRIPTION - DESCRIPTORS: DESCRIPTORS: ACHING;DISCOMFORT

## 2022-12-07 ASSESSMENT — PAIN - FUNCTIONAL ASSESSMENT: PAIN_FUNCTIONAL_ASSESSMENT: PREVENTS OR INTERFERES SOME ACTIVE ACTIVITIES AND ADLS

## 2022-12-07 ASSESSMENT — PAIN DESCRIPTION - ORIENTATION: ORIENTATION: RIGHT;LEFT

## 2022-12-07 NOTE — PROGRESS NOTES
Comprehensive Nutrition Assessment    Type and Reason for Visit:  Initial, Consult, Wound    Nutrition Recommendations/Plan:   Continue current diet as tolerated. Recommend and start Krystian BID and Ensure HP BID to optimize intake/promote wound healing. Will continue to monitor. Malnutrition Assessment:  Malnutrition Status: At risk for malnutrition (Comment) (12/07/22 1350)    Context:  Chronic Illness     Findings of the 6 clinical characteristics of malnutrition:  Energy Intake:  Mild decrease in energy intake (Comment) (pta)  Weight Loss:  Unable to assess (UTO d/t lack of recent/measured wt hx on file to assess)     Body Fat Loss:  Unable to assess     Muscle Mass Loss:  Unable to assess    Fluid Accumulation:  No significant fluid accumulation     Strength:  Not Performed    Nutrition Assessment:    Pt. admit w/ abdominal pain and infection of PEG tube site. Noted PEG removed on (12/3). Noted bacteremia/cellulitis. Hx of breast cancer, MS, CVA, PEG tube, CAD. Noted pressure wound. Pt. is at risk d/t increased nutrient needs for wound healing. Pt. tolerating regular diet-intake x1 of % noted. Will start ONS and monitor. Nutrition Related Findings:    A&Ox3, -I/O(3L), hypokalemia, +BS, tender abd (s/p PEG removal), +1/+2 pitting edema, Wound Type: Multiple, Pressure Injury, Stage II (Left buttock: stage 2  Mid upper abdomen: Old peg tube site)       Current Nutrition Intake & Therapies:    Average Meal Intake: % (x1)  Average Supplements Intake: None Ordered  ADULT DIET; Regular; Low Sodium (2 gm)    Anthropometric Measures:  Height: 5' 6\" (167.6 cm)  Ideal Body Weight (IBW): 130 lbs (59 kg)    Admission Body Weight: 173 lb 8 oz (78.7 kg) (12/07 BS first measured)  Current Body Weight: 173 lb 8 oz (78.7 kg) (12/07 BS), 133.5 % IBW.  Weight Source: Bed Scale  Current BMI (kg/m2): 28  Usual Body Weight:  (UTO d/t lack of recent/measured wt hx on file to assess)     Weight Adjustment For: No Adjustment                 BMI Categories: Overweight (BMI 25.0-29. 9)    Estimated Daily Nutrient Needs:  Energy Requirements Based On: Formula  Weight Used for Energy Requirements: Current  Energy (kcal/day): 1700-1800kcal (MSJ x1. 3SF)  Weight Used for Protein Requirements: Ideal  Protein (g/day): 89-106g (1.5-1.8g/kgIBW)  Method Used for Fluid Requirements: 1 ml/kcal  Fluid (ml/day):     Nutrition Diagnosis:   Increased nutrient needs related to increase demand for energy/nutrients as evidenced by wounds    Nutrition Interventions:   Food and/or Nutrient Delivery: Continue Current Diet, Start Oral Nutrition Supplement (Krystian BID, Ensure HP BID)  Nutrition Education/Counseling: Education not indicated  Coordination of Nutrition Care: Continue to monitor while inpatient       Goals:     Goals: PO intake 75% or greater, by next RD assessment (to continue)       Nutrition Monitoring and Evaluation:   Behavioral-Environmental Outcomes: None Identified  Food/Nutrient Intake Outcomes: Food and Nutrient Intake, Supplement Intake  Physical Signs/Symptoms Outcomes: Biochemical Data, GI Status, Fluid Status or Edema, Nutrition Focused Physical Findings, Skin, Weight    Discharge Planning:    Continue Oral Nutrition Supplement     Kelsey Roach RD  Contact: ext [phone]

## 2022-12-07 NOTE — PROGRESS NOTES
Hospitalist Progress Note      SYNOPSIS: Patient admitted on 2022 for Cellulitis  Ms. Sebastian Burks, a 74y.o. year old female  who  has a past medical history of Anxiety, Breast cancer ([facility] [address] CAD (coronary artery disease), Cystitis, Depression, Elizondo catheter status, Foot drop, bilateral, History of blood transfusion, History of renal calculi, Hx of blood clots, Hyperlipidemia, Hypertension, Hypothyroidism, MS (multiple sclerosis) ([facility] [address] Neurogenic bladder, [facility]non[facility]ST [facility] Total self-care deficit, Unspecified cerebral artery occlusion with cerebral infarction, and Wheelchair dependent. Patient presented to the emergency department with complaint of abdominal pain and infection of PEG tube site. Patient is no longer using PEG tube and is currently tolerating oral intake. SUBJECTIVE:    Patient seen and examined  Records reviewed. Stable overnight. No other overnight issues reported. Temp (24hrs), Av.8 °F (36.6 °C), Min:97.6 °F (36.4 °C), Max:98 °F (36.7 °C)    DIET: ADULT DIET; Regular; Low Sodium (2 gm)  CODE: Full Code    Intake/Output Summary (Last 24 hours) at 2022 1729  Last data filed at 2022 1348  Gross per 24 hour   Intake 490 ml   Output 1350 ml   Net -860 ml         OBJECTIVE:    /79   Pulse 77   Temp 98 °F (36.7 °C) (Oral)   Resp 16   Ht 5' 6\" (1.676 m)   Wt 173 lb 8 oz (78.7 kg)   SpO2 93%   BMI 28.00 kg/m²     General appearance: No apparent distress, appears stated age and cooperative. HEENT:  Conjunctivae/corneas clear. Neck: Supple. No jugular venous distention. Respiratory: Clear to auscultation bilaterally, normal respiratory effort  Cardiovascular: Regular rate rhythm, normal S1-S2  Abdomen: Soft, nontender, non-distended, former PEG tube incision wound site looks clean,  Musculoskeletal: No clubbing, cyanosis, no bilateral lower extremity edema. Brisk capillary refill.    Skin:  No rashes  on BILITOT, AMYLASE, LIPASE in the last 72 hours. No results for input(s): INR in the last 72 hours. No results for input(s): Penny Elizalde in the last 72 hours. Chronic labs:    Lab Results   Component Value Date    CHOL 102 10/11/2022    TRIG 119 10/11/2022    HDL 32 10/11/2022    LDLCALC 46 10/11/2022    TSH 1.920 10/11/2022    INR 1.2 12/02/2022       Radiology: REVIEWED DAILY    +++++++++++++++++++++++++++++++++++++++++++++++++  Hoang Reyes MD  [facility] Physician - [address] [place] [address]  +++++++++++++++++++++++++++++++++++++++++++++++++  NOTE: This report was transcribed using voice recognition software. Every effort was made to ensure accuracy; however, inadvertent computerized transcription errors may be present.

## 2022-12-07 NOTE — CARE COORDINATION
12/7 Update CM note. Vancomycin IV Q12H continues until 12/10 after 14 doses. Await ID recs today. If pt is stable for discharge back to [facility] she will need a midline placed for remainder of IV ATB therapy. Pt is a LTC bed hold at [facility]. Pt can return once medically stable. DICK/destination complete. Ambulance form in soft chart. Will need completed and signed on day OF discharge. No COVID test required.      Garlin Goodell, BSN, RN  PHYSICIANS [facility] Case Management   Cell: [phone]

## 2022-12-07 NOTE — PROGRESS NOTES
Department of Internal Medicine  Infectious Diseases  Progress  Note      C/C : Staphylococcus epidermidis bacteremia , peg wound infection        Pt is awake and alert  Denies fever or chills  Mild abdomen pain   Afebrile       Current Facility-Administered Medications   Medication Dose Route Frequency Provider Last Rate Last Admin    white petrolatum ointment   Topical BID Truman Jimenez MD   Given at 12/07/22 0815    white petrolatum ointment   Topical TID PRN Truman Jimenez MD        potassium chloride (KLOR-CON M) extended release tablet 40 mEq  40 mEq Oral PRN Starlene Card, DO   40 mEq at 12/07/22 0810    Or    potassium bicarb-citric acid (EFFER-K) effervescent tablet 40 mEq  40 mEq Oral PRN Starlene Card, DO   40 mEq at 12/03/22 3452    Or    potassium chloride 10 mEq/100 mL IVPB (Peripheral Line)  10 mEq IntraVENous PRN Starlene Card, DO   Stopped at 12/03/22 0247    albuterol (PROVENTIL) nebulizer solution 2.5 mg  2.5 mg Nebulization Q4H PRN Starlene Card, DO        anastrozole (ARIMIDEX) tablet 1 mg  1 mg Oral Daily Starlene Card, DO   1 mg at 12/07/22 0806    atorvastatin (LIPITOR) tablet 80 mg  80 mg Oral Nightly Starlene Card, DO   80 mg at 12/06/22 2005    cetirizine (ZYRTEC) tablet 10 mg  10 mg Oral Daily Starlene Card, DO   10 mg at 12/07/22 0806    FLUoxetine (PROZAC) capsule 20 mg  20 mg Oral BID Starlene Card, DO   20 mg at 12/07/22 3269    gabapentin (NEURONTIN) capsule 300 mg  300 mg Oral TID Starlene Card, DO   300 mg at 12/07/22 1338    levothyroxine (SYNTHROID) tablet 125 mcg  125 mcg Oral Daily Starlene Card, DO   125 mcg at 12/07/22 0543    pantoprazole (PROTONIX) tablet 40 mg  40 mg Oral QAM AC Starlene Card, DO   40 mg at 12/07/22 0543    ticagrelor (BRILINTA) tablet 60 mg  60 mg Oral BID Starlene Card, DO   60 mg at 12/07/22 0806    sodium chloride flush 0.9 % injection 10 mL  10 mL IntraVENous 2 times per day Starlene Card, DO   10 mL at 12/07/22 0818    sodium chloride flush 0.9 % injection 10 mL  10 mL IntraVENous PRN Aurther Pines, DO        0.9 % sodium chloride infusion   IntraVENous PRN Aurther Pines, DO        enoxaparin (LOVENOX) injection 40 mg  40 mg SubCUTAneous Daily Aurther Pines, DO   40 mg at 12/07/22 0806    promethazine (PHENERGAN) tablet 12.5 mg  12.5 mg Oral Q6H PRN Aurther Pines, DO        Or    [facility]) injection 4 mg  4 mg IntraVENous Q6H PRN Aurther Pines, DO        polyethylene glycol (GLYCOLAX) packet 17 g  17 g Oral Daily PRN Aurther Pines, DO        acetaminophen (TYLENOL) tablet 650 mg  650 mg Oral Q6H PRN Aurther Pines, DO   650 mg at 12/07/22 0810    Or    acetaminophen (TYLENOL) suppository 650 mg  650 mg Rectal Q6H PRN Aurther Pines, DO        vancomycin (VANCOCIN) 1,000 mg in dextrose 5 % 250 mL IVPB ([id number])  1,000 mg IntraVENous Q12H Aurther Pines, DO   Stopped at 12/07/22 0650             REVIEW OF SYSTEMS:    CONSTITUTIONAL:  Denies fever, chill or rigors. HEENT: denies blurring of vision or double vision, denies hearing problem  RESPIRATORY: denies cough, shortness of breath, sputum expectoration. CARDIOVASCULAR:  Denies palpitation or chest pain   GASTROINTESTINAL:  drainage from the PEG wound, pain   GENITOURINARY:  Denies burning urination or frequency of urination  INTEGUMENT: denies wound , rash  HEMATOLOGIC/LYMPHATIC:  Denies lymph node swelling, gum bleeding or easy bruising. MUSCULOSKELETAL:  Denies leg pain , joint pain , joint swelling  NEUROLOGICAL:  Denies light headed, dizziness, loss of consciousness, weakness of lower extremities, bowel or bladder incontinence. PHYSICAL EXAM:      Vitals:     Vitals:    12/07/22 0752   BP: 131/61   Pulse: 77   Resp: 16   Temp: 97.6 °F (36.4 °C)   SpO2: 91%       General Appearance:    Awake, alert , no acute distress. Head:    Normocephalic, atraumatic   Eyes:    No pallor, no icterus,   Ears:    No obvious deformity or drainage.    Nose:   No nasal drainage   Throat: Mucosa moist, no oral thrush   Neck:   Supple, no lymphadenopathy   Back:     no CVA tenderness   Lungs:     Clear to auscultation bilaterally,    Heart:    Regular rate and rhythm, no murmur   Abdomen:     Soft, bowel sounds present , PEG site - mild erythema, drainage    Extremities:   No edema, no cyanosis    Pulses:   Dorsalis pedis palpable    Skin:   no rashes or lesions     CBC with Differential:      Lab Results   Component Value Date/Time    WBC 9.2 12/07/2022 05:17 AM    RBC 3.71 12/07/2022 05:17 AM    HGB 10.5 12/07/2022 05:17 AM    HCT 34.7 12/07/2022 05:17 AM     12/07/2022 05:17 AM    MCV 93.5 12/07/2022 05:17 AM    MCH 28.3 12/07/2022 05:17 AM    MCHC 30.3 12/07/2022 05:17 AM    RDW 14.6 12/07/2022 05:17 AM    NRBC 0.9 03/08/2020 12:16 PM    METASPCT 0.9 03/11/2020 06:41 AM    LYMPHOPCT 23.9 12/07/2022 05:17 AM    MONOPCT 9.5 12/07/2022 05:17 AM    BASOPCT 0.7 12/07/2022 05:17 AM    MONOSABS 0.87 12/07/2022 05:17 AM    LYMPHSABS 2.20 12/07/2022 05:17 AM    EOSABS 0.20 12/07/2022 05:17 AM    BASOSABS 0.06 12/07/2022 05:17 AM       CMP     Lab Results   Component Value Date/Time     12/07/2022 05:17 AM    K 3.4 12/07/2022 05:17 AM    K 4.0 12/03/2022 05:27 AM     12/07/2022 05:17 AM    CO2 21 12/07/2022 05:17 AM    BUN 9 12/07/2022 05:17 AM    CREATININE 0.7 12/07/2022 05:17 AM    GFRAA >60 10/11/2022 04:31 AM    LABGLOM >60 12/07/2022 05:17 AM    GLUCOSE 90 12/07/2022 05:17 AM    PROT 6.0 12/03/2022 05:27 AM    LABALBU 3.3 12/03/2022 05:27 AM    CALCIUM 8.8 12/07/2022 05:17 AM    BILITOT 0.4 12/03/2022 05:27 AM    ALKPHOS 98 12/03/2022 05:27 AM    AST 9 12/03/2022 05:27 AM    ALT 7 12/03/2022 05:27 AM         Hepatic Function Panel:    Lab Results   Component Value Date/Time    ALKPHOS 98 12/03/2022 05:27 AM    ALT 7 12/03/2022 05:27 AM    AST 9 12/03/2022 05:27 AM    PROT 6.0 12/03/2022 05:27 AM    BILITOT 0.4 12/03/2022 05:27 AM    BILIDIR <0.2 12/02/2022 04:24 PM    IBILI see below 12/02/2022 04:24 PM    LABALBU 3.3 12/03/2022 05:27 AM       PT/INR:    Lab Results   Component Value Date/Time    PROTIME 12.7 12/02/2022 04:24 PM    INR 1.2 12/02/2022 04:24 PM       TSH:    Lab Results   Component Value Date/Time    TSH 1.920 10/11/2022 04:31 AM       U/A:    Lab Results   Component Value Date/Time    COLORU Yellow 03/17/2021 03:00 AM    PHUR 6.5 03/17/2021 03:00 AM    WBCUA PACKED 03/17/2021 03:00 AM    RBCUA 5-10 03/17/2021 03:00 AM    BACTERIA MANY 03/17/2021 03:00 AM    CLARITYU CLOUDY 03/17/2021 03:00 AM    SPECGRAV 1.015 03/17/2021 03:00 AM    LEUKOCYTESUR LARGE 03/17/2021 03:00 AM    UROBILINOGEN 0.2 03/17/2021 03:00 AM    BILIRUBINUR Negative 03/17/2021 03:00 AM    BLOODU MODERATE 03/17/2021 03:00 AM    GLUCOSEU Negative 03/17/2021 03:00 AM    AMORPHOUS MANY 02/03/2021 12:15 PM       ABG:  No results found for: QGT9ZOZ, BEART, T1JGRDMM, PHART, THGBART, SWP9FMN, PO2ART, ILX7XRK    MICROBIOLOGY:    Blood culture -    Staphylococcus epidermidis by PCR DETECTED Panic       Staphylococcus lugdunensis by PCR Not Detected    Staphylococcus species by PCR DETECTED Panic      Serratia marcescens by PCR Not Detected    Streptococcus pneumoniae by PCR Not Detected    Streptococcus pyogenes  by PCR Not Detected    Streptococcus species by PCR Not Detected    Stenotrophomonas maltophilia by PCR Not Detected    Candida albicans by PCR Not Detected    Candida auris by PCR Not Detected    Candida glabrata by PCR Not Detected    Candida krusei by PCR Not Detected    Candida parapsilosis by PCR Not Detected    Candida tropicalis by PCR Not Detected    Cryptococcus neoformans/gattii by PCR Not Detected    Methicillin Resistance mecA/C  by PCR DETECTED Panic         Radiology :    CT abdomen and pelvis -  Impression:        Peg tube balloon tip within the 2nd portion of the duodenum. No adjacent   cellulitis, abscess or inflammatory stranding.             Culture, Wound Aerobic Only [[id number]] (Abnormal)     Collected: 12/05/22 1359    Updated: 12/07/22 4426    Specimen Type: Abdomen     WOUND/ABSCESS -- Abnormal     Additional growth present, also evaluating for;   Yeast    Abnormal     Organism Escherichia coli Abnormal     WOUND/ABSCESS --    Light growth   Identification and sensitivity to follow        IMPRESSION:     CONS bacteremia   PEG wound infection, cellulitis   Leukocytosis - improved       RECOMMENDATIONS:         Vancomycin 1 gram IV q 12 hrs- vanco level   Stop omncief   Levaquin 500 mg po q 24 hrs   Diflucan 200 mg po q 24 hrs   No plan for IV abx

## 2022-12-07 NOTE — PROGRESS NOTES
Pharmacy Consultation Note  (Antibiotic Dosing and Monitoring)    Initial consult date: 12/2/22  Consulting physician/provider: Dr Miquel Perry  Drug: Vancomycin  Indication: SSTI of PEG Tube    Age/  Gender Height Weight IBW  Allergy Information   67 y.o./female 167.6 cm 85.3 kg     Ideal body weight: 59.3 kg (130 lb 11.7 oz)  Adjusted ideal body weight: 67.1 kg (147 lb 13.4 oz)   Pcn [penicillins] and Watermelon [citrullus vulgaris]      Renal Function:  Recent Labs     12/05/22  0431 12/06/22  0619 12/07/22  0517   BUN 9 7 9   CREATININE 0.7 0.6 0.7         Intake/Output Summary (Last 24 hours) at 12/7/2022 5686  Last data filed at 12/7/2022 7710  Gross per 24 hour   Intake 240 ml   Output 875 ml   Net -635 ml         Vancomycin Monitoring:  Trough:  No results for input(s): VANCOTROUGH in the last 72 hours. Random:    Recent Labs     12/05/22  0431   VANCORANDOM 15.6         Recent Labs     12/05/22  1444   BLOODCULT2 24 Hours no growth          Historical Cultures:  Organism   Date Value Ref Range Status   12/05/2022 Gram negative trent (A)  Preliminary     Recent Labs     12/05/22  1333   BC 24 Hours no growth       Vancomycin Administration Times:  Recent vancomycin administrations                     vancomycin (VANCOCIN) 1,000 mg in dextrose 5 % 250 mL IVPB ([id number]) (mg) 1,000 mg New Bag 12/07/22 0542     1,000 mg New Bag 12/06/22 1850     1,000 mg New Bag  0523     1,000 mg New Bag 12/05/22 1912     1,000 mg New Bag  0550     1,000 mg New Bag 12/04/22 1943                    Assessment:  Patient is a 74 y.o. female who has been initiated on vancomycin  Estimated Creatinine Clearance: 85 mL/min (based on SCr of 0.7 mg/dL).   To dose vancomycin, pharmacy will be utilizing [employer] calculation software for goal AUC/JANE 400-600 mg/L-hr  PEG tube removed  ID consulted- CONS bacteremia     Plan:  Continue vancomycin 1000 mg IV every 12 hours (AUC/JANE 456, Tr 14.1 per [employer] software)  Will check vancomycin levels when appropriate  Will continue to monitor renal function   Pharmacy to follow    Thank you for this consult,    Jonathan Shine, Pharm D 12/7/2022 8:08 AM

## 2022-12-08 VITALS
HEIGHT: 66 IN | RESPIRATION RATE: 15 BRPM | WEIGHT: 172.4 LBS | HEART RATE: 103 BPM | SYSTOLIC BLOOD PRESSURE: 121 MMHG | OXYGEN SATURATION: 94 % | TEMPERATURE: 98.2 F | DIASTOLIC BLOOD PRESSURE: 72 MMHG | BODY MASS INDEX: 27.71 KG/M2

## 2022-12-08 LAB
ALBUMIN SERPL-MCNC: 3.5 G/DL (ref 3.5–5.2)
ALP BLD-CCNC: 102 U/L (ref 35–104)
ALT SERPL-CCNC: 17 U/L (ref 0–32)
ANION GAP SERPL CALCULATED.3IONS-SCNC: 14 MMOL/L (ref 7–16)
AST SERPL-CCNC: 24 U/L (ref 0–31)
BILIRUB SERPL-MCNC: 0.3 MG/DL (ref 0–1.2)
BLOOD CULTURE, ROUTINE: ABNORMAL
BUN BLDV-MCNC: 7 MG/DL (ref 6–23)
CALCIUM SERPL-MCNC: 9.4 MG/DL (ref 8.6–10.2)
CHLORIDE BLD-SCNC: 101 MMOL/L (ref 98–107)
CO2: 21 MMOL/L (ref 22–29)
CREAT SERPL-MCNC: 0.7 MG/DL (ref 0.5–1)
GFR SERPL CREATININE-BSD FRML MDRD: >60 ML/MIN/1.73
GLUCOSE BLD-MCNC: 145 MG/DL (ref 74–99)
METER GLUCOSE: 99 MG/DL (ref 74–99)
ORGANISM: ABNORMAL
ORGANISM: ABNORMAL
POTASSIUM SERPL-SCNC: 4.7 MMOL/L (ref 3.5–5)
SODIUM BLD-SCNC: 136 MMOL/L (ref 132–146)
TOTAL PROTEIN: 6 G/DL (ref 6.4–8.3)

## 2022-12-08 PROCEDURE — 80053 COMPREHEN METABOLIC PANEL: CPT

## 2022-12-08 PROCEDURE — 1200000000 HC SEMI PRIVATE

## 2022-12-08 PROCEDURE — 82962 GLUCOSE BLOOD TEST: CPT

## 2022-12-08 PROCEDURE — 6370000000 HC RX 637 (ALT 250 FOR IP): Performed by: FAMILY MEDICINE

## 2022-12-08 PROCEDURE — 36415 COLL VENOUS BLD VENIPUNCTURE: CPT

## 2022-12-08 PROCEDURE — 6370000000 HC RX 637 (ALT 250 FOR IP): Performed by: INTERNAL MEDICINE

## 2022-12-08 PROCEDURE — 6360000002 HC RX W HCPCS: Performed by: FAMILY MEDICINE

## 2022-12-08 PROCEDURE — 2580000003 HC RX 258: Performed by: FAMILY MEDICINE

## 2022-12-08 RX ORDER — FLUCONAZOLE 200 MG/1
200 TABLET ORAL DAILY
Qty: 7 TABLET | Refills: 0 | DISCHARGE
Start: 2022-12-09 | End: 2022-12-16

## 2022-12-08 RX ORDER — LEVOFLOXACIN 500 MG/1
500 TABLET, FILM COATED ORAL DAILY
Qty: 7 TABLET | Refills: 0 | DISCHARGE
Start: 2022-12-09 | End: 2022-12-16

## 2022-12-08 RX ADMIN — POTASSIUM CHLORIDE 20 MEQ: 1500 TABLET, EXTENDED RELEASE ORAL at 08:41

## 2022-12-08 RX ADMIN — LEVOTHYROXINE SODIUM 125 MCG: 0.12 TABLET ORAL at 06:37

## 2022-12-08 RX ADMIN — SODIUM CHLORIDE, PRESERVATIVE FREE 10 ML: 5 INJECTION INTRAVENOUS at 08:43

## 2022-12-08 RX ADMIN — ENOXAPARIN SODIUM 40 MG: 100 INJECTION SUBCUTANEOUS at 08:41

## 2022-12-08 RX ADMIN — VANCOMYCIN HYDROCHLORIDE 1000 MG: 1 INJECTION, POWDER, LYOPHILIZED, FOR SOLUTION INTRAVENOUS at 13:11

## 2022-12-08 RX ADMIN — FLUOXETINE 20 MG: 20 CAPSULE ORAL at 08:41

## 2022-12-08 RX ADMIN — PETROLATUM: 420 OINTMENT TOPICAL at 08:44

## 2022-12-08 RX ADMIN — CETIRIZINE HYDROCHLORIDE 10 MG: 10 TABLET, FILM COATED ORAL at 08:41

## 2022-12-08 RX ADMIN — PANTOPRAZOLE SODIUM 40 MG: 40 TABLET, DELAYED RELEASE ORAL at 06:37

## 2022-12-08 RX ADMIN — GABAPENTIN 300 MG: 300 CAPSULE ORAL at 08:41

## 2022-12-08 RX ADMIN — LEVOFLOXACIN 500 MG: 500 TABLET, FILM COATED ORAL at 08:43

## 2022-12-08 RX ADMIN — GABAPENTIN 300 MG: 300 CAPSULE ORAL at 14:07

## 2022-12-08 RX ADMIN — ANASTROZOLE 1 MG: 1 TABLET ORAL at 08:41

## 2022-12-08 RX ADMIN — TICAGRELOR 60 MG: 60 TABLET ORAL at 08:42

## 2022-12-08 RX ADMIN — FLUCONAZOLE 200 MG: 100 TABLET ORAL at 08:42

## 2022-12-08 ASSESSMENT — PAIN DESCRIPTION - LOCATION: LOCATION: JAW

## 2022-12-08 ASSESSMENT — PAIN SCALES - GENERAL: PAINLEVEL_OUTOF10: 5

## 2022-12-08 NOTE — PROGRESS NOTES
Pharmacy Consultation Note  (Antibiotic Dosing and Monitoring)    Initial consult date: 12/2/22  Consulting physician/provider: Dr Carlos Townsend  Drug: Vancomycin  Indication: SSTI of PEG Tube    Age/  Gender Height Weight IBW  Allergy Information   67 y.o./female 167.6 cm 85.3 kg     Ideal body weight: 59.3 kg (130 lb 11.7 oz)  Adjusted ideal body weight: 66.9 kg (147 lb 6.4 oz)   Pcn [penicillins] and Watermelon [citrullus vulgaris]      Renal Function:  Recent Labs     12/06/22  0619 12/07/22  0517   BUN 7 9   CREATININE 0.6 0.7         Intake/Output Summary (Last 24 hours) at 12/8/2022 0917  Last data filed at 12/8/2022 0522  Gross per 24 hour   Intake 480 ml   Output 2425 ml   Net -1945 ml         Vancomycin Monitoring:  Trough:  No results for input(s): VANCOTROUGH in the last 72 hours. Random:  No results for input(s): VANCORANDOM in the last 72 hours. Recent Labs     12/05/22  1444   BLOODCULT2 24 Hours no growth          Historical Cultures:  Organism   Date Value Ref Range Status   12/05/2022 Escherichia coli (A)  Preliminary   12/05/2022 Yeast (A)  Preliminary     Recent Labs     12/05/22  1333   BC 24 Hours no growth       Vancomycin Administration Times:  Recent vancomycin administrations                     vancomycin (VANCOCIN) 1,000 mg in dextrose 5 % 250 mL IVPB ([id number]) (mg) 1,000 mg New Bag 12/07/22 2358     1,000 mg New Bag  0542     1,000 mg New Bag 12/06/22 1850     1,000 mg New Bag  0523     1,000 mg New Bag 12/05/22 1912                        Assessment:  Patient is a 74 y.o. female who has been initiated on vancomycin  Estimated Creatinine Clearance: 85 mL/min (based on SCr of 0.7 mg/dL).   To dose vancomycin, pharmacy will be utilizing [employer] calculation software for goal AUC/JANE 400-600 mg/L-hr  PEG tube removed  ID consulted- CONS bacteremia     Plan:  Continue vancomycin 1000 mg IV every 12 hours (AUC/JANE 456, Tr 14.1 per [employer] software)  Will check vancomycin levels when appropriate  Will continue to monitor renal function   Pharmacy to follow    Thank you for this consult,    Mahesh Rodgers Pharm. D.  12/8/2022  9:18 AM

## 2022-12-08 NOTE — PLAN OF CARE
Problem: Skin/Tissue Integrity  Goal: Absence of new skin breakdown  Description: 1. Monitor for areas of redness and/or skin breakdown  2. Assess vascular access sites hourly  3. Every 4-6 hours minimum:  Change oxygen saturation probe site  4. Every 4-6 hours:  If on nasal continuous positive airway pressure, respiratory therapy assess nares and determine need for appliance change or resting period.   Outcome: Progressing     Problem: Safety - Adult  Goal: Free from fall injury  Outcome: Progressing     Problem: Pain  Goal: Verbalizes/displays adequate comfort level or baseline comfort level  Outcome: Progressing     Problem: Chronic Conditions and Co-morbidities  Goal: Patient's chronic conditions and co-morbidity symptoms are monitored and maintained or improved  Outcome: Progressing     Problem: Cardiovascular - Adult  Goal: Maintains optimal cardiac output and hemodynamic stability  Outcome: Progressing     Problem: Metabolic/Fluid and Electrolytes - Adult  Goal: Hemodynamic stability and optimal renal function maintained  Outcome: Progressing     Problem: Nutrition Deficit:  Goal: Optimize nutritional status  Outcome: Progressing

## 2022-12-08 NOTE — PROGRESS NOTES
[facility] called they are running at least 90 minutes behind.  They are now planning to pick her up 630-7pm.

## 2022-12-08 NOTE — CARE COORDINATION
12/8 Update CM note. ID states Levaquin PO Q24H and Diflucan PO Q24H, no IV ATB needed on discharge. No scripts completed as of current. Await ID to round and anticipated discharge back to [facility] today. DICK/destination complete. Ambulance form in soft chart, will need completed and signed on day OF discharge. [facility] with [facility], stretcher transport arranged for 4:00 PM to secure pt a spot for transport today. Completed and signed ambulance form in soft chart. Pt aware of anticipated discharge and Elvia Bustle from [facility] notified.      MATTHEW PerezN, RN  [facility] \[facility]\"" Case Management   Cell: [phone]

## 2022-12-08 NOTE — DISCHARGE SUMMARY
Hospitalist Discharge Summary    Patient ID: Imani Perdomo   Patient : 1955  Patient's PCP: Luan Madden DO    Admit Date: 2022   Admitting Physician: Rogerio Campos MD    Discharge Date:  2022   Discharge Physician: VERENA Santiago Chi - CNP   Discharge Condition: Stable  Discharge Disposition: [facility] course in brief:  (Please refer to daily progress notes for a comprehensive review of the hospitalization by requesting medical records)  Briefly, patient from [facility] with PMH significant for breast CVA, CAD, cystitis, renal calculi, blood clots, HPL, HPT, hypothyroidism, MS, neurogenic bladder, foot drop, NSTEMI, cerebral artery occlusion with cerebral infarction who is wheelchair dependent presented to ED with complaints of abdominal pain and irritation at PEG tube site. Infectious disease was consulted for cellulitis at PEG tube site and was started on vancomycin. General surgery was consulted, no surgical interventions at this time. Infectious disease stopped IV antibiotics started p.o. Patient stable from medical perspective for discharge. Consults:   IP CONSULT TO GENERAL SURGERY  IP CONSULT TO PHARMACY  IP CONSULT TO INFECTIOUS DISEASES  IP CONSULT TO DIETITIAN    Discharge Diagnoses:  PEG tube site infection  Bacteremia  CAD  HPT  HPL  GERD  Hypothyroidism  Anxiety disorder      Discharge Instructions / Follow up:    Future Appointments   Date Time Provider Sapna Machado   3/8/2023  8:00 AM [facility] CHAIR 2 [facility]       The patient's condition is stable. At this time the patient is without objective evidence of an acute process requiring continuing hospitalization or inpatient management. They are stable for discharge with outpatient follow-up.      I have spoken with the patient and discussed the results of the current hospitalization, in addition to providing specific details for the plan of care and counseling regarding the diagnosis and prognosis. The plan has been discussed in detail and they are aware of the specific conditions for emergent return, as well as the importance of follow-up. Their questions are answered at this time and they are agreeable with the plan for discharge to [facility]. Continued appropriate risk factor modification of blood pressure, diabetes and serum lipids will remain essential to reducing risk of future atherosclerotic development    Activity: activity as tolerated    Physical exam:  General appearance: No apparent distress, appears stated age and cooperative. HEENT: Normal cephalic, atraumatic without obvious deformity. Pupils equal, round, and reactive to light. Extra ocular muscles intact. Conjunctivae/corneas clear. Neck: Supple, with full range of motion. No jugular venous distention. Trachea midline. Respiratory:  Clear to auscultation bilaterally. No apparent distress. Cardiovascular:  Regular rate and rhythm. S1, S2 without murmurs, rubs, or gallops. PV: Brisk capillary refill. +2 pedal and radial pulses bilaterally. No clubbing, cyanosis, edema of bilateral lower extremities. Abdomen: Soft, non-tender, non-distended. +BS  Musculoskeletal: No obvious deformities or erythematous or edematous joints. Skin: Normal skin color. No rashes or lesions. Neurologic:  Neurovascularly intact without any focal sensory/motor deficits.  Cranial nerves: II-XII intact, grossly non-focal.  Psychiatric: Alert and oriented, thought content appropriate, normal insight    Significant labs:  CBC:   Recent Labs     12/06/22  0619 12/07/22  0517   WBC 8.0 9.2   RBC 3.57 3.71   HGB 10.4* 10.5*   HCT 33.2* 34.7   MCV 93.0 93.5   RDW 14.6 14.6    433     BMP:   Recent Labs     12/06/22  0619 12/07/22  0517 12/08/22  1054    133 136   K 3.4* 3.4* 4.7    114* 101   CO2 23 21* 21*   BUN 7 9 7   CREATININE 0.6 0.7 0.7   MG 1.8  --   --      LFT:  Recent Labs 12/08/22  1054   PROT 6.0*   ALKPHOS 102   ALT 17   AST 24   BILITOT 0.3     PT/INR: No results for input(s): INR, APTT in the last 72 hours. BNP: No results for input(s): BNP in the last 72 hours. Hgb A1C: No results found for: LABA1C  Folate and B12:   Lab Results   Component Value Date    ULECLCPR18 415 03/09/2020   ,   Lab Results   Component Value Date    FOLATE >20.0 03/09/2020     Thyroid Studies:   Lab Results   Component Value Date    TSH 1.920 10/11/2022       Urinalysis:    Lab Results   Component Value Date/Time    NITRU POSITIVE 03/17/2021 03:00 AM    WBCUA PACKED 03/17/2021 03:00 AM    BACTERIA MANY 03/17/2021 03:00 AM    RBCUA 5-10 03/17/2021 03:00 AM    BLOODU MODERATE 03/17/2021 03:00 AM    SPECGRAV 1.015 03/17/2021 03:00 AM    GLUCOSEU Negative 03/17/2021 03:00 AM       Imaging:  CT ABDOMEN PELVIS W IV CONTRAST Additional Contrast? None    Result Date: 12/2/2022  EXAMINATION: CT OF THE ABDOMEN AND PELVIS WITH CONTRAST 12/2/2022 7:19 pm TECHNIQUE: CT of the abdomen and pelvis was performed with the administration of intravenous contrast. Multiplanar reformatted images are provided for review. Automated exposure control, iterative reconstruction, and/or weight based adjustment of the mA/kV was utilized to reduce the radiation dose to as low as reasonably achievable. COMPARISON: None. HISTORY: ORDERING SYSTEM PROVIDED HISTORY: peg tube with pain and surrounding erythema TECHNOLOGIST PROVIDED HISTORY: Additional Contrast?->None Reason for exam:->peg tube with pain and surrounding erythema Decision Support Exception - unselect if not a suspected or confirmed emergency medical condition->Emergency Medical Condition (MA) What reading provider will be dictating this exam?->CRC FINDINGS: Lower Chest:   Left lower lobe atelectasis. Organs: Liver, spleen, adrenal glands, pancreas and gallbladder are normal. Mild renal atrophy. GI/Bowel:   Peg tube tip terminates in the 2nd portion of the duodenum.   No evidence for adjacent cellulitis or abscess. Pelvis: Elizondo catheter within a decompressed urinary bladder. Peritoneum/Retroperitoneum: No free fluid or free air. Bones/Soft Tissues:   Unremarkable. Peg tube balloon tip within the 2nd portion of the duodenum. No adjacent cellulitis, abscess or inflammatory stranding.        Discharge Medications:      Medication List        START taking these medications      fluconazole 200 MG tablet  Commonly known as: DIFLUCAN  Take 1 tablet by mouth daily for 7 doses  Start taking on: December 9, 2022     levoFLOXacin 500 MG tablet  Commonly known as: LEVAQUIN  Take 1 tablet by mouth daily for 7 doses  Start taking on: December 9, 2022            CHANGE how you take these medications      Cranberry 500 MG Caps  1 capsule by PEG Tube route daily  What changed: how to take this            CONTINUE taking these medications      acetaminophen 325 MG tablet  Commonly known as: TYLENOL     albuterol (2.5 MG/3ML) 0.083% nebulizer solution  Commonly known as: PROVENTIL     anastrozole 1 MG tablet  Commonly known as: ARIMIDEX     ARTIFICIAL TEAR OINTMENT OP     atorvastatin 80 MG tablet  Commonly known as: LIPITOR     cetirizine 10 MG tablet  Commonly known as: ZYRTEC     diazePAM 2 MG tablet  Commonly known as: VALIUM     FLUoxetine 20 MG capsule  Commonly known as: PROZAC     fluticasone 50 MCG/ACT nasal spray  Commonly known as: FLONASE     gabapentin 300 MG capsule  Commonly known as: NEURONTIN     levothyroxine 125 MCG tablet  Commonly known as: SYNTHROID     magnesium hydroxide 400 MG/5ML suspension  Commonly known as: MILK OF MAGNESIA     metoprolol tartrate 25 MG tablet  Commonly known as: LOPRESSOR     nitroGLYCERIN 0.4 MG SL tablet  Commonly known as: NITROSTAT     OCREVUS IV     pantoprazole 40 MG tablet  Commonly known as: PROTONIX     psyllium 28.3 % Pack  Commonly known as: KONSYL     saline nasal gel Gel     ticagrelor 60 MG Tabs tablet  Commonly known as: BRILINTA  Take 1 tablet by mouth 2 times daily     Vitamin D3 1.25 MG (94122 UT) Caps               Where to Get Your Medications        You can get these medications from any pharmacy    Bring a paper prescription for each of these medications  ticagrelor 60 MG Tabs tablet       Information about where to get these medications is not yet available    Ask your nurse or doctor about these medications  fluconazole 200 MG tablet  levoFLOXacin 500 MG tablet         Time Spent on discharge is more than 45 minutes in the examination, evaluation, counseling and review of medications and discharge plan.    +++++++++++++++++++++++++++++++++++++++++++++++++  Tiffanie Poe[address]  +++++++++++++++++++++++++++++++++++++++++++++++++  NOTE: This report was transcribed using voice recognition software. Every effort was made to ensure accuracy; however, inadvertent computerized transcription errors may be present.

## 2022-12-08 NOTE — PROGRESS NOTES
Department of Internal Medicine  Infectious Diseases  Progress  Note      C/C : Staphylococcus epidermidis bacteremia , peg wound infection        Pt is awake and alert  Denies fever or chills  Mild abdomen pain   Afebrile       Current Facility-Administered Medications   Medication Dose Route Frequency Provider Last Rate Last Admin    fluconazole (DIFLUCAN) tablet 200 mg  200 mg Oral Daily Luc Ramirez MD   200 mg at 12/08/22 0842    levoFLOXacin (LEVAQUIN) tablet 500 mg  500 mg Oral Daily Luc Ramirez MD   500 mg at 12/08/22 0843    potassium chloride (KLOR-CON M) extended release tablet 20 mEq  20 mEq Oral BID Dashawn Newton MD   20 mEq at 12/08/22 0841    white petrolatum ointment   Topical BID Dashawn Newton MD   Given at 12/08/22 0844    white petrolatum ointment   Topical TID PRN Dashawn Newton MD        albuterol (PROVENTIL) nebulizer solution 2.5 mg  2.5 mg Nebulization Q4H PRN Oconnor Class, DO        anastrozole (ARIMIDEX) tablet 1 mg  1 mg Oral Daily Oconnor Class, DO   1 mg at 12/08/22 0841    atorvastatin (LIPITOR) tablet 80 mg  80 mg Oral Nightly Oconnor Class, DO   80 mg at 12/07/22 2206    cetirizine (ZYRTEC) tablet 10 mg  10 mg Oral Daily Oconnor Class, DO   10 mg at 12/08/22 0841    FLUoxetine (PROZAC) capsule 20 mg  20 mg Oral BID Oconnor Class, DO   20 mg at 12/08/22 0841    gabapentin (NEURONTIN) capsule 300 mg  300 mg Oral TID Oconnor Class, DO   300 mg at 12/08/22 1407    levothyroxine (SYNTHROID) tablet 125 mcg  125 mcg Oral Daily Oconnor Class, DO   125 mcg at 12/08/22 0513    pantoprazole (PROTONIX) tablet 40 mg  40 mg Oral QAM AC Oconnor Class, DO   40 mg at 12/08/22 5743    ticagrelor (BRILINTA) tablet 60 mg  60 mg Oral BID Oconnor Class, DO   60 mg at 12/08/22 8931    sodium chloride flush 0.9 % injection 10 mL  10 mL IntraVENous 2 times per day Oconnor Class, DO   10 mL at 12/08/22 0843    sodium chloride flush 0.9 % injection 10 mL  10 mL IntraVENous PRN Soniya Hurtado Angelina Bombard, DO        0.9 % sodium chloride infusion   IntraVENous PRN Elva Mile, DO        enoxaparin (LOVENOX) injection 40 mg  40 mg SubCUTAneous Daily Elva Mile, DO   40 mg at 12/08/22 0841    promethazine (PHENERGAN) tablet 12.5 mg  12.5 mg Oral Q6H PRN Elva Mile, DO        Or    ondansetron [facility]) injection 4 mg  4 mg IntraVENous Q6H PRN Elva Mile, DO        polyethylene glycol (GLYCOLAX) packet 17 g  17 g Oral Daily PRN Elva Mile, DO        acetaminophen (TYLENOL) tablet 650 mg  650 mg Oral Q6H PRN Elva Mile, DO   650 mg at 12/07/22 0810    Or    acetaminophen (TYLENOL) suppository 650 mg  650 mg Rectal Q6H PRN Elva Mile, DO        vancomycin (VANCOCIN) 1,000 mg in dextrose 5 % 250 mL IVPB ([id number])  1,000 mg IntraVENous Q12H Elva Mile,  mL/hr at 12/08/22 1311 1,000 mg at 12/08/22 1311             REVIEW OF SYSTEMS:    CONSTITUTIONAL:  Denies fever, chill or rigors. HEENT: denies blurring of vision or double vision, denies hearing problem  RESPIRATORY: denies cough, shortness of breath, sputum expectoration. CARDIOVASCULAR:  Denies palpitation or chest pain   GASTROINTESTINAL:  drainage from the PEG wound, pain   GENITOURINARY:  Denies burning urination or frequency of urination  INTEGUMENT: denies wound , rash  HEMATOLOGIC/LYMPHATIC:  Denies lymph node swelling, gum bleeding or easy bruising. MUSCULOSKELETAL:  Denies leg pain , joint pain , joint swelling  NEUROLOGICAL:  Denies light headed, dizziness, loss of consciousness, weakness of lower extremities, bowel or bladder incontinence. PHYSICAL EXAM:      Vitals:     Vitals:    12/08/22 0830   BP: 121/72   Pulse: (!) 103   Resp: 15   Temp: 98.2 °F (36.8 °C)   SpO2: 94%       General Appearance:    Awake, alert , no acute distress. Head:    Normocephalic, atraumatic   Eyes:    No pallor, no icterus,   Ears:    No obvious deformity or drainage.    Nose:   No nasal drainage   Throat:   Mucosa moist, no oral thrush Neck:   Supple, no lymphadenopathy   Back:     no CVA tenderness   Lungs:     Clear to auscultation bilaterally,    Heart:    Regular rate and rhythm, no murmur   Abdomen:     Soft, bowel sounds present , mid tender , PEG site -small drainage    Extremities:   No edema, no cyanosis    Pulses:   Dorsalis pedis palpable    Skin:   no rashes or lesions     CBC with Differential:      Lab Results   Component Value Date/Time    WBC 9.2 12/07/2022 05:17 AM    RBC 3.71 12/07/2022 05:17 AM    HGB 10.5 12/07/2022 05:17 AM    HCT 34.7 12/07/2022 05:17 AM     12/07/2022 05:17 AM    MCV 93.5 12/07/2022 05:17 AM    MCH 28.3 12/07/2022 05:17 AM    MCHC 30.3 12/07/2022 05:17 AM    RDW 14.6 12/07/2022 05:17 AM    NRBC 0.9 03/08/2020 12:16 PM    METASPCT 0.9 03/11/2020 06:41 AM    LYMPHOPCT 23.9 12/07/2022 05:17 AM    MONOPCT 9.5 12/07/2022 05:17 AM    BASOPCT 0.7 12/07/2022 05:17 AM    MONOSABS 0.87 12/07/2022 05:17 AM    LYMPHSABS 2.20 12/07/2022 05:17 AM    EOSABS 0.20 12/07/2022 05:17 AM    BASOSABS 0.06 12/07/2022 05:17 AM       CMP     Lab Results   Component Value Date/Time     12/08/2022 10:54 AM    K 4.7 12/08/2022 10:54 AM    K 4.0 12/03/2022 05:27 AM     12/08/2022 10:54 AM    CO2 21 12/08/2022 10:54 AM    BUN 7 12/08/2022 10:54 AM    CREATININE 0.7 12/08/2022 10:54 AM    GFRAA >60 10/11/2022 04:31 AM    LABGLOM >60 12/08/2022 10:54 AM    GLUCOSE 145 12/08/2022 10:54 AM    PROT 6.0 12/08/2022 10:54 AM    LABALBU 3.5 12/08/2022 10:54 AM    CALCIUM 9.4 12/08/2022 10:54 AM    BILITOT 0.3 12/08/2022 10:54 AM    ALKPHOS 102 12/08/2022 10:54 AM    AST 24 12/08/2022 10:54 AM    ALT 17 12/08/2022 10:54 AM         Hepatic Function Panel:    Lab Results   Component Value Date/Time    ALKPHOS 102 12/08/2022 10:54 AM    ALT 17 12/08/2022 10:54 AM    AST 24 12/08/2022 10:54 AM    PROT 6.0 12/08/2022 10:54 AM    BILITOT 0.3 12/08/2022 10:54 AM    BILIDIR <0.2 12/02/2022 04:24 PM    IBILI see below 12/02/2022 04:24 PM    LABALBU 3.5 12/08/2022 10:54 AM       PT/INR:    Lab Results   Component Value Date/Time    PROTIME 12.7 12/02/2022 04:24 PM    INR 1.2 12/02/2022 04:24 PM       TSH:    Lab Results   Component Value Date/Time    TSH 1.920 10/11/2022 04:31 AM       U/A:    Lab Results   Component Value Date/Time    COLORU Yellow 03/17/2021 03:00 AM    PHUR 6.5 03/17/2021 03:00 AM    WBCUA PACKED 03/17/2021 03:00 AM    RBCUA 5-10 03/17/2021 03:00 AM    BACTERIA MANY 03/17/2021 03:00 AM    CLARITYU CLOUDY 03/17/2021 03:00 AM    SPECGRAV 1.015 03/17/2021 03:00 AM    LEUKOCYTESUR LARGE 03/17/2021 03:00 AM    UROBILINOGEN 0.2 03/17/2021 03:00 AM    BILIRUBINUR Negative 03/17/2021 03:00 AM    BLOODU MODERATE 03/17/2021 03:00 AM    GLUCOSEU Negative 03/17/2021 03:00 AM    AMORPHOUS MANY 02/03/2021 12:15 PM       ABG:  No results found for: ATH1EJG, BEART, L1LLIRLL, PHART, THGBART, XEU8ECS, PO2ART, RZD4CVF    MICROBIOLOGY:    Blood culture -    Staphylococcus epidermidis by PCR DETECTED Panic       Staphylococcus lugdunensis by PCR Not Detected    Staphylococcus species by PCR DETECTED Panic      Serratia marcescens by PCR Not Detected    Streptococcus pneumoniae by PCR Not Detected    Streptococcus pyogenes  by PCR Not Detected    Streptococcus species by PCR Not Detected    Stenotrophomonas maltophilia by PCR Not Detected    Candida albicans by PCR Not Detected    Candida auris by PCR Not Detected    Candida glabrata by PCR Not Detected    Candida krusei by PCR Not Detected    Candida parapsilosis by PCR Not Detected    Candida tropicalis by PCR Not Detected    Cryptococcus neoformans/gattii by PCR Not Detected    Methicillin Resistance mecA/C  by PCR DETECTED Panic           Culture, Wound Aerobic Only [[id number]] (Abnormal)     Collected: 12/05/22 1359    Updated: 12/07/22 6807    Specimen Type: Abdomen     WOUND/ABSCESS -- Abnormal     Additional growth present, also evaluating for;   Yeast    Abnormal     Organism Escherichia coli Abnormal     WOUND/ABSCESS --    Light growth   Identification and sensitivity to follow          Radiology :    CT abdomen and pelvis -  Impression:        Peg tube balloon tip within the 2nd portion of the duodenum. No adjacent   cellulitis, abscess or inflammatory stranding.             IMPRESSION:     CONS bacteremia   PEG wound infection, cellulitis   Leukocytosis - improved       RECOMMENDATIONS:         Stop vancomycin   Levaquin 500 mg po q 24 hrs /  diflucan 200 mg po q 24 hrs  ~ 1 week

## 2022-12-08 NOTE — PROGRESS NOTES
Hospitalist Progress Note      Synopsis:   Briefly, [facility] with PMH significant for breast CVA, CAD, cystitis, renal calculi, blood clots, HPL, HPT, hypothyroidism, MS, neurogenic bladder, foot drop, NSTEMI, cerebral artery occlusion with cerebral infarction who is wheelchair dependent presented to ED with complaints of abdominal pain and irritation at PEG tube site. Infectious disease was consulted for cellulitis at PEG tube site and was started on vancomycin. Hospital day 6     Subjective:  Stable overnight. No issues reported. Patient seen and examined  Records reviewed. Temp (24hrs), Av.6 °F (37 °C), Min:98 °F (36.7 °C), Max:99.5 °F (37.5 °C)    DIET: ADULT DIET; Regular; Low Sodium (2 gm)  CODE: Full Code    Intake/Output Summary (Last 24 hours) at 2022 0955  Last data filed at 2022 0522  Gross per 24 hour   Intake 480 ml   Output 2425 ml   Net -1945 ml       Review of Systems: All bolded are positive; please see HPI  General:  Fever, chills, diaphoresis, fatigue, malaise, night sweats, weight loss  Psychological:  Anxiety, disorientation, hallucinations. ENT:  Epistaxis, headaches, vertigo, visual changes. Cardiovascular:  Chest pain, irregular heartbeats, palpitations, paroxysmal nocturnal dyspnea. Respiratory:  Shortness of breath, coughing, sputum production, hemoptysis, wheezing, orthopnea.   Gastrointestinal:  Nausea, vomiting, diarrhea, heartburn, constipation, abdominal pain, hematemesis, hematochezia, melena, acholic stools  Genito-Urinary:  Dysuria, urgency, frequency, hematuria  Musculoskeletal:  Joint pain, joint stiffness, joint swelling, muscle pain  Neurology:  Headache, focal neurological deficits, weakness, numbness, paresthesia  Derm:  Rashes, ulcers, excoriations, bruising  Extremities:  Decreased ROM, peripheral edema, mottling    Objective:    /72   Pulse (!) 103   Temp 98.2 °F (36.8 °C) (Temporal)   Resp 15   Ht 5' 6\" (1.676 m) Wt 172 lb 6.4 oz (78.2 kg)   SpO2 94%   BMI 27.83 kg/m²     General appearance: No apparent distress, appears stated age and cooperative. HEENT: Conjunctivae/corneas clear. Mucous membranes moist.  Neck: Supple. No JVD. Respiratory:  Clear to auscultation bilaterally. Normal respiratory effort. Cardiovascular:  RRR. S1, S2 without MRG. PV: Pulses palpable. No edema. Abdomen: Soft, non-tender, non-distended. +BS  Musculoskeletal: No obvious deformities. Skin: Normal skin color. No rashes or lesions. Good turgor. Neurologic:  Grossly non-focal. Awake, alert, following commands. Psychiatric: Alert and oriented, thought content appropriate, normal insight and judgement    Medications:  REVIEWED DAILY    Infusion Medications    sodium chloride       Scheduled Medications    fluconazole  200 mg Oral Daily    levoFLOXacin  500 mg Oral Daily    potassium chloride  20 mEq Oral BID    white petrolatum   Topical BID    anastrozole  1 mg Oral Daily    atorvastatin  80 mg Oral Nightly    cetirizine  10 mg Oral Daily    FLUoxetine  20 mg Oral BID    gabapentin  300 mg Oral TID    levothyroxine  125 mcg Oral Daily    pantoprazole  40 mg Oral QAM AC    ticagrelor  60 mg Oral BID    sodium chloride flush  10 mL IntraVENous 2 times per day    enoxaparin  40 mg SubCUTAneous Daily    vancomycin  1,000 mg IntraVENous Q12H     PRN Meds: white petrolatum, albuterol, sodium chloride flush, sodium chloride, promethazine **OR** ondansetron, polyethylene glycol, acetaminophen **OR** acetaminophen    Labs:     Recent Labs     12/06/22  0619 12/07/22  0517   WBC 8.0 9.2   HGB 10.4* 10.5*   HCT 33.2* 34.7    433       Recent Labs     12/06/22  0619 12/07/22  0517    133   K 3.4* 3.4*    114*   CO2 23 21*   BUN 7 9   CREATININE 0.6 0.7   CALCIUM 8.5* 8.8       No results for input(s): PROT, ALB, ALKPHOS, ALT, AST, BILITOT, AMYLASE, LIPASE in the last 72 hours.     No results for input(s): INR in the last 72 hours. No results for input(s): Bernadette Lopez in the last 72 hours. Chronic labs:    Lab Results   Component Value Date    CHOL 102 10/11/2022    TRIG 119 10/11/2022    HDL 32 10/11/2022    LDLCALC 46 10/11/2022    TSH 1.920 10/11/2022    INR 1.2 12/02/2022       Radiology: REVIEWED DAILY    Assessment:  PEG tube site infection  Bacteremia  CAD  HPT  HPL  GERD  Hypothyroidism  Anxiety disorder  Plan:  Cultures growing staph epidermidis x2  Infectious disease consulted  Continue IV vancomycin, Levaquin and Diflucan  Follow wound and blood cultures  General surgery was consulted and has signed off no recommendation for surgery at this time  If stable for discharge patient will need midline placed for remainder of IV antibiotic therapy  Continue home medications  DVT Prophylaxis [x] Lovenox  []  Heparin [] DOAC [] PCDs [] Ambulation    GI Prophylaxis [] PPI  [] H2 Blocker   [] Carafate  [x] Diet/Tube Feeds   Level of care [] Med/Surg  [x] Intermediate  []  ICU   Diet ADULT DIET; Regular; Low Sodium (2 gm)    Family contact [x]  N/A    [] At bedside  [] Phone call   Disposition Patient requires continued admission IV antibiotic therapy for bacteremia and infected PEG tube site   MDM [] Low    [] Moderate  []   High       Discharge Plan: Return to [facility] when clinically stable    +++++++++++++++++++++++++++++++++++++++++++++++++  Miya Clark[address]  +++++++++++++++++++++++++++++++++++++++++++++++++  NOTE: This report was transcribed using voice recognition software. Every effort was made to ensure accuracy; however, inadvertent computerized transcription errors may be present.

## 2022-12-09 ENCOUNTER — HOSPITAL ENCOUNTER (EMERGENCY)
Age: 67
Discharge: HOME | End: 2022-12-10
Attending: EMERGENCY MEDICINE
Payer: MEDICARE

## 2022-12-09 DIAGNOSIS — K94.20 COMPLICATION OF GASTROSTOMY TUBE (HCC): Primary | ICD-10-CM

## 2022-12-09 LAB
ORGANISM: ABNORMAL
ORGANISM: ABNORMAL
WOUND/ABSCESS: ABNORMAL
WOUND/ABSCESS: ABNORMAL

## 2022-12-09 PROCEDURE — 99283 EMERGENCY DEPT VISIT LOW MDM: CPT

## 2022-12-09 ASSESSMENT — PAIN DESCRIPTION - LOCATION: LOCATION: ABDOMEN

## 2022-12-09 ASSESSMENT — PAIN SCALES - GENERAL: PAINLEVEL_OUTOF10: 6

## 2022-12-09 ASSESSMENT — PAIN - FUNCTIONAL ASSESSMENT: PAIN_FUNCTIONAL_ASSESSMENT: 0-10

## 2022-12-10 VITALS
DIASTOLIC BLOOD PRESSURE: 68 MMHG | BODY MASS INDEX: 27.64 KG/M2 | WEIGHT: 172 LBS | TEMPERATURE: 99 F | RESPIRATION RATE: 16 BRPM | HEIGHT: 66 IN | OXYGEN SATURATION: 93 % | SYSTOLIC BLOOD PRESSURE: 113 MMHG | HEART RATE: 79 BPM

## 2022-12-10 LAB
BLOOD CULTURE, ROUTINE: NORMAL
CULTURE, BLOOD 2: NORMAL

## 2022-12-10 NOTE — ED NOTES
Applied dressing and skin protectant to site; patient tolerated well.       Beverley Harper RN  12/10/22 9975

## 2022-12-10 NOTE — ED NOTES
Patient presents to ED via EMS from nursing home for post op complications. Patient had peg tube removed on 12/4/22, has had excessive drainage since removal. Denies fevers, chills, chest pain, or SOB. Patient reports abdominal pain and pain around the site.       Misty Briggs RN  12/10/22 0028

## 2022-12-10 NOTE — ED NOTES
N2N report called to [facility], gave discharge instructions; no questions from RN at this time.       Damaris Quiros RN  12/10/22 1309

## 2022-12-10 NOTE — ED PROVIDER NOTES
HPI:  12/9/22,   Time: 11:54 PM JENNIFER Diaz is a 74 y.o. female presenting to the ED for evaluation of a leaking G-tube site. Patient had a G-tube in place for nearly 4 years. It was recently removed. Since removal she has had leaking of gastric fluid from the site. She was sent here for evaluation of this. She has no other complaints. She states she has been eating well. ROS:   Pertinent positives and negatives are stated within HPI, all other systems reviewed and are negative.  --------------------------------------------- PAST HISTORY ---------------------------------------------  Past Medical History:  has a past medical history of Anxiety, Breast cancer ([facility] [address] CAD (coronary artery disease), Cystitis, Depression, Elizondo catheter status, Foot drop, bilateral, History of blood transfusion, History of renal calculi, Hx of blood clots, Hyperlipidemia, Hypertension, Hypothyroidism, MS (multiple sclerosis) ([facility] [address] Neurogenic bladder, NSTEMI (non-ST elevated myocardial infarction) ([facility] [address] Total self-care deficit, Unspecified cerebral artery occlusion with cerebral infarction, and Wheelchair dependent. Past Surgical History:  has a past surgical history that includes Hysterectomy (2001); Dental surgery; Colon surgery (2001); Upper gastrointestinal endoscopy (N/A, 12/12/2019); Colonoscopy (N/A, 2/4/2020); Mastectomy, modified radical (Right, 3/3/2020); Coronary angioplasty (2019); Gastrostomy tube placement (N/A, 11/30/2021); and Mastectomy. Social History:  reports that she quit smoking about 22 years ago. Her smoking use included cigarettes. She started smoking about 27 years ago. She has a 5.00 pack-year smoking history. She has never used smokeless tobacco. She reports that she does not drink alcohol and does not use drugs. Family History: family history includes Breast Cancer in her maternal aunt, maternal aunt, mother, and sister; Diabetes in her father; Heart Disease in her father. The patients home medications have been reviewed. Allergies: Pcn [penicillins] and Watermelon [citrullus vulgaris]    -------------------------------------------------- RESULTS -------------------------------------------------  All laboratory and radiology results have been personally reviewed by myself   LABS:  No results found for this visit on 12/09/22. RADIOLOGY:  Interpreted by Radiologist.  No orders to display       ------------------------- NURSING NOTES AND VITALS REVIEWED ---------------------------   The nursing notes within the ED encounter and vital signs as below have been reviewed. /67   Pulse 76   Temp 99 °F (37.2 °C) (Oral)   Ht 5' 6\" (1.676 m)   Wt 172 lb (78 kg)   SpO2 92%   BMI 27.76 kg/m²   Oxygen Saturation Interpretation: Normal      ---------------------------------------------------PHYSICAL EXAM--------------------------------------      Constitutional/General: Alert and oriented x3, well appearing, non toxic in NAD  Head: NC/AT  Eyes: PERRL, EOMI  Nose:  Nares patent. No congestion or discharge noted. Ears:  TM's intact without erythema or perforation. External canal without swelling  Mouth: Oropharynx clear, handling secretions, no trismus  Neck: Supple, full ROM, no meningeal signs  Pulmonary: Lungs Clear to auscultation bilaterally. No rales or rhonchi or wheezes noted. No retractions. Cardiovascular:  Regular rate and rhythm, no murmurs, gallops, or rubs. 2+ symmetric distal pulses   Chest:  No tenderness, deformity or crepitus  Abdomen: Soft, non tender, non distended, normal bowel sounds. G-tube site noted in the mid epigastrium region. Excoriation around the site. There is bubbling of gas and liquid coming out of the site when the patient Valsalvas. Back:  No tenderness to palpation on the cervical or thoracic or lumbar spine  Extremities: Moves all extremities x 4.  Warm and well perfused  Skin: warm and dry with excoriated skin around the G-tube site as noted above and underneath the patient's breasts. Neurologic: GCS 15, no focal acute neurological deficit  Psych: Normal Affect      ------------------------------ ED COURSE/MEDICAL DECISION MAKING----------------------  Medications - No data to display         Medical Decision Making:    I placed the patient in slight Trendelenburg. Dried the gastrostomy tube site well and applied thick layers of skin adhesive. I did this into separate stages to allow drying of the first before the second coat was placed. It does appear that a seal to some degree has formed. Patient was warned that this could breakdown she could have more drainage until the site ends up closing permanently but because it was in for 4 years this could take some time. She was given instructions to have the excoriated skin around the site covered and a thick to prevent further skin damage. Patient advised to return to the emergency department should symptoms worsen. Advised to contact primary care physician to secure follow-up appointment within the next 1-2 days. --------------------------------- IMPRESSION AND DISPOSITION ---------------------------------    IMPRESSION  1.  Complication of gastrostomy tube [facility])        DISPOSITION  Disposition: Discharge to home  Patient condition is good        Gerardo Kirkpatrick DO  12/09/22 3453

## 2023-02-14 LAB
ALBUMIN SERPL-MCNC: 3.4 G/DL (ref 3.5–5.2)
ALP BLD-CCNC: 104 U/L (ref 35–104)
ALT SERPL-CCNC: 16 U/L (ref 0–32)
ANION GAP SERPL CALCULATED.3IONS-SCNC: 13 MMOL/L (ref 7–16)
AST SERPL-CCNC: 15 U/L (ref 0–31)
BILIRUB SERPL-MCNC: 0.3 MG/DL (ref 0–1.2)
BUN BLDV-MCNC: 12 MG/DL (ref 6–23)
CALCIUM SERPL-MCNC: 8.9 MG/DL (ref 8.6–10.2)
CHLORIDE BLD-SCNC: 103 MMOL/L (ref 98–107)
CO2: 25 MMOL/L (ref 22–29)
CREAT SERPL-MCNC: 0.6 MG/DL (ref 0.5–1)
GFR SERPL CREATININE-BSD FRML MDRD: >60 ML/MIN/1.73
GLUCOSE BLD-MCNC: 83 MG/DL (ref 74–99)
POTASSIUM SERPL-SCNC: 3.5 MMOL/L (ref 3.5–5)
SODIUM BLD-SCNC: 141 MMOL/L (ref 132–146)
TOTAL PROTEIN: 5.6 G/DL (ref 6.4–8.3)

## 2023-02-27 ENCOUNTER — PREP FOR PROCEDURE (OUTPATIENT)
Dept: SURGERY | Age: 68
End: 2023-02-27

## 2023-02-27 RX ORDER — SODIUM CHLORIDE 0.9 % (FLUSH) 0.9 %
5-40 SYRINGE (ML) INJECTION PRN
Status: CANCELLED | OUTPATIENT
Start: 2023-02-27

## 2023-02-27 RX ORDER — SODIUM CHLORIDE 0.9 % (FLUSH) 0.9 %
5-40 SYRINGE (ML) INJECTION EVERY 12 HOURS SCHEDULED
Status: CANCELLED | OUTPATIENT
Start: 2023-02-27

## 2023-02-27 RX ORDER — SODIUM CHLORIDE, SODIUM LACTATE, POTASSIUM CHLORIDE, CALCIUM CHLORIDE 600; 310; 30; 20 MG/100ML; MG/100ML; MG/100ML; MG/100ML
INJECTION, SOLUTION INTRAVENOUS CONTINUOUS
Status: CANCELLED | OUTPATIENT
Start: 2023-02-27

## 2023-02-27 NOTE — H&P (VIEW-ONLY)
Name: Catherine Cid              : 1955 Sex: F  Age: 74 yrs  Acct#:  [id number]          Patient was referred by Frankie Grover D.O..  Patient's primary care provider is Frankie Grover D.O..  CC:  Drainage    HPI: 82-year-old female known to me. History of gastrostomy tube. A partner attempted a bear claw procedure to close the gastrostomy site, but it has not succeeded. She has persistent drainage which is causing skin irritation    Meds Prior to Visit:  Albuterol Sulfate HFA  108 (90 Base) mcg/Act  inhale 2 puff by mouth every 4 to 6 hours if needed swallow  Aspirin 81 Low Dose  81 mg  once daily  Ayr Saline Nasal No-Drip     Brilinta  90 mg   Clonidine HCL  0.2 mg   Cranberry Soft  500 mg   Eliquis  5 mg   Flonase Allergy Relief  50 mcg/Act   Lasix  20 mg   Lipitor  80 mg   Lisinopril  5 mg   Metamucil Fiber  51.7 %  one packet daily  Metoprolol Tartrate  25 mg  1/2 by mouth twice a day  Milk Of Magnesia  400 mg/5ml   Nitrostat  0.4 mg   Potassium Bicarbonate  25 Meq   Prozac  20 mg  1 by mouth twice a day  Synthroid  88 mcg  daily  Tylenol  325 mg   Diazepam     Gabapentin        Allergies:  Penicillin            Exam:  Constitution:  Non-toxic, no acute distress  Heart :  RRR  Lungs CTA bilaterally  Abdomen: Soft and nondistended, gastrostomy site open and draining  Extremeties: No rash, cyanosis, or jaundice         Assessment #1: 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 Despite all efforts to prevent them, transcription errors may have occurred.   Seen by:

## 2023-03-03 RX ORDER — OLOPATADINE HYDROCHLORIDE 665 UG/1
SPRAY NASAL
COMMUNITY
Start: 2022-12-08

## 2023-03-03 RX ORDER — POTASSIUM CHLORIDE 750 MG/1
20 CAPSULE, EXTENDED RELEASE ORAL DAILY
COMMUNITY

## 2023-03-03 NOTE — PROGRESS NOTES
Spoke with Santi Hodge RN at [facility] where pt resides. Informed pt scheduled for OR 3/7/23 at 1309 and will need to arrive at 1100 to [facility]. Santi Hodge states facility to arrange transport. Pt is wheelchair bound, needs [employer]. Pt's son Michele Trevino to accompany patient and will sign ([phone]). Pt is alert and oriented, hx MS, CAD, CVA. Has fistula at PEG site, pacheco catheter, can take po meds with sips of water only, can use call light. Pt is a FULL CODE. Preop instructions faxed to [facility].

## 2023-03-06 NOTE — PROGRESS NOTES
Spoke with Lizeth Baires RN from [facility]. Lizeth Baires states patient is alert and oriented to self only. Pt's son Moris Mckinley aware of OR scheduled 3/7/23 and will meet pt at [facility] before 1100.

## 2023-03-07 ENCOUNTER — ANESTHESIA (OUTPATIENT)
Dept: OPERATING ROOM | Age: 68
End: 2023-03-07
Payer: MEDICARE

## 2023-03-07 ENCOUNTER — ANESTHESIA EVENT (OUTPATIENT)
Dept: OPERATING ROOM | Age: 68
End: 2023-03-07
Payer: MEDICARE

## 2023-03-07 ENCOUNTER — HOSPITAL ENCOUNTER (OUTPATIENT)
Age: 68
Setting detail: OUTPATIENT SURGERY
Discharge: HOME | End: 2023-03-07
Attending: SURGERY | Admitting: SURGERY
Payer: MEDICARE

## 2023-03-07 VITALS
SYSTOLIC BLOOD PRESSURE: 116 MMHG | OXYGEN SATURATION: 96 % | HEART RATE: 75 BPM | BODY MASS INDEX: 27.48 KG/M2 | WEIGHT: 171 LBS | RESPIRATION RATE: 16 BRPM | TEMPERATURE: 97.8 F | DIASTOLIC BLOOD PRESSURE: 56 MMHG | HEIGHT: 66 IN

## 2023-03-07 DIAGNOSIS — K31.6 INTERNAL GASTROINTESTINAL FISTULA: ICD-10-CM

## 2023-03-07 LAB
ANION GAP SERPL CALCULATED.3IONS-SCNC: 9 MMOL/L (ref 7–16)
BUN BLDV-MCNC: 9 MG/DL (ref 6–23)
CALCIUM SERPL-MCNC: 9 MG/DL (ref 8.6–10.2)
CHLORIDE BLD-SCNC: 105 MMOL/L (ref 98–107)
CO2: 28 MMOL/L (ref 22–29)
CREAT SERPL-MCNC: 0.7 MG/DL (ref 0.5–1)
GFR SERPL CREATININE-BSD FRML MDRD: >60 ML/MIN/1.73
GLUCOSE BLD-MCNC: 88 MG/DL (ref 74–99)
HCT VFR BLD CALC: 37 % (ref 34–48)
HEMOGLOBIN: 11.1 G/DL (ref 11.5–15.5)
MCH RBC QN AUTO: 27.7 PG (ref 26–35)
MCHC RBC AUTO-ENTMCNC: 30 % (ref 32–34.5)
MCV RBC AUTO: 92.3 FL (ref 80–99.9)
PDW BLD-RTO: 15 FL (ref 11.5–15)
PLATELET # BLD: 370 E9/L (ref 130–450)
PMV BLD AUTO: 9.8 FL (ref 7–12)
POTASSIUM SERPL-SCNC: 4.1 MMOL/L (ref 3.5–5)
RBC # BLD: 4.01 E12/L (ref 3.5–5.5)
SODIUM BLD-SCNC: 142 MMOL/L (ref 132–146)
WBC # BLD: 7.5 E9/L (ref 4.5–11.5)

## 2023-03-07 PROCEDURE — 3700000000 HC ANESTHESIA ATTENDED CARE: Performed by: SURGERY

## 2023-03-07 PROCEDURE — 2580000003 HC RX 258: Performed by: SURGERY

## 2023-03-07 PROCEDURE — 3600000012 HC SURGERY LEVEL 2 ADDTL 15MIN: Performed by: SURGERY

## 2023-03-07 PROCEDURE — 2500000003 HC RX 250 WO HCPCS: Performed by: NURSE ANESTHETIST, CERTIFIED REGISTERED

## 2023-03-07 PROCEDURE — 2500000003 HC RX 250 WO HCPCS: Performed by: SURGERY

## 2023-03-07 PROCEDURE — 36415 COLL VENOUS BLD VENIPUNCTURE: CPT

## 2023-03-07 PROCEDURE — 7100000010 HC PHASE II RECOVERY - FIRST 15 MIN: Performed by: SURGERY

## 2023-03-07 PROCEDURE — 7100000011 HC PHASE II RECOVERY - ADDTL 15 MIN: Performed by: SURGERY

## 2023-03-07 PROCEDURE — 6360000002 HC RX W HCPCS: Performed by: NURSE ANESTHETIST, CERTIFIED REGISTERED

## 2023-03-07 PROCEDURE — 88304 TISSUE EXAM BY PATHOLOGIST: CPT

## 2023-03-07 PROCEDURE — 7100000000 HC PACU RECOVERY - FIRST 15 MIN: Performed by: SURGERY

## 2023-03-07 PROCEDURE — 3700000001 HC ADD 15 MINUTES (ANESTHESIA): Performed by: SURGERY

## 2023-03-07 PROCEDURE — 7100000001 HC PACU RECOVERY - ADDTL 15 MIN: Performed by: SURGERY

## 2023-03-07 PROCEDURE — 85027 COMPLETE CBC AUTOMATED: CPT

## 2023-03-07 PROCEDURE — 80048 BASIC METABOLIC PNL TOTAL CA: CPT

## 2023-03-07 PROCEDURE — 3600000002 HC SURGERY LEVEL 2 BASE: Performed by: SURGERY

## 2023-03-07 PROCEDURE — 2709999900 HC NON-CHARGEABLE SUPPLY: Performed by: SURGERY

## 2023-03-07 RX ORDER — PROPOFOL 10 MG/ML
INJECTION, EMULSION INTRAVENOUS PRN
Status: DISCONTINUED | OUTPATIENT
Start: 2023-03-07 | End: 2023-03-07 | Stop reason: SDUPTHER

## 2023-03-07 RX ORDER — CLINDAMYCIN PHOSPHATE 600 MG/50ML
600 INJECTION INTRAVENOUS ONCE
Status: DISCONTINUED | OUTPATIENT
Start: 2023-03-07 | End: 2023-03-07 | Stop reason: CLARIF

## 2023-03-07 RX ORDER — LABETALOL HYDROCHLORIDE 5 MG/ML
10 INJECTION, SOLUTION INTRAVENOUS
Status: CANCELLED | OUTPATIENT
Start: 2023-03-07

## 2023-03-07 RX ORDER — ACETAMINOPHEN 325 MG/1
650 TABLET ORAL
Status: CANCELLED | OUTPATIENT
Start: 2023-03-07 | End: 2023-03-08

## 2023-03-07 RX ORDER — ONDANSETRON 2 MG/ML
4 INJECTION INTRAMUSCULAR; INTRAVENOUS
Status: CANCELLED | OUTPATIENT
Start: 2023-03-07 | End: 2023-03-08

## 2023-03-07 RX ORDER — EPHEDRINE SULFATE/0.9% NACL/PF 50 MG/5 ML
SYRINGE (ML) INTRAVENOUS PRN
Status: DISCONTINUED | OUTPATIENT
Start: 2023-03-07 | End: 2023-03-07 | Stop reason: SDUPTHER

## 2023-03-07 RX ORDER — CLINDAMYCIN PHOSPHATE 300 MG/50ML
300 INJECTION INTRAVENOUS ONCE
Status: DISCONTINUED | OUTPATIENT
Start: 2023-03-07 | End: 2023-03-07 | Stop reason: CLARIF

## 2023-03-07 RX ORDER — SODIUM CHLORIDE 0.9 % (FLUSH) 0.9 %
5-40 SYRINGE (ML) INJECTION EVERY 12 HOURS SCHEDULED
Status: CANCELLED | OUTPATIENT
Start: 2023-03-07

## 2023-03-07 RX ORDER — MIDAZOLAM HYDROCHLORIDE 1 MG/ML
INJECTION INTRAMUSCULAR; INTRAVENOUS PRN
Status: DISCONTINUED | OUTPATIENT
Start: 2023-03-07 | End: 2023-03-07 | Stop reason: SDUPTHER

## 2023-03-07 RX ORDER — FENTANYL CITRATE 50 UG/ML
INJECTION, SOLUTION INTRAMUSCULAR; INTRAVENOUS PRN
Status: DISCONTINUED | OUTPATIENT
Start: 2023-03-07 | End: 2023-03-07 | Stop reason: SDUPTHER

## 2023-03-07 RX ORDER — LIDOCAINE HYDROCHLORIDE 10 MG/ML
INJECTION, SOLUTION INFILTRATION; PERINEURAL PRN
Status: DISCONTINUED | OUTPATIENT
Start: 2023-03-07 | End: 2023-03-07 | Stop reason: ALTCHOICE

## 2023-03-07 RX ORDER — DEXAMETHASONE SODIUM PHOSPHATE 4 MG/ML
INJECTION, SOLUTION INTRA-ARTICULAR; INTRALESIONAL; INTRAMUSCULAR; INTRAVENOUS; SOFT TISSUE PRN
Status: DISCONTINUED | OUTPATIENT
Start: 2023-03-07 | End: 2023-03-07 | Stop reason: SDUPTHER

## 2023-03-07 RX ORDER — DIPHENHYDRAMINE HYDROCHLORIDE 50 MG/ML
12.5 INJECTION INTRAMUSCULAR; INTRAVENOUS
Status: CANCELLED | OUTPATIENT
Start: 2023-03-07 | End: 2023-03-08

## 2023-03-07 RX ORDER — SODIUM CHLORIDE, SODIUM LACTATE, POTASSIUM CHLORIDE, CALCIUM CHLORIDE 600; 310; 30; 20 MG/100ML; MG/100ML; MG/100ML; MG/100ML
INJECTION, SOLUTION INTRAVENOUS CONTINUOUS
Status: DISCONTINUED | OUTPATIENT
Start: 2023-03-07 | End: 2023-03-07 | Stop reason: HOSPADM

## 2023-03-07 RX ORDER — SODIUM CHLORIDE, SODIUM LACTATE, POTASSIUM CHLORIDE, CALCIUM CHLORIDE 600; 310; 30; 20 MG/100ML; MG/100ML; MG/100ML; MG/100ML
INJECTION, SOLUTION INTRAVENOUS CONTINUOUS
Status: CANCELLED | OUTPATIENT
Start: 2023-03-07

## 2023-03-07 RX ORDER — SODIUM CHLORIDE 9 MG/ML
INJECTION, SOLUTION INTRAVENOUS PRN
Status: CANCELLED | OUTPATIENT
Start: 2023-03-07

## 2023-03-07 RX ORDER — SODIUM CHLORIDE 0.9 % (FLUSH) 0.9 %
5-40 SYRINGE (ML) INJECTION EVERY 12 HOURS SCHEDULED
Status: DISCONTINUED | OUTPATIENT
Start: 2023-03-07 | End: 2023-03-07 | Stop reason: HOSPADM

## 2023-03-07 RX ORDER — LIDOCAINE HYDROCHLORIDE 10 MG/ML
INJECTION, SOLUTION INFILTRATION; PERINEURAL
Status: DISCONTINUED
Start: 2023-03-07 | End: 2023-03-07 | Stop reason: HOSPADM

## 2023-03-07 RX ORDER — DROPERIDOL 2.5 MG/ML
0.62 INJECTION, SOLUTION INTRAMUSCULAR; INTRAVENOUS
Status: CANCELLED | OUTPATIENT
Start: 2023-03-07 | End: 2023-03-08

## 2023-03-07 RX ORDER — CLINDAMYCIN PHOSPHATE 900 MG/50ML
INJECTION INTRAVENOUS
Status: DISCONTINUED
Start: 2023-03-07 | End: 2023-03-07 | Stop reason: HOSPADM

## 2023-03-07 RX ORDER — SODIUM CHLORIDE 0.9 % (FLUSH) 0.9 %
5-40 SYRINGE (ML) INJECTION PRN
Status: CANCELLED | OUTPATIENT
Start: 2023-03-07

## 2023-03-07 RX ORDER — GLYCOPYRROLATE 0.2 MG/ML
INJECTION INTRAMUSCULAR; INTRAVENOUS PRN
Status: DISCONTINUED | OUTPATIENT
Start: 2023-03-07 | End: 2023-03-07 | Stop reason: SDUPTHER

## 2023-03-07 RX ORDER — DEXTROSE MONOHYDRATE 100 MG/ML
INJECTION, SOLUTION INTRAVENOUS CONTINUOUS PRN
Status: CANCELLED | OUTPATIENT
Start: 2023-03-07

## 2023-03-07 RX ORDER — HYDRALAZINE HYDROCHLORIDE 20 MG/ML
10 INJECTION INTRAMUSCULAR; INTRAVENOUS
Status: CANCELLED | OUTPATIENT
Start: 2023-03-07

## 2023-03-07 RX ORDER — SODIUM CHLORIDE 0.9 % (FLUSH) 0.9 %
5-40 SYRINGE (ML) INJECTION PRN
Status: DISCONTINUED | OUTPATIENT
Start: 2023-03-07 | End: 2023-03-07 | Stop reason: HOSPADM

## 2023-03-07 RX ADMIN — SODIUM CHLORIDE, PRESERVATIVE FREE 10 ML: 5 INJECTION INTRAVENOUS at 11:28

## 2023-03-07 RX ADMIN — PROPOFOL 100 MG: 10 INJECTION, EMULSION INTRAVENOUS at 11:46

## 2023-03-07 RX ADMIN — MIDAZOLAM 0.25 MG: 1 INJECTION INTRAMUSCULAR; INTRAVENOUS at 11:41

## 2023-03-07 RX ADMIN — FENTANYL CITRATE 50 MCG: 50 INJECTION, SOLUTION INTRAMUSCULAR; INTRAVENOUS at 11:46

## 2023-03-07 RX ADMIN — SODIUM CHLORIDE, POTASSIUM CHLORIDE, SODIUM LACTATE AND CALCIUM CHLORIDE: 600; 310; 30; 20 INJECTION, SOLUTION INTRAVENOUS at 11:33

## 2023-03-07 RX ADMIN — Medication 10 MG: at 11:58

## 2023-03-07 RX ADMIN — DEXAMETHASONE SODIUM PHOSPHATE 10 MG: 4 INJECTION, SOLUTION INTRAMUSCULAR; INTRAVENOUS at 11:57

## 2023-03-07 RX ADMIN — CLINDAMYCIN IN 5 PERCENT DEXTROSE 900 MG: 18 INJECTION, SOLUTION INTRAVENOUS at 11:41

## 2023-03-07 RX ADMIN — GLYCOPYRROLATE 0.2 MG: 1 INJECTION INTRAMUSCULAR; INTRAVENOUS at 11:46

## 2023-03-07 ASSESSMENT — PAIN - FUNCTIONAL ASSESSMENT: PAIN_FUNCTIONAL_ASSESSMENT: NONE - DENIES PAIN

## 2023-03-07 NOTE — OP NOTE
Operative Note      Patient: Miguel Vargas  YOB: 1955  MRN: [id number]    Date of Procedure: 3/7/2023    Pre-Op Diagnosis: gastrocutaneous fistula    Post-Op Diagnosis: Same       Procedure: Closure gastrocutaneous fistula    Surgeon(s):  Michelle Jay MD    Assistant:   Resident: Gee Cage MD    Anesthesia: General    Estimated Blood Loss (mL): 7 cc    Complications: None    Specimens:   ID Type Source Tests Collected by Time Destination   A : GASTROINTESTINAL FISTULA Tissue Tissue SURGICAL PATHOLOGY Michelle Jay MD 3/7/2023 1219        Implants:  * No implants in log *      Drains: * No LDAs found *    Findings: Excision gastrocutaneous fistula. gastrocutaneous fistula taken down with linear stapler. 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 Multiple attempts with different seizures made to assist in closure of the gastrocutaneous fistula. These were all unsuccessful. Patient uncomfortable from gastrocutaneous fistula. Recommendation to undergo OR takedown of gastrocutaneous fistula. Risk, benefits, complication of the procedure discussed with the patient and her son at bedside. They expressed understanding and agreed to proceed. Procedure: The patient was taken the operating room placed in the supine position. Preoperative antibiotics were given and sequential compression devices were applied. Anesthesia was administered per anesthesia record. The patient's abdomen was prepped and draped in the usual sterile fashion. Immediately prior to the procedure a timeout was called. A 4 cm elliptical transverse skin incision was made with a 15 blade including the gastrocutaneous fistula tract. Sharp dissection with palpable and blunt dissection with hemostat was used to isolate the fistula tract from surrounding tissue. We were able to identify what appeared to be gastric mucosa. During this process we identified and removed the prior bear claw clip. We used a linear stapler to fire across the gastric mucosa, therefore taking down the gastrocutaneous fistula. The stomach was released into the abdomen. We then closed the fascia with figure-of-eight 0 Vicryl sutures. Skin was approximated with 3-0 Vicryl in deep dermal fashion. The skin was closed with 4-0 Vicryl in a running subcuticular fashion. The skin was cleansed and dried. Dermabond skin glue was applied. Sterile dressing was applied. Instrument and material count were correct x2 at the end of the case. The patient tolerated the procedure well was brought to PACU in stable condition.   Dr. Mainor Hurtado was present for the procedure    Electronically signed by Rosalinda Lee MD on 3/7/2023 at 12:42 PM

## 2023-03-07 NOTE — INTERVAL H&P NOTE
Update History & Physical    The patient's History and Physical of February 27, 2023 was reviewed with the patient and I examined the patient. There was no change. The surgical site was confirmed by the patient and me. Plan: The risks, benefits, expected outcome, and alternative to the recommended procedure have been discussed with the patient. Patient understands and wants to proceed with the procedure.      Electronically signed by Holland Angulo MD on 3/7/2023 at 10:47 AM

## 2023-03-07 NOTE — ANESTHESIA PRE PROCEDURE
Department of Anesthesiology  Preprocedure Note       Name:  Dereck Carpio   Age:  74 y.o.  :  1955                                          MRN:  [id number]         Date:  3/7/2023      Surgeon: Doni Cuello):  Geovanny Welsh MD    Procedure: Procedure(s):  CLOSURE SUBCUTANEOUS FISTULA    Medications prior to admission:   Prior to Admission medications    Medication Sig Start Date End Date Taking? Authorizing Provider   potassium chloride (MICRO-K) 10 MEQ extended release capsule Take 20 mEq by mouth daily Mon and Thur   Yes Historical Provider, MD   olopatadine (PATANASE) 0.6 % SOLN nassl soln  22   Historical Provider, MD   ticagrelor (BRILINTA) 60 MG TABS tablet Take 1 tablet by mouth 2 times daily 22   VERENA Guevara CNP   acetaminophen (TYLENOL) 325 MG tablet Take 650 mg by mouth every 4 hours as needed for Pain or Fever    Historical Provider, MD   cetirizine (ZYRTEC) 10 MG tablet Take 10 mg by mouth daily    Historical Provider, MD   Cholecalciferol (VITAMIN D3) 1.25 MG (54295 UT) CAPS Take by mouth once a week     Historical Provider, MD   fluticasone (FLONASE) 50 MCG/ACT nasal spray 1 spray by Nasal route daily    Historical Provider, MD   levothyroxine (SYNTHROID) 125 MCG tablet Take 125 mcg by mouth Daily    Historical Provider, MD   atorvastatin (LIPITOR) 80 MG tablet Take 80 mg by mouth nightly     Historical Provider, MD   psyllium (KONSYL) 28.3 % PACK Take 1 packet by mouth daily     Historical Provider, MD   pantoprazole (PROTONIX) 40 MG tablet Take 40 mg by mouth daily    Historical Provider, MD   FLUoxetine (PROZAC) 20 MG capsule Take 20 mg by mouth 2 times daily    Historical Provider, MD   metoprolol tartrate (LOPRESSOR) 25 MG tablet Take 12.5 mg by mouth 2 times daily     Historical Provider, MD   gabapentin (NEURONTIN) 300 MG capsule Take 300 mg by mouth 3 times daily.     Historical Provider, MD   ARTIFICIAL TEAR OINTMENT OP Place 1 drop into both eyes every 6 hours as needed (dry eyes)    Historical Provider, MD   saline nasal gel (AYR) GEL 1 each by Nasal route every 12 hours as needed for Congestion    Historical Provider, MD   magnesium hydroxide (MILK OF MAGNESIA) 400 MG/5ML suspension Take 30 mLs by mouth daily as needed for Constipation    Historical Provider, MD   nitroGLYCERIN (NITROSTAT) 0.4 MG SL tablet Place 0.4 mg under the tongue every 5 minutes as needed for Chest pain    Historical Provider, MD   anastrozole (ARIMIDEX) 1 MG tablet Take 1 mg by mouth daily    Historical Provider, MD   diazePAM (VALIUM) 2 MG tablet 2 mg by Per G Tube route 2 times daily. Historical Provider, MD   Cranberry 500 MG CAPS 1 capsule by PEG Tube route daily 12/16/19   Virginia Jason MD       Current medications:    Current Facility-Administered Medications   Medication Dose Route Frequency Provider Last Rate Last Admin    sodium chloride flush 0.9 % injection 5-40 mL  5-40 mL IntraVENous 2 times per day Jessie Moulton MD        sodium chloride flush 0.9 % injection 5-40 mL  5-40 mL IntraVENous PRN Jessie Moulton MD   10 mL at 03/07/23 1128    lactated ringers IV soln infusion   IntraVENous Continuous Frubry Moulton MD   New Bag at 03/07/23 1133    lidocaine 1 % injection             clindamycin (CLEOCIN) 900 MG/50ML IVPB                Allergies:     Allergies   Allergen Reactions    Pcn [Penicillins] Anaphylaxis    Watermelon [Citrullus Vulgaris]        Problem List:    Patient Active Problem List   Diagnosis Code    [facility] G35    Bilateral foot-drop M21.371, M21.372    Lactic acidosis E87.20    Heart failure (HCC) I50.9    Myocardial infarction, anteroseptal (HCC) I21.09    NSTEMI (non-ST elevated myocardial infarction) ([facility] I21.4    Carcinoma of upper-outer quadrant of female breast, right ([facility] C50.411    Carcinoma of upper-outer quadrant of right female breast ([facility] C50.411    Hypotension I95.9    GI bleed K92.2    Pyelonephritis N12    Coronary disease I25.10    Urine retention R33.9    Malfunction of Leizondo catheter ([facility] T83.011A    Neurogenic bladder N31.9    Cellulitis L03.90       Past Medical History:        Diagnosis Date    Anxiety     Breast cancer ([facility] 2020    right    CAD (coronary artery disease)     Cystitis     Depression     Elizondo catheter status     Foot drop, bilateral     History of blood transfusion     History of renal calculi     Hx of blood clots     dvt    Hyperlipidemia     Hypertension 2018    Hypothyroidism     MS (multiple sclerosis) ([facility]     wheelchair bound    Neurogenic bladder 2018    NSTEMI (non-ST elevated myocardial infarction) ([facility]     Total self-care deficit     Unspecified cerebral artery occlusion with cerebral infarction     left her incontinent    Wheelchair dependent        Past Surgical History:        Procedure Laterality Date    COLON SURGERY      exp lap, lysis of adhesions, release of SBO. [facility]. Dr. Al Cid 2020    COLONOSCOPY DIAGNOSTIC performed by Anbaell Quiñones MD at [address] [facility] [place]      no stent-    DENTAL SURGERY      all removed    GASTROSTOMY TUBE PLACEMENT N/A 2021    PEG TUBE EXCHANGE performed by Anabell Quiñones MD at [address] ([facility])  [address] [place] [address].  Dr. Ginger Payne, MODIFIED RADICAL Right 3/3/2020    RIGHT BREAST MASTECTOMY WITH SENTINEL NODE DISSECTION WITH BLUE DYE performed by Anabell Quiñones MD at [place] [address] ENDOSCOPY N/A 2019    EGD PEG INSERTION performed by Anabell Qui[facility]ones MD at [facility] History:    Social History     Tobacco Use    Smoking status: Former     Packs/day: 1.00     Years: 5.00     Pack years: 5.00     Types: Cigarettes     Start date:      Quit date:      Years since quittin.1    Smokeless tobacco: Never   Substance Use Topics    Alcohol use: No                                Counseling given: Not Answered      Vital Signs (Current):   Vitals:    03/03/23 1623 03/07/23 1130 03/07/23 1239   BP:  132/61 135/61   Pulse:  77 76   Resp:  20 16   Temp:  36.1 °C (97 °F)    TempSrc:  Temporal    SpO2:  96% 100%   Weight: 171 lb (77.6 kg)     Height: 5' 6\" (1.676 m)                                                BP Readings from Last 3 Encounters:   03/07/23 135/61   12/10/22 113/68   12/08/22 121/72       NPO Status: Time of last liquid consumption: 1800                        Time of last solid consumption: 1800                        Date of last liquid consumption: 03/06/23                        Date of last solid food consumption: 03/06/23    BMI:   Wt Readings from Last 3 Encounters:   03/03/23 171 lb (77.6 kg)   12/09/22 172 lb (78 kg)   12/08/22 172 lb 6.4 oz (78.2 kg)     Body mass index is 27.6 kg/m². CBC:   Lab Results   Component Value Date/Time    WBC 7.5 03/07/2023 11:23 AM    RBC 4.01 03/07/2023 11:23 AM    HGB 11.1 03/07/2023 11:23 AM    HCT 37.0 03/07/2023 11:23 AM    MCV 92.3 03/07/2023 11:23 AM    RDW 15.0 03/07/2023 11:23 AM     03/07/2023 11:23 AM       CMP:   Lab Results   Component Value Date/Time     03/07/2023 11:23 AM    K 4.1 03/07/2023 11:23 AM    K 4.0 12/03/2022 05:27 AM     03/07/2023 11:23 AM    CO2 28 03/07/2023 11:23 AM    BUN 9 03/07/2023 11:23 AM    CREATININE 0.7 03/07/2023 11:23 AM    GFRAA >60 10/11/2022 04:31 AM    LABGLOM >60 03/07/2023 11:23 AM    GLUCOSE 88 03/07/2023 11:23 AM    PROT 5.6 02/14/2023 04:37 AM    CALCIUM 9.0 03/07/2023 11:23 AM    BILITOT 0.3 02/14/2023 04:37 AM    ALKPHOS 104 02/14/2023 04:37 AM    AST 15 02/14/2023 04:37 AM    ALT 16 02/14/2023 04:37 AM       POC Tests: No results for input(s): POCGLU, POCNA, POCK, POCCL, POCBUN, POCHEMO, POCHCT in the last 72 hours.     Coags:   Lab Results   Component Value Date/Time    PROTIME 12.7 12/02/2022 04:24 PM    INR 1.2 12/02/2022 04:24 PM    APTT 32.8 03/08/2020 12:16 PM       HCG (If Applicable): No results found for: PREGTESTUR, PREGSERUM, HCG, HCGQUANT     ABGs: No results found for: PHART, PO2ART, CLJ1GIN, SIR4UCA, BEART, C9WDQPFB     Type & Screen (If Applicable):  No results found for: LABABO, LABRH    Drug/Infectious Status (If Applicable):  No results found for: HIV, HEPCAB    COVID-19 Screening (If Applicable):   Lab Results   Component Value Date/Time    COVID19 Not Detected 04/29/2021 12:00 PM           Anesthesia Evaluation     Anesthesia Plan      ASA 3       Induction: intravenous. Plan discussed with attending.                     Vance Duncan, APRN - CRNA   3/7/2023

## 2023-03-07 NOTE — ANESTHESIA PRE PROCEDURE
Department of Anesthesiology  Preprocedure Note       Name:  Rachel Esquivel   Age:  74 y.o.  :  1955                                          MRN:  [id number]         Date:  3/7/2023      Surgeon: Lizabeth Samuel):  Kali Bang MD    Procedure: Procedure(s):  CLOSURE SUBCUTANEOUS FISTULA   ++FACILITY++    Medications prior to admission:   Prior to Admission medications    Medication Sig Start Date End Date Taking? Authorizing Provider   potassium chloride (MICRO-K) 10 MEQ extended release capsule Take 20 mEq by mouth daily Mon and Thur   Yes Historical Provider, MD   olopatadine (PATANASE) 0.6 % SOLN nassl soln  22   Historical Provider, MD   ticagrelor (BRILINTA) 60 MG TABS tablet Take 1 tablet by mouth 2 times daily 22   VERENA Blank CNP   acetaminophen (TYLENOL) 325 MG tablet Take 650 mg by mouth every 4 hours as needed for Pain or Fever    Historical Provider, MD   cetirizine (ZYRTEC) 10 MG tablet Take 10 mg by mouth daily    Historical Provider, MD   Cholecalciferol (VITAMIN D3) 1.25 MG (48896 UT) CAPS Take by mouth once a week     Historical Provider, MD   fluticasone (FLONASE) 50 MCG/ACT nasal spray 1 spray by Nasal route daily    Historical Provider, MD   levothyroxine (SYNTHROID) 125 MCG tablet Take 125 mcg by mouth Daily    Historical Provider, MD   atorvastatin (LIPITOR) 80 MG tablet Take 80 mg by mouth nightly     Historical Provider, MD   psyllium (KONSYL) 28.3 % PACK Take 1 packet by mouth daily     Historical Provider, MD   pantoprazole (PROTONIX) 40 MG tablet Take 40 mg by mouth daily    Historical Provider, MD   FLUoxetine (PROZAC) 20 MG capsule Take 20 mg by mouth 2 times daily    Historical Provider, MD   metoprolol tartrate (LOPRESSOR) 25 MG tablet Take 12.5 mg by mouth 2 times daily     Historical Provider, MD   gabapentin (NEURONTIN) 300 MG capsule Take 300 mg by mouth 3 times daily.     Historical Provider, MD   ARTIFICIAL TEAR OINTMENT OP Place 1 drop into both eyes every 6 hours as needed (dry eyes)    Historical Provider, MD   saline nasal gel (AYR) GEL 1 each by Nasal route every 12 hours as needed for Congestion    Historical Provider, MD   magnesium hydroxide (MILK OF MAGNESIA) 400 MG/5ML suspension Take 30 mLs by mouth daily as needed for Constipation    Historical Provider, MD   nitroGLYCERIN (NITROSTAT) 0.4 MG SL tablet Place 0.4 mg under the tongue every 5 minutes as needed for Chest pain    Historical Provider, MD   anastrozole (ARIMIDEX) 1 MG tablet Take 1 mg by mouth daily    Historical Provider, MD   diazePAM (VALIUM) 2 MG tablet 2 mg by Per G Tube route 2 times daily. Historical Provider, MD   Cranberry 500 MG CAPS 1 capsule by PEG Tube route daily 12/16/19   Cleveland Antonio MD       Current medications:    Current Facility-Administered Medications   Medication Dose Route Frequency Provider Last Rate Last Admin    sodium chloride flush 0.9 % injection 5-40 mL  5-40 mL IntraVENous 2 times per day Edita Curtis MD        sodium chloride flush 0.9 % injection 5-40 mL  5-40 mL IntraVENous PRN Edita Curtis MD        lactated ringers IV soln infusion   IntraVENous Continuous Edita Curtis MD        ceFAZolin (ANCEF) 2,000 mg in sterile water 20 mL IV syringe  2,000 mg IntraVENous On Call to [address] [place] [address] MD Anni           Allergies:     Allergies   Allergen Reactions    Pcn [Penicillins] Anaphylaxis    Watermelon [Citrullus Vulgaris]        Problem List:    Patient Active Problem List   Diagnosis Code    Multiple sclerosis [facility] G35    Bilateral foot-drop M21.371, M21.372    Lactic acidosis E87.20    Heart failure (HCC) I50.9    Myocardial infarction, anteroseptal (HCC) I21.09    NSTEMI (non-ST elevated myocardial infarction) ([facility] I21.4    Carcinoma of upper-outer quadrant of female breast, right ([facility] C50.411    Carcinoma of upper-outer quadrant of right female breast ([facility] C50.411    Hypotension I95.9    GI bleed K92.2    Pyelonephritis N12    Coronary disease I25.10    Urine retention R33.9    Malfunction of Elizondo catheter ([facility] T83.011A    Neurogenic bladder N31.9    Cellulitis L03.90       Past Medical History:        Diagnosis Date    Anxiety     Breast cancer ([facility] 2020    right    CAD (coronary artery disease)     Cystitis     Depression     Elizondo catheter status     Foot drop, bilateral     History of blood transfusion     History of renal calculi     Hx of blood clots     dvt    Hyperlipidemia     Hypertension 2018    Hypothyroidism     MS (multiple sclerosis) ([facility]     wheelchair bound    Neurogenic bladder 2018    NSTEMI (non-ST elevated myocardial infarction) ([facility] 2019    Total self-care deficit     Unspecified cerebral artery occlusion with cerebral infarction     left her incontinent    Wheelchair dependent        Past Surgical History:        Procedure Laterality Date    COLON SURGERY      exp lap, lysis of adhesions, release of SBO. [facility]. Dr. Anderson Pettit 2020    COLONOSCOPY DIAGNOSTIC performed by Obinna Rodriguez MD at [address]      no stent-    DENTAL SURGERY      all removed    GASTROSTOMY TUBE PLACEMENT N/A 2021    PEG TUBE EXCHANGE performed by Obinna Rodriguez MD at [facility] ([facility])  [address] [place] [address].  Dr. Bel Irvin, MODIFIED RADICAL Right 3/3/2020    RIGHT BREAST MASTECTOMY WITH SENTINEL NODE DISSECTION WITH BLUE DYE performed by Obinna Rodriguez MD at [place] [address] ENDOSCOPY N/A 2019    EGD PEG INSERTION performed by [facility]in[facility] MD at [facility] History:    Social History     Tobacco Use    Smoking status: Former     Packs/day: 1.00     Years: 5.00     Pack years: 5.00     Types: Cigarettes     Start date:      Quit date:      Years since quittin.1    Smokeless tobacco: Never   Substance Use Topics    Alcohol use: No                                Counseling given: Not Answered      Vital Signs (Current):   Vitals:    03/03/23 1623   Weight: 171 lb (77.6 kg)   Height: 5' 6\" (1.676 m)                                              BP Readings from Last 3 Encounters:   12/10/22 113/68   12/08/22 121/72   09/08/22 (!) 141/76       NPO Status:                                                                                 BMI:   Wt Readings from Last 3 Encounters:   03/03/23 171 lb (77.6 kg)   12/09/22 172 lb (78 kg)   12/08/22 172 lb 6.4 oz (78.2 kg)     Body mass index is 27.6 kg/m². CBC:   Lab Results   Component Value Date/Time    WBC 8.1 01/10/2023 05:05 AM    RBC 3.73 01/10/2023 05:05 AM    HGB 10.6 01/10/2023 05:05 AM    HCT 34.2 01/10/2023 05:05 AM    MCV 91.7 01/10/2023 05:05 AM    RDW 15.2 01/10/2023 05:05 AM     01/10/2023 05:05 AM       CMP:   Lab Results   Component Value Date/Time     02/14/2023 04:37 AM    K 3.5 02/14/2023 04:37 AM    K 4.0 12/03/2022 05:27 AM     02/14/2023 04:37 AM    CO2 25 02/14/2023 04:37 AM    BUN 12 02/14/2023 04:37 AM    CREATININE 0.6 02/14/2023 04:37 AM    GFRAA >60 10/11/2022 04:31 AM    LABGLOM >60 02/14/2023 04:37 AM    GLUCOSE 83 02/14/2023 04:37 AM    PROT 5.6 02/14/2023 04:37 AM    CALCIUM 8.9 02/14/2023 04:37 AM    BILITOT 0.3 02/14/2023 04:37 AM    ALKPHOS 104 02/14/2023 04:37 AM    AST 15 02/14/2023 04:37 AM    ALT 16 02/14/2023 04:37 AM       POC Tests: No results for input(s): POCGLU, POCNA, POCK, POCCL, POCBUN, POCHEMO, POCHCT in the last 72 hours.     Coags:   Lab Results   Component Value Date/Time    PROTIME 12.7 12/02/2022 04:24 PM    INR 1.2 12/02/2022 04:24 PM    APTT 32.8 03/08/2020 12:16 PM       HCG (If Applicable): No results found for: PREGTESTUR, PREGSERUM, HCG, HCGQUANT     ABGs: No results found for: PHART, PO2ART, TDQ2ZJW, XHL5SXH, BEART, I6MCNYEQ     Type & Screen (If Applicable):  No results found for: LABABO, LABRH    Drug/Infectious Status (If Applicable):  No results found for: HIV, HEPCAB    COVID-19 Screening (If Applicable):   Lab Results   Component Value Date/Time    COVID19 Not Detected 04/29/2021 12:00 PM     TTE procedure:Echo Complete W/ Dop & Color Flow. Procedure Date  Date: 10/07/2019 Start: 01:06 PM     Study Location: Echo Lab  Technical Quality: Poor visualization due to patient immobility.     Patient Status: Routine     Contrast Medium: Definity.     Contrast Comments: Patient in wheelchair for exam.     Height: 66 inches Weight: 180 pounds BSA: 1.91 m^2 BMI: 29.05 kg/m^2     BP: 118/80 mmHg     Allergies    - Penicillins.      Findings      Left Ventricle   Normal left ventricle size and systolic function. Ejection fraction is visually estimated at 60-65%. No regional wall motion abnormalities seen. Normal left ventricle wall thickness. Indeterminate diastolic function. Right Ventricle   Normal right ventricular size and function. Left Atrium   Left atrium was not clearly visualized. Right Atrium   Right Atrium is not clearly visualized. Mitral Valve   Normal mitral valve structure and function. No evidence of mitral valve stenosis. No systolic mitral regurgitation noted. Tricuspid Valve   The tricuspid valve was not well visualized. Unable to estimate PA systolic pressure. Aortic Valve   The aortic valve leaflets were not well visualized. No hemodynamically significant aortic stenosis is present. No evidence of aortic valve regurgitation. Pulmonic Valve   The pulmonic valve was not well visualized. Physiologic and/or trace pulmonic regurgitation present. No evidence of pulmonic valve stenosis. Pericardial Effusion   Trace pericardial effusion. Pleural Effusion   No evidence of pleural effusion. Aorta   Aortic root dimension within normal limits. Miscellaneous   Inferior Vena Cava not well visualized.       Conclusions      Summary   Technically difficult examination due to body habitus and inability to   position. Definity contrast was used to delineate endocardial borders. Normal left ventricle size and systolic function. Ejection fraction is visually estimated at 60-65%. No regional wall motion abnormalities seen. Normal left ventricle wall thickness. Indeterminate diastolic function. Normal right ventricular size and function. No evidence of mitral valve stenosis. No hemodynamically significant aortic stenosis is present. Unable to estimate PA systolic pressure. Trace pericardial effusion. Compared to prior echo of 5/6/2019 , no changes noted. Per Cardiology   \"Mrs. Derek Lee is a 60-year-old female with history of multiple sclerosis with paraplegia, wheel chair bound, and no prior history of cardiac history, history of DVT involving the left lower extremity diagnosed in February 2019 on Eliquis for anticoagulation, ambulates with a walker, history of CVA diagnosed in February 2011 and history of hypertension who presented to the hospital on 5/6/2019 with midsternal chest pressure and blood work and EKG changes suggestive for non-ST elevation MI. After initial chest pain patient remain asymptomatic. Her stress test and cardiac MRI showed evidence of myocardial infarction with no reversible ischemia. Patient was discharged home on medical management. She was readmitted to the hospital on 6/26/2019 with chest pain secondary to unstable angina. She did undergo cardiac catheterization which showed multivessel disease and underwent PCI/ELHAM to mid RCA,and  Circumflex. PCI of the  proximal LAD was attempted and was not successful and was unable to pass balloon beyond the obstruction. She does have left to right collateral.  She did not go for bypass due to multiple sclerosis. She was discharged home on 6/26/2019.   She was seen in the office on 7/5/2019, since her last visit, she was hospitalized several times including UTI with sepsis in November 2019. Again she was admitted to our visited emergency room on 1/27/2020 with bleeding around the PEG tube. Now she has a PEG tube placed for feeding due to aspiration risk from dysphagia. She is compliant with medications, as well as salt and fluid intake. He does not take any over-the-counter arthritis medications. She is still taking triple therapy with aspirin, Brilinta, and Eliquis without any bleeding complications. \"    Anesthesia Evaluation  Patient summary reviewed and Nursing notes reviewed no history of anesthetic complications:   Airway: Mallampati: III  TM distance: >3 FB   Neck ROM: full  Mouth opening: > = 3 FB   Dental:          Pulmonary: breath sounds clear to auscultation                             Cardiovascular:    (+) hypertension:, past MI:, CAD:, CHF:, hyperlipidemia        Rhythm: regular  Rate: normal           Beta Blocker:  Dose within 24 Hrs         Neuro/Psych:   (+) CVA:, neuromuscular disease: multiple sclerosis, depression/anxiety             GI/Hepatic/Renal:   (+) GERD:,           Endo/Other:    (+) hypothyroidism::., .                 Abdominal:             Vascular: Other Findings:           Anesthesia Plan      general         Induction: intravenous. Anesthetic plan and risks discussed with patient. Use of blood products discussed with patient whom. Plan discussed with CRNA. Chhaya Merritt MD   3/7/2023    Chart reviewed . Patient assessed before induction. I agree with the above note.   VERENA Mac - CRNA

## 2023-03-08 ENCOUNTER — HOSPITAL ENCOUNTER (OUTPATIENT)
Dept: INFUSION THERAPY | Age: 68
Setting detail: INFUSION SERIES
Discharge: HOME | End: 2023-03-08

## 2023-03-08 NOTE — ANESTHESIA POSTPROCEDURE EVALUATION
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     Thalia Phase I: Thalia Score: 10    Thalia Phase II: Thalia Score: 10      Anesthesia Post Evaluation    Patient location during evaluation: PACU  Patient participation: complete - patient participated  Level of consciousness: awake and alert  Airway patency: patent  Nausea & Vomiting: no nausea and no vomiting  Complications: no  Cardiovascular status: blood pressure returned to baseline  Respiratory status: acceptable  Hydration status: euvolemic

## 2023-04-18 ENCOUNTER — HOSPITAL ENCOUNTER (OUTPATIENT)
Dept: INFUSION THERAPY | Age: 68
Setting detail: INFUSION SERIES
Discharge: HOME | End: 2023-04-18

## 2023-05-05 RX ORDER — DIPHENHYDRAMINE HYDROCHLORIDE 50 MG/ML
50 INJECTION INTRAMUSCULAR; INTRAVENOUS ONCE
Status: CANCELLED | OUTPATIENT
Start: 2023-05-05 | End: 2023-05-05

## 2023-05-05 RX ORDER — EPINEPHRINE 1 MG/ML
0.3 INJECTION, SOLUTION, CONCENTRATE INTRAVENOUS PRN
Status: CANCELLED | OUTPATIENT
Start: 2023-05-05

## 2023-05-11 ENCOUNTER — HOSPITAL ENCOUNTER (OUTPATIENT)
Dept: INFUSION THERAPY | Age: 68
Setting detail: INFUSION SERIES
Discharge: HOME | End: 2023-05-11
Payer: MEDICARE

## 2023-05-11 VITALS
DIASTOLIC BLOOD PRESSURE: 78 MMHG | SYSTOLIC BLOOD PRESSURE: 157 MMHG | RESPIRATION RATE: 16 BRPM | HEART RATE: 75 BPM | TEMPERATURE: 98.2 F

## 2023-05-11 DIAGNOSIS — G35 MULTIPLE SCLEROSIS (HCC): Primary | ICD-10-CM

## 2023-05-11 PROCEDURE — 96375 TX/PRO/DX INJ NEW DRUG ADDON: CPT

## 2023-05-11 PROCEDURE — 6370000000 HC RX 637 (ALT 250 FOR IP): Performed by: PSYCHIATRY & NEUROLOGY

## 2023-05-11 PROCEDURE — 2580000003 HC RX 258: Performed by: PSYCHIATRY & NEUROLOGY

## 2023-05-11 PROCEDURE — 96365 THER/PROPH/DIAG IV INF INIT: CPT

## 2023-05-11 PROCEDURE — 96366 THER/PROPH/DIAG IV INF ADDON: CPT

## 2023-05-11 PROCEDURE — 6360000002 HC RX W HCPCS: Performed by: PSYCHIATRY & NEUROLOGY

## 2023-05-11 RX ORDER — SODIUM CHLORIDE 0.9 % (FLUSH) 0.9 %
5-40 SYRINGE (ML) INJECTION PRN
OUTPATIENT
Start: 2023-08-10

## 2023-05-11 RX ORDER — EPINEPHRINE 1 MG/ML
0.3 INJECTION, SOLUTION, CONCENTRATE INTRAVENOUS PRN
OUTPATIENT
Start: 2023-08-10

## 2023-05-11 RX ORDER — SODIUM CHLORIDE 9 MG/ML
INJECTION, SOLUTION INTRAVENOUS CONTINUOUS
Status: ACTIVE | OUTPATIENT
Start: 2023-05-11 | End: 2023-05-11

## 2023-05-11 RX ORDER — ALBUTEROL SULFATE 90 UG/1
4 AEROSOL, METERED RESPIRATORY (INHALATION) PRN
Start: 2023-08-10

## 2023-05-11 RX ORDER — HEPARIN SODIUM (PORCINE) LOCK FLUSH IV SOLN 100 UNIT/ML 100 UNIT/ML
500 SOLUTION INTRAVENOUS PRN
Status: DISCONTINUED | OUTPATIENT
Start: 2023-05-11 | End: 2023-05-12 | Stop reason: HOSPADM

## 2023-05-11 RX ORDER — DIPHENHYDRAMINE HYDROCHLORIDE 50 MG/ML
50 INJECTION INTRAMUSCULAR; INTRAVENOUS ONCE
OUTPATIENT
Start: 2023-08-10 | End: 2023-08-10

## 2023-05-11 RX ORDER — METHYLPREDNISOLONE SODIUM SUCCINATE 125 MG/2ML
100 INJECTION, POWDER, LYOPHILIZED, FOR SOLUTION INTRAMUSCULAR; INTRAVENOUS ONCE
Status: COMPLETED | OUTPATIENT
Start: 2023-05-11 | End: 2023-05-11

## 2023-05-11 RX ORDER — ACETAMINOPHEN 325 MG/1
650 TABLET ORAL ONCE
Status: CANCELLED | OUTPATIENT
Start: 2023-08-10

## 2023-05-11 RX ORDER — SODIUM CHLORIDE 0.9 % (FLUSH) 0.9 %
5-40 SYRINGE (ML) INJECTION PRN
Status: DISCONTINUED | OUTPATIENT
Start: 2023-05-11 | End: 2023-05-12 | Stop reason: HOSPADM

## 2023-05-11 RX ORDER — DIPHENHYDRAMINE HYDROCHLORIDE 50 MG/ML
25 INJECTION INTRAMUSCULAR; INTRAVENOUS ONCE
Status: COMPLETED | OUTPATIENT
Start: 2023-05-11 | End: 2023-05-11

## 2023-05-11 RX ORDER — ACETAMINOPHEN 325 MG/1
650 TABLET ORAL ONCE
Status: COMPLETED | OUTPATIENT
Start: 2023-05-11 | End: 2023-05-11

## 2023-05-11 RX ORDER — DIPHENHYDRAMINE HYDROCHLORIDE 50 MG/ML
25 INJECTION INTRAMUSCULAR; INTRAVENOUS ONCE
Status: CANCELLED | OUTPATIENT
Start: 2023-08-10

## 2023-05-11 RX ORDER — ONDANSETRON 2 MG/ML
8 INJECTION INTRAMUSCULAR; INTRAVENOUS ONCE
Start: 2023-08-10 | End: 2023-08-10

## 2023-05-11 RX ORDER — METHYLPREDNISOLONE SODIUM SUCCINATE 125 MG/2ML
100 INJECTION, POWDER, LYOPHILIZED, FOR SOLUTION INTRAMUSCULAR; INTRAVENOUS ONCE
Status: CANCELLED | OUTPATIENT
Start: 2023-08-10

## 2023-05-11 RX ORDER — MEPERIDINE HYDROCHLORIDE 25 MG/ML
25 INJECTION INTRAMUSCULAR; INTRAVENOUS; SUBCUTANEOUS ONCE
Start: 2023-08-10 | End: 2023-08-10

## 2023-05-11 RX ORDER — ACETAMINOPHEN 325 MG/1
650 TABLET ORAL ONCE
Start: 2023-08-10 | End: 2023-08-10

## 2023-05-11 RX ORDER — HEPARIN SODIUM (PORCINE) LOCK FLUSH IV SOLN 100 UNIT/ML 100 UNIT/ML
500 SOLUTION INTRAVENOUS PRN
Status: CANCELLED | OUTPATIENT
Start: 2023-08-10

## 2023-05-11 RX ORDER — SODIUM CHLORIDE 9 MG/ML
INJECTION, SOLUTION INTRAVENOUS CONTINUOUS
Status: CANCELLED
Start: 2023-08-10

## 2023-05-11 RX ADMIN — SODIUM CHLORIDE, PRESERVATIVE FREE 10 ML: 5 INJECTION INTRAVENOUS at 09:58

## 2023-05-11 RX ADMIN — SODIUM CHLORIDE, PRESERVATIVE FREE 10 ML: 5 INJECTION INTRAVENOUS at 09:55

## 2023-05-11 RX ADMIN — OCRELIZUMAB 600 MG: 300 INJECTION INTRAVENOUS at 10:22

## 2023-05-11 RX ADMIN — SODIUM CHLORIDE, PRESERVATIVE FREE 10 ML: 5 INJECTION INTRAVENOUS at 10:01

## 2023-05-11 RX ADMIN — METHYLPREDNISOLONE SODIUM SUCCINATE 100 MG: 125 INJECTION, POWDER, FOR SOLUTION INTRAMUSCULAR; INTRAVENOUS at 09:55

## 2023-05-11 RX ADMIN — DIPHENHYDRAMINE HYDROCHLORIDE 25 MG: 50 INJECTION, SOLUTION INTRAMUSCULAR; INTRAVENOUS at 09:58

## 2023-05-11 RX ADMIN — SODIUM CHLORIDE: 9 INJECTION, SOLUTION INTRAVENOUS at 14:32

## 2023-05-11 RX ADMIN — ACETAMINOPHEN 650 MG: 325 TABLET, FILM COATED ORAL at 10:02

## 2023-05-11 ASSESSMENT — PAIN DESCRIPTION - PAIN TYPE: TYPE: CHRONIC PAIN

## 2023-05-11 ASSESSMENT — PAIN DESCRIPTION - FREQUENCY: FREQUENCY: CONTINUOUS

## 2023-05-11 ASSESSMENT — PAIN - FUNCTIONAL ASSESSMENT: PAIN_FUNCTIONAL_ASSESSMENT: PREVENTS OR INTERFERES SOME ACTIVE ACTIVITIES AND ADLS

## 2023-05-11 ASSESSMENT — PAIN SCALES - GENERAL: PAINLEVEL_OUTOF10: 5

## 2023-05-11 ASSESSMENT — PAIN DESCRIPTION - DESCRIPTORS: DESCRIPTORS: ACHING

## 2023-05-11 ASSESSMENT — PAIN DESCRIPTION - ONSET: ONSET: ON-GOING

## 2023-05-11 ASSESSMENT — PAIN DESCRIPTION - LOCATION: LOCATION: GENERALIZED

## 2023-05-11 ASSESSMENT — PAIN DESCRIPTION - ORIENTATION: ORIENTATION: OTHER (COMMENT)

## 2023-05-11 NOTE — FLOWSHEET NOTE
Patient tolerated infusion well. Remained on unit for2 hours after treatment. Patient alert and oriented x3. No distress noted. Vital signs stable. Patient denies any new or worsening pain. Educated patient on possible side effects and treatment of medication. Patient verbalized understanding. Offered patient education and/or discharge material. Patient  declined. Patient denies any needs. All questions answered. D/C in stable condition.

## 2023-05-23 NOTE — LETTER
[place] Department Medicaid  CERTIFICATION OF NECESSITY  FOR NON-EMERGENCY TRANSPORTATION   BY GROUND AMBULANCE      Individual Information   1. Name: Milo Santacruz 2. [place] Medicaid Billing Number:   3. Address: [place] [address]      Transportation Provider Information   4. Provider Name:   5. [place] Medicaid Provider Number: National Provider Identifier (NPI):     Certification  7. Criteria:  During transport, this individual requires:  [x] Medical treatment or continuous     supervision by an EMT. [] The administration or regulation of oxygen by another person. [] Supervised protective restraint. 8. Period Beginning Date:   5. Length  [x] Not more than 1 day(s)  [] One Year     Additional Information Relevant to Certification   10. Comments or Explanations, If Necessary or Appropriate     Multiple sclerosis, safety risk due to decreased strength, balance, and physical mobility, expressive aphasia, delayed responses, dependent for transfers       Certifying Practitioner Information   11. Name of Practitioner: Polina Barrow MD   12. [place] Medicaid Provider Number, If Applicable:  [address] Provider Identifier (NPI):     Signature Information   14. Date of Signature: 13. Name of Person Signin. Signature and Professional Designation     [facility] [id number]  Rev. 2015    [address] Encounter Date/Time: 2022 [address] [place] [address] Account: [de-identified]    MRN: [id number]    Patient: Milo Santacruz    Contact Serial #: [id number]      ENCOUNTER          Patient Class: I Private Enc?   No Unit RM BD: [address] Service: MED   Encounter DX: Cellulitis [A63.65]   ADM Provider: Rosalia Reed DO   Procedure:     ATT Provider: Polina Barrow MD   REF Provider:        Admission DX: Cellulitis and DX codes: L03.90      PATIENT                 Name: Milo Santacruz : 1955 (67 yrs)   Address: [address] [place] [address] Sex: Female   [place] [address]         Marital Status:    Employer: NOT EMPLOYED         Sikh: [facility]   Primary Care Provider: Maikel Betancourt DO         Primary Phone: [phone]   EMERGENCY CONTACT   Contact Name Legal Guardian? Relationship to Patient Home Phone Work Phone   1. Zac Kelly  2. Arnaldo Sol      Child  Child [phone]              GUARANTOR            Guarantor: Zuleyma      : 1955   Address: [address] [place] [address] Rd;[facility]* Sex: Female   [place] [address]     Relation to Patient: Self       Home Phone: [phone]   Guarantor ID: [id number]       Work Phone:     Guarantor Employer: NOT EMPLOYED         Status: NOT EMPLO*      COVERAGE        PRIMARY INSURANCE   Payor: MEDICARE Plan: MEDICARE PART A AND B   Payor Address: [facility],  [facility], [facility] [address]       Group Number:   Insurance Type: INDEMNITY   Subscriber Name: Rian Arroyo : 1955   Subscriber ID: [id number] Pat. Rel. to Sub: Self   SECONDARY INSURANCE   Payor: [place]ICAID [place] Plan: [facility] DEPT OF*   Payor Address:  [facility] [address], [place], [address]          Group Number:   Insurance Type: INDEMNITY   Subscriber Name: Rian Arroyo : 1955   Subscriber ID: [id number] Pat.  Rel. to Sub: SELF         CSN: [id number] No

## 2023-07-11 LAB
ALBUMIN SERPL-MCNC: 3.6 G/DL (ref 3.5–5.2)
ALP SERPL-CCNC: 105 U/L (ref 35–104)
ALT SERPL-CCNC: 14 U/L (ref 0–32)
ANION GAP SERPL CALCULATED.3IONS-SCNC: 9 MMOL/L (ref 7–16)
AST SERPL-CCNC: 12 U/L (ref 0–31)
BILIRUB SERPL-MCNC: 0.2 MG/DL (ref 0–1.2)
BUN SERPL-MCNC: 11 MG/DL (ref 6–23)
CALCIUM SERPL-MCNC: 9.1 MG/DL (ref 8.6–10.2)
CHLORIDE SERPL-SCNC: 105 MMOL/L (ref 98–107)
CO2 SERPL-SCNC: 27 MMOL/L (ref 22–29)
CREAT SERPL-MCNC: 0.7 MG/DL (ref 0.5–1)
GLUCOSE SERPL-MCNC: 95 MG/DL (ref 74–99)
POTASSIUM SERPL-SCNC: 3.7 MMOL/L (ref 3.5–5)
PROT SERPL-MCNC: 5.7 G/DL (ref 6.4–8.3)
SODIUM SERPL-SCNC: 141 MMOL/L (ref 132–146)

## 2023-10-03 ENCOUNTER — HOSPITAL ENCOUNTER (OUTPATIENT)
Dept: GENERAL RADIOLOGY | Age: 68
Discharge: HOME | End: 2023-10-05
Payer: MEDICARE

## 2023-10-03 DIAGNOSIS — Z12.31 VISIT FOR SCREENING MAMMOGRAM: ICD-10-CM

## 2023-10-03 PROCEDURE — 77063 BREAST TOMOSYNTHESIS BI: CPT

## 2023-11-01 ENCOUNTER — TELEPHONE (OUTPATIENT)
Dept: NEUROLOGY | Age: 68
End: 2023-11-01

## 2023-11-01 RX ORDER — ACETAMINOPHEN 325 MG/1
650 TABLET ORAL ONCE
OUTPATIENT
Start: 2023-11-01

## 2023-11-01 RX ORDER — SODIUM CHLORIDE 9 MG/ML
INJECTION, SOLUTION INTRAVENOUS CONTINUOUS
OUTPATIENT
Start: 2023-11-01

## 2023-11-01 RX ORDER — EPINEPHRINE 1 MG/ML
0.3 INJECTION, SOLUTION, CONCENTRATE INTRAVENOUS PRN
OUTPATIENT
Start: 2023-11-01

## 2023-11-01 RX ORDER — SODIUM CHLORIDE 0.9 % (FLUSH) 0.9 %
5-40 SYRINGE (ML) INJECTION PRN
OUTPATIENT
Start: 2023-11-01

## 2023-11-01 RX ORDER — SODIUM CHLORIDE 9 MG/ML
INJECTION, SOLUTION INTRAVENOUS CONTINUOUS
Start: 2023-11-01

## 2023-11-01 RX ORDER — DIPHENHYDRAMINE HYDROCHLORIDE 50 MG/ML
50 INJECTION INTRAMUSCULAR; INTRAVENOUS ONCE
OUTPATIENT
Start: 2023-11-01 | End: 2023-11-01

## 2023-11-01 RX ORDER — ONDANSETRON 2 MG/ML
8 INJECTION INTRAMUSCULAR; INTRAVENOUS ONCE
Start: 2023-11-01 | End: 2023-11-01

## 2023-11-01 RX ORDER — ALBUTEROL SULFATE 90 UG/1
4 AEROSOL, METERED RESPIRATORY (INHALATION) PRN
Start: 2023-11-01

## 2023-11-01 RX ORDER — HEPARIN 100 UNIT/ML
500 SYRINGE INTRAVENOUS PRN
OUTPATIENT
Start: 2023-11-01

## 2023-11-01 RX ORDER — ACETAMINOPHEN 325 MG/1
650 TABLET ORAL ONCE
Start: 2023-11-01 | End: 2023-11-01

## 2023-11-01 RX ORDER — MEPERIDINE HYDROCHLORIDE 25 MG/ML
25 INJECTION INTRAMUSCULAR; INTRAVENOUS; SUBCUTANEOUS ONCE
Start: 2023-11-01 | End: 2023-11-01

## 2023-11-01 RX ORDER — DIPHENHYDRAMINE HYDROCHLORIDE 50 MG/ML
25 INJECTION INTRAMUSCULAR; INTRAVENOUS ONCE
OUTPATIENT
Start: 2023-11-01

## 2023-11-08 ENCOUNTER — HOSPITAL ENCOUNTER (OUTPATIENT)
Dept: INFUSION THERAPY | Age: 68
Setting detail: INFUSION SERIES
Discharge: HOME | End: 2023-11-08
Payer: MEDICARE

## 2023-11-08 VITALS
DIASTOLIC BLOOD PRESSURE: 69 MMHG | RESPIRATION RATE: 16 BRPM | TEMPERATURE: 97.6 F | SYSTOLIC BLOOD PRESSURE: 148 MMHG | OXYGEN SATURATION: 97 % | HEART RATE: 73 BPM

## 2023-11-08 DIAGNOSIS — G35 MULTIPLE SCLEROSIS (HCC): Primary | ICD-10-CM

## 2023-11-08 PROCEDURE — 96365 THER/PROPH/DIAG IV INF INIT: CPT

## 2023-11-08 PROCEDURE — 2580000003 HC RX 258: Performed by: CLINICAL NURSE SPECIALIST

## 2023-11-08 PROCEDURE — 96375 TX/PRO/DX INJ NEW DRUG ADDON: CPT

## 2023-11-08 PROCEDURE — 6360000002 HC RX W HCPCS: Performed by: CLINICAL NURSE SPECIALIST

## 2023-11-08 PROCEDURE — 6370000000 HC RX 637 (ALT 250 FOR IP): Performed by: CLINICAL NURSE SPECIALIST

## 2023-11-08 PROCEDURE — 96366 THER/PROPH/DIAG IV INF ADDON: CPT

## 2023-11-08 RX ORDER — SODIUM CHLORIDE 9 MG/ML
INJECTION, SOLUTION INTRAVENOUS CONTINUOUS
OUTPATIENT
Start: 2024-04-10

## 2023-11-08 RX ORDER — ACETAMINOPHEN 325 MG/1
650 TABLET ORAL ONCE
Start: 2024-04-10 | End: 2024-04-10

## 2023-11-08 RX ORDER — DIPHENHYDRAMINE HYDROCHLORIDE 50 MG/ML
50 INJECTION INTRAMUSCULAR; INTRAVENOUS ONCE
Status: DISCONTINUED | OUTPATIENT
Start: 2023-11-08 | End: 2023-11-09 | Stop reason: HOSPADM

## 2023-11-08 RX ORDER — ACETAMINOPHEN 325 MG/1
650 TABLET ORAL ONCE
Status: DISCONTINUED | OUTPATIENT
Start: 2023-11-08 | End: 2023-11-09 | Stop reason: HOSPADM

## 2023-11-08 RX ORDER — DIPHENHYDRAMINE HYDROCHLORIDE 50 MG/ML
25 INJECTION INTRAMUSCULAR; INTRAVENOUS ONCE
Status: COMPLETED | OUTPATIENT
Start: 2023-11-08 | End: 2023-11-08

## 2023-11-08 RX ORDER — SODIUM CHLORIDE 9 MG/ML
INJECTION, SOLUTION INTRAVENOUS CONTINUOUS
Status: DISCONTINUED | OUTPATIENT
Start: 2023-11-08 | End: 2023-11-09 | Stop reason: HOSPADM

## 2023-11-08 RX ORDER — SODIUM CHLORIDE 9 MG/ML
INJECTION, SOLUTION INTRAVENOUS CONTINUOUS
Status: ACTIVE | OUTPATIENT
Start: 2023-11-08 | End: 2023-11-08

## 2023-11-08 RX ORDER — HEPARIN 100 UNIT/ML
500 SYRINGE INTRAVENOUS PRN
Status: DISCONTINUED | OUTPATIENT
Start: 2023-11-08 | End: 2023-11-09 | Stop reason: HOSPADM

## 2023-11-08 RX ORDER — DIPHENHYDRAMINE HYDROCHLORIDE 50 MG/ML
25 INJECTION INTRAMUSCULAR; INTRAVENOUS ONCE
OUTPATIENT
Start: 2024-04-10

## 2023-11-08 RX ORDER — HEPARIN 100 UNIT/ML
500 SYRINGE INTRAVENOUS PRN
OUTPATIENT
Start: 2024-04-10

## 2023-11-08 RX ORDER — ONDANSETRON 2 MG/ML
8 INJECTION INTRAMUSCULAR; INTRAVENOUS ONCE
Status: DISCONTINUED | OUTPATIENT
Start: 2023-11-08 | End: 2023-11-09 | Stop reason: HOSPADM

## 2023-11-08 RX ORDER — SODIUM CHLORIDE 0.9 % (FLUSH) 0.9 %
5-40 SYRINGE (ML) INJECTION PRN
OUTPATIENT
Start: 2024-04-10

## 2023-11-08 RX ORDER — MEPERIDINE HYDROCHLORIDE 25 MG/ML
25 INJECTION INTRAMUSCULAR; INTRAVENOUS; SUBCUTANEOUS ONCE
Status: DISCONTINUED | OUTPATIENT
Start: 2023-11-08 | End: 2023-11-09 | Stop reason: HOSPADM

## 2023-11-08 RX ORDER — ACETAMINOPHEN 325 MG/1
650 TABLET ORAL ONCE
Status: COMPLETED | OUTPATIENT
Start: 2023-11-08 | End: 2023-11-08

## 2023-11-08 RX ORDER — EPINEPHRINE 1 MG/ML
0.3 INJECTION, SOLUTION, CONCENTRATE INTRAVENOUS PRN
OUTPATIENT
Start: 2024-04-10

## 2023-11-08 RX ORDER — ACETAMINOPHEN 325 MG/1
650 TABLET ORAL ONCE
OUTPATIENT
Start: 2024-04-10

## 2023-11-08 RX ORDER — METHYLPREDNISOLONE SODIUM SUCCINATE 1 G/16ML
100 INJECTION, POWDER, LYOPHILIZED, FOR SOLUTION INTRAMUSCULAR; INTRAVENOUS ONCE
Status: DISCONTINUED | OUTPATIENT
Start: 2023-11-08 | End: 2023-11-09 | Stop reason: HOSPADM

## 2023-11-08 RX ORDER — ALBUTEROL SULFATE 90 UG/1
4 AEROSOL, METERED RESPIRATORY (INHALATION) PRN
Status: DISCONTINUED | OUTPATIENT
Start: 2023-11-08 | End: 2023-11-09 | Stop reason: HOSPADM

## 2023-11-08 RX ORDER — DIPHENHYDRAMINE HYDROCHLORIDE 50 MG/ML
50 INJECTION INTRAMUSCULAR; INTRAVENOUS ONCE
OUTPATIENT
Start: 2024-04-10 | End: 2024-04-10

## 2023-11-08 RX ORDER — EPINEPHRINE 1 MG/ML
0.3 INJECTION, SOLUTION, CONCENTRATE INTRAVENOUS PRN
Status: DISCONTINUED | OUTPATIENT
Start: 2023-11-08 | End: 2023-11-09 | Stop reason: HOSPADM

## 2023-11-08 RX ORDER — SODIUM CHLORIDE 9 MG/ML
INJECTION, SOLUTION INTRAVENOUS CONTINUOUS
Start: 2024-04-10

## 2023-11-08 RX ORDER — SODIUM CHLORIDE 0.9 % (FLUSH) 0.9 %
5-40 SYRINGE (ML) INJECTION PRN
Status: DISCONTINUED | OUTPATIENT
Start: 2023-11-08 | End: 2023-11-09 | Stop reason: HOSPADM

## 2023-11-08 RX ORDER — ONDANSETRON 2 MG/ML
8 INJECTION INTRAMUSCULAR; INTRAVENOUS ONCE
Start: 2024-04-10 | End: 2024-04-10

## 2023-11-08 RX ORDER — ALBUTEROL SULFATE 90 UG/1
4 AEROSOL, METERED RESPIRATORY (INHALATION) PRN
Start: 2024-04-10

## 2023-11-08 RX ORDER — MEPERIDINE HYDROCHLORIDE 25 MG/ML
25 INJECTION INTRAMUSCULAR; INTRAVENOUS; SUBCUTANEOUS ONCE
Start: 2024-04-10 | End: 2024-04-10

## 2023-11-08 RX ADMIN — SODIUM CHLORIDE, PRESERVATIVE FREE 10 ML: 5 INJECTION INTRAVENOUS at 09:36

## 2023-11-08 RX ADMIN — HYDROCORTISONE SODIUM SUCCINATE 100 MG: 100 INJECTION, POWDER, FOR SOLUTION INTRAMUSCULAR; INTRAVENOUS at 09:35

## 2023-11-08 RX ADMIN — ACETAMINOPHEN 650 MG: 325 TABLET, FILM COATED ORAL at 09:42

## 2023-11-08 RX ADMIN — SODIUM CHLORIDE: 9 INJECTION, SOLUTION INTRAVENOUS at 14:14

## 2023-11-08 RX ADMIN — OCRELIZUMAB 600 MG: 300 INJECTION INTRAVENOUS at 09:53

## 2023-11-08 RX ADMIN — DIPHENHYDRAMINE HYDROCHLORIDE 25 MG: 50 INJECTION, SOLUTION INTRAMUSCULAR; INTRAVENOUS at 09:36

## 2023-11-08 ASSESSMENT — PAIN DESCRIPTION - LOCATION: LOCATION: GENERALIZED

## 2023-11-08 ASSESSMENT — PAIN DESCRIPTION - FREQUENCY: FREQUENCY: CONTINUOUS

## 2023-11-08 ASSESSMENT — PAIN DESCRIPTION - ONSET: ONSET: ON-GOING

## 2023-11-08 ASSESSMENT — PAIN - FUNCTIONAL ASSESSMENT: PAIN_FUNCTIONAL_ASSESSMENT: PREVENTS OR INTERFERES WITH MANY ACTIVE NOT PASSIVE ACTIVITIES

## 2023-11-08 ASSESSMENT — PAIN SCALES - GENERAL: PAINLEVEL_OUTOF10: 8

## 2023-11-08 ASSESSMENT — PAIN DESCRIPTION - PAIN TYPE: TYPE: CHRONIC PAIN

## 2023-11-08 ASSESSMENT — PAIN DESCRIPTION - DESCRIPTORS: DESCRIPTORS: ACHING

## 2023-11-08 NOTE — FLOWSHEET NOTE
Patient tolerated infusion well. Remained on unit for  1 hour after treatment. Patient alert and oriented x3. No distress noted. Vital signs stable. Patient denies any new or worsening pain. Educated patient on possible side effects and treatment of medication. Patient verbalized understanding. Offered patient education and/or discharge material. Patient  declined. Patient denies any needs. All questions answered. D/C in stable condition.

## 2024-02-12 LAB
BACTERIA: ABNORMAL
BILIRUBIN URINE: NEGATIVE
COLOR: YELLOW
EPITHELIAL CELLS UA: ABNORMAL /HPF
GLUCOSE URINE: NEGATIVE MG/DL
KETONES, URINE: NEGATIVE MG/DL
LEUKOCYTE ESTERASE, URINE: ABNORMAL
NITRITE, URINE: NEGATIVE
PH UA: 6.5 (ref 5–9)
PROTEIN UA: 30 MG/DL
RBC UA: ABNORMAL /HPF
SPECIFIC GRAVITY UA: 1.01 (ref 1–1.03)
TURBIDITY: ABNORMAL
URINE HGB: ABNORMAL
UROBILINOGEN, URINE: 0.2 EU/DL (ref 0–1)
WBC UA: ABNORMAL /HPF

## 2024-02-13 LAB
CULTURE: NORMAL
CULTURE: NORMAL
SPECIMEN DESCRIPTION: NORMAL

## 2024-05-08 ENCOUNTER — HOSPITAL ENCOUNTER (OUTPATIENT)
Dept: INFUSION THERAPY | Age: 69
Setting detail: INFUSION SERIES
Discharge: HOME | End: 2024-05-08
Payer: MEDICARE

## 2024-05-08 VITALS
OXYGEN SATURATION: 96 % | DIASTOLIC BLOOD PRESSURE: 62 MMHG | RESPIRATION RATE: 16 BRPM | SYSTOLIC BLOOD PRESSURE: 121 MMHG | HEART RATE: 78 BPM | TEMPERATURE: 97.8 F

## 2024-05-08 DIAGNOSIS — G35 MULTIPLE SCLEROSIS (HCC): Primary | ICD-10-CM

## 2024-05-08 PROCEDURE — 96366 THER/PROPH/DIAG IV INF ADDON: CPT

## 2024-05-08 PROCEDURE — 96365 THER/PROPH/DIAG IV INF INIT: CPT

## 2024-05-08 PROCEDURE — 2580000003 HC RX 258: Performed by: CLINICAL NURSE SPECIALIST

## 2024-05-08 PROCEDURE — 96375 TX/PRO/DX INJ NEW DRUG ADDON: CPT

## 2024-05-08 PROCEDURE — 6370000000 HC RX 637 (ALT 250 FOR IP): Performed by: CLINICAL NURSE SPECIALIST

## 2024-05-08 PROCEDURE — 6360000002 HC RX W HCPCS: Performed by: CLINICAL NURSE SPECIALIST

## 2024-05-08 RX ORDER — ACETAMINOPHEN 325 MG/1
650 TABLET ORAL ONCE
Status: COMPLETED | OUTPATIENT
Start: 2024-05-08 | End: 2024-05-08

## 2024-05-08 RX ORDER — ACETAMINOPHEN 325 MG/1
650 TABLET ORAL ONCE
Start: 2024-10-09 | End: 2024-10-09

## 2024-05-08 RX ORDER — DULOXETIN HYDROCHLORIDE 30 MG/1
30 CAPSULE, DELAYED RELEASE ORAL DAILY
COMMUNITY

## 2024-05-08 RX ORDER — LORATADINE 10 MG/1
10 TABLET ORAL DAILY
COMMUNITY

## 2024-05-08 RX ORDER — SODIUM CHLORIDE 0.9 % (FLUSH) 0.9 %
5-40 SYRINGE (ML) INJECTION PRN
OUTPATIENT
Start: 2024-10-09

## 2024-05-08 RX ORDER — EPINEPHRINE 1 MG/ML
0.3 INJECTION, SOLUTION, CONCENTRATE INTRAVENOUS PRN
OUTPATIENT
Start: 2024-10-09

## 2024-05-08 RX ORDER — ACETAMINOPHEN 325 MG/1
650 TABLET ORAL ONCE
OUTPATIENT
Start: 2024-10-09

## 2024-05-08 RX ORDER — DIPHENHYDRAMINE HYDROCHLORIDE 50 MG/ML
50 INJECTION INTRAMUSCULAR; INTRAVENOUS ONCE
OUTPATIENT
Start: 2024-10-09 | End: 2024-10-09

## 2024-05-08 RX ORDER — HEPARIN 100 UNIT/ML
500 SYRINGE INTRAVENOUS PRN
OUTPATIENT
Start: 2024-10-09

## 2024-05-08 RX ORDER — ONDANSETRON 2 MG/ML
8 INJECTION INTRAMUSCULAR; INTRAVENOUS ONCE
Start: 2024-10-09 | End: 2024-10-09

## 2024-05-08 RX ORDER — ALBUTEROL SULFATE 90 UG/1
4 AEROSOL, METERED RESPIRATORY (INHALATION) PRN
Start: 2024-10-09

## 2024-05-08 RX ORDER — MEPERIDINE HYDROCHLORIDE 25 MG/ML
25 INJECTION INTRAMUSCULAR; INTRAVENOUS; SUBCUTANEOUS ONCE
Start: 2024-10-09 | End: 2024-10-09

## 2024-05-08 RX ORDER — SODIUM CHLORIDE 0.9 % (FLUSH) 0.9 %
5-40 SYRINGE (ML) INJECTION PRN
Status: DISCONTINUED | OUTPATIENT
Start: 2024-05-08 | End: 2024-05-09 | Stop reason: HOSPADM

## 2024-05-08 RX ORDER — DIPHENHYDRAMINE HYDROCHLORIDE 50 MG/ML
25 INJECTION INTRAMUSCULAR; INTRAVENOUS ONCE
Status: COMPLETED | OUTPATIENT
Start: 2024-05-08 | End: 2024-05-08

## 2024-05-08 RX ORDER — DIPHENHYDRAMINE HYDROCHLORIDE 50 MG/ML
25 INJECTION INTRAMUSCULAR; INTRAVENOUS ONCE
OUTPATIENT
Start: 2024-10-09

## 2024-05-08 RX ORDER — SODIUM CHLORIDE 9 MG/ML
INJECTION, SOLUTION INTRAVENOUS CONTINUOUS
Status: ACTIVE | OUTPATIENT
Start: 2024-05-08 | End: 2024-05-08

## 2024-05-08 RX ORDER — SODIUM CHLORIDE 9 MG/ML
INJECTION, SOLUTION INTRAVENOUS CONTINUOUS
OUTPATIENT
Start: 2024-10-09

## 2024-05-08 RX ORDER — SODIUM CHLORIDE 9 MG/ML
INJECTION, SOLUTION INTRAVENOUS CONTINUOUS
Start: 2024-10-09

## 2024-05-08 RX ADMIN — WATER 100 MG: 1 INJECTION INTRAMUSCULAR; INTRAVENOUS; SUBCUTANEOUS at 08:41

## 2024-05-08 RX ADMIN — ACETAMINOPHEN 650 MG: 325 TABLET, FILM COATED ORAL at 08:35

## 2024-05-08 RX ADMIN — SODIUM CHLORIDE: 9 INJECTION, SOLUTION INTRAVENOUS at 12:57

## 2024-05-08 RX ADMIN — DIPHENHYDRAMINE HYDROCHLORIDE 25 MG: 50 INJECTION INTRAMUSCULAR; INTRAVENOUS at 08:42

## 2024-05-08 RX ADMIN — OCRELIZUMAB 600 MG: 300 INJECTION INTRAVENOUS at 09:08

## 2024-05-08 RX ADMIN — SODIUM CHLORIDE: 9 INJECTION, SOLUTION INTRAVENOUS at 08:41

## 2024-05-08 RX ADMIN — SODIUM CHLORIDE, PRESERVATIVE FREE 10 ML: 5 INJECTION INTRAVENOUS at 08:44

## 2024-05-08 RX ADMIN — SODIUM CHLORIDE, PRESERVATIVE FREE 10 ML: 5 INJECTION INTRAVENOUS at 08:27

## 2024-05-08 ASSESSMENT — PAIN DESCRIPTION - LOCATION: LOCATION: SHOULDER;BACK

## 2024-05-08 ASSESSMENT — PAIN SCALES - GENERAL
PAINLEVEL_OUTOF10: 8
PAINLEVEL_OUTOF10: 0

## 2024-05-08 NOTE — FLOWSHEET NOTE
Discharged to home in stable condition, tolerated infusion well, all questions answered, VS stable, does not want dc instructions, dc'd with caregiver, pt alert times 3

## 2025-07-22 ENCOUNTER — TELEPHONE (OUTPATIENT)
Age: 70
End: 2025-07-22

## 2025-07-22 NOTE — TELEPHONE ENCOUNTER
Requesting information for referral to [facility]       [facility] social; worker, Iban checking status of above referral for [facility]. Please advise [phone] ext [phone]

## (undated) DEVICE — 4-PORT MANIFOLD: Brand: NEPTUNE 2

## (undated) DEVICE — TOWEL,OR,DSP,ST,BLUE,STD,6/PK,12PK/CS: Brand: MEDLINE

## (undated) DEVICE — PACK PROCEDURE SURG GEN CUST

## (undated) DEVICE — SUTURE VCRL + SZ 3-0 L18IN ABSRB UD PS-1 L24MM 3/8 CIR PRIM VCP683G

## (undated) DEVICE — GOWN,SIRUS,FABRNF,XL,20/CS: Brand: MEDLINE

## (undated) DEVICE — STRIP,CLOSURE,WOUND,MEDI-STRIP,1/2X4: Brand: MEDLINE

## (undated) DEVICE — DRAPE,LAPAROTOMY,PCH,STERILE: Brand: MEDLINE

## (undated) DEVICE — DEFENDO AIR WATER SUCTION AND BIOPSY VALVE KIT FOR  OLYMPUS: Brand: DEFENDO AIR/WATER/SUCTION AND BIOPSY VALVE

## (undated) DEVICE — BLOCK BITE 60FR RUBBER ADLT DENTAL

## (undated) DEVICE — GRADUATE TRIANG MEASURE 1000ML BLK PRNT

## (undated) DEVICE — DOUBLE BASIN SET: Brand: MEDLINE INDUSTRIES, INC.

## (undated) DEVICE — BAG: SPONGE CT 10.25X32 2.0ML BLU 250/CS: Brand: MEDICAL ACTION INDUSTRIES

## (undated) DEVICE — DRESSING FOAM W22XL25CM FILVE LAYR FOAM DP DEF SAFETAC

## (undated) DEVICE — SPONGE GZ W4XL4IN RAYON POLY FILL CVR W/ NONWOVEN FAB

## (undated) DEVICE — SPONGE,DRAIN,NONWVN,4"X4",6PLY,STRL,LF: Brand: MEDLINE

## (undated) DEVICE — GOWN,SIRUS,FABRNF,L,20/CS: Brand: MEDLINE

## (undated) DEVICE — ELECTRODE PT RET AD L9FT HI MOIST COND ADH HYDRGEL CORDED

## (undated) DEVICE — SWABSTICK SURG PREP BENZOIN TINCTURE SINGLE ST

## (undated) DEVICE — BRA SURG LG 40-42IN WHT LYCRA FR HK AND LOOP CLSR WIDE ADJ

## (undated) DEVICE — NEEDLE HYPO 25GA L1.5IN BLU POLYPR HUB S STL REG BVL STR

## (undated) DEVICE — GLOVE ORANGE PI 7 1/2   MSG9075

## (undated) DEVICE — TRAY SET AMBULATORY INSTRUMENT S REUSABLE

## (undated) DEVICE — ADHESIVE SKIN CLSR 0.7ML TOP DERMBND ADV

## (undated) DEVICE — GAUZE,SPONGE,4"X4",8PLY,STRL,LF,10/TRAY: Brand: MEDLINE

## (undated) DEVICE — DRESSING COMP W4XL4IN N ADH PD W2.5XL2.5IN GZ BORDERED ADH

## (undated) DEVICE — SPONGE LAP W18XL18IN WHT COT 4 PLY FLD STRUNG RADPQ DISP ST

## (undated) DEVICE — INTENDED FOR TISSUE SEPARATION, AND OTHER PROCEDURES THAT REQUIRE A SHARP SURGICAL BLADE TO PUNCTURE OR CUT.: Brand: BARD-PARKER ® STAINLESS STEEL BLADES

## (undated) DEVICE — 3M™ MICROPORE™ TAPE, 1530-2: Brand: 3M™ MICROPORE™

## (undated) DEVICE — STAPLER SKIN STPL LN H1.5-3.5XL30MM REG TISS 2 ROW 8 FIRING

## (undated) DEVICE — PACK,UNIV, II AURORA: Brand: MEDLINE

## (undated) DEVICE — DRAIN SURG 15FR SIL RND CHN W/ TRCR FULL FLUT DBL WRP TRAD

## (undated) DEVICE — NEEDLE,22GX1.5",REG,BEVEL: Brand: MEDLINE

## (undated) DEVICE — BLADE,STAINLESS-STEEL,15,STRL,DISPOSABLE: Brand: MEDLINE

## (undated) DEVICE — CANNULA NSL ORAL AD FOR CAPNOFLEX CO2 O2 AIRLFE

## (undated) DEVICE — 3M™ TRANSPORE™ WHITE SURGICAL TAPE 1534-2, 2 INCH X 10 YARD (5CM X 9,1M), 6 ROLLS/CARTON 10 CARTONS/CASE: Brand: 3M™ TRANSPORE™

## (undated) DEVICE — SOLUTION IV IRRIG POUR BRL 0.9% SODIUM CHL 2F7124

## (undated) DEVICE — CHLORAPREP 26ML ORANGE

## (undated) DEVICE — SPONGE,PEANUT,XRAY,ST,SM,3/8",5/CARD: Brand: MEDLINE INDUSTRIES, INC.

## (undated) DEVICE — BINDER ABD M/L H12IN FOR 46-62IN WHT 4 SLD PNL DSGN HOOP

## (undated) DEVICE — Device

## (undated) DEVICE — GAUZE,SPONGE,POST-OP,4X3,STRL,LF: Brand: MEDLINE